# Patient Record
Sex: MALE | Race: WHITE | NOT HISPANIC OR LATINO | Employment: UNEMPLOYED | URBAN - METROPOLITAN AREA
[De-identification: names, ages, dates, MRNs, and addresses within clinical notes are randomized per-mention and may not be internally consistent; named-entity substitution may affect disease eponyms.]

---

## 2017-01-05 LAB
CHOLEST SERPL-MSCNC: 208 MG/DL (ref 0–200)
HDLC SERPL-MCNC: 50 MG/DL
LDLC SERPL CALC-MCNC: 142 MG/DL
NON HDL CHOL. (LDL+VLDL): 158
TRIGL SERPL-MCNC: 81 MG/DL

## 2017-06-25 ENCOUNTER — HOSPITAL ENCOUNTER (EMERGENCY)
Facility: HOSPITAL | Age: 72
Discharge: HOME | End: 2017-06-25
Payer: MEDICARE

## 2017-06-25 VITALS
TEMPERATURE: 98 F | SYSTOLIC BLOOD PRESSURE: 164 MMHG | RESPIRATION RATE: 20 BRPM | WEIGHT: 150 LBS | OXYGEN SATURATION: 97 % | BODY MASS INDEX: 21 KG/M2 | DIASTOLIC BLOOD PRESSURE: 80 MMHG | HEIGHT: 71 IN | HEART RATE: 67 BPM

## 2017-06-25 DIAGNOSIS — T63.461A WASP STING, ACCIDENTAL OR UNINTENTIONAL, INITIAL ENCOUNTER: Primary | ICD-10-CM

## 2017-06-25 PROCEDURE — 96372 THER/PROPH/DIAG INJ SC/IM: CPT

## 2017-06-25 PROCEDURE — 63600175 PHARM REV CODE 636 W HCPCS: Performed by: PHYSICIAN ASSISTANT

## 2017-06-25 PROCEDURE — 99283 EMERGENCY DEPT VISIT LOW MDM: CPT | Mod: 25

## 2017-06-25 RX ORDER — DIPHENHYDRAMINE HCL 25 MG
25 CAPSULE ORAL EVERY 6 HOURS PRN
Qty: 20 CAPSULE | Refills: 0 | Status: SHIPPED | OUTPATIENT
Start: 2017-06-25 | End: 2019-01-22

## 2017-06-25 RX ORDER — FAMOTIDINE 20 MG/1
20 TABLET, FILM COATED ORAL 2 TIMES DAILY
Qty: 20 TABLET | Refills: 0 | Status: SHIPPED | OUTPATIENT
Start: 2017-06-25 | End: 2019-01-22

## 2017-06-25 RX ORDER — PREDNISONE 20 MG/1
40 TABLET ORAL DAILY
Qty: 10 TABLET | Refills: 0 | Status: SHIPPED | OUTPATIENT
Start: 2017-06-25 | End: 2017-06-30

## 2017-06-25 RX ADMIN — METHYLPREDNISOLONE SODIUM SUCCINATE 125 MG: 125 INJECTION, POWDER, FOR SOLUTION INTRAMUSCULAR; INTRAVENOUS at 04:06
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
## 2019-01-22 ENCOUNTER — OFFICE VISIT (OUTPATIENT)
Dept: INTERNAL MEDICINE | Facility: CLINIC | Age: 74
End: 2019-01-22
Payer: MEDICARE

## 2019-01-22 ENCOUNTER — HOSPITAL ENCOUNTER (OUTPATIENT)
Dept: RADIOLOGY | Facility: HOSPITAL | Age: 74
Discharge: HOME | End: 2019-01-22
Attending: FAMILY MEDICINE
Payer: MEDICARE

## 2019-01-22 VITALS
HEART RATE: 66 BPM | TEMPERATURE: 98 F | HEIGHT: 70 IN | WEIGHT: 153 LBS | RESPIRATION RATE: 18 BRPM | OXYGEN SATURATION: 98 % | SYSTOLIC BLOOD PRESSURE: 122 MMHG | BODY MASS INDEX: 21.9 KG/M2 | DIASTOLIC BLOOD PRESSURE: 70 MMHG

## 2019-01-22 DIAGNOSIS — R63.4 WEIGHT LOSS: ICD-10-CM

## 2019-01-22 DIAGNOSIS — Z12.9 SCREENING FOR CANCER: ICD-10-CM

## 2019-01-22 DIAGNOSIS — D64.9 ANEMIA, UNSPECIFIED TYPE: ICD-10-CM

## 2019-01-22 DIAGNOSIS — R05.9 COUGH: ICD-10-CM

## 2019-01-22 DIAGNOSIS — Z12.5 SCREENING FOR PROSTATE CANCER: ICD-10-CM

## 2019-01-22 DIAGNOSIS — Z00.00 WELLNESS EXAMINATION: ICD-10-CM

## 2019-01-22 DIAGNOSIS — I10 ESSENTIAL HYPERTENSION: ICD-10-CM

## 2019-01-22 DIAGNOSIS — E78.5 HYPERLIPIDEMIA, UNSPECIFIED HYPERLIPIDEMIA TYPE: ICD-10-CM

## 2019-01-22 DIAGNOSIS — C34.90 MALIGNANT NEOPLASM OF LUNG, UNSPECIFIED LATERALITY, UNSPECIFIED PART OF LUNG: Primary | ICD-10-CM

## 2019-01-22 DIAGNOSIS — J44.9 CHRONIC OBSTRUCTIVE PULMONARY DISEASE, UNSPECIFIED COPD TYPE: ICD-10-CM

## 2019-01-22 DIAGNOSIS — C34.90 MALIGNANT NEOPLASM OF LUNG, UNSPECIFIED LATERALITY, UNSPECIFIED PART OF LUNG: ICD-10-CM

## 2019-01-22 PROCEDURE — 71046 X-RAY EXAM CHEST 2 VIEWS: CPT | Mod: TC

## 2019-01-22 PROCEDURE — 99214 OFFICE O/P EST MOD 30 MIN: CPT | Mod: PBBFAC,25 | Performed by: FAMILY MEDICINE

## 2019-01-22 PROCEDURE — 99999 PR PBB SHADOW E&M-EST. PATIENT-LVL IV: ICD-10-PCS | Mod: PBBFAC,,, | Performed by: FAMILY MEDICINE

## 2019-01-22 PROCEDURE — 71046 XR CHEST PA AND LATERAL: ICD-10-PCS | Mod: 26,,, | Performed by: RADIOLOGY

## 2019-01-22 PROCEDURE — 99999 PR PBB SHADOW E&M-EST. PATIENT-LVL IV: CPT | Mod: PBBFAC,,, | Performed by: FAMILY MEDICINE

## 2019-01-22 PROCEDURE — 99397 PER PM REEVAL EST PAT 65+ YR: CPT | Mod: S$PBB,,, | Performed by: FAMILY MEDICINE

## 2019-01-22 PROCEDURE — 99397 PR PREVENTIVE VISIT,EST,65 & OVER: ICD-10-PCS | Mod: S$PBB,,, | Performed by: FAMILY MEDICINE

## 2019-01-22 PROCEDURE — 71046 X-RAY EXAM CHEST 2 VIEWS: CPT | Mod: 26,,, | Performed by: RADIOLOGY

## 2019-01-22 RX ORDER — ATORVASTATIN CALCIUM 10 MG/1
TABLET, FILM COATED ORAL
COMMUNITY
Start: 2018-12-28 | End: 2019-03-19 | Stop reason: SDUPTHER

## 2019-01-22 RX ORDER — ALBUTEROL SULFATE 90 UG/1
AEROSOL, METERED RESPIRATORY (INHALATION)
COMMUNITY
Start: 2018-11-16 | End: 2020-09-24 | Stop reason: SDUPTHER

## 2019-01-22 RX ORDER — BRIMONIDINE TARTRATE AND TIMOLOL MALEATE 2; 5 MG/ML; MG/ML
1 SOLUTION OPHTHALMIC
COMMUNITY
Start: 2012-05-13 | End: 2019-01-22 | Stop reason: SDUPTHER

## 2019-01-22 RX ORDER — LATANOPROST 50 UG/ML
SOLUTION/ DROPS OPHTHALMIC
COMMUNITY
Start: 2017-06-08
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
## 2019-01-31 ENCOUNTER — HOSPITAL ENCOUNTER (OUTPATIENT)
Dept: RADIOLOGY | Facility: HOSPITAL | Age: 74
Discharge: HOME | End: 2019-01-31
Attending: FAMILY MEDICINE
Payer: MEDICARE

## 2019-01-31 DIAGNOSIS — R05.9 COUGH: ICD-10-CM

## 2019-01-31 DIAGNOSIS — Z12.9 SCREENING FOR CANCER: ICD-10-CM

## 2019-01-31 DIAGNOSIS — J44.9 CHRONIC OBSTRUCTIVE PULMONARY DISEASE, UNSPECIFIED COPD TYPE: ICD-10-CM

## 2019-01-31 DIAGNOSIS — C34.90 MALIGNANT NEOPLASM OF LUNG, UNSPECIFIED LATERALITY, UNSPECIFIED PART OF LUNG: ICD-10-CM

## 2019-01-31 DIAGNOSIS — R63.4 WEIGHT LOSS: ICD-10-CM

## 2019-01-31 PROCEDURE — 71250 CT THORAX DX C-: CPT | Mod: TC

## 2019-03-19 RX ORDER — ATORVASTATIN CALCIUM 10 MG/1
10 TABLET, FILM COATED ORAL DAILY
Qty: 30 TABLET | Refills: 3 | Status: SHIPPED | OUTPATIENT
Start: 2019-03-19 | End: 2019-07-22

## 2019-04-08 ENCOUNTER — DOCUMENTATION ONLY (OUTPATIENT)
Dept: ADMINISTRATIVE | Facility: HOSPITAL | Age: 74
End: 2019-04-08

## 2019-04-22 ENCOUNTER — OFFICE VISIT (OUTPATIENT)
Dept: INTERNAL MEDICINE | Facility: CLINIC | Age: 74
End: 2019-04-22
Payer: MEDICARE

## 2019-04-22 VITALS
TEMPERATURE: 97 F | WEIGHT: 155.19 LBS | OXYGEN SATURATION: 96 % | SYSTOLIC BLOOD PRESSURE: 126 MMHG | BODY MASS INDEX: 22.22 KG/M2 | DIASTOLIC BLOOD PRESSURE: 64 MMHG | HEIGHT: 70 IN | HEART RATE: 60 BPM

## 2019-04-22 DIAGNOSIS — I10 ESSENTIAL HYPERTENSION: ICD-10-CM

## 2019-04-22 DIAGNOSIS — F17.200 SMOKER: ICD-10-CM

## 2019-04-22 DIAGNOSIS — E78.5 HYPERLIPIDEMIA, UNSPECIFIED HYPERLIPIDEMIA TYPE: ICD-10-CM

## 2019-04-22 DIAGNOSIS — Z71.85 VACCINE COUNSELING: ICD-10-CM

## 2019-04-22 DIAGNOSIS — R73.9 ELEVATED BLOOD SUGAR: Primary | ICD-10-CM

## 2019-04-22 PROCEDURE — 99999 PR PBB SHADOW E&M-EST. PATIENT-LVL III: CPT | Mod: PBBFAC,,, | Performed by: FAMILY MEDICINE

## 2019-04-22 PROCEDURE — 99999 PR PBB SHADOW E&M-EST. PATIENT-LVL III: ICD-10-PCS | Mod: PBBFAC,,, | Performed by: FAMILY MEDICINE

## 2019-04-22 PROCEDURE — 99213 OFFICE O/P EST LOW 20 MIN: CPT | Mod: S$PBB,,, | Performed by: FAMILY MEDICINE

## 2019-04-22 PROCEDURE — 99213 PR OFFICE/OUTPT VISIT, EST, LEVL III, 20-29 MIN: ICD-10-PCS | Mod: S$PBB,,, | Performed by: FAMILY MEDICINE

## 2019-04-22 PROCEDURE — 99213 OFFICE O/P EST LOW 20 MIN: CPT | Mod: PBBFAC | Performed by: FAMILY MEDICINE

## 2019-04-22 RX ORDER — ZOSTER VACCINE RECOMBINANT, ADJUVANTED 50 MCG/0.5
KIT INTRAMUSCULAR
Qty: 1 ML | Refills: 0 | Status: SHIPPED | OUTPATIENT
Start: 2019-04-22 | End: 2020-09-24

## 2019-04-22 RX ORDER — ZOSTER VACCINE RECOMBINANT, ADJUVANTED 50 MCG/0.5
KIT INTRAMUSCULAR
Refills: 0 | COMMUNITY
Start: 2019-01-21 | End: 2019-04-22 | Stop reason: SDUPTHER

## 2019-04-22 NOTE — PROGRESS NOTES
Humberto Carlson  04/22/2019  [id number]    Markie Belcher MD  Patient Care Team:  Markie Belcher MD as PCP - General (Family Medicine)        Chief Complaint:  Chief Complaint   Patient presents with    3 month follow up       History of Present Illness:  This patient is here for recheck visit in needs to get another order for the shingrix vaccine to take to the pharmacy.  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  We looked up his CT scan of the chest and it only showed old scarring and I have asked him to get this done yearly.  His blood sugar was slightly elevated on last evaluation so he will come in next visit fasting so we can recheck it.      History:  Past Medical History:   Diagnosis Date    Asthma     Cancer     Cataract     Encounter for blood transfusion     GERD (gastroesophageal reflux disease)     Glaucoma     Hyperlipidemia     Seizures      Past Surgical History:   Procedure Laterality Date    BRAIN SURGERY      EYE SURGERY      LUNG SURGERY      Partial lung removed Left      No family history on file.  Social History     Socioeconomic History    Marital status:      Spouse name: Not on file    Number of children: Not on file    Years of education: Not on file    Highest education level: Not on file   Occupational History    Not on file   Social Needs    Financial resource strain: Not on file    Food insecurity:     Worry: Not on file     Inability: Not on file    Transportation needs:     Medical: Not on file     Non-medical: Not on file   Tobacco Use    Smoking status: Current Every Day Smoker     Packs/day: 2.00     Types: Cigarettes    Smokeless tobacco: Never Used   Substance and Sexual Activity    Alcohol use: No    Drug use: No    Sexual activity: Not on file   Lifestyle    Physical activity:     Days per week: Not on file      Minutes per session: Not on file    Stress: Not on file   Relationships    Social connections:     Talks on phone: Not on file     Gets together: Not on file     Attends Holiness service: Not on file     Active member of club or organization: Not on file     Attends meetings of clubs or organizations: Not on file     Relationship status: Not on file   Other Topics Concern    Not on file   Social History Narrative    Not on file     There is no problem list on file for this patient.    Review of patient's allergies indicates:   Allergen Reactions    Albuterol      Other reaction(s): sweaty  Other reaction(s): chills       The following were reviewed at this visit: active problem list, medication list, allergies, family history, social history, and health maintenance.    Medications:  Current Outpatient Medications on File Prior to Visit   Medication Sig Dispense Refill    atorvastatin (LIPITOR) 10 MG tablet Take 1 tablet (10 mg total) by mouth once daily. 30 tablet 3    brimonidine-timolol (COMBIGAN) 0.2-0.5 % Drop Inject 1 drop into the eye Twice daily.      latanoprost 0.005 % ophthalmic solution       phenytoin (DILANTIN) 100 MG ER capsule Take by mouth Three times a day.      PROAIR HFA 90 mcg/actuation inhaler       [DISCONTINUED] SHINGRIX, PF, 50 mcg/0.5 mL injection TO BE ADMINISTERED BY PHARMACIST FOR IMMUNIZATION  0     No current facility-administered medications on file prior to visit.        Medications have been reviewed and reconciled with patient at this visit.      Exam:  Wt Readings from Last 3 Encounters:   04/22/19 70.4 kg (155 lb 3.3 oz)   01/22/19 69.4 kg (153 lb)   06/25/17 68 kg (150 lb)     Temp Readings from Last 3 Encounters:   04/22/19 96.5 °F (35.8 °C) (Tympanic)   01/22/19 97.5 °F (36.4 °C) (Tympanic)   06/25/17 98 °F (36.7 °C) (Oral)     BP Readings from Last 3 Encounters:   04/22/19 126/64   01/22/19 122/70   06/25/17 (!) 164/80     Pulse Readings from Last 3 Encounters:    04/22/19 60   01/22/19 66   06/25/17 67     Body mass index is 22.59 kg/m².      Review of Systems   Constitutional: Negative for chills, fever and weight loss.   Respiratory: Negative for shortness of breath.    Cardiovascular: Negative for chest pain and palpitations.     Physical Exam alert and oriented, well-nourished well-developed ambulatory and in no acute distress  Heart regular rate and rhythm ///lungs-with few chronic rhonchi scattered  Abdomen soft nontender  Neuro grossly intact  Psychiatric good affect and cognition    The patient still not interested in trying to quit smoking.          Humberto was seen today for 3 month follow up.    Diagnoses and all orders for this visit:    Essential hypertension    Hyperlipidemia, unspecified hyperlipidemia type    Smoker    Vaccine counseling    Other orders  -     SHINGRIX, PF, 50 mcg/0.5 mL injection; TO BE ADMINISTERED BY PHARMACIST FOR IMMUNIZATION                  Follow up: Follow up in about 3 months (around 7/22/2019).    After visit summary was printed and given to patient upon discharge today.  Patient goals and care plan are included in After Visit Summary.

## 2019-07-22 ENCOUNTER — LAB VISIT (OUTPATIENT)
Dept: LAB | Facility: HOSPITAL | Age: 74
End: 2019-07-22
Attending: FAMILY MEDICINE
Payer: MEDICARE

## 2019-07-22 ENCOUNTER — OFFICE VISIT (OUTPATIENT)
Dept: INTERNAL MEDICINE | Facility: CLINIC | Age: 74
End: 2019-07-22
Payer: MEDICARE

## 2019-07-22 VITALS
BODY MASS INDEX: 21.69 KG/M2 | SYSTOLIC BLOOD PRESSURE: 118 MMHG | HEART RATE: 64 BPM | RESPIRATION RATE: 18 BRPM | TEMPERATURE: 98 F | DIASTOLIC BLOOD PRESSURE: 68 MMHG | WEIGHT: 149.06 LBS

## 2019-07-22 DIAGNOSIS — I10 ESSENTIAL HYPERTENSION: ICD-10-CM

## 2019-07-22 DIAGNOSIS — E78.5 HYPERLIPIDEMIA, UNSPECIFIED HYPERLIPIDEMIA TYPE: ICD-10-CM

## 2019-07-22 DIAGNOSIS — R73.9 ELEVATED BLOOD SUGAR: ICD-10-CM

## 2019-07-22 DIAGNOSIS — Z87.892 HISTORY OF ANAPHYLAXIS: ICD-10-CM

## 2019-07-22 DIAGNOSIS — E78.5 HYPERLIPIDEMIA, UNSPECIFIED HYPERLIPIDEMIA TYPE: Primary | ICD-10-CM

## 2019-07-22 DIAGNOSIS — G40.909 SEIZURE DISORDER: ICD-10-CM

## 2019-07-22 LAB
ANION GAP SERPL CALC-SCNC: 10 MMOL/L (ref 8–16)
BUN SERPL-MCNC: 10 MG/DL (ref 8–23)
CALCIUM SERPL-MCNC: 9.1 MG/DL (ref 8.7–10.5)
CHLORIDE SERPL-SCNC: 103 MMOL/L (ref 95–110)
CHOLEST SERPL-MCNC: 167 MG/DL (ref 120–199)
CHOLEST/HDLC SERPL: 2.7 {RATIO} (ref 2–5)
CO2 SERPL-SCNC: 26 MMOL/L (ref 23–29)
CREAT SERPL-MCNC: 0.8 MG/DL (ref 0.5–1.4)
EST. GFR  (AFRICAN AMERICAN): >60 ML/MIN/1.73 M^2
EST. GFR  (NON AFRICAN AMERICAN): >60 ML/MIN/1.73 M^2
ESTIMATED AVG GLUCOSE: 108 MG/DL (ref 68–131)
GLUCOSE SERPL-MCNC: 113 MG/DL (ref 70–110)
HBA1C MFR BLD HPLC: 5.4 % (ref 4–5.6)
HDLC SERPL-MCNC: 62 MG/DL (ref 40–75)
HDLC SERPL: 37.1 % (ref 20–50)
LDLC SERPL CALC-MCNC: 87.4 MG/DL (ref 63–159)
NONHDLC SERPL-MCNC: 105 MG/DL
POTASSIUM SERPL-SCNC: 4.6 MMOL/L (ref 3.5–5.1)
SODIUM SERPL-SCNC: 139 MMOL/L (ref 136–145)
TRIGL SERPL-MCNC: 88 MG/DL (ref 30–150)

## 2019-07-22 PROCEDURE — 99999 PR PBB SHADOW E&M-EST. PATIENT-LVL IV: ICD-10-PCS | Mod: PBBFAC,,, | Performed by: FAMILY MEDICINE

## 2019-07-22 PROCEDURE — 99214 OFFICE O/P EST MOD 30 MIN: CPT | Mod: PBBFAC,25 | Performed by: FAMILY MEDICINE

## 2019-07-22 PROCEDURE — 99214 OFFICE O/P EST MOD 30 MIN: CPT | Mod: S$PBB,,, | Performed by: FAMILY MEDICINE

## 2019-07-22 PROCEDURE — 83036 HEMOGLOBIN GLYCOSYLATED A1C: CPT

## 2019-07-22 PROCEDURE — 80061 LIPID PANEL: CPT

## 2019-07-22 PROCEDURE — 36415 COLL VENOUS BLD VENIPUNCTURE: CPT

## 2019-07-22 PROCEDURE — 99999 PR PBB SHADOW E&M-EST. PATIENT-LVL IV: CPT | Mod: PBBFAC,,, | Performed by: FAMILY MEDICINE

## 2019-07-22 PROCEDURE — 90471 IMMUNIZATION ADMIN: CPT | Mod: PBBFAC

## 2019-07-22 PROCEDURE — 99214 PR OFFICE/OUTPT VISIT, EST, LEVL IV, 30-39 MIN: ICD-10-PCS | Mod: S$PBB,,, | Performed by: FAMILY MEDICINE

## 2019-07-22 PROCEDURE — 80048 BASIC METABOLIC PNL TOTAL CA: CPT

## 2019-07-22 RX ORDER — ATORVASTATIN CALCIUM 10 MG/1
20 TABLET, FILM COATED ORAL DAILY
Qty: 30 TABLET | Refills: 11 | Status: SHIPPED | OUTPATIENT
Start: 2019-07-22 | End: 2020-09-24 | Stop reason: SDUPTHER

## 2019-07-22 NOTE — PROGRESS NOTES
Humberto Carlson  2019  [id number]    Markie Belcher MD  Patient Care Team:  Markie Belcher MD as PCP - General (Family Medicine)        Chief Complaint:  Chief Complaint   Patient presents with    Follow-up    Medication Refill     Atorvastatin       History of Present Illness:  Mr. carlson states that he is generally doing well and still active taking care of  activities for people.    He needs atorvastatin refill today and because his lipids were elevated we will increase to 20 mg.    He had the shingles 2nd vaccine a few months ago but is due for a tetanus and will get that today.    We discussed smoking cessation but he is not interested in discontinuing says his lungs are doing fine and he has no shortness of breath except occasionally with strenuous exertion doing carpentry type projects and his symptoms resolved if he uses his inhaler.  Also the patient states that his epi pens or  and so we will refill prescription.    The patient sees a neurologist yearly for medication for his seizure disorder but fortunately he has not had a seizure in years because he takes his medication regularly.          History:  Past Medical History:   Diagnosis Date    Asthma     Cancer     Cataract     Encounter for blood transfusion     Essential hypertension 2019    GERD (gastroesophageal reflux disease)     Glaucoma     Hyperlipidemia     Seizures      Past Surgical History:   Procedure Laterality Date    BRAIN SURGERY      EYE SURGERY      LUNG SURGERY      Partial lung removed Left      History reviewed. No pertinent family history.  Social History     Socioeconomic History    Marital status:      Spouse name: Not on file    Number of children: Not on file    Years of education: Not on file    Highest education level: Not on file   Occupational History    Not on file   Social Needs    Financial resource strain: Not on file    Food insecurity:     Worry: Not on file      Inability: Not on file    Transportation needs:     Medical: Not on file     Non-medical: Not on file   Tobacco Use    Smoking status: Current Every Day Smoker     Packs/day: 2.00     Types: Cigarettes    Smokeless tobacco: Never Used   Substance and Sexual Activity    Alcohol use: No    Drug use: No    Sexual activity: Not on file   Lifestyle    Physical activity:     Days per week: Not on file     Minutes per session: Not on file    Stress: Not on file   Relationships    Social connections:     Talks on phone: Not on file     Gets together: Not on file     Attends Pentecostal service: Not on file     Active member of club or organization: Not on file     Attends meetings of clubs or organizations: Not on file     Relationship status: Not on file   Other Topics Concern    Not on file   Social History Narrative    Not on file     Patient Active Problem List   Diagnosis    Hyperlipidemia    Essential hypertension    History of anaphylaxis    Elevated blood sugar    Seizure disorder     Review of patient's allergies indicates:   Allergen Reactions    Albuterol      Other reaction(s): sweaty  Other reaction(s): chills       The following were reviewed at this visit: active problem list, medication list, allergies, family history, social history, and health maintenance.    Medications:  Current Outpatient Medications on File Prior to Visit   Medication Sig Dispense Refill    brimonidine-timolol (COMBIGAN) 0.2-0.5 % Drop Inject 1 drop into the eye Twice daily.      latanoprost 0.005 % ophthalmic solution       phenytoin (DILANTIN) 100 MG ER capsule Take by mouth Three times a day.      PROAIR HFA 90 mcg/actuation inhaler       [DISCONTINUED] atorvastatin (LIPITOR) 10 MG tablet Take 1 tablet (10 mg total) by mouth once daily. 30 tablet 3    SHINGRIX, PF, 50 mcg/0.5 mL injection TO BE ADMINISTERED BY PHARMACIST FOR IMMUNIZATION 1 mL 0     No current facility-administered medications on file prior to  visit.        Medications have been reviewed and reconciled with patient at this visit.      Exam:  Wt Readings from Last 3 Encounters:   07/22/19 67.6 kg (149 lb 0.5 oz)   04/22/19 70.4 kg (155 lb 3.3 oz)   01/22/19 69.4 kg (153 lb)     Temp Readings from Last 3 Encounters:   07/22/19 97.6 °F (36.4 °C) (Tympanic)   04/22/19 96.5 °F (35.8 °C) (Tympanic)   01/22/19 97.5 °F (36.4 °C) (Tympanic)     BP Readings from Last 3 Encounters:   07/22/19 118/68   04/22/19 126/64   01/22/19 122/70     Pulse Readings from Last 3 Encounters:   07/22/19 64   04/22/19 60   01/22/19 66     Body mass index is 21.69 kg/m².      Review of Systems   Constitutional: Negative for chills, fever and weight loss.   Respiratory: Negative for shortness of breath.    Cardiovascular: Negative for chest pain and palpitations.     Physical Exam alert and oriented, well-nourished well-developed no acute distress, ambulatory adult male.    Heart regular rate rhythm  Lungs clear to auscultation with decreased breath sounds left posterior base  Abdomen soft nontender  Extremities no cc or E  Psychiatric good affect and cognition        Humberto was seen today for follow-up and medication refill.    Diagnoses and all orders for this visit:    Hyperlipidemia, unspecified hyperlipidemia type  -     Lipid panel; Future  -     Cancel: Lipid panel; Future    Essential hypertension    History of anaphylaxis    Seizure disorder    Elevated blood sugar    Other orders  -     atorvastatin (LIPITOR) 10 MG tablet; Take 2 tablets (20 mg total) by mouth once daily.  -     (In Office Administered) Tdap Vaccine      30 min spent face-to-face with patient and over 50% time spent counseling regarding maintenance of blood pressure and improvement on cholesterol measures, proper nutrition and needed immunizations.            Follow up: Follow up in about 2 months (around 9/22/2019).    After visit summary was printed and given to patient upon discharge today.  Patient goals  and care plan are included in After Visit Summary.

## 2019-07-22 NOTE — PATIENT INSTRUCTIONS
Tetanus shot today    Atorvastatin refilled at higher dose 20 mg    Labs to be done today    Recheck in 2 months and plan to get flu shot at that time    Called in 2 Epi Pens with 1 additional refill, authorized by Dr. Belcher to [facility]. Pt informed. Rx unable to send electronically. SUHAIL Angulo LPN

## 2019-07-29 ENCOUNTER — TELEPHONE (OUTPATIENT)
Dept: INTERNAL MEDICINE | Facility: CLINIC | Age: 74
End: 2019-07-29

## 2019-07-29 NOTE — TELEPHONE ENCOUNTER
Pharmacy notified and clarified dosage, dispensed amount and directions. Advised to call the office as needed.

## 2019-07-29 NOTE — TELEPHONE ENCOUNTER
Received a fax from the pharmacy asking for clarification on patients Atorvastatin. Please advise if the patient needs to be taking 10 mg, 2 tablets once daily or taking 20 mg once daily.

## 2019-07-31 ENCOUNTER — PATIENT OUTREACH (OUTPATIENT)
Dept: ADMINISTRATIVE | Facility: HOSPITAL | Age: 74
End: 2019-07-31

## 2019-07-31 ENCOUNTER — TELEPHONE (OUTPATIENT)
Dept: ORTHOPEDICS | Facility: CLINIC | Age: 74
End: 2019-07-31

## 2019-07-31 NOTE — TELEPHONE ENCOUNTER
----- Message from Cha Figueroa sent at 7/31/2019  8:45 AM CDT -----  Contact: self  needs call back regarding cholesterol strength, duplicate message...[phone] (home)

## 2019-07-31 NOTE — PROGRESS NOTES
Contacted patient to follow up on overdue Colonoscopy. Patient states he had colonoscopies the past, but doesn't remember if it was at [facility] or [facility].

## 2019-11-25 ENCOUNTER — OFFICE VISIT (OUTPATIENT)
Dept: INTERNAL MEDICINE | Facility: CLINIC | Age: 74
End: 2019-11-25
Payer: MEDICARE

## 2019-11-25 VITALS
HEART RATE: 69 BPM | TEMPERATURE: 99 F | SYSTOLIC BLOOD PRESSURE: 114 MMHG | WEIGHT: 146.81 LBS | OXYGEN SATURATION: 94 % | BODY MASS INDEX: 21.74 KG/M2 | DIASTOLIC BLOOD PRESSURE: 60 MMHG | HEIGHT: 69 IN

## 2019-11-25 DIAGNOSIS — I10 ESSENTIAL HYPERTENSION: ICD-10-CM

## 2019-11-25 DIAGNOSIS — J20.9 ACUTE BRONCHITIS, UNSPECIFIED ORGANISM: ICD-10-CM

## 2019-11-25 DIAGNOSIS — C34.90 MALIGNANT NEOPLASM OF LUNG, UNSPECIFIED LATERALITY, UNSPECIFIED PART OF LUNG: ICD-10-CM

## 2019-11-25 DIAGNOSIS — R06.00 DYSPNEA, UNSPECIFIED TYPE: Primary | ICD-10-CM

## 2019-11-25 PROCEDURE — 99214 PR OFFICE/OUTPT VISIT, EST, LEVL IV, 30-39 MIN: ICD-10-PCS | Mod: S$PBB,,, | Performed by: FAMILY MEDICINE

## 2019-11-25 PROCEDURE — 99214 OFFICE O/P EST MOD 30 MIN: CPT | Mod: PBBFAC,25 | Performed by: FAMILY MEDICINE

## 2019-11-25 PROCEDURE — 1125F PR PAIN SEVERITY QUANTIFIED, PAIN PRESENT: ICD-10-PCS | Mod: ,,, | Performed by: FAMILY MEDICINE

## 2019-11-25 PROCEDURE — 1159F MED LIST DOCD IN RCRD: CPT | Mod: ,,, | Performed by: FAMILY MEDICINE

## 2019-11-25 PROCEDURE — 99999 PR PBB SHADOW E&M-EST. PATIENT-LVL IV: ICD-10-PCS | Mod: PBBFAC,,, | Performed by: FAMILY MEDICINE

## 2019-11-25 PROCEDURE — 96372 THER/PROPH/DIAG INJ SC/IM: CPT | Mod: PBBFAC

## 2019-11-25 PROCEDURE — 1125F AMNT PAIN NOTED PAIN PRSNT: CPT | Mod: ,,, | Performed by: FAMILY MEDICINE

## 2019-11-25 PROCEDURE — 99214 OFFICE O/P EST MOD 30 MIN: CPT | Mod: S$PBB,,, | Performed by: FAMILY MEDICINE

## 2019-11-25 PROCEDURE — 1159F PR MEDICATION LIST DOCUMENTED IN MEDICAL RECORD: ICD-10-PCS | Mod: ,,, | Performed by: FAMILY MEDICINE

## 2019-11-25 PROCEDURE — 99999 PR PBB SHADOW E&M-EST. PATIENT-LVL IV: CPT | Mod: PBBFAC,,, | Performed by: FAMILY MEDICINE

## 2019-11-25 RX ORDER — ALBUTEROL SULFATE 90 UG/1
AEROSOL, METERED RESPIRATORY (INHALATION)
COMMUNITY
Start: 2018-02-28 | End: 2021-04-19

## 2019-11-25 RX ORDER — BETAMETHASONE SODIUM PHOSPHATE AND BETAMETHASONE ACETATE 3; 3 MG/ML; MG/ML
6 INJECTION, SUSPENSION INTRA-ARTICULAR; INTRALESIONAL; INTRAMUSCULAR; SOFT TISSUE
Status: COMPLETED | OUTPATIENT
Start: 2019-11-25 | End: 2019-11-25

## 2019-11-25 RX ORDER — LEVOFLOXACIN 750 MG/1
750 TABLET ORAL DAILY
Qty: 10 TABLET | Refills: 0 | Status: SHIPPED | OUTPATIENT
Start: 2019-11-25 | End: 2020-09-24

## 2019-11-25 RX ORDER — BENZONATATE 100 MG/1
100 CAPSULE ORAL 3 TIMES DAILY PRN
Qty: 30 CAPSULE | Refills: 0 | Status: SHIPPED | OUTPATIENT
Start: 2019-11-25 | End: 2019-12-05

## 2019-11-25 RX ADMIN — BETAMETHASONE ACETATE AND BETAMETHASONE SODIUM PHOSPHATE 6 MG: 3; 3 INJECTION, SUSPENSION INTRA-ARTICULAR; INTRALESIONAL; INTRAMUSCULAR; SOFT TISSUE at 04:11

## 2019-11-25 NOTE — PROGRESS NOTES
Humberto Carlson  11/25/2019  [id number]    Markie Belcher MD  Patient Care Team:  Markie Belcher MD as PCP - General (Family Medicine)  DEIDRE. Anuel Ballesteros MD as Consulting Physician (Gastroenterology)        Chief Complaint:  Chief Complaint   Patient presents with    Cough    chest congestion    runny nose       History of Present Illness:   Humberto Carlson 74 y.o. male presents today with 3-4 day history sinus congestion and heart cough.  The cough is sometimes productive of yellow sputum and he is having some irritation in his chest when he has heart coughing spells.  No known fever and no sweats or chills or weight loss.  No nausea vomiting or diarrhea    He does feel that his breathing has worsened over the last week and that he occasionally feels slightly shortness of breath.  His had some difficulty sleeping because he says at times he has some drainage in his throat which seems to build up and cause him congestion.    He had a flu shot about 5 days ago and then within a few days of the injection started feeling like he was getting a cold or possibly bronchitis; no exertion related chest pain.      History:  Past Medical History:   Diagnosis Date    Asthma     Cancer     Cataract     Encounter for blood transfusion     Essential hypertension 7/22/2019    GERD (gastroesophageal reflux disease)     Glaucoma     Hyperlipidemia     Seizures      Past Surgical History:   Procedure Laterality Date    BRAIN SURGERY      EYE SURGERY      LUNG SURGERY      Partial lung removed Left      No family history on file.  Social History     Socioeconomic History    Marital status:      Spouse name: Not on file    Number of children: Not on file    Years of education: Not on file    Highest education level: Not on file   Occupational History    Not on file   Social Needs    Financial resource strain: Not on file    Food insecurity:     Worry: Not on file     Inability: Not on file     Transportation needs:     Medical: Not on file     Non-medical: Not on file   Tobacco Use    Smoking status: Current Every Day Smoker     Packs/day: 2.00     Types: Cigarettes    Smokeless tobacco: Never Used   Substance and Sexual Activity    Alcohol use: No    Drug use: No    Sexual activity: Not on file   Lifestyle    Physical activity:     Days per week: Not on file     Minutes per session: Not on file    Stress: Not on file   Relationships    Social connections:     Talks on phone: Not on file     Gets together: Not on file     Attends Gnosticist service: Not on file     Active member of club or organization: Not on file     Attends meetings of clubs or organizations: Not on file     Relationship status: Not on file   Other Topics Concern    Not on file   Social History Narrative    Not on file     Patient Active Problem List   Diagnosis    Hyperlipidemia    Essential hypertension    History of anaphylaxis    Elevated blood sugar    Seizure disorder     Review of patient's allergies indicates:   Allergen Reactions    Albuterol      Other reaction(s): sweaty  Other reaction(s): chills    Wasp sting  [allergen ext-venom-honey bee]      Other reaction(s): swelling       The following were reviewed at this visit: active problem list, medication list, allergies, family history, social history, and health maintenance.    Medications:  Current Outpatient Medications on File Prior to Visit   Medication Sig Dispense Refill    albuterol (PROVENTIL/VENTOLIN HFA) 90 mcg/actuation inhaler 2 puffs as needed      atorvastatin (LIPITOR) 10 MG tablet Take 2 tablets (20 mg total) by mouth once daily. 30 tablet 11    brimonidine-timolol (COMBIGAN) 0.2-0.5 % Drop Inject 1 drop into the eye Twice daily.      latanoprost 0.005 % ophthalmic solution       phenytoin (DILANTIN) 100 MG ER capsule Take by mouth Three times a day.      epinephrine (EPIPEN INJ)       PROAIR HFA 90 mcg/actuation inhaler        SHINGRIX, PF, 50 mcg/0.5 mL injection TO BE ADMINISTERED BY PHARMACIST FOR IMMUNIZATION 1 mL 0     No current facility-administered medications on file prior to visit.        Medications have been reviewed and reconciled with patient at this visit.      Exam:  Wt Readings from Last 3 Encounters:   11/25/19 66.6 kg (146 lb 13.2 oz)   07/22/19 67.6 kg (149 lb 0.5 oz)   04/22/19 70.4 kg (155 lb 3.3 oz)     Temp Readings from Last 3 Encounters:   11/25/19 98.6 °F (37 °C) (Tympanic)   07/22/19 97.6 °F (36.4 °C) (Tympanic)   04/22/19 96.5 °F (35.8 °C) (Tympanic)     BP Readings from Last 3 Encounters:   11/25/19 114/60   07/22/19 118/68   04/22/19 126/64     Pulse Readings from Last 3 Encounters:   11/25/19 69   07/22/19 64   04/22/19 60     Body mass index is 21.68 kg/m².      Review of Systems   Constitutional: Negative for chills, fever and weight loss.   Respiratory: Positive for cough. Negative for shortness of breath.    Cardiovascular: Negative for chest pain and palpitations.     Physical Exam  WNWD, A&O ambulatory pleasant adult male appears a little fatigued but in no acute distress at this time    HEENT-ear canal clear  Pharynx benign  No nasal discharge  Neck supple    Heart regular rate and rhythm  Lungs few scattered rhonchi    Psychiatric good affect cognition    25 min spent face patient over 50% of that time in counseling regarding his shortness of breath past history of lung cancer in regards to his present acute bronchitis.  We discussed things  for him to watch for in case his condition worsens.  The patient verbalizes good understanding of medical issue involved and appreciation of the care provided today.      Humberto was seen today for cough, chest congestion and runny nose.    Diagnoses and all orders for this visit:    Dyspnea, unspecified type  -     X-Ray Chest PA And Lateral    Essential hypertension    Other orders  -     betamethasone acetate-betamethasone sodium phosphate injection 6 mg  -      levoFLOXacin (LEVAQUIN) 750 MG tablet; Take 1 tablet (750 mg total) by mouth once daily.  -     benzonatate (TESSALON) 100 MG capsule; Take 1 capsule (100 mg total) by mouth 3 (three) times daily as needed for Cough.          I discussed the nature of the problem(s) with the patient today.  The patient was encouraged to participate in appropriate physical activity.    After visit summary was printed and given to patient upon discharge today.  Patient goals and care plan are included in After Visit Summary.    This note was produced with voice recognition software and may have sound a like errors

## 2019-11-25 NOTE — PATIENT INSTRUCTIONS
Rest fluids    To emergency room if breathing gets any worse or feels like he is struggling to breathe or if temperature over 101    Tylenol as needed for any aches or pains

## 2019-11-26 ENCOUNTER — HOSPITAL ENCOUNTER (OUTPATIENT)
Dept: RADIOLOGY | Facility: HOSPITAL | Age: 74
Discharge: HOME | End: 2019-11-26
Attending: FAMILY MEDICINE
Payer: MEDICARE

## 2019-11-26 DIAGNOSIS — R06.00 DYSPNEA, UNSPECIFIED TYPE: ICD-10-CM

## 2019-11-26 PROCEDURE — 71046 X-RAY EXAM CHEST 2 VIEWS: CPT | Mod: 26,,, | Performed by: RADIOLOGY

## 2019-11-26 PROCEDURE — 71046 X-RAY EXAM CHEST 2 VIEWS: CPT | Mod: TC

## 2019-11-26 PROCEDURE — 71046 XR CHEST PA AND LATERAL: ICD-10-PCS | Mod: 26,,, | Performed by: RADIOLOGY

## 2019-12-02 ENCOUNTER — OFFICE VISIT (OUTPATIENT)
Dept: INTERNAL MEDICINE | Facility: CLINIC | Age: 74
End: 2019-12-02
Payer: MEDICARE

## 2019-12-02 VITALS
OXYGEN SATURATION: 97 % | HEIGHT: 69 IN | SYSTOLIC BLOOD PRESSURE: 120 MMHG | BODY MASS INDEX: 21.25 KG/M2 | TEMPERATURE: 97 F | HEART RATE: 64 BPM | DIASTOLIC BLOOD PRESSURE: 64 MMHG | WEIGHT: 143.5 LBS

## 2019-12-02 DIAGNOSIS — J20.9 ACUTE BRONCHITIS, UNSPECIFIED ORGANISM: ICD-10-CM

## 2019-12-02 DIAGNOSIS — Z12.5 SCREENING FOR PROSTATE CANCER: ICD-10-CM

## 2019-12-02 DIAGNOSIS — C34.90 MALIGNANT NEOPLASM OF LUNG, UNSPECIFIED LATERALITY, UNSPECIFIED PART OF LUNG: ICD-10-CM

## 2019-12-02 DIAGNOSIS — R73.03 PRE-DIABETES: ICD-10-CM

## 2019-12-02 DIAGNOSIS — E78.2 MIXED HYPERLIPIDEMIA: Primary | ICD-10-CM

## 2019-12-02 DIAGNOSIS — R63.4 WEIGHT LOSS: ICD-10-CM

## 2019-12-02 PROCEDURE — 1126F PR PAIN SEVERITY QUANTIFIED, NO PAIN PRESENT: ICD-10-PCS | Mod: ,,, | Performed by: FAMILY MEDICINE

## 2019-12-02 PROCEDURE — 99214 PR OFFICE/OUTPT VISIT, EST, LEVL IV, 30-39 MIN: ICD-10-PCS | Mod: S$PBB,,, | Performed by: FAMILY MEDICINE

## 2019-12-02 PROCEDURE — 1159F MED LIST DOCD IN RCRD: CPT | Mod: ,,, | Performed by: FAMILY MEDICINE

## 2019-12-02 PROCEDURE — 1126F AMNT PAIN NOTED NONE PRSNT: CPT | Mod: ,,, | Performed by: FAMILY MEDICINE

## 2019-12-02 PROCEDURE — 99999 PR PBB SHADOW E&M-EST. PATIENT-LVL IV: CPT | Mod: PBBFAC,,, | Performed by: FAMILY MEDICINE

## 2019-12-02 PROCEDURE — 1159F PR MEDICATION LIST DOCUMENTED IN MEDICAL RECORD: ICD-10-PCS | Mod: ,,, | Performed by: FAMILY MEDICINE

## 2019-12-02 PROCEDURE — 99214 OFFICE O/P EST MOD 30 MIN: CPT | Mod: S$PBB,,, | Performed by: FAMILY MEDICINE

## 2019-12-02 PROCEDURE — 99999 PR PBB SHADOW E&M-EST. PATIENT-LVL IV: ICD-10-PCS | Mod: PBBFAC,,, | Performed by: FAMILY MEDICINE

## 2019-12-02 PROCEDURE — 99214 OFFICE O/P EST MOD 30 MIN: CPT | Mod: PBBFAC | Performed by: FAMILY MEDICINE

## 2019-12-02 NOTE — PROGRESS NOTES
Humberto Carlson  12/02/2019  [id number]    Markie Belcher MD  Patient Care Team:  Markie Belcher MD as PCP - General (Family Medicine)  DEIDRE. Anuel Ballesteros MD as Consulting Physician (Gastroenterology)        Chief Complaint:  Chief Complaint   Patient presents with    1 week followup     Has returned.  History of Present Illness:   Humberto Carlson 74 y.o. male presents today with several week history of cough with yellowish gray sputum and with 3-4 days of fever and chills last week.  His appetite had decreased and he had lost a few lb in the process as well.  He says that he has not had any fever chills in the last 3 days and that his normal appetite has returned.  He is not having shortness of breath any more and, is sleeping better and feels like he is getting back to normal.    He stated his home by himself during Thanksgiving last week so that he would not spread illness to others .    The patient refuses to have colonoscopy or to even have a quick fit:  Occult screen blood test done.  He states that if he sees any blood in his stool he will let us know.    He did have a flu shot a few weeks ago and he has had the shingles  Vaccine.          History:  Past Medical History:   Diagnosis Date    Asthma     Cancer     Cataract     Encounter for blood transfusion     Essential hypertension 7/22/2019    GERD (gastroesophageal reflux disease)     Glaucoma     Hyperlipidemia     Seizures      Past Surgical History:   Procedure Laterality Date    BRAIN SURGERY      EYE SURGERY      LUNG SURGERY      Partial lung removed Left      No family history on file.  Social History     Socioeconomic History    Marital status:      Spouse name: Not on file    Number of children: Not on file    Years of education: Not on file    Highest education level: Not on file   Occupational History    Not on file   Social Needs    Financial resource strain: Not on file    Food insecurity:     Worry: Not on file      Inability: Not on file    Transportation needs:     Medical: Not on file     Non-medical: Not on file   Tobacco Use    Smoking status: Current Every Day Smoker     Packs/day: 2.00     Types: Cigarettes    Smokeless tobacco: Never Used   Substance and Sexual Activity    Alcohol use: No    Drug use: No    Sexual activity: Not on file   Lifestyle    Physical activity:     Days per week: Not on file     Minutes per session: Not on file    Stress: Not on file   Relationships    Social connections:     Talks on phone: Not on file     Gets together: Not on file     Attends Evangelical service: Not on file     Active member of club or organization: Not on file     Attends meetings of clubs or organizations: Not on file     Relationship status: Not on file   Other Topics Concern    Not on file   Social History Narrative    Not on file     Patient Active Problem List   Diagnosis    Hyperlipidemia    Essential hypertension    History of anaphylaxis    Elevated blood sugar    Seizure disorder    Acute bronchitis    Malignant neoplasm of lung    Screening for prostate cancer    Pre-diabetes    Weight loss     Review of patient's allergies indicates:   Allergen Reactions    Albuterol      Other reaction(s): sweaty  Other reaction(s): chills    Wasp sting  [allergen ext-venom-honey bee]      Other reaction(s): swelling       The following were reviewed at this visit: active problem list, medication list, allergies, family history, social history, and health maintenance.    Medications:  Current Outpatient Medications on File Prior to Visit   Medication Sig Dispense Refill    albuterol (PROVENTIL/VENTOLIN HFA) 90 mcg/actuation inhaler 2 puffs as needed      atorvastatin (LIPITOR) 10 MG tablet Take 2 tablets (20 mg total) by mouth once daily. 30 tablet 11    benzonatate (TESSALON) 100 MG capsule Take 1 capsule (100 mg total) by mouth 3 (three) times daily as needed for Cough. 30 capsule 0     brimonidine-timolol (COMBIGAN) 0.2-0.5 % Drop Inject 1 drop into the eye Twice daily.      epinephrine (EPIPEN INJ)       latanoprost 0.005 % ophthalmic solution       levoFLOXacin (LEVAQUIN) 750 MG tablet Take 1 tablet (750 mg total) by mouth once daily. 10 tablet 0    phenytoin (DILANTIN) 100 MG ER capsule Take by mouth Three times a day.      PROAIR HFA 90 mcg/actuation inhaler       FLUZONE HIGH-DOSE 2019-20, PF, 180 mcg/0.5 mL Syrg TO BE ADMINISTERED BY PHARMACIST FOR IMMUNIZATION  0    SHINGRIX, PF, 50 mcg/0.5 mL injection TO BE ADMINISTERED BY PHARMACIST FOR IMMUNIZATION 1 mL 0     No current facility-administered medications on file prior to visit.        Medications have been reviewed and reconciled with patient at this visit.      Exam:  Wt Readings from Last 3 Encounters:   12/02/19 65.1 kg (143 lb 8.3 oz)   11/25/19 66.6 kg (146 lb 13.2 oz)   07/22/19 67.6 kg (149 lb 0.5 oz)     Temp Readings from Last 3 Encounters:   12/02/19 97.4 °F (36.3 °C) (Tympanic)   11/25/19 98.6 °F (37 °C) (Tympanic)   07/22/19 97.6 °F (36.4 °C) (Tympanic)     BP Readings from Last 3 Encounters:   12/02/19 120/64   11/25/19 114/60   07/22/19 118/68     Pulse Readings from Last 3 Encounters:   12/02/19 64   11/25/19 69   07/22/19 64     Body mass index is 21.19 kg/m².      Review of Systems   Constitutional: Negative for chills, fever and weight loss.   Respiratory: Negative for shortness of breath.    Cardiovascular: Negative for chest pain and palpitations.     Physical Exam   Constitutional: He is oriented to person, place, and time. He appears well-developed and well-nourished. No distress.   HENT:   Head: Normocephalic and atraumatic.   Eyes: Pupils are equal, round, and reactive to light. EOM are normal. Right eye exhibits no discharge. Left eye exhibits no discharge.   Neck: Normal range of motion. Neck supple. No thyromegaly present.   Cardiovascular: Normal rate, regular rhythm and normal heart sounds. Exam  reveals no gallop and no friction rub.   No murmur heard.  Pulmonary/Chest: Effort normal and breath sounds normal. No respiratory distress. He has no wheezes. He has no rales.   Abdominal: Soft. He exhibits no distension. There is no tenderness.   Musculoskeletal: Normal range of motion.   Neurological: He is alert and oriented to person, place, and time.   Psychiatric: He has a normal mood and affect.   Vitals reviewed.    WNWD, A&O  25 min spent face-to-face with patient over 50% of that time in counseling regarding recommendation for his preventive care and also discussing his labs this past July and reviewing his labs from January as well.  We also discussed the need for yearly annual follow-ups and labs and he will come in and approximately 3 months and have his labs done and his annual recheck.  He also understands the need to finish his medication for the acute bronchitis.  Chest x-ray was negative for pneumonia    Patient expresses appreciation for the care provided today.    Humberto was seen today for 1 week followup.    Diagnoses and all orders for this visit:    Mixed hyperlipidemia  -     Lipid panel; Future    Acute bronchitis, unspecified organism    Malignant neoplasm of lung, unspecified laterality, unspecified part of lung  -     CBC auto differential; Future  -     Comprehensive metabolic panel; Future  -     Lipid panel; Future  -     TSH; Future  -     Hemoglobin A1c; Future  -     Microalbumin/creatinine urine ratio; Future  -     PSA, Screening; Future    Screening for prostate cancer  -     CBC auto differential; Future  -     Comprehensive metabolic panel; Future  -     Lipid panel; Future  -     TSH; Future  -     Hemoglobin A1c; Future  -     Microalbumin/creatinine urine ratio; Future  -     PSA, Screening; Future    Pre-diabetes  -     CBC auto differential; Future  -     Comprehensive metabolic panel; Future  -     Lipid panel; Future  -     TSH; Future  -     Hemoglobin A1c; Future  -      Microalbumin/creatinine urine ratio; Future  -     PSA, Screening; Future    Weight loss  -     CBC auto differential; Future  -     Comprehensive metabolic panel; Future  -     Lipid panel; Future  -     TSH; Future  -     Hemoglobin A1c; Future  -     Microalbumin/creatinine urine ratio; Future  -     PSA, Screening; Future          I discussed the nature of the problem(s) with the patient today.  The patient was encouraged to participate in appropriate physical activity.    After visit summary was printed and given to patient upon discharge today.  Patient goals and care plan are included in After Visit Summary.    This note was produced with voice recognition software and may have sound a like errors

## 2019-12-03 PROBLEM — R06.00 DYSPNEA: Status: ACTIVE | Noted: 2019-12-03

## 2020-02-25 ENCOUNTER — LAB VISIT (OUTPATIENT)
Dept: LAB | Facility: HOSPITAL | Age: 75
End: 2020-02-25
Attending: FAMILY MEDICINE
Payer: MEDICARE

## 2020-02-25 DIAGNOSIS — R73.03 PRE-DIABETES: ICD-10-CM

## 2020-02-25 DIAGNOSIS — C34.90 MALIGNANT NEOPLASM OF LUNG, UNSPECIFIED LATERALITY, UNSPECIFIED PART OF LUNG: ICD-10-CM

## 2020-02-25 DIAGNOSIS — Z12.5 SCREENING FOR PROSTATE CANCER: ICD-10-CM

## 2020-02-25 DIAGNOSIS — R63.4 WEIGHT LOSS: ICD-10-CM

## 2020-02-25 DIAGNOSIS — E78.2 MIXED HYPERLIPIDEMIA: ICD-10-CM

## 2020-02-25 LAB
ALBUMIN SERPL BCP-MCNC: 4 G/DL (ref 3.5–5.2)
ALP SERPL-CCNC: 57 U/L (ref 55–135)
ALT SERPL W/O P-5'-P-CCNC: 14 U/L (ref 10–44)
ANION GAP SERPL CALC-SCNC: 7 MMOL/L (ref 8–16)
AST SERPL-CCNC: 17 U/L (ref 10–40)
BASOPHILS # BLD AUTO: 0.08 K/UL (ref 0–0.2)
BASOPHILS NFR BLD: 1.2 % (ref 0–1.9)
BILIRUB SERPL-MCNC: 0.3 MG/DL (ref 0.1–1)
BUN SERPL-MCNC: 11 MG/DL (ref 8–23)
CALCIUM SERPL-MCNC: 9.2 MG/DL (ref 8.7–10.5)
CHLORIDE SERPL-SCNC: 102 MMOL/L (ref 95–110)
CHOLEST SERPL-MCNC: 187 MG/DL (ref 120–199)
CHOLEST/HDLC SERPL: 3.8 {RATIO} (ref 2–5)
CO2 SERPL-SCNC: 29 MMOL/L (ref 23–29)
COMPLEXED PSA SERPL-MCNC: 0.88 NG/ML (ref 0–4)
CREAT SERPL-MCNC: 0.8 MG/DL (ref 0.5–1.4)
DIFFERENTIAL METHOD: ABNORMAL
EOSINOPHIL # BLD AUTO: 0.9 K/UL (ref 0–0.5)
EOSINOPHIL NFR BLD: 12.9 % (ref 0–8)
ERYTHROCYTE [DISTWIDTH] IN BLOOD BY AUTOMATED COUNT: 13.2 % (ref 11.5–14.5)
EST. GFR  (AFRICAN AMERICAN): >60 ML/MIN/1.73 M^2
EST. GFR  (NON AFRICAN AMERICAN): >60 ML/MIN/1.73 M^2
ESTIMATED AVG GLUCOSE: 111 MG/DL (ref 68–131)
GLUCOSE SERPL-MCNC: 106 MG/DL (ref 70–110)
HBA1C MFR BLD HPLC: 5.5 % (ref 4–5.6)
HCT VFR BLD AUTO: 44.2 % (ref 40–54)
HDLC SERPL-MCNC: 49 MG/DL (ref 40–75)
HDLC SERPL: 26.2 % (ref 20–50)
HGB BLD-MCNC: 13.5 G/DL (ref 14–18)
IMM GRANULOCYTES # BLD AUTO: 0.02 K/UL (ref 0–0.04)
IMM GRANULOCYTES NFR BLD AUTO: 0.3 % (ref 0–0.5)
LDLC SERPL CALC-MCNC: 110.4 MG/DL (ref 63–159)
LYMPHOCYTES # BLD AUTO: 2.7 K/UL (ref 1–4.8)
LYMPHOCYTES NFR BLD: 39.7 % (ref 18–48)
MCH RBC QN AUTO: 29.5 PG (ref 27–31)
MCHC RBC AUTO-ENTMCNC: 30.5 G/DL (ref 32–36)
MCV RBC AUTO: 97 FL (ref 82–98)
MONOCYTES # BLD AUTO: 0.5 K/UL (ref 0.3–1)
MONOCYTES NFR BLD: 7.4 % (ref 4–15)
NEUTROPHILS # BLD AUTO: 2.6 K/UL (ref 1.8–7.7)
NEUTROPHILS NFR BLD: 38.5 % (ref 38–73)
NONHDLC SERPL-MCNC: 138 MG/DL
NRBC BLD-RTO: 0 /100 WBC
PLATELET # BLD AUTO: 282 K/UL (ref 150–350)
PMV BLD AUTO: 10.9 FL (ref 9.2–12.9)
POTASSIUM SERPL-SCNC: 4.8 MMOL/L (ref 3.5–5.1)
PROT SERPL-MCNC: 7.2 G/DL (ref 6–8.4)
RBC # BLD AUTO: 4.57 M/UL (ref 4.6–6.2)
SODIUM SERPL-SCNC: 138 MMOL/L (ref 136–145)
TRIGL SERPL-MCNC: 138 MG/DL (ref 30–150)
TSH SERPL DL<=0.005 MIU/L-ACNC: 1.58 UIU/ML (ref 0.4–4)
WBC # BLD AUTO: 6.77 K/UL (ref 3.9–12.7)

## 2020-02-25 PROCEDURE — 36415 COLL VENOUS BLD VENIPUNCTURE: CPT

## 2020-02-25 PROCEDURE — 80053 COMPREHEN METABOLIC PANEL: CPT

## 2020-02-25 PROCEDURE — 85025 COMPLETE CBC W/AUTO DIFF WBC: CPT

## 2020-02-25 PROCEDURE — 83036 HEMOGLOBIN GLYCOSYLATED A1C: CPT

## 2020-02-25 PROCEDURE — 80061 LIPID PANEL: CPT

## 2020-02-25 PROCEDURE — 84153 ASSAY OF PSA TOTAL: CPT

## 2020-02-25 PROCEDURE — 84443 ASSAY THYROID STIM HORMONE: CPT

## 2020-02-26 ENCOUNTER — TELEPHONE (OUTPATIENT)
Dept: ORTHOPEDICS | Facility: CLINIC | Age: 75
End: 2020-02-26

## 2020-02-26 NOTE — TELEPHONE ENCOUNTER
Spoke with patient regarding his labs. He verbalized understanding and was grateful for the call. MS      ----- Message from Markie Belcher MD sent at 2/25/2020  5:01 PM CST -----  Labs look very good-continue same.

## 2020-03-02 ENCOUNTER — OFFICE VISIT (OUTPATIENT)
Dept: INTERNAL MEDICINE | Facility: CLINIC | Age: 75
End: 2020-03-02
Payer: MEDICARE

## 2020-03-02 VITALS
WEIGHT: 146.81 LBS | SYSTOLIC BLOOD PRESSURE: 128 MMHG | BODY MASS INDEX: 21.74 KG/M2 | HEART RATE: 72 BPM | HEIGHT: 69 IN | OXYGEN SATURATION: 97 % | TEMPERATURE: 96 F | DIASTOLIC BLOOD PRESSURE: 70 MMHG

## 2020-03-02 DIAGNOSIS — M54.50 RIGHT-SIDED LOW BACK PAIN WITHOUT SCIATICA, UNSPECIFIED CHRONICITY: ICD-10-CM

## 2020-03-02 DIAGNOSIS — I10 ESSENTIAL HYPERTENSION: Primary | ICD-10-CM

## 2020-03-02 DIAGNOSIS — C34.00 MALIGNANT NEOPLASM OF HILUS OF LUNG, UNSPECIFIED LATERALITY: ICD-10-CM

## 2020-03-02 PROCEDURE — 99999 PR PBB SHADOW E&M-EST. PATIENT-LVL III: ICD-10-PCS | Mod: PBBFAC,,, | Performed by: FAMILY MEDICINE

## 2020-03-02 PROCEDURE — 99214 PR OFFICE/OUTPT VISIT, EST, LEVL IV, 30-39 MIN: ICD-10-PCS | Mod: S$PBB,,, | Performed by: FAMILY MEDICINE

## 2020-03-02 PROCEDURE — 99214 OFFICE O/P EST MOD 30 MIN: CPT | Mod: S$PBB,,, | Performed by: FAMILY MEDICINE

## 2020-03-02 PROCEDURE — 99999 PR PBB SHADOW E&M-EST. PATIENT-LVL III: CPT | Mod: PBBFAC,,, | Performed by: FAMILY MEDICINE

## 2020-03-02 PROCEDURE — 99213 OFFICE O/P EST LOW 20 MIN: CPT | Mod: PBBFAC | Performed by: FAMILY MEDICINE

## 2020-03-02 NOTE — PATIENT INSTRUCTIONS
Patient should gradually increased back to full normal activity as tolerated but understands if this takes more than 1-2 weeks or his back flares up again that he should come back and and we will plan to x-ray and further evaluate especially and light of his previous cancer history.     For now activity as tolerated and apply ice if needed

## 2020-03-02 NOTE — PROGRESS NOTES
Humberto Carlson  03/02/2020  [id number]    Markie Belcher MD  Patient Care Team:  Markie Belcher MD as PCP - General (Family Medicine)  Markie Belcher MD as PCP [place] [facility]  MARGIE Ballesteros MD as Consulting Physician (Gastroenterology)        Chief Complaint:  Chief Complaint   Patient presents with    Follow-up       History of Present Illness:   Humberto Carlson 74 y.o. male  had been doing well but 2 weeks ago lift a lot more our the my it and felt a strain in his right low back that sometimes radiates to the hip but not down the leg.  The patient states he reduced activity over the last 2 weeks and the pain is improving.  He says he has stiffness when he 1st gets up in the morning and starts moving around but then the low back loosens up and the pain resolves.  He is not having to take medication or specific treatment for this.    No change in bowel or bladder habits and no fever weight loss or chills.  He is sleeping well.      History:  Past Medical History:   Diagnosis Date    Asthma     Cancer     Cataract     Encounter for blood transfusion     Essential hypertension 7/22/2019    GERD (gastroesophageal reflux disease)     Glaucoma     Hyperlipidemia     Seizures      Past Surgical History:   Procedure Laterality Date    BRAIN SURGERY      EYE SURGERY      LUNG SURGERY      Partial lung removed Left      History reviewed. No pertinent family history.  Social History     Socioeconomic History    Marital status:      Spouse name: Not on file    Number of children: Not on file    Years of education: Not on file    Highest education level: Not on file   Occupational History    Not on file   Social Needs    Financial resource strain: Not on file    Food insecurity:     Worry: Not on file     Inability: Not on file    Transportation needs:     Medical: Not on file     Non-medical: Not on file   Tobacco Use    Smoking status: Current Every Day Smoker     Packs/day: 2.00      Types: Cigarettes    Smokeless tobacco: Never Used   Substance and Sexual Activity    Alcohol use: No    Drug use: No    Sexual activity: Not on file   Lifestyle    Physical activity:     Days per week: Not on file     Minutes per session: Not on file    Stress: Not on file   Relationships    Social connections:     Talks on phone: Not on file     Gets together: Not on file     Attends Denominational service: Not on file     Active member of club or organization: Not on file     Attends meetings of clubs or organizations: Not on file     Relationship status: Not on file   Other Topics Concern    Not on file   Social History Narrative    Not on file     Patient Active Problem List   Diagnosis    Hyperlipidemia    Essential hypertension    History of anaphylaxis    Elevated blood sugar    Seizure disorder    Acute bronchitis    Malignant neoplasm of lung    Screening for prostate cancer    Pre-diabetes    Weight loss    Dyspnea    Right-sided low back pain without sciatica     Review of patient's allergies indicates:   Allergen Reactions    Albuterol      Other reaction(s): sweaty  Other reaction(s): chills    Wasp sting  [allergen ext-venom-honey bee]      Other reaction(s): swelling       The following were reviewed at this visit: active problem list, medication list, allergies, family history, social history, and health maintenance.    Medications:  Current Outpatient Medications on File Prior to Visit   Medication Sig Dispense Refill    atorvastatin (LIPITOR) 10 MG tablet Take 2 tablets (20 mg total) by mouth once daily. 30 tablet 11    brimonidine-timolol (COMBIGAN) 0.2-0.5 % Drop Inject 1 drop into the eye Twice daily.      epinephrine (EPIPEN INJ)       latanoprost 0.005 % ophthalmic solution       phenytoin (DILANTIN) 100 MG ER capsule Take by mouth Three times a day.      albuterol (PROVENTIL/VENTOLIN HFA) 90 mcg/actuation inhaler 2 puffs as needed      FLUZONE HIGH-DOSE 2019-20, PF,  180 mcg/0.5 mL Syrg TO BE ADMINISTERED BY PHARMACIST FOR IMMUNIZATION  0    levoFLOXacin (LEVAQUIN) 750 MG tablet Take 1 tablet (750 mg total) by mouth once daily. (Patient not taking: Reported on 3/2/2020) 10 tablet 0    PROAIR HFA 90 mcg/actuation inhaler       SHINGRIX, PF, 50 mcg/0.5 mL injection TO BE ADMINISTERED BY PHARMACIST FOR IMMUNIZATION 1 mL 0     No current facility-administered medications on file prior to visit.        Medications have been reviewed and reconciled with patient at this visit.      Exam:  Wt Readings from Last 3 Encounters:   03/02/20 66.6 kg (146 lb 13.2 oz)   12/02/19 65.1 kg (143 lb 8.3 oz)   11/25/19 66.6 kg (146 lb 13.2 oz)     Temp Readings from Last 3 Encounters:   03/02/20 (!) 95.8 °F (35.4 °C)   12/02/19 97.4 °F (36.3 °C) (Tympanic)   11/25/19 98.6 °F (37 °C) (Tympanic)     BP Readings from Last 3 Encounters:   03/02/20 128/70   12/02/19 120/64   11/25/19 114/60     Pulse Readings from Last 3 Encounters:   03/02/20 72   12/02/19 64   11/25/19 69     Body mass index is 21.68 kg/m².      Review of Systems   Constitutional: Negative for chills, fever and weight loss.   Respiratory: Negative for shortness of breath.    Cardiovascular: Negative for chest pain and palpitations.     Physical Exam  WNWD, A&O ambulatory and in no acute distress    Respiratory effort is    Lumbar exam-mild tenderness to deep palpation in the right lumbosacral region.  Negative straight leg raise to 90° bilateral  Knee jerk and ankle jerk 1+ bilaterally    Strength is good    Range of motion is good    Neurovascular intact    Psychiatric good affect and cognition      Humberto was seen today for follow-up.    Diagnoses and all orders for this visit:    Essential hypertension    Right-sided low back pain without sciatica, unspecified chronicity    Malignant neoplasm of hilus of lung, unspecified laterality        The patient verbalized good understanding of the medical issues discussed today and  expressed appreciation for the care provided.  Patient was given the opportunity to ask questions and be an active participant in their medical care. Patient had no further questions or concerns at this time.   The patient was encouraged to participate in appropriate physical activity.    After visit summary was printed and given to patient upon discharge today.  Patient goals and care plan are included in After Visit Summary.    This note was produced with voice recognition software and may have sound a like errors

## 2020-06-02 ENCOUNTER — IMMUNIZATION (OUTPATIENT)
Dept: PHARMACY | Facility: CLINIC | Age: 75
End: 2020-06-02
Payer: MEDICARE

## 2020-06-02 ENCOUNTER — OFFICE VISIT (OUTPATIENT)
Dept: INTERNAL MEDICINE | Facility: CLINIC | Age: 75
End: 2020-06-02
Payer: MEDICARE

## 2020-06-02 VITALS
WEIGHT: 145.31 LBS | OXYGEN SATURATION: 96 % | BODY MASS INDEX: 21.52 KG/M2 | SYSTOLIC BLOOD PRESSURE: 118 MMHG | DIASTOLIC BLOOD PRESSURE: 74 MMHG | HEART RATE: 70 BPM | HEIGHT: 69 IN | TEMPERATURE: 98 F | RESPIRATION RATE: 18 BRPM

## 2020-06-02 DIAGNOSIS — I10 ESSENTIAL HYPERTENSION: ICD-10-CM

## 2020-06-02 DIAGNOSIS — M54.50 RIGHT-SIDED LOW BACK PAIN WITHOUT SCIATICA, UNSPECIFIED CHRONICITY: ICD-10-CM

## 2020-06-02 DIAGNOSIS — G40.909 SEIZURE DISORDER: ICD-10-CM

## 2020-06-02 DIAGNOSIS — J00 ACUTE RHINITIS: Primary | ICD-10-CM

## 2020-06-02 DIAGNOSIS — C34.00 MALIGNANT NEOPLASM OF HILUS OF LUNG, UNSPECIFIED LATERALITY: ICD-10-CM

## 2020-06-02 PROCEDURE — 99214 PR OFFICE/OUTPT VISIT, EST, LEVL IV, 30-39 MIN: ICD-10-PCS | Mod: S$PBB,,, | Performed by: FAMILY MEDICINE

## 2020-06-02 PROCEDURE — 99214 OFFICE O/P EST MOD 30 MIN: CPT | Mod: PBBFAC | Performed by: FAMILY MEDICINE

## 2020-06-02 PROCEDURE — 99214 OFFICE O/P EST MOD 30 MIN: CPT | Mod: S$PBB,,, | Performed by: FAMILY MEDICINE

## 2020-06-02 PROCEDURE — 99999 PR PBB SHADOW E&M-EST. PATIENT-LVL IV: ICD-10-PCS | Mod: PBBFAC,,, | Performed by: FAMILY MEDICINE

## 2020-06-02 PROCEDURE — 99999 PR PBB SHADOW E&M-EST. PATIENT-LVL IV: CPT | Mod: PBBFAC,,, | Performed by: FAMILY MEDICINE

## 2020-06-02 RX ORDER — PREDNISONE 10 MG/1
TABLET ORAL
Qty: 9 TABLET | Refills: 0 | Status: SHIPPED | OUTPATIENT
Start: 2020-06-02 | End: 2020-09-24

## 2020-06-02 NOTE — PATIENT INSTRUCTIONS
Zyrtec over-the-counter antihistamine 1/2 for 1 pill at night as needed for allergy and congestion    Shingrix vaccine    Recheck with new PCP 2-3 months    Let us know if nasal drainage continues and we will refer to ENT

## 2020-06-02 NOTE — PROGRESS NOTES
Humberto Carlson  06/02/2020  [id number]    Markie Belcher MD  Patient Care Team:  Markie Belcher MD as PCP - General (Family Medicine)  Markie Belcher MD as PCP - [facility]  MARGEI Ballesteros MD as Consulting Physician (Gastroenterology)        Chief Complaint:  Chief Complaint   Patient presents with    Follow-up       History of Present Illness:   Humberto Carlson 75 y.o. male  doing well and says low back strain has resolved and he is able to do his regular projects such as building a deck for his family members.    He had shingles a year so ago but has not had the Shingrix vaccine so we will order it for him    He has not had fever will weight loss chills sweats chest pain or shortness of breath or been exposed virus as far as he knows.    He complains a clear nasal discharge from either or both nares that times during the day and states the leg raise not drying this up well enough now.  He says he usually has  problem in the spring and other high allergy months and he would like something stronger to help dry up the drainage.  He says the clear nasal drainage seems to be associated also with sneezing during the day.  He states that he has a mild cough 1st thing in the morning and coughs up slight amount of clear sputum but that resolves it for the rest of the day.    We went over in detail the patient's lab work from February 2020 which was normal.      History:  Past Medical History:   Diagnosis Date    Asthma     Cancer     Cataract     Encounter for blood transfusion     Essential hypertension 7/22/2019    GERD (gastroesophageal reflux disease)     Glaucoma     Hyperlipidemia     Seizures      Past Surgical History:   Procedure Laterality Date    BRAIN SURGERY      EYE SURGERY      LUNG SURGERY      Partial lung removed Left      History reviewed. No pertinent family history.  Social History     Socioeconomic History    Marital status:      Spouse name: Not on file    Number  of children: Not on file    Years of education: Not on file    Highest education level: Not on file   Occupational History    Not on file   Social Needs    Financial resource strain: Not on file    Food insecurity:     Worry: Not on file     Inability: Not on file    Transportation needs:     Medical: Not on file     Non-medical: Not on file   Tobacco Use    Smoking status: Current Every Day Smoker     Packs/day: 2.00     Types: Cigarettes    Smokeless tobacco: Never Used   Substance and Sexual Activity    Alcohol use: No    Drug use: No    Sexual activity: Not on file   Lifestyle    Physical activity:     Days per week: Not on file     Minutes per session: Not on file    Stress: Not on file   Relationships    Social connections:     Talks on phone: Not on file     Gets together: Not on file     Attends [employer] service: Not on file     Active member of club or organization: Not on file     Attends meetings of clubs or organizations: Not on file     Relationship status: Not on file   Other Topics Concern    Not on file   Social History Narrative    Not on file     Patient Active Problem List   Diagnosis    Hyperlipidemia    Essential hypertension    History of anaphylaxis    Elevated blood sugar    Seizure disorder    Acute bronchitis    Malignant neoplasm of lung    Screening for prostate cancer    Pre-diabetes    Weight loss    Dyspnea    Right-sided low back pain without sciatica    Acute rhinitis     Review of patient's allergies indicates:   Allergen Reactions    Albuterol      Other reaction(s): sweaty  Other reaction(s): chills    Wasp sting  [allergen ext-venom-honey bee]      Other reaction(s): swelling       The following were reviewed at this visit: active problem list, medication list, allergies, family history, social history, and health maintenance.    Medications:  Current Outpatient Medications on File Prior to Visit   Medication Sig Dispense Refill    albuterol  (PROVENTIL/VENTOLIN HFA) 90 mcg/actuation inhaler 2 puffs as needed      atorvastatin (LIPITOR) 10 MG tablet Take 2 tablets (20 mg total) by mouth once daily. 30 tablet 11    brimonidine-timolol (COMBIGAN) 0.2-0.5 % Drop Inject 1 drop into the eye Twice daily.      epinephrine (EPIPEN INJ)       FLUZONE HIGH-DOSE 2019-20, PF, 180 mcg/0.5 mL Syrg TO BE ADMINISTERED BY PHARMACIST FOR IMMUNIZATION  0    latanoprost 0.005 % ophthalmic solution       levoFLOXacin (LEVAQUIN) 750 MG tablet Take 1 tablet (750 mg total) by mouth once daily. 10 tablet 0    phenytoin (DILANTIN) 100 MG ER capsule Take by mouth Three times a day.      PROAIR HFA 90 mcg/actuation inhaler       SHINGRIX, PF, 50 mcg/0.5 mL injection TO BE ADMINISTERED BY PHARMACIST FOR IMMUNIZATION 1 mL 0     No current facility-administered medications on file prior to visit.        Medications have been reviewed and reconciled with patient at this visit.      Exam:  Wt Readings from Last 3 Encounters:   06/02/20 65.9 kg (145 lb 4.5 oz)   03/02/20 66.6 kg (146 lb 13.2 oz)   12/02/19 65.1 kg (143 lb 8.3 oz)     Temp Readings from Last 3 Encounters:   06/02/20 98.2 °F (36.8 °C) (Tympanic)   03/02/20 (!) 95.8 °F (35.4 °C)   12/02/19 97.4 °F (36.3 °C) (Tympanic)     BP Readings from Last 3 Encounters:   06/02/20 118/74   03/02/20 128/70   12/02/19 120/64     Pulse Readings from Last 3 Encounters:   06/02/20 70   03/02/20 72   12/02/19 64     Body mass index is 21.45 kg/m².      Review of Systems   Constitutional: Negative for chills, fever and weight loss.   Respiratory: Negative for shortness of breath.    Cardiovascular: Negative for chest pain and palpitations.     Physical Exam  WNWD, A&O ambulatory pleasant adult male in no acute distress    HEENT-Nasal mucosa moist but without discharge or excessive congestion  No drainage from eyes or ear canals    Respiratory effort is normal    Back nontender to palpation  Negative straight leg raise  bilateral    Psychiatric good affect and cognition      Humberto was seen today for follow-up.    Diagnoses and all orders for this visit:    Acute rhinitis    Right-sided low back pain without sciatica, unspecified chronicity    Essential hypertension    Seizure disorder        The patient verbalized good understanding of the medical issues discussed today and expressed appreciation for the care provided.  Patient was given the opportunity to ask questions and be an active participant in their medical care. Patient had no further questions or concerns at this time.   The patient was encouraged to participate in appropriate physical activity.    After visit summary was printed and given to patient upon discharge today.  Patient goals and care plan are included in After Visit Summary.    This note was produced with voice recognition software and may have sound a like errors

## 2020-07-17 DIAGNOSIS — Z71.89 COMPLEX CARE COORDINATION: ICD-10-CM

## 2020-09-24 ENCOUNTER — OFFICE VISIT (OUTPATIENT)
Dept: NEUROLOGY | Facility: CLINIC | Age: 75
End: 2020-09-24
Payer: MEDICARE

## 2020-09-24 ENCOUNTER — OFFICE VISIT (OUTPATIENT)
Dept: INTERNAL MEDICINE | Facility: CLINIC | Age: 75
End: 2020-09-24
Payer: MEDICARE

## 2020-09-24 ENCOUNTER — HOSPITAL ENCOUNTER (OUTPATIENT)
Dept: RADIOLOGY | Facility: HOSPITAL | Age: 75
Discharge: HOME | End: 2020-09-24
Attending: FAMILY MEDICINE
Payer: MEDICARE

## 2020-09-24 VITALS
RESPIRATION RATE: 18 BRPM | HEART RATE: 56 BPM | OXYGEN SATURATION: 96 % | WEIGHT: 149.69 LBS | DIASTOLIC BLOOD PRESSURE: 78 MMHG | SYSTOLIC BLOOD PRESSURE: 170 MMHG | BODY MASS INDEX: 22.11 KG/M2 | TEMPERATURE: 98 F

## 2020-09-24 VITALS
BODY MASS INDEX: 22.17 KG/M2 | DIASTOLIC BLOOD PRESSURE: 72 MMHG | HEIGHT: 69 IN | SYSTOLIC BLOOD PRESSURE: 160 MMHG | WEIGHT: 149.69 LBS | HEART RATE: 56 BPM | RESPIRATION RATE: 16 BRPM

## 2020-09-24 DIAGNOSIS — M54.50 CHRONIC BILATERAL LOW BACK PAIN, UNSPECIFIED WHETHER SCIATICA PRESENT: ICD-10-CM

## 2020-09-24 DIAGNOSIS — R63.4 WEIGHT LOSS: ICD-10-CM

## 2020-09-24 DIAGNOSIS — G40.909 SEIZURE DISORDER: Primary | ICD-10-CM

## 2020-09-24 DIAGNOSIS — E78.5 HYPERLIPIDEMIA, UNSPECIFIED HYPERLIPIDEMIA TYPE: ICD-10-CM

## 2020-09-24 DIAGNOSIS — G89.29 CHRONIC BILATERAL LOW BACK PAIN, UNSPECIFIED WHETHER SCIATICA PRESENT: ICD-10-CM

## 2020-09-24 DIAGNOSIS — G40.409 GRAND MAL EPILEPSY, CONTROLLED: Primary | ICD-10-CM

## 2020-09-24 DIAGNOSIS — J20.9 ACUTE BRONCHITIS, UNSPECIFIED ORGANISM: ICD-10-CM

## 2020-09-24 DIAGNOSIS — R73.9 ELEVATED BLOOD SUGAR: ICD-10-CM

## 2020-09-24 DIAGNOSIS — J00 ACUTE RHINITIS: ICD-10-CM

## 2020-09-24 DIAGNOSIS — I10 ESSENTIAL HYPERTENSION: ICD-10-CM

## 2020-09-24 DIAGNOSIS — R06.00 DYSPNEA, UNSPECIFIED TYPE: ICD-10-CM

## 2020-09-24 DIAGNOSIS — C34.00 MALIGNANT NEOPLASM OF HILUS OF LUNG, UNSPECIFIED LATERALITY: ICD-10-CM

## 2020-09-24 DIAGNOSIS — Z76.89 ENCOUNTER TO ESTABLISH CARE: ICD-10-CM

## 2020-09-24 DIAGNOSIS — I10 ELEVATED BLOOD PRESSURE READING IN OFFICE WITH DIAGNOSIS OF HYPERTENSION: ICD-10-CM

## 2020-09-24 DIAGNOSIS — Z86.79 HISTORY OF SUBDURAL HEMATOMA: ICD-10-CM

## 2020-09-24 DIAGNOSIS — Z85.118 HISTORY OF LUNG CANCER: ICD-10-CM

## 2020-09-24 DIAGNOSIS — M54.9 DORSALGIA, UNSPECIFIED: ICD-10-CM

## 2020-09-24 DIAGNOSIS — M54.50 RIGHT-SIDED LOW BACK PAIN WITHOUT SCIATICA, UNSPECIFIED CHRONICITY: ICD-10-CM

## 2020-09-24 DIAGNOSIS — G40.909 SEIZURE DISORDER: ICD-10-CM

## 2020-09-24 DIAGNOSIS — Z87.892 HISTORY OF ANAPHYLAXIS: ICD-10-CM

## 2020-09-24 DIAGNOSIS — G40.409 GRAND MAL EPILEPSY, CONTROLLED: ICD-10-CM

## 2020-09-24 DIAGNOSIS — R73.03 PRE-DIABETES: ICD-10-CM

## 2020-09-24 DIAGNOSIS — F17.200 SMOKING: ICD-10-CM

## 2020-09-24 DIAGNOSIS — Z12.5 SCREENING FOR PROSTATE CANCER: ICD-10-CM

## 2020-09-24 DIAGNOSIS — R01.1 HEART MURMUR: ICD-10-CM

## 2020-09-24 PROCEDURE — 99204 PR OFFICE/OUTPT VISIT, NEW, LEVL IV, 45-59 MIN: ICD-10-PCS | Mod: S$PBB,,, | Performed by: PSYCHIATRY & NEUROLOGY

## 2020-09-24 PROCEDURE — 72110 X-RAY EXAM L-2 SPINE 4/>VWS: CPT | Mod: 26,,, | Performed by: RADIOLOGY

## 2020-09-24 PROCEDURE — 99214 OFFICE O/P EST MOD 30 MIN: CPT | Mod: PBBFAC,25,27 | Performed by: FAMILY MEDICINE

## 2020-09-24 PROCEDURE — 99204 OFFICE O/P NEW MOD 45 MIN: CPT | Mod: S$PBB,,, | Performed by: PSYCHIATRY & NEUROLOGY

## 2020-09-24 PROCEDURE — 99214 OFFICE O/P EST MOD 30 MIN: CPT | Mod: PBBFAC,25 | Performed by: PSYCHIATRY & NEUROLOGY

## 2020-09-24 PROCEDURE — 72110 XR LUMBAR SPINE COMPLETE 5 VIEW: ICD-10-PCS | Mod: 26,,, | Performed by: RADIOLOGY

## 2020-09-24 PROCEDURE — 99999 PR PBB SHADOW E&M-EST. PATIENT-LVL IV: ICD-10-PCS | Mod: PBBFAC,,, | Performed by: FAMILY MEDICINE

## 2020-09-24 PROCEDURE — 99999 PR PBB SHADOW E&M-EST. PATIENT-LVL IV: CPT | Mod: PBBFAC,,, | Performed by: FAMILY MEDICINE

## 2020-09-24 PROCEDURE — 90694 VACC AIIV4 NO PRSRV 0.5ML IM: CPT | Mod: PBBFAC

## 2020-09-24 PROCEDURE — G0009 ADMIN PNEUMOCOCCAL VACCINE: HCPCS | Mod: PBBFAC

## 2020-09-24 PROCEDURE — 99999 PR PBB SHADOW E&M-EST. PATIENT-LVL IV: ICD-10-PCS | Mod: PBBFAC,,, | Performed by: PSYCHIATRY & NEUROLOGY

## 2020-09-24 PROCEDURE — 72110 X-RAY EXAM L-2 SPINE 4/>VWS: CPT | Mod: TC

## 2020-09-24 PROCEDURE — 99999 PR PBB SHADOW E&M-EST. PATIENT-LVL IV: CPT | Mod: PBBFAC,,, | Performed by: PSYCHIATRY & NEUROLOGY

## 2020-09-24 PROCEDURE — 99214 PR OFFICE/OUTPT VISIT, EST, LEVL IV, 30-39 MIN: ICD-10-PCS | Mod: S$PBB,,, | Performed by: FAMILY MEDICINE

## 2020-09-24 PROCEDURE — 99214 OFFICE O/P EST MOD 30 MIN: CPT | Mod: S$PBB,,, | Performed by: FAMILY MEDICINE

## 2020-09-24 PROCEDURE — G0008 ADMIN INFLUENZA VIRUS VAC: HCPCS | Mod: PBBFAC

## 2020-09-24 RX ORDER — ATORVASTATIN CALCIUM 10 MG/1
20 TABLET, FILM COATED ORAL DAILY
Qty: 90 TABLET | Refills: 1 | Status: SHIPPED | OUTPATIENT
Start: 2020-09-24 | End: 2020-10-26 | Stop reason: SDUPTHER

## 2020-09-24 RX ORDER — PHENYTOIN SODIUM 100 MG/1
300 CAPSULE, EXTENDED RELEASE ORAL DAILY
Qty: 270 CAPSULE | Refills: 11 | Status: SHIPPED | OUTPATIENT
Start: 2020-09-24 | End: 2021-09-29

## 2020-09-24 RX ORDER — PHENYTOIN SODIUM 300 MG/1
300 CAPSULE, EXTENDED RELEASE ORAL DAILY
Qty: 90 CAPSULE | Refills: 11 | Status: SHIPPED | OUTPATIENT
Start: 2020-09-24 | End: 2020-09-24 | Stop reason: SDUPTHER

## 2020-09-24 NOTE — TELEPHONE ENCOUNTER
Insurance will not pay for the 300 mr ER ( Dilatin) they will only pay for 100 mg Er .  please resend .

## 2020-09-24 NOTE — PROGRESS NOTES
"Subjective:       Patient ID: Humberto Carlson is a 75 y.o. male.    Chief Complaint: Establish Care    HPI     75    Past Medical History:   Diagnosis Date    Asthma     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    Cancer     Cataract     Encounter for blood transfusion     Essential hypertension 7/22/2019    GERD (gastroesophageal reflux disease)     Glaucoma     Hyperlipidemia     Seizures    - brain bleed - 2015     Past Surgical History:   Procedure Laterality Date    BRAIN SURGERY      EYE SURGERY      LUNG SURGERY      Partial lung removed Left        Social History     Tobacco Use   Smoking Status Current Every Day Smoker    Packs/day: 2.00    Types: Cigarettes   Smokeless Tobacco Never Used     Family History   Problem Relation Age of Onset    Heart attack Father     Coronary artery disease Brother     Valvular heart disease Brother          Seizure history  Last seizure 2010  Has only had 2  Controlled.      Breathing fair  Mask isn't great with him  Short winded easily  Uses prn albuterol - but rare  denies asthma history    Doubts he will ever quit smoking    Reports when found his lung cancer - it was found he had a " bone spur " at base of spine.  Was told needs surgery  Pain goes into his hips and does hurt.  Sometimes just to get up and down.  Low back pain  Chronic    Unaware has heart murmur  Denies dizziness, unless moves head back    Review of Systems   Constitutional: Negative for chills and fever.   HENT: Negative for trouble swallowing.    Respiratory: Negative for shortness of breath.    Cardiovascular: Negative for chest pain.   Gastrointestinal: Negative for constipation.   Genitourinary: Negative for difficulty urinating.   Musculoskeletal: Negative for gait problem.   Skin: Negative for color change.   Neurological: Negative for dizziness and seizures.   Psychiatric/Behavioral: Negative for confusion.       Objective:       Vitals:    " "09/24/20 0754   BP: (!) 170/78   Pulse: (!) 56   Resp: 18   Temp: 97.9 °F (36.6 °C)       Physical Exam  HENT:      Head: Normocephalic.   Eyes:      Conjunctiva/sclera: Conjunctivae normal.   Neck:      Musculoskeletal: Normal range of motion.   Cardiovascular:      Rate and Rhythm: Normal rate and regular rhythm.      Heart sounds: Murmur present.   Skin:     Coloration: Skin is not pale.   Neurological:      General: No focal deficit present.      Mental Status: He is alert.   Psychiatric:         Mood and Affect: Mood normal.         Assessment:       1. Seizure disorder    2. Dorsalgia, unspecified    3. Chronic bilateral low back pain, unspecified whether sciatica present    4. Heart murmur    5. Elevated blood pressure reading in office with diagnosis of hypertension    6. Smoking    7. History of lung cancer    8. Hyperlipidemia, unspecified hyperlipidemia type    9. Encounter to establish care        Plan:     Establish care    H/o lung Cancer  Smoking  - still smoking  - "probably " not going to quit.  Has tried meds and patches  Uses Pro-Air prn - not that often  He questions dx of Asthma    History of seizures  Sees Dr. Cardoso  He gets his dilantin thru Dr. Cardoso.  Will need to get a new neurologist due to insurance issue.    HLD  On statin  Will refill    Elevated blood pressure w/out dx of HTN  Reviewed last 3 visits  Pressures have all been controlled.  Today is only day it has been elevated.  Plan on f/u in 2 week for recheck.    Chronic low back pain  Will set up with pain mngt for further evaluation  Lumbar spine xray today     Flu Shot  PNA 13      Heart murmur  echo          "

## 2020-09-24 NOTE — PATIENT INSTRUCTIONS
Living Well with Epilepsy  People with epilepsy can lead healthy, productive lives. Life with epilepsy can be challenging, but there are things you can do to make it easier. For example, you can pay attention to your emotions. If you feel down, upset, or scared, talk to your healthcare provider. And be open with the people in your life. Talking about epilepsy can help them understand. It can also help you feel better.  Coping with emotions  You may be scared to go out in public for fear of having a seizure. Or you may just get frustrated with having epilepsy. Such feelings are normal. But they can lead to anxiety and depression. Treatment is available for these conditions, so talk to your healthcare provider. Discuss what can help you, such as the following:  · Support groups give you the chance to talk with other people who have epilepsy.  · Counseling helps you learn to cope with your emotions and health problems.  · Medicine can help if you have a mood disorder.     Recognizing signs of depression  Depression is an illness that affects your thoughts and feelings. It can be caused by trouble coping with epilepsy, and sometimes it may be caused by the medicines used to treat it. Depression can be serious. If you have any of the following, call your healthcare provider:  · Feeling down most of the time  · Feeling hopeless or helpless  · Losing pleasure in things you used to enjoy  · Sleeping less or more than usual  · Having a big change in appetite or weight  · Having trouble focusing, remembering, or making decisions  · Isolating yourself from friends or family    Coping at home  Epilepsy affects those around you, too. Talk with your loved ones and learn their concerns. For instance, your children may be afraid for your safety. Reassure them that you can live a long, healthy life with epilepsy. Your partner may wonder if a normal sex life is possible. Let him or her know that epilepsy doesnt have to affect  intimacy. If loved ones have questions, you can always arrange a talk with your healthcare provider.  Epilepsy and your job  Epilepsy doesnt have to keep you from working. In fact, people with epilepsy hold many kinds of jobs. But there are some issues you should consider, such as:  · What kind of work can I do? This depends on several things, like how well controlled your seizures are. Also consider whether the job involves tasks that may not be safe for you. These include driving or operating heavy machinery.  · Should I tell my boss or coworkers about my epilepsy? This is your personal choice. But you may be safer if people at your workplace are prepared to respond to a seizure. If you are concerned about losing your job, know your rights. The Americans with Disabilities Act provides work-related protections for people with epilepsy.  Date Last Reviewed: 9/10/2015  © 7718-6663 [employer]. [address] [place] [address], [place] [address]. All rights reserved. This information is not intended as a substitute for professional medical care. Always follow your healthcare professional's instructions.

## 2020-09-24 NOTE — TELEPHONE ENCOUNTER
----- Message from Megan Jones sent at 9/24/2020 10:09 AM CDT -----  Contact: [facility]  Type:  Pharmacy Calling to Clarify an RX    Name of Caller:julia  [facility]  Prescription Name:dilantin   What do they need to clarify?:directions on prescription  Best Call Back Number:[phone]  Additional Information: none

## 2020-09-24 NOTE — PROGRESS NOTES
Subjective:       Patient ID: Humberto Carlson is a 75 y.o. male.    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 Cardoso 15 years ago (2005) after sustaining a GTC with no aura. He was placed on  mg QD. Has done well on it and was seizure-free till 10 years ago (2010) when he ran of PHT for 1 week and had a Seizure that was not GTC but cause staring and ROSY (Complex Partial). Since then he has not had any seizure. Denies SEs. In 2015, he sustained a traumatic LT SDH S/P evacuation with excellent recovery. We do not have any records and the information is based on the history.         Review of Systems   Constitutional: Negative for appetite change and fatigue.   HENT: Negative for hearing loss and tinnitus.    Eyes: Positive for visual disturbance. Negative for photophobia.   Respiratory: Positive for shortness of breath. Negative for apnea.    Cardiovascular: Negative for chest pain and palpitations.   Gastrointestinal: Negative for nausea and vomiting.   Endocrine: Negative for cold intolerance and heat intolerance.   Genitourinary: Negative for difficulty urinating and urgency.   Musculoskeletal: Positive for arthralgias and back pain. Negative for gait problem, joint swelling, myalgias, neck pain and neck stiffness.   Skin: Negative for color change and rash.   Allergic/Immunologic: Negative for environmental allergies and immunocompromised state.   Neurological: Negative for dizziness, tremors, seizures, syncope, facial asymmetry, speech difficulty, weakness, light-headedness, numbness and headaches.   Hematological: Negative for adenopathy. Does not bruise/bleed easily.   Psychiatric/Behavioral: Negative for agitation, behavioral problems, confusion, decreased concentration, dysphoric mood, hallucinations, self-injury, sleep disturbance and suicidal ideas. The patient is not hyperactive.              Current Outpatient Medications:      albuterol (PROVENTIL/VENTOLIN HFA) 90 mcg/actuation inhaler, 2 puffs as needed, Disp: , Rfl:     atorvastatin (LIPITOR) 10 MG tablet, Take 2 tablets (20 mg total) by mouth once daily., Disp: 90 tablet, Rfl: 1    brimonidine-timolol (COMBIGAN) 0.2-0.5 % Drop, Inject 1 drop into the eye Twice daily., Disp: , Rfl:     epinephrine (EPIPEN INJ), , Disp: , Rfl:     latanoprost 0.005 % ophthalmic solution, , Disp: , Rfl:     phenytoin (DILANTIN) 300 MG ER capsule, Take 1 capsule (300 mg total) by mouth once daily., Disp: 90 capsule, Rfl: 11  Past Medical History:   Diagnosis Date    Asthma     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    Cancer     Cataract     Encounter for blood transfusion     Essential hypertension 7/22/2019    GERD (gastroesophageal reflux disease)     Glaucoma     Hyperlipidemia     Seizures      Past Surgical History:   Procedure Laterality Date    BRAIN SURGERY      EYE SURGERY      LUNG SURGERY      Partial lung removed Left      Social History     Socioeconomic History    Marital status:      Spouse name: Not on file    Number of children: Not on file    Years of education: Not on file    Highest education level: Not on file   Occupational History    Not on file   Social Needs    Financial resource strain: Not on file    Food insecurity     Worry: Not on file     Inability: Not on file    Transportation needs     Medical: Not on file     Non-medical: Not on file   Tobacco Use    Smoking status: Current Every Day Smoker     Packs/day: 2.00     Types: Cigarettes    Smokeless tobacco: Never Used   Substance and Sexual Activity    Alcohol use: No    Drug use: No    Sexual activity: Not on file   Lifestyle    Physical activity     Days per week: Not on file     Minutes per session: Not on file    Stress: Not on file   Relationships    Social connections     Talks on phone: Not on file     Gets together: Not on file      Attends Anabaptism service: Not on file     Active member of club or organization: Not on file     Attends meetings of clubs or organizations: Not on file     Relationship status: Not on file   Other Topics Concern    Not on file   Social History Narrative    Not on file             Past/Current Medical/Surgical History, Past/Current Social History, Past/Current Family History and Past/Current Medications were reviewed in detail.        Objective:           VITAL SIGNS WERE REVIEWED      GENERAL APPEARANCE:     The patient looks comfortable.    Body habitus is normal.     No signs of respiratory distress.    Normal breathing pattern.    No dysmorphic features    Normal eye contact.     GENERAL MEDICAL EXAM:    HEENT:  Head is atraumatic normocephalic.     No tender temporal arteries. Fundoscopic (Ophthalmoscopic) exam showed no disc edema.      Neck and Axillae: No JVD. No visible lesions.    No carotid bruits. No thyromegaly. No lymphadenopathy.    Cardiopulmonary: No cyanosis. No tachypnea. Normal respiratory effort.    Clear breath sounds. S1, S2 with regular rhythm . No murmurs.     Gastrointestinal/Urogenital:  No jaundice. No stomas or lesions. No visible hernias. No catheters.     Abdomen is soft non-tender. No masses or organomegaly.    Skin, Hair and Nails: No pathognonomic skin rash. No neurofibromatosis. No visible lesions.No stigmata of autoimmune disease. No clubbing.    Skin is warm and moist. No palpable masses.    Limbs: No varicose veins. No visible swelling.    No palpable edema. Pulses are symmetric. Pedal pulses are palpable.      Muskoskeletal: No visible deformities.No visible lesions.    No spine tenderness. No signs of longstanding neuropathy. No dislocations or fractures.            Neurologic Exam     Mental Status   Oriented to person, place, and time.   Registration: recalls 3 of 3 objects. Recall at 5 minutes: recalls 3 of 3 objects. Follows 3 step commands.   Attention: normal.  Concentration: normal.   Speech: speech is normal   Level of consciousness: alert  Knowledge: good and consistent with education. Able to perform simple calculations.   Able to name object. Able to read. Able to repeat. Able to write. Normal comprehension.     Cranial Nerves     CN II   Visual acuity: decreased  Right visual field deficit: none  Left visual field deficit: upper temporal and lower temporal quadrant(s)    CN III, IV, VI   Pupils are equal, round, and reactive to light.  Extraocular motions are normal.   Right pupil: Size: 4 mm. Shape: regular. Reactivity: brisk. Consensual response: intact. Accommodation: intact.   Left pupil: Size: 3 mm. Shape: irregular. Reactivity: sluggish. Consensual response: intact. Accommodation: intact.   CN III: no CN III palsy  CN VI: no CN VI palsy  Nystagmus: none   Diplopia: none  Ophthalmoparesis: none  Upgaze: normal  Downgaze: normal  Conjugate gaze: present  Vestibulo-ocular reflex: present    CN V   Facial sensation intact.   Right facial sensation deficit: none  Left facial sensation deficit: none    CN VII   Facial expression full, symmetric.   Right facial weakness: none  Left facial weakness: none    CN VIII   CN VIII normal.   Hearing: intact  Right Rinne: AC > BC  Left Rinne: AC > BC  Glass: does not lateralize     CN IX, X   CN IX normal.   CN X normal.   Palate: symmetric    CN XI   CN XI normal.   Right sternocleidomastoid strength: normal  Left sternocleidomastoid strength: normal  Right trapezius strength: normal  Left trapezius strength: normal    CN XII   CN XII normal.   Tongue: not atrophic  Fasciculations: absent  Tongue deviation: none    Motor Exam   Muscle bulk: normal  Overall muscle tone: normal  Right arm tone: normal  Left arm tone: normal  Right arm pronator drift: absent  Left arm pronator drift: absent  Right leg tone: normal  Left leg tone: normal    Strength   Strength 5/5 throughout.   Right neck flexion: 5/5  Left neck flexion:    Right neck extension:   Left neck extension:   Right deltoid:   Left deltoid:   Right biceps:   Left biceps:   Right triceps:   Left triceps:   Right wrist flexion:   Left wrist flexion:   Right wrist extension:   Left wrist extension:   Right interossei:   Left interossei:   Right iliopsoas:   Left iliopsoas:   Right quadriceps:   Left quadriceps:   Right hamstrin/5  Left hamstrin/5  Right glutei:   Left glutei:   Right anterior tibial:   Left anterior tibial:   Right posterior tibial:   Left posterior tibial:   Right peroneal:   Left peroneal:   Right gastroc:   Left gastroc:     Sensory Exam   Light touch normal.   Right arm light touch: normal  Left arm light touch: normal  Right leg light touch: normal  Left leg light touch: normal  Right arm vibration: normal  Left arm vibration: normal  Right leg vibration: decreased from toes  Left leg vibration: decreased from toes  Right arm proprioception: normal  Left arm proprioception: normal  Right leg proprioception: decreased from toes  Left leg proprioception: decreased from toes  Pinprick normal.   Right arm pinprick: normal  Left arm pinprick: normal  Right leg pinprick: normal  Left leg pinprick: normal  Graphesthesia: normal  Stereognosis: normal    Gait, Coordination, and Reflexes     Gait  Gait: normal    Coordination   Romberg: negative  Finger to nose coordination: normal  Heel to shin coordination: normal    Tremor   Resting tremor: absent  Intention tremor: absent  Action tremor: absent    Reflexes   Right brachioradialis: 2+  Left brachioradialis: 2+  Right biceps: 2+  Left biceps: 2+  Right triceps: 2+  Left triceps: 2+  Right patellar: 1+  Left patellar: 1+  Right achilles: 0  Left achilles: 0  Right plantar: normal  Left plantar: normal  Right Willingham: absent  Left Willingham: absent  Right ankle clonus: absent  Left ankle clonus: absent  Right pendular knee  jerk: absent  Left pendular knee jerk: absent      Lab Results   Component Value Date    WBC 6.77 02/25/2020    HGB 13.5 (L) 02/25/2020    HCT 44.2 02/25/2020    MCV 97 02/25/2020     02/25/2020     Sodium   Date Value Ref Range Status   02/25/2020 138 136 - 145 mmol/L Final     Potassium   Date Value Ref Range Status   02/25/2020 4.8 3.5 - 5.1 mmol/L Final     Chloride   Date Value Ref Range Status   02/25/2020 102 95 - 110 mmol/L Final     CO2   Date Value Ref Range Status   02/25/2020 29 23 - 29 mmol/L Final     Glucose   Date Value Ref Range Status   02/25/2020 106 70 - 110 mg/dL Final     BUN, Bld   Date Value Ref Range Status   02/25/2020 11 8 - 23 mg/dL Final     Creatinine   Date Value Ref Range Status   02/25/2020 0.8 0.5 - 1.4 mg/dL Final     Calcium   Date Value Ref Range Status   02/25/2020 9.2 8.7 - 10.5 mg/dL Final     Total Protein   Date Value Ref Range Status   02/25/2020 7.2 6.0 - 8.4 g/dL Final     Albumin   Date Value Ref Range Status   02/25/2020 4.0 3.5 - 5.2 g/dL Final     Total Bilirubin   Date Value Ref Range Status   02/25/2020 0.3 0.1 - 1.0 mg/dL Final     Comment:     For infants and newborns, interpretation of results should be based  on gestational age, weight and in agreement with clinical  observations.  Premature Infant recommended reference ranges:  Up to 24 hours.............<8.0 mg/dL  Up to 48 hours............<12.0 mg/dL  3-5 days..................<15.0 mg/dL  6-29 days.................<15.0 mg/dL       Alkaline Phosphatase   Date Value Ref Range Status   02/25/2020 57 55 - 135 U/L Final     AST   Date Value Ref Range Status   02/25/2020 17 10 - 40 U/L Final     ALT   Date Value Ref Range Status   02/25/2020 14 10 - 44 U/L Final     Anion Gap   Date Value Ref Range Status   02/25/2020 7 (L) 8 - 16 mmol/L Final     eGFR if    Date Value Ref Range Status   02/25/2020 >60.0 >60 mL/min/1.73 m^2 Final     eGFR if non    Date Value Ref Range  Status   02/25/2020 >60.0 >60 mL/min/1.73 m^2 Final     Comment:     Calculation used to obtain the estimated glomerular filtration  rate (eGFR) is the CKD-EPI equation.        No results found for: [id number]  Lab Results   Component Value Date    TSH 1.583 02/25/2020       10-     Brain MRI     Unremarkable. No new changes. Remote left-sided craniotomy.    12-    EEG NL      10-26 to 10-    71 Hours AEEG NL    Interpreted on 11-      AED MONITORING      AED: PHT NORMAL LEVEL RANGE: 10-20   DATE LEVEL   10- 9.7                                 Reviewed the neuroimaging independently           Assessment:       1. Grand mal epilepsy, controlled    2. Seizure disorder    3. Hyperlipidemia, unspecified hyperlipidemia type    4. Essential hypertension    5. History of anaphylaxis    6. Elevated blood sugar    7. Acute bronchitis, unspecified organism    8. Malignant neoplasm of hilus of lung, unspecified laterality    9. Pre-diabetes    10. Screening for prostate cancer    11. Weight loss    12. Acute rhinitis    13. Right-sided low back pain without sciatica, unspecified chronicity    14. Dyspnea, unspecified type    15. History of subdural hematoma            EPILEPSY CLASSIFICATION    SEMIOLOGY:  DIALEPTIC (FOCAL-ROSY-->GTC     EPILEPTOGENIC ZONE (S): UNKNOWN     ETIOLOGY: UNKNOWN     PRIOR AEDS: NONE     CURRENT AEDS: PHT    LAST SEIZURE DATE: 2010 (NON-COMPLIANCE), 2005 (BEFORE PHT)      COMPREHENSIVE LIST OF AEDs:     Acetazolamide (AZM-Diamox)   Benzos: clonazepam (CZP Klonopin), lorazepam (LZP-Ativan), diazepam (DZP-Valium), clorazepate (CLZ- Tranxene)  Brivaracetam (BRV-Briviact)  Cannabidiol (CBD- Epidiolex)  Carbamazepine (CBZ-Tegretol)  Cenobamate (CNB-Xcopri)  Clobazam (CLB-Onfi)  Eslicarbazepine (ESL-Aptiom)  Ethosuximide (ESX-Zarontin)  Felbamate (FBM-Felbatol)  Gabapentin (GBP-Neurontin)  Lacosamide (LCM-Vimpat)  Lamotrigine (LTG-Lamitcal)  Levetiracetam (LEV-  Keppra)  Oxcarbazepine (OXC-Trileptal)  Perampanel (PML-Fycompa)  Phenobarbital (PB)  Phenytoin (PHT-Dilantin)  Pregabalin (PGB-Lyrica)  Primidone (PRM)   Retigabine (RTG- Potiga) Discontinued in 2017  Rufinamide (RFN-Benzil)  Stiripentol (STP-Diacomit)  Tiagabine (TGB-Gabitril)  Topiramate (TPM-Topamax)  Valproate (VPA-Depakote)  Vigabatrin (VGB-Sabril)  Zonisamide (ZNS-Zonegran)    Plan:       GRAND MAL-FOCAL EPILEPSY, IDIOPATHIC, WELL-CONTROLED ON PHT      Get all the records for review.    EEG to evaluate for epileptiform discharges.     Brain MRI WWO to evaluate for epileptogenic lesions.    The patient was encouraged to maintain full traditional seizure precautions which include but not limited to avoid driving, avoid high altitudes, avoid being close to fire or fire source, avoid being close to a body of water or swimming alone, avoid operating heavy machinery and avoid using sharp objects if possible. The patient was encouraged to shower (without accumulation of water) instead of taking a bath if unsupervised. The patient was made aware that these precautions are especially important during concurrent illness. Adequate sleep and avoidance of alcohol as important measures to assure good seizure control were discussed with the patient. The patient was also advised not to care for children without company. The patient was advised to pad the side rails with pillows and blankets if applicable and sleep as close to the floor as possible. I strongly recommended lowering the bed to the floor level to decrease the risk of falls. I also instructed the patient to avoid safety sensitive duties. In general, any activity that requires full awareness and would result in serious injury to self and others if a seizure occurs should be avoided. The patient verbalized full understanding.    The patient has been seizure-free for >10 years, compliant with regimen with therapeutic AED level. The patient meets the legal criteria  to resume driving.I explained to the patient that the risk of breakthrough seizures will ALWAYS be there. I instructed the patient to avoid alcohol, get enough sleep and be complaint with AED regimen. I instructed the patient to avoid driving if AED was skipped, if drank alcohol, sleep-deprived or not feeling well. The patient verbalized full understanding.       Continue  mg QD (#3 of 100 mg capsules). The patient does not want to change his regimen at all. Insurance would not cover  mg capsules!!!    CBC-CMP, Trough PHT.      Continue AEDs INDEFINITELY, FOR GOOD AND NEVER SKIP A DOSE. The patient verbalized full understanding. Stressed the extreme medical, personal safety, public safety and legal importance of compliance and seizure control. Will give as many refills as possible with or without face-to-face evaluation to make sure the patient and any patient with epilepsy will never run out of medications. Running out of seizure medications should never happen under our care. I explained to the patient that he should not, under any circumstances, adjust or stop taking his seizure medication without discussing with me. The patient verbalized full understanding.     AVOID any substance that could lower seizure threshold including but not limited to:      ALCOHOL AND WITHDRAWAL      TRAMADOL.     DEMEROL      ALL STIMULANTS-ALL ADHD MEDICATIONS.      CLOZAPINE.      BUPROPION.     CIPROFLOXACIN                   MEDICAL/SURGICAL COMORBIDITIES     All relevant medical comorbidities noted and managed by primary care physician and medical care team.          MISCELLANEOUS MEDICAL PROBLEMS       HEALTHY LIFESTYLE AND PREVENTATIVE CARE    Encouraged the patient to adhere to the age-appropriate health maintenance guidelines including screening tests and vaccinations.     Discussed the overall importance of healthy lifestyle, optimal weight, exercise, healthy diet, good sleep hygiene and avoiding drugs  including smoking, alcohol and recreational drugs. The patient verbalized full understanding.       Advised the patient to follow COVID-19 prevention measures.       I spent 50 minutes face to face with the patient    More than 30 minutes of the time spent in counseling and coordination of care including discussions etiology of diagnosis (EPILEPSY), pathogenesis of diagnosis, prognosis of diagnosis,, diagnostic results, impression and recommendations, diagnostic studies, management, risks and benefits of treatment, instructions of disease self-management, treatment instructions, follow up requirements, patient and family counseling/involvement in care compliance with treatment regimen. All of the patient's questions were answered during this discussion.        RTC annually               Zain Morataya MD, FAAN    Attending Neurologist/Epileptologist         Diplomate, American Board of Psychiatry and Neurology    Diplomate, American Board of Clinical Neurophysiology     Fellow, [employer] Academy of Neurology

## 2020-09-25 ENCOUNTER — TELEPHONE (OUTPATIENT)
Dept: INTERNAL MEDICINE | Facility: CLINIC | Age: 75
End: 2020-09-25

## 2020-09-25 ENCOUNTER — LAB VISIT (OUTPATIENT)
Dept: LAB | Facility: HOSPITAL | Age: 75
End: 2020-09-25
Attending: PSYCHIATRY & NEUROLOGY
Payer: MEDICARE

## 2020-09-25 DIAGNOSIS — G40.409 GRAND MAL EPILEPSY, CONTROLLED: ICD-10-CM

## 2020-09-25 PROCEDURE — 80053 COMPREHEN METABOLIC PANEL: CPT

## 2020-09-25 PROCEDURE — 80185 ASSAY OF PHENYTOIN TOTAL: CPT

## 2020-09-25 PROCEDURE — 36415 COLL VENOUS BLD VENIPUNCTURE: CPT | Mod: PO

## 2020-09-25 PROCEDURE — 85025 COMPLETE CBC W/AUTO DIFF WBC: CPT

## 2020-09-25 NOTE — TELEPHONE ENCOUNTER
----- Message from Zeke Lamb MD sent at 9/24/2020  1:17 PM CDT -----  Please call the patient regarding his xray  degenerative findings noted. Keep appt w/ pain mngt

## 2020-09-26 LAB
ALBUMIN SERPL BCP-MCNC: 4.1 G/DL (ref 3.5–5.2)
ALP SERPL-CCNC: 62 U/L (ref 55–135)
ALT SERPL W/O P-5'-P-CCNC: 15 U/L (ref 10–44)
ANION GAP SERPL CALC-SCNC: 9 MMOL/L (ref 8–16)
AST SERPL-CCNC: 19 U/L (ref 10–40)
BASOPHILS # BLD AUTO: 0.05 K/UL (ref 0–0.2)
BASOPHILS NFR BLD: 0.6 % (ref 0–1.9)
BILIRUB SERPL-MCNC: 0.6 MG/DL (ref 0.1–1)
BUN SERPL-MCNC: 10 MG/DL (ref 8–23)
CALCIUM SERPL-MCNC: 8.9 MG/DL (ref 8.7–10.5)
CHLORIDE SERPL-SCNC: 101 MMOL/L (ref 95–110)
CO2 SERPL-SCNC: 29 MMOL/L (ref 23–29)
CREAT SERPL-MCNC: 0.7 MG/DL (ref 0.5–1.4)
DIFFERENTIAL METHOD: ABNORMAL
EOSINOPHIL # BLD AUTO: 0.5 K/UL (ref 0–0.5)
EOSINOPHIL NFR BLD: 5.8 % (ref 0–8)
ERYTHROCYTE [DISTWIDTH] IN BLOOD BY AUTOMATED COUNT: 13.2 % (ref 11.5–14.5)
EST. GFR  (AFRICAN AMERICAN): >60 ML/MIN/1.73 M^2
EST. GFR  (NON AFRICAN AMERICAN): >60 ML/MIN/1.73 M^2
GLUCOSE SERPL-MCNC: 104 MG/DL (ref 70–110)
HCT VFR BLD AUTO: 44.4 % (ref 40–54)
HGB BLD-MCNC: 13.9 G/DL (ref 14–18)
IMM GRANULOCYTES # BLD AUTO: 0.02 K/UL (ref 0–0.04)
IMM GRANULOCYTES NFR BLD AUTO: 0.2 % (ref 0–0.5)
LYMPHOCYTES # BLD AUTO: 2.9 K/UL (ref 1–4.8)
LYMPHOCYTES NFR BLD: 33 % (ref 18–48)
MCH RBC QN AUTO: 30.5 PG (ref 27–31)
MCHC RBC AUTO-ENTMCNC: 31.3 G/DL (ref 32–36)
MCV RBC AUTO: 97 FL (ref 82–98)
MONOCYTES # BLD AUTO: 0.6 K/UL (ref 0.3–1)
MONOCYTES NFR BLD: 7.1 % (ref 4–15)
NEUTROPHILS # BLD AUTO: 4.6 K/UL (ref 1.8–7.7)
NEUTROPHILS NFR BLD: 53.3 % (ref 38–73)
NRBC BLD-RTO: 0 /100 WBC
PHENYTOIN SERPL-MCNC: 8.2 UG/ML (ref 10–20)
PLATELET # BLD AUTO: 305 K/UL (ref 150–350)
PMV BLD AUTO: 10.7 FL (ref 9.2–12.9)
POTASSIUM SERPL-SCNC: 4.3 MMOL/L (ref 3.5–5.1)
PROT SERPL-MCNC: 6.9 G/DL (ref 6–8.4)
RBC # BLD AUTO: 4.56 M/UL (ref 4.6–6.2)
SODIUM SERPL-SCNC: 139 MMOL/L (ref 136–145)
WBC # BLD AUTO: 8.69 K/UL (ref 3.9–12.7)

## 2020-09-30 ENCOUNTER — OFFICE VISIT (OUTPATIENT)
Dept: PAIN MEDICINE | Facility: CLINIC | Age: 75
End: 2020-09-30
Payer: MEDICARE

## 2020-09-30 VITALS
WEIGHT: 148.94 LBS | SYSTOLIC BLOOD PRESSURE: 142 MMHG | BODY MASS INDEX: 22.06 KG/M2 | DIASTOLIC BLOOD PRESSURE: 76 MMHG | HEART RATE: 53 BPM | HEIGHT: 69 IN

## 2020-09-30 DIAGNOSIS — M47.816 SPONDYLOSIS WITHOUT MYELOPATHY OR RADICULOPATHY, LUMBAR REGION: Primary | ICD-10-CM

## 2020-09-30 DIAGNOSIS — M54.50 CHRONIC BILATERAL LOW BACK PAIN, UNSPECIFIED WHETHER SCIATICA PRESENT: ICD-10-CM

## 2020-09-30 DIAGNOSIS — G89.29 CHRONIC BILATERAL LOW BACK PAIN, UNSPECIFIED WHETHER SCIATICA PRESENT: ICD-10-CM

## 2020-09-30 PROCEDURE — 99204 PR OFFICE/OUTPT VISIT, NEW, LEVL IV, 45-59 MIN: ICD-10-PCS | Mod: S$PBB,,, | Performed by: PHYSICAL MEDICINE & REHABILITATION

## 2020-09-30 PROCEDURE — 99999 PR PBB SHADOW E&M-EST. PATIENT-LVL IV: ICD-10-PCS | Mod: PBBFAC,,, | Performed by: PHYSICAL MEDICINE & REHABILITATION

## 2020-09-30 PROCEDURE — 99999 PR PBB SHADOW E&M-EST. PATIENT-LVL IV: CPT | Mod: PBBFAC,,, | Performed by: PHYSICAL MEDICINE & REHABILITATION

## 2020-09-30 PROCEDURE — 99214 OFFICE O/P EST MOD 30 MIN: CPT | Mod: PBBFAC | Performed by: PHYSICAL MEDICINE & REHABILITATION

## 2020-09-30 PROCEDURE — 99204 OFFICE O/P NEW MOD 45 MIN: CPT | Mod: S$PBB,,, | Performed by: PHYSICAL MEDICINE & REHABILITATION

## 2020-09-30 RX ORDER — NAPROXEN 500 MG/1
500 TABLET ORAL 2 TIMES DAILY WITH MEALS
Qty: 60 TABLET | Refills: 0 | Status: SHIPPED | OUTPATIENT
Start: 2020-09-30 | End: 2023-06-21

## 2020-09-30 NOTE — PROGRESS NOTES
New Patient Chronic Pain Note (Initial Visit)    Referring Physician: Zeke Lamb MD    PCP: Markie Belcher MD    Chief Complaint:   Chief Complaint   Patient presents with    Back Pain     Lower back pain radiating into right hip and down right leg    Tingling     tingling in bilateral legs. Right leg is worse than left leg.        SUBJECTIVE:  Humberto Carlson is a 75 y.o. male who presents to the clinic for the evaluation of chronic lower back pain.  He was referred by the Family Medicine department for further evaluation management of this pain.  Of note, patient has past medical history of seizure disorder, history of subdural hematoma, glaucoma, asthma, hypertension, hyperlipidemia, history of lung cancer, prediabetes, GERD, and multiple other medical comorbidities as listed below.  The pain started about 4-5 months ago following an episode where he lifted up his riding  and symptoms have been slowly improving.The pain is located in the lumbosacral area and radiates to the bilateral lower extremities, right worse than left.  He reports that the back pain is worse than the leg pain.  The pain is described as Aching and is rated as 2/10. The pain is rated with a score of  1/10 on the BEST day and a score of 9/10 on the WORST day.  Symptoms interfere with daily activity and work. The pain is exacerbated by bending, lifting, kneeling, stooping.  The pain is mitigated by rest. The patient reports spending 2-4 hours per day reclining. The patient reports 5-7 hours of uninterrupted sleep per night.  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    Patient denies night fever/night sweats, urinary incontinence, bowel incontinence, significant weight loss, significant motor weakness and loss of sensations.    Pain Disability Index Review:   No flowsheet data found.    Non-Pharmacologic Treatments:  Physical Therapy/Home Exercise: no  Ice/Heat:no  TENS: no  Acupuncture:  no  Massage: no  Chiropractic: no    Other: no      Pain Medications:  - Opioids: None  - Adjuvant Medications: Tylenol  - Anti-Coagulants: None     report:  Reviewed and consistent with medication use as prescribed.  No controlled substances recently prescribed.    Pain Procedures:   None      Imaging:   X-ray lumbar spine 09/24/2020:  There is mild dextroscoliosis of the upper thoracic spine.  Vertebral body heights appear within normal limits.  There is mild grade 1 anterolisthesis of L4 on L5. There is moderate disc height loss at L2-3 and L4-5.  Mild asymmetric disc height loss at L3-4. Facet arthropathy demonstrated at L4-5 and L5-S1. No pars defects visualized.  No fractures visualized.  The remaining visualized osseous and soft tissue structures demonstrate no appreciable abnormality.    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    Cancer     Cataract     Encounter for blood transfusion     Essential hypertension 7/22/2019    GERD (gastroesophageal reflux disease)     Glaucoma     Hyperlipidemia     Seizures      Past Surgical History:   Procedure Laterality Date    BRAIN SURGERY      EYE SURGERY      LUNG SURGERY      Partial lung removed Left      Social History     Socioeconomic History    Marital status:      Spouse name: Not on file    Number of children: Not on file    Years of education: Not on file    Highest education level: Not on file   Occupational History    Not on file   Social Needs    Financial resource strain: Not on file    Food insecurity     Worry: Not on file     Inability: Not on file    Transportation needs     Medical: Not on file     Non-medical: Not on file   Tobacco Use    Smoking status: Current Every Day Smoker     Packs/day: 2.00     Types: Cigarettes    Smokeless tobacco: Never Used   Substance and Sexual Activity    Alcohol use: No    Drug use: No     Sexual activity: Not on file   Lifestyle    Physical activity     Days per week: Not on file     Minutes per session: Not on file    Stress: Not on file   Relationships    Social connections     Talks on phone: Not on file     Gets together: Not on file     Attends Congregation service: Not on file     Active member of club or organization: Not on file     Attends meetings of clubs or organizations: Not on file     Relationship status: Not on file   Other Topics Concern    Not on file   Social History Narrative    Not on file     Family History   Problem Relation Age of Onset    Heart attack Father     Coronary artery disease Brother     Valvular heart disease Brother        Review of patient's allergies indicates:   Allergen Reactions    Albuterol      Other reaction(s): sweaty  Other reaction(s): chills    Wasp sting  [allergen ext-venom-honey bee]      Other reaction(s): swelling       Current Outpatient Medications   Medication Sig    albuterol (PROVENTIL/VENTOLIN HFA) 90 mcg/actuation inhaler 2 puffs as needed    atorvastatin (LIPITOR) 10 MG tablet Take 2 tablets (20 mg total) by mouth once daily.    brimonidine-timolol (COMBIGAN) 0.2-0.5 % Drop Inject 1 drop into the eye Twice daily.    epinephrine (EPIPEN INJ)     latanoprost 0.005 % ophthalmic solution     phenytoin (DILANTIN) 100 MG ER capsule Take 3 capsules (300 mg total) by mouth once daily.    naproxen (NAPROSYN) 500 MG tablet Take 1 tablet (500 mg total) by mouth 2 (two) times daily with meals.     No current facility-administered medications for this visit.        Review of Systems     GENERAL:  No weight loss, malaise or fevers.  HEENT:   No recent changes in vision or hearing  NECK:  Negative for lumps, no difficulty with swallowing.  RESPIRATORY:  Negative for cough, wheezing or shortness of breath, patient denies any recent URI.  CARDIOVASCULAR:  Negative for chest pain, leg swelling or palpitations.  GI:  Negative for abdominal  "discomfort, blood in stools or black stools or change in bowel habits.  MUSCULOSKELETAL:  See HPI.  SKIN:  Negative for lesions, rash, and itching.  PSYCH:  No mood disorder or recent psychosocial stressors.  Patients sleep is not disturbed secondary to pain.  HEMATOLOGY/LYMPHOLOGY:  Negative for prolonged bleeding, bruising easily or swollen nodes.  Patient is not currently taking any anti-coagulants  NEURO:   No history of headaches, syncope, paralysis, seizures or tremors.  All other reviewed and negative other than HPI.    OBJECTIVE:    BP (!) 142/76 (BP Location: Right arm, Patient Position: Sitting, BP Method: Medium (Automatic))   Pulse (!) 53   Ht 5' 9" (1.753 m)   Wt 67.5 kg (148 lb 14.7 oz)   BMI 21.99 kg/m²         Physical Exam    GENERAL: Well appearing, in no acute distress, alert and oriented x3.  PSYCH:  Mood and affect appropriate.  SKIN: Skin color, texture, turgor normal, no rashes or lesions.  HEAD/FACE:  Normocephalic, atraumatic. Cranial nerves grossly intact.  CV: RRR with palpation of the radial artery.  PULM: No evidence of respiratory difficulty, symmetric chest rise.  GI:  Soft and non-tender.  BACK: Straight leg raising in the sitting and supine positions is negative to radicular pain. No pain to palpation over the facet joints of the lumbar spine or spinous processes. Normal range of motion without pain reproduction.  EXTREMITIES: Peripheral joint ROM is full and pain free without obvious instability or laxity in all four extremities. No deformities, edema, or skin discoloration. Good capillary refill.  MUSCULOSKELETAL: Shoulder, hip, and knee provocative maneuvers are negative.  There is no pain with palpation over the sacroiliac joints bilaterally.  FABERs test is negative.  FADIRs test is negative.   Bilateral upper and lower extremity strength is normal and symmetric.  No atrophy or tone abnormalities are noted.  NEURO: Bilateral upper and lower extremity coordination and muscle " stretch reflexes are physiologic and symmetric.  Plantar response are downgoing. No clonus.  No loss of sensation is noted.  GAIT: normal.      LABS:  Lab Results   Component Value Date    WBC 8.69 09/25/2020    HGB 13.9 (L) 09/25/2020    HCT 44.4 09/25/2020    MCV 97 09/25/2020     09/25/2020       CMP  Sodium   Date Value Ref Range Status   09/25/2020 139 136 - 145 mmol/L Final     Potassium   Date Value Ref Range Status   09/25/2020 4.3 3.5 - 5.1 mmol/L Final     Chloride   Date Value Ref Range Status   09/25/2020 101 95 - 110 mmol/L Final     CO2   Date Value Ref Range Status   09/25/2020 29 23 - 29 mmol/L Final     Glucose   Date Value Ref Range Status   09/25/2020 104 70 - 110 mg/dL Final     BUN, Bld   Date Value Ref Range Status   09/25/2020 10 8 - 23 mg/dL Final     Creatinine   Date Value Ref Range Status   09/25/2020 0.7 0.5 - 1.4 mg/dL Final     Calcium   Date Value Ref Range Status   09/25/2020 8.9 8.7 - 10.5 mg/dL Final     Total Protein   Date Value Ref Range Status   09/25/2020 6.9 6.0 - 8.4 g/dL Final     Albumin   Date Value Ref Range Status   09/25/2020 4.1 3.5 - 5.2 g/dL Final     Total Bilirubin   Date Value Ref Range Status   09/25/2020 0.6 0.1 - 1.0 mg/dL Final     Comment:     For infants and newborns, interpretation of results should be based  on gestational age, weight and in agreement with clinical  observations.  Premature Infant recommended reference ranges:  Up to 24 hours.............<8.0 mg/dL  Up to 48 hours............<12.0 mg/dL  3-5 days..................<15.0 mg/dL  6-29 days.................<15.0 mg/dL       Alkaline Phosphatase   Date Value Ref Range Status   09/25/2020 62 55 - 135 U/L Final     AST   Date Value Ref Range Status   09/25/2020 19 10 - 40 U/L Final     ALT   Date Value Ref Range Status   09/25/2020 15 10 - 44 U/L Final     Anion Gap   Date Value Ref Range Status   09/25/2020 9 8 - 16 mmol/L Final     eGFR if    Date Value Ref Range Status    09/25/2020 >60.0 >60 mL/min/1.73 m^2 Final     eGFR if non    Date Value Ref Range Status   09/25/2020 >60.0 >60 mL/min/1.73 m^2 Final     Comment:     Calculation used to obtain the estimated glomerular filtration  rate (eGFR) is the CKD-EPI equation.          Lab Results   Component Value Date    HGBA1C 5.5 02/25/2020             ASSESSMENT: 75 y.o. year old male with lower back pain, consistent with     1. Spondylosis without myelopathy or radiculopathy, lumbar region  Ambulatory referral/consult to Physical/Occupational Therapy   2. Chronic bilateral low back pain, unspecified whether sciatica present  Ambulatory referral/consult to Pain Clinic         PLAN:   - Interventions: None at this time.  Consider lumbar facet injections bilaterally at L4-5 and L5-S1 future if warranted and if conservative measures fail.    - Anticoagulation use: no     - Medications:I have counseled the patient on importance of smoking cessation and specifically poor outcomes related to spine and spine surgery.,  I have stressed the importance of physical activity and a home exercise plan to help with pain and improve health. and Patient can continue with medications for now since they are providing benefits, using them appropriately, and without side effects.  Start naproxen 500 mg b.i.d. p.r.n., number 60 tablets, 0 refills.  We will trial this for 1 month the help assist with inflammatory source of pain into limit medication use.    - Therapy: Referral to Physical therapy for Lumbar stabilization, core strengthening, and a home exercise program.    - Labs:  Reviewed    - Imaging: Reviewed available imaging with patient and answered any questions they had regarding study.    - Consults/Referrals:  Physical therapy    - Records:  Reviewed/Obtain old records from outside physicians and imaging    - Follow up visit: return to clinic in 6 weeks or as needed    - Counseled patient regarding the importance of activity  modification, smoking cessation and physical therapy    - This condition does not require this patient to take time off of work, and the primary goal of our Pain Management services is to improve the patient's functional capacity.    - Patient Questions: Answered all of the patient's questions regarding diagnosis, therapy, and treatment        The above plan and management options were discussed at length with patient. Patient is in agreement with the above and verbalized understanding.    I discussed the goals of interventional chronic pain management with the patient on today's visit.  I explained the utility of injections for diagnostic and therapeutic purposes.  We discussed a multimodal approach to pain including treating the patient's given worst pain at any given time.  We will use a systematic approach to addressing pain.  We will also adopt a multimodal approach that includes injections, adjuvant medications, physical therapy, at times psychiatry.  There may be a limited role for opioid use intermittently in the treatment of pain, more particularly for acute pain although no one approach can be used as a sole treatment modality.    I emphasized the importance of regular exercise, core strengthening and stretching, diet and weight loss as a cornerstone of long-term pain management.      Apollo Prescott MD  Interventional Pain Management  [facility]    Disclaimer:  This note was prepared using voice recognition system and is likely to have sound alike errors that may have been overlooked even after proof reading.  Please call me with any questions

## 2020-09-30 NOTE — LETTER
September 30, 2020      Zeke Lamb MD  [address] [address]           [facility]  [address] [place] [id number]  Phone: [phone]  Fax: [phone]          Patient: Humberto Carlson   MR Number: [id number]   YOB: 1945   Date of Visit: 9/30/2020       Dear Dr. Zeke Lamb:    Thank you for referring Humberto Carlson to me for evaluation. Attached you will find relevant portions of my assessment and plan of care.    If you have questions, please do not hesitate to call me. I look forward to following Humberto Carlson along with you.    Sincerely,    Apollo Prescott MD    Enclosure  CC:  No Recipients    If you would like to receive this communication electronically, please contact [email or web address] or [phone] to request more information on [employer] Link access.    For providers and/or their staff who would like to refer a patient to [facility], please contact us through our one-stop-shop provider referral line, [facility] , at [phone].    If you feel you have received this communication in error or would no longer like to receive these types of communications, please e-mail [email or web address]

## 2020-10-01 ENCOUNTER — HOSPITAL ENCOUNTER (OUTPATIENT)
Dept: RADIOLOGY | Facility: HOSPITAL | Age: 75
Discharge: HOME | End: 2020-10-01
Attending: PSYCHIATRY & NEUROLOGY
Payer: MEDICARE

## 2020-10-01 DIAGNOSIS — R63.4 WEIGHT LOSS: ICD-10-CM

## 2020-10-01 DIAGNOSIS — C34.00 MALIGNANT NEOPLASM OF HILUS OF LUNG, UNSPECIFIED LATERALITY: ICD-10-CM

## 2020-10-01 DIAGNOSIS — Z86.79 HISTORY OF SUBDURAL HEMATOMA: ICD-10-CM

## 2020-10-01 DIAGNOSIS — G40.409 GRAND MAL EPILEPSY, CONTROLLED: ICD-10-CM

## 2020-10-01 DIAGNOSIS — J20.9 ACUTE BRONCHITIS, UNSPECIFIED ORGANISM: ICD-10-CM

## 2020-10-01 DIAGNOSIS — M54.50 RIGHT-SIDED LOW BACK PAIN WITHOUT SCIATICA, UNSPECIFIED CHRONICITY: ICD-10-CM

## 2020-10-01 DIAGNOSIS — R73.03 PRE-DIABETES: ICD-10-CM

## 2020-10-01 DIAGNOSIS — Z12.5 SCREENING FOR PROSTATE CANCER: ICD-10-CM

## 2020-10-01 DIAGNOSIS — G40.909 SEIZURE DISORDER: ICD-10-CM

## 2020-10-01 DIAGNOSIS — J00 ACUTE RHINITIS: ICD-10-CM

## 2020-10-01 DIAGNOSIS — R06.00 DYSPNEA, UNSPECIFIED TYPE: ICD-10-CM

## 2020-10-01 DIAGNOSIS — I10 ESSENTIAL HYPERTENSION: ICD-10-CM

## 2020-10-01 DIAGNOSIS — E78.5 HYPERLIPIDEMIA, UNSPECIFIED HYPERLIPIDEMIA TYPE: ICD-10-CM

## 2020-10-01 DIAGNOSIS — Z87.892 HISTORY OF ANAPHYLAXIS: ICD-10-CM

## 2020-10-01 DIAGNOSIS — R73.9 ELEVATED BLOOD SUGAR: ICD-10-CM

## 2020-10-01 PROCEDURE — 70553 MRI BRAIN STEM W/O & W/DYE: CPT | Mod: TC

## 2020-10-01 PROCEDURE — A9585 GADOBUTROL INJECTION: HCPCS | Performed by: PSYCHIATRY & NEUROLOGY

## 2020-10-01 PROCEDURE — 25500020 PHARM REV CODE 255: Performed by: PSYCHIATRY & NEUROLOGY

## 2020-10-01 RX ORDER — GADOBUTROL 604.72 MG/ML
10 INJECTION INTRAVENOUS
Status: COMPLETED | OUTPATIENT
Start: 2020-10-01 | End: 2020-10-01

## 2020-10-01 RX ADMIN — GADOBUTROL 6 ML: 604.72 INJECTION INTRAVENOUS at 08:10

## 2020-10-02 ENCOUNTER — HOSPITAL ENCOUNTER (OUTPATIENT)
Dept: CARDIOLOGY | Facility: HOSPITAL | Age: 75
Discharge: HOME | End: 2020-10-02
Attending: FAMILY MEDICINE
Payer: MEDICARE

## 2020-10-02 VITALS
HEART RATE: 67 BPM | WEIGHT: 149 LBS | BODY MASS INDEX: 22.07 KG/M2 | HEIGHT: 69 IN | SYSTOLIC BLOOD PRESSURE: 142 MMHG | DIASTOLIC BLOOD PRESSURE: 76 MMHG

## 2020-10-02 DIAGNOSIS — R01.1 HEART MURMUR: ICD-10-CM

## 2020-10-02 LAB
AORTIC ROOT ANNULUS: 3.18 CM
ASCENDING AORTA: 3.03 CM
AV INDEX (PROSTH): 0.36
AV MEAN GRADIENT: 19 MMHG
AV PEAK GRADIENT: 34 MMHG
AV VALVE AREA: 1.51 CM2
AV VELOCITY RATIO: 0.36
BSA FOR ECHO PROCEDURE: 1.81 M2
CV ECHO LV RWT: 0.44 CM
DOP CALC AO PEAK VEL: 2.9 M/S
DOP CALC AO VTI: 62.61 CM
DOP CALC LVOT AREA: 4.2 CM2
DOP CALC LVOT DIAMETER: 2.3 CM
DOP CALC LVOT PEAK VEL: 1.03 M/S
DOP CALC LVOT STROKE VOLUME: 94.56 CM3
DOP CALC RVOT PEAK VEL: 0.92 M/S
DOP CALC RVOT VTI: 21.44 CM
DOP CALCLVOT PEAK VEL VTI: 22.77 CM
E WAVE DECELERATION TIME: 239.76 MSEC
E/A RATIO: 0.65
E/E' RATIO: 16.31 M/S
ECHO LV POSTERIOR WALL: 1.12 CM (ref 0.6–1.1)
FRACTIONAL SHORTENING: 34 % (ref 28–44)
INTERVENTRICULAR SEPTUM: 1.19 CM (ref 0.6–1.1)
IVRT: 119.89 MSEC
LA MAJOR: 5.68 CM
LA MINOR: 6.16 CM
LA WIDTH: 3.33 CM
LEFT ATRIUM SIZE: 3.9 CM
LEFT ATRIUM VOLUME INDEX: 35.8 ML/M2
LEFT ATRIUM VOLUME: 65.24 CM3
LEFT INTERNAL DIMENSION IN SYSTOLE: 3.36 CM (ref 2.1–4)
LEFT VENTRICLE DIASTOLIC VOLUME INDEX: 67.34 ML/M2
LEFT VENTRICLE DIASTOLIC VOLUME: 122.75 ML
LEFT VENTRICLE MASS INDEX: 125 G/M2
LEFT VENTRICLE SYSTOLIC VOLUME INDEX: 25.3 ML/M2
LEFT VENTRICLE SYSTOLIC VOLUME: 46.18 ML
LEFT VENTRICULAR INTERNAL DIMENSION IN DIASTOLE: 5.08 CM (ref 3.5–6)
LEFT VENTRICULAR MASS: 227.31 G
LV LATERAL E/E' RATIO: 15.14 M/S
LV SEPTAL E/E' RATIO: 17.67 M/S
MV PEAK A VEL: 1.62 M/S
MV PEAK E VEL: 1.06 M/S
MV STENOSIS PRESSURE HALF TIME: 69.53 MS
MV VALVE AREA P 1/2 METHOD: 3.16 CM2
PISA TR MAX VEL: 2.8 M/S
PULM VEIN S/D RATIO: 1.48
PV MEAN GRADIENT: 2.12 MMHG
PV PEAK D VEL: 0.65 M/S
PV PEAK S VEL: 0.96 M/S
PV PEAK VELOCITY: 1.24 CM/S
RA MAJOR: 3.67 CM
RA WIDTH: 3.49 CM
RIGHT VENTRICULAR END-DIASTOLIC DIMENSION: 2.28 CM
SINUS: 3.32 CM
STJ: 2.97 CM
TDI LATERAL: 0.07 M/S
TDI SEPTAL: 0.06 M/S
TDI: 0.07 M/S
TR MAX PG: 31 MMHG
TRICUSPID ANNULAR PLANE SYSTOLIC EXCURSION: 2.23 CM

## 2020-10-02 PROCEDURE — 93306 ECHO (CUPID ONLY): ICD-10-PCS | Mod: 26,,, | Performed by: INTERNAL MEDICINE

## 2020-10-02 PROCEDURE — 93306 TTE W/DOPPLER COMPLETE: CPT | Mod: 26,,, | Performed by: INTERNAL MEDICINE

## 2020-10-02 PROCEDURE — 93306 TTE W/DOPPLER COMPLETE: CPT

## 2020-10-05 ENCOUNTER — CLINICAL SUPPORT (OUTPATIENT)
Dept: REHABILITATION | Facility: HOSPITAL | Age: 75
End: 2020-10-05
Attending: PHYSICAL MEDICINE & REHABILITATION
Payer: MEDICARE

## 2020-10-05 DIAGNOSIS — M47.816 SPONDYLOSIS WITHOUT MYELOPATHY OR RADICULOPATHY, LUMBAR REGION: ICD-10-CM

## 2020-10-05 DIAGNOSIS — M54.50 CHRONIC BILATERAL LOW BACK PAIN, UNSPECIFIED WHETHER SCIATICA PRESENT: Primary | ICD-10-CM

## 2020-10-05 DIAGNOSIS — G89.29 CHRONIC BILATERAL LOW BACK PAIN, UNSPECIFIED WHETHER SCIATICA PRESENT: Primary | ICD-10-CM

## 2020-10-05 PROCEDURE — 97161 PT EVAL LOW COMPLEX 20 MIN: CPT

## 2020-10-05 NOTE — PLAN OF CARE
[facility]  Physical Therapy Initial Evaluation    Name: Humberto Carlson  Clinic Number: [id number]    Therapy Diagnosis:   Encounter Diagnoses   Name Primary?    Spondylosis without myelopathy or radiculopathy, lumbar region     Chronic bilateral low back pain, unspecified whether sciatica present Yes     Physician: Apollo Prescott MD    Physician Orders: PT Eval and Treat   Medical Diagnosis from Referral: Spondylosis without myelopathy or radiculopathy, lumbar region  Evaluation Date: 10/5/2020  Authorization Period Expiration: 9/30/2021  Plan of Care Expiration: 10/4/2020  Visit # / Visits authorized: 1/1    Time In: 11:00 am  Time Out: 11:50 am  Total Billable Time: 50 minutes    Precautions: seizures    Subjective   Date of onset:  9/30/2020  History of current condition - Humberto reports: trouble breathing after lung surgery 2015 and in coma for 2 months.  Brain surgery in Dec after fall.  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    Cancer     Cataract     Encounter for blood transfusion     Essential hypertension 7/22/2019    GERD (gastroesophageal reflux disease)     Glaucoma     Hyperlipidemia     Seizures        Surgical History:   Humberto Carlson  has a past surgical history that includes Partial lung removed (Left); Brain surgery; Eye surgery; and Lung surgery.    Medications:   Humberto has a current medication list which includes the following prescription(s): albuterol, atorvastatin, brimonidine-timolol, epinephrine, latanoprost, naproxen, and phenytoin.    Allergies:   Review of patient's allergies indicates:   Allergen Reactions    Albuterol      Other reaction(s): sweaty  Other reaction(s): chills    Wasp sting  [allergen ext-venom-honey bee]      Other reaction(s): swelling         Imaging:  Narrative & Impression     EXAMINATION:  XR LUMBAR SPINE COMPLETE 5 VIEW     CLINICAL HISTORY:  Back pain or radiculopathy, > 6 wks;Back pain or radiculopathy, < 6 wks, uncomplicated;Low back pain     TECHNIQUE:  AP, lateral, spot and bilateral oblique views of the lumbar spine were preformed.     COMPARISON:  None     FINDINGS:  There is mild dextroscoliosis of the upper thoracic spine.  Vertebral body heights appear within normal limits.  There is mild grade 1 anterolisthesis of L4 on L5. There is moderate disc height loss at L2-3 and L4-5.  Mild asymmetric disc height loss at L3-4. Facet arthropathy demonstrated at L4-5 and L5-S1. No pars defects visualized.  No fractures visualized.  The remaining visualized osseous and soft tissue structures demonstrate no appreciable abnormality.     Impression:     Degenerative findings as above        Electronically signed by: Dougie Hsu MD     Prior Therapy: none  Social History:  lives alone  Occupation: Maintenance for rent houses  Prior Level of Function: independent  Current Level of Function: independent    Pain:  Current 1/10, worst 5/10, best 0/10   Location: bilateral back and sometimes down the leg.  Recent calf pain when sleeping  Description: Aching  Aggravating Factors: Lifting  Easing Factors: rest    Pts goals: to decrease pain with lifting    Objective     Sensation:  Sensation is intact to light touch    Structural Inspection:     ROM   % ----   Lumbar Forward Bending 100 ----   Lumbar Backward Bending  50 ----    Right % Left %   Lumbar Sidebending 60 60   Hip Flexion 120 120   Hip Extension  15 15     Strength   Right  Left   Gluteus Don 3/5 3/5   Gluteus Medius 3/5 3/5   Hip Adductors 4-/5 4-/5   Psoas 5/5 5/5   Quadriceps 5/5 5/5   Hamstrings 4/5 4/5   Anterior Tibialis 5/5 5/5   Gastroc/Soleus 5/5 5/5     Special Tests:   Test Right Left   SLR Test negative negative   SLUMP  negative negative   ASIS Compression negative negative    ASIS Distraction negative negative   Posterior Shear Gap negative negative     Palpation: (B) low back     Gait Analysis: The patient ambulated with the use of none and presents with the following gait abnormalities: none    Other: Pt presents with postural abnormalities which include: forward head and rounded shoulders     Function:     CMS Impairment/Limitation/Restriction for FOTO Lumbar Spine Survey    Therapist reviewed FOTO scores for Humberto Carlson on 10/5/2020.   FOTO documents entered into [employer] - see Media section.    Limitation Score: 33%         TREATMENT   Treatment Time In: 11:40 am  Treatment Time Out: 11:50 am  Total Treatment time separate from Evaluation: 10 minutes    Humberto received therapeutic exercises to develop strength, ROM and core stabilization for 10 minutes including:  Single knee to chest   Pelvic tilts  Dead bugs (lower extremity to start)  Alt superman (starting with hip extension)    Home Exercises and Patient Education Provided    Education provided:   - Home program    Written Home Exercises Provided: yes.  Exercises were reviewed and Humberto was able to demonstrate them prior to the end of the session.  Humberto demonstrated good  understanding of the education provided.     See EMR under Patient Instructions for exercises provided 10/5/2020.    Assessment   Humberto is a 75 y.o. male referred to outpatient Physical Therapy with a medical diagnosis of Spondylosis without myelopathy or radiculopathy, lumbar region. The patient presents with impairments which include decreased strength, decreased joint mobility and postural abnormalities.  These impairments are limiting patient's ability to lift and work without pain. Pt prognosis is Excellent due to personal factors and co-morbidities listed below. Pt will benefit from skilled outpatient Physical Therapy to address the deficits stated above and in the chart below, provide pt/family education, and to maximize pt's level of independence.      Plan of care discussed with patient: Yes  Pt's spiritual, cultural and educational needs considered and patient is agreeable to the plan of care and goals as stated below:     Anticipated Barriers for therapy: none    Medical Necessity is demonstrated by the following  History  Co-morbidities and personal factors that may impact the plan of care Co-morbidities:   history of TBI and Lung surgery    Personal Factors:   no deficits     low   Examination  Body Structures and Functions, activity limitations and participation restrictions that may impact the plan of care Body Regions:   back    Body Systems:    strength    Participation Restrictions:   none    Activity limitations:   Learning and applying knowledge  no deficits    General Tasks and Commands  no deficits    Communication  no deficits    Mobility  lifting and carrying objects    Self care  no deficits    Domestic Life  shopping  doing house work (cleaning house, washing dishes, laundry)    Interactions/Relationships  no deficits    Life Areas  no deficits    Community and Social Life  community life  recreation and leisure         low   Clinical Presentation stable and uncomplicated low   Decision Making/ Complexity Score: low     Goals:  Short Term Goals: In 3 weeks   1.I with HEP  2.Pt to increase lumbar ROM from 80 to 90%    3. Pt to increase B hip strength from 3/5 to 4/5    4.Pt to have pain less than 3/10 at all times.    Long Term Goals: In 6 weeks  1.Pt to score less than % impaired on the Oswestry low back pain questionnaire  2. Patient to demo increase in LE strength to 5/5  3. Patient to have decreased pain to 0/10 at all times.  4. Patient to demo increase lumbar ROM to 100%  5. Patient to perform daily activities including lifting and working without limitation.    Plan   Plan of care Certification: 10/5/2020 to 10/4/2020.    Outpatient Physical Therapy 2 times weekly for 6 weeks to include the following interventions: Electrical  Stimulation to low back, Manual Therapy, Moist Heat/ Ice, Neuromuscular Re-ed, Patient Education, Therapeutic Activites, Therapeutic Exercise, Ultrasound and dry needling.     Kedar Moore, PT

## 2020-10-09 ENCOUNTER — TELEPHONE (OUTPATIENT)
Dept: INTERNAL MEDICINE | Facility: CLINIC | Age: 75
End: 2020-10-09

## 2020-10-09 NOTE — TELEPHONE ENCOUNTER
----- Message from Zeke Lamb MD sent at 10/9/2020 10:39 AM CDT -----  Please call the patient regarding his echo.  Indicates some weakness / declined diastolic function. Some mild valve issues. We will discuss at our follow up.

## 2020-10-12 ENCOUNTER — OFFICE VISIT (OUTPATIENT)
Dept: INTERNAL MEDICINE | Facility: CLINIC | Age: 75
End: 2020-10-12
Payer: MEDICARE

## 2020-10-12 VITALS
TEMPERATURE: 99 F | OXYGEN SATURATION: 95 % | HEART RATE: 63 BPM | WEIGHT: 146.38 LBS | BODY MASS INDEX: 21.68 KG/M2 | RESPIRATION RATE: 18 BRPM | SYSTOLIC BLOOD PRESSURE: 122 MMHG | DIASTOLIC BLOOD PRESSURE: 62 MMHG | HEIGHT: 69 IN

## 2020-10-12 DIAGNOSIS — R94.30 EJECTION FRACTION < 50%: ICD-10-CM

## 2020-10-12 DIAGNOSIS — R53.1 WEAKNESS: ICD-10-CM

## 2020-10-12 DIAGNOSIS — I51.89 DIASTOLIC DYSFUNCTION: ICD-10-CM

## 2020-10-12 DIAGNOSIS — R25.2 LEG CRAMP: ICD-10-CM

## 2020-10-12 DIAGNOSIS — I35.0 AORTIC VALVE STENOSIS, ETIOLOGY OF CARDIAC VALVE DISEASE UNSPECIFIED: Primary | ICD-10-CM

## 2020-10-12 PROCEDURE — 99999 PR PBB SHADOW E&M-EST. PATIENT-LVL IV: ICD-10-PCS | Mod: PBBFAC,,, | Performed by: FAMILY MEDICINE

## 2020-10-12 PROCEDURE — 99999 PR PBB SHADOW E&M-EST. PATIENT-LVL IV: CPT | Mod: PBBFAC,,, | Performed by: FAMILY MEDICINE

## 2020-10-12 PROCEDURE — 99214 OFFICE O/P EST MOD 30 MIN: CPT | Mod: PBBFAC | Performed by: FAMILY MEDICINE

## 2020-10-12 PROCEDURE — 99213 PR OFFICE/OUTPT VISIT, EST, LEVL III, 20-29 MIN: ICD-10-PCS | Mod: S$PBB,,, | Performed by: FAMILY MEDICINE

## 2020-10-12 PROCEDURE — 99213 OFFICE O/P EST LOW 20 MIN: CPT | Mod: S$PBB,,, | Performed by: FAMILY MEDICINE

## 2020-10-12 NOTE — PROGRESS NOTES
Subjective:       Patient ID: Humberto Carlson is a 75 y.o. male.    Chief Complaint: Follow-up    HPI     75    Past Medical History:   Diagnosis Date    Asthma     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    Cancer     Cataract     Encounter for blood transfusion     Essential hypertension 7/22/2019    GERD (gastroesophageal reflux disease)     Glaucoma     Hyperlipidemia     Seizures      Past Surgical History:   Procedure Laterality Date    BRAIN SURGERY      EYE SURGERY      LUNG SURGERY      Partial lung removed Left      Social History     Tobacco Use   Smoking Status Current Every Day Smoker    Packs/day: 2.00    Types: Cigarettes   Smokeless Tobacco Never Used     Family History   Problem Relation Age of Onset    Heart attack Father     Coronary artery disease Brother     Valvular heart disease Brother          Energy level is low  Has chronic shortness of breath  having to sit a lot more  Taking more breaks  Typically very active.  Some swelling at end of the day.  Sleeps with 1 small/ near flat pillow  Able to lie flat on his back without issue.    Multiple people have noted decrease per his daughter's report.    He notes his calves cramp at night.    Review of Systems   Constitutional: Negative for fever.   HENT: Negative for trouble swallowing.    Eyes: Negative for visual disturbance.   Respiratory: Positive for shortness of breath.    Cardiovascular: Negative for chest pain.   Gastrointestinal: Negative for abdominal pain.   Genitourinary: Negative for difficulty urinating.   Neurological: Positive for weakness.   Psychiatric/Behavioral: Negative for confusion.       Objective:       Vitals:    10/12/20 1027   BP: 122/62   Pulse: 63   Resp: 18   Temp: 98.7 °F (37.1 °C)       Physical Exam  Constitutional:       General: He is not in acute distress.     Appearance: Normal appearance. He is well-developed.   HENT:      Head: Normocephalic and  atraumatic.   Eyes:      General:         Right eye: No discharge.         Left eye: No discharge.      Conjunctiva/sclera: Conjunctivae normal.   Neck:      Musculoskeletal: Full passive range of motion without pain.      Trachea: Trachea normal.   Cardiovascular:      Rate and Rhythm: Normal rate and regular rhythm.      Heart sounds: Normal heart sounds.   Pulmonary:      Effort: Pulmonary effort is normal. No respiratory distress.      Breath sounds: Normal breath sounds. No decreased breath sounds or wheezing.   Abdominal:      Palpations: Abdomen is soft.      Tenderness: There is no abdominal tenderness.   Musculoskeletal:         General: No deformity.   Lymphadenopathy:      Cervical: No cervical adenopathy.   Skin:     Coloration: Skin is not pale.   Neurological:      Mental Status: He is alert and oriented to person, place, and time.   Psychiatric:         Speech: Speech normal.         Behavior: Behavior normal.         Thought Content: Thought content normal.         Assessment:       1. Aortic valve stenosis, etiology of cardiac valve disease unspecified    2. Diastolic dysfunction    3. Ejection fraction < 50%    4. Weakness    5. Leg cramp        Plan:       AS  Diastolic dysfunction  Decreased EF  Given weakness and decline in function/ability  Will consult Cardiology for further evaluation    Weight loss  Wt Readings from Last 10 Encounters:   10/12/20 66.4 kg (146 lb 6.2 oz)   10/02/20 67.6 kg (149 lb)   09/30/20 67.5 kg (148 lb 14.7 oz)   09/24/20 67.9 kg (149 lb 11.1 oz)   09/24/20 67.9 kg (149 lb 11.1 oz)   06/02/20 65.9 kg (145 lb 4.5 oz)   03/02/20 66.6 kg (146 lb 13.2 oz)   12/02/19 65.1 kg (143 lb 8.3 oz)   11/25/19 66.6 kg (146 lb 13.2 oz)   07/22/19 67.6 kg (149 lb 0.5 oz)   daughter concerned -but relatively stable over past year  Will continue to montior    Leg cramping  Onset roughly 1 month ago  Maybe for 6 months  Has been on statin for a long time.  Improves with juliann  juice  Limit caffeine  Advise trial of multivitamin  Stretching before bed.  If not improving plan further lab work

## 2020-10-26 ENCOUNTER — HOSPITAL ENCOUNTER (OUTPATIENT)
Dept: CARDIOLOGY | Facility: HOSPITAL | Age: 75
Discharge: HOME | End: 2020-10-26
Attending: INTERNAL MEDICINE
Payer: MEDICARE

## 2020-10-26 ENCOUNTER — OFFICE VISIT (OUTPATIENT)
Dept: CARDIOLOGY | Facility: CLINIC | Age: 75
End: 2020-10-26
Payer: MEDICARE

## 2020-10-26 VITALS — DIASTOLIC BLOOD PRESSURE: 80 MMHG | OXYGEN SATURATION: 98 % | SYSTOLIC BLOOD PRESSURE: 146 MMHG | HEART RATE: 52 BPM

## 2020-10-26 DIAGNOSIS — I10 ESSENTIAL HYPERTENSION: ICD-10-CM

## 2020-10-26 DIAGNOSIS — I35.0 AORTIC VALVE STENOSIS, ETIOLOGY OF CARDIAC VALVE DISEASE UNSPECIFIED: ICD-10-CM

## 2020-10-26 DIAGNOSIS — I35.0 NONRHEUMATIC AORTIC VALVE STENOSIS: ICD-10-CM

## 2020-10-26 DIAGNOSIS — I70.0 AORTIC CALCIFICATION: ICD-10-CM

## 2020-10-26 DIAGNOSIS — I25.118 CORONARY ARTERY DISEASE OF NATIVE ARTERY OF NATIVE HEART WITH STABLE ANGINA PECTORIS: Primary | ICD-10-CM

## 2020-10-26 DIAGNOSIS — F17.200 SMOKER: ICD-10-CM

## 2020-10-26 DIAGNOSIS — E78.2 MIXED HYPERLIPIDEMIA: ICD-10-CM

## 2020-10-26 DIAGNOSIS — E78.5 HYPERLIPIDEMIA, UNSPECIFIED HYPERLIPIDEMIA TYPE: ICD-10-CM

## 2020-10-26 DIAGNOSIS — I50.22 CHRONIC SYSTOLIC CONGESTIVE HEART FAILURE: ICD-10-CM

## 2020-10-26 DIAGNOSIS — E78.5 HYPERLIPIDEMIA, UNSPECIFIED HYPERLIPIDEMIA TYPE: Primary | ICD-10-CM

## 2020-10-26 DIAGNOSIS — I51.89 DIASTOLIC DYSFUNCTION: ICD-10-CM

## 2020-10-26 PROCEDURE — 93005 ELECTROCARDIOGRAM TRACING: CPT

## 2020-10-26 PROCEDURE — 99999 PR PBB SHADOW E&M-EST. PATIENT-LVL III: ICD-10-PCS | Mod: PBBFAC,,, | Performed by: INTERNAL MEDICINE

## 2020-10-26 PROCEDURE — 93010 ELECTROCARDIOGRAM REPORT: CPT | Mod: ,,, | Performed by: INTERNAL MEDICINE

## 2020-10-26 PROCEDURE — 99213 OFFICE O/P EST LOW 20 MIN: CPT | Mod: PBBFAC,25 | Performed by: INTERNAL MEDICINE

## 2020-10-26 PROCEDURE — 99205 OFFICE O/P NEW HI 60 MIN: CPT | Mod: S$PBB,,, | Performed by: INTERNAL MEDICINE

## 2020-10-26 PROCEDURE — 93010 EKG 12-LEAD: ICD-10-PCS | Mod: ,,, | Performed by: INTERNAL MEDICINE

## 2020-10-26 PROCEDURE — 99999 PR PBB SHADOW E&M-EST. PATIENT-LVL III: CPT | Mod: PBBFAC,,, | Performed by: INTERNAL MEDICINE

## 2020-10-26 PROCEDURE — 99205 PR OFFICE/OUTPT VISIT, NEW, LEVL V, 60-74 MIN: ICD-10-PCS | Mod: S$PBB,,, | Performed by: INTERNAL MEDICINE

## 2020-10-26 RX ORDER — ASPIRIN 81 MG/1
81 TABLET ORAL DAILY
Start: 2020-10-26 | End: 2023-04-19

## 2020-10-26 RX ORDER — LOSARTAN POTASSIUM 25 MG/1
25 TABLET ORAL DAILY
Qty: 30 TABLET | Refills: 5 | Status: SHIPPED | OUTPATIENT
Start: 2020-10-26 | End: 2021-03-22 | Stop reason: DRUGHIGH

## 2020-10-26 RX ORDER — ATORVASTATIN CALCIUM 40 MG/1
40 TABLET, FILM COATED ORAL DAILY
Qty: 30 TABLET | Refills: 5 | Status: SHIPPED | OUTPATIENT
Start: 2020-10-26 | End: 2021-04-29

## 2020-10-26 RX ORDER — AMLODIPINE BESYLATE 2.5 MG/1
2.5 TABLET ORAL DAILY
Qty: 30 TABLET | Refills: 11 | Status: SHIPPED | OUTPATIENT
Start: 2020-10-26 | End: 2021-02-01

## 2020-10-26 NOTE — PROGRESS NOTES
Subjective:   Patient ID:  Humberto Carlson is a 75 y.o. male who presents for evaluation of Shortness of Breath      74 yo male referred for low EF.   PMH CAD lung cancer s/p removal of left lung in 2015, h/o fall two months after the procedure and brain bleeding s/p removal, HTN HLD smoker 1 ppd for 60 yrs, occasional drinking  Occasional BECKER  No chest pain, dizziness palpitation, orthopnea and leg swelling  Part time work.  ECHO EF 40% mild AS and apical HK  EKG NSR ols septal infarct        Past Medical History:   Diagnosis Date    Asthma     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    Cancer     Cataract     Coronary artery disease of native artery of native heart with stable angina pectoris 10/26/2020    Encounter for blood transfusion     Essential hypertension 7/22/2019    GERD (gastroesophageal reflux disease)     Glaucoma     Hyperlipidemia     Seizures        Past Surgical History:   Procedure Laterality Date    BRAIN SURGERY      EYE SURGERY      LUNG SURGERY      Partial lung removed Left        Social History     Tobacco Use    Smoking status: Current Every Day Smoker     Packs/day: 2.00     Types: Cigarettes    Smokeless tobacco: Never Used   Substance Use Topics    Alcohol use: No    Drug use: No       Family History   Problem Relation Age of Onset    Heart attack Father     Coronary artery disease Brother     Valvular heart disease Brother        Review of Systems   Constitution: Negative for decreased appetite, diaphoresis, fever, malaise/fatigue and night sweats.   HENT: Negative for nosebleeds.    Eyes: Negative for blurred vision and double vision.   Cardiovascular: Negative for chest pain, claudication, dyspnea on exertion, irregular heartbeat, leg swelling, near-syncope, orthopnea, palpitations, paroxysmal nocturnal dyspnea and syncope.   Respiratory: Negative for cough, shortness of breath, sleep disturbances due to breathing, snoring,  sputum production and wheezing.    Endocrine: Negative for cold intolerance and polyuria.   Hematologic/Lymphatic: Does not bruise/bleed easily.   Skin: Negative for rash.   Musculoskeletal: Negative for back pain, falls, joint pain, joint swelling and neck pain.   Gastrointestinal: Negative for abdominal pain, heartburn, nausea and vomiting.   Genitourinary: Negative for dysuria, frequency and hematuria.   Neurological: Negative for difficulty with concentration, dizziness, focal weakness, headaches, light-headedness, numbness, seizures and weakness.   Psychiatric/Behavioral: Negative for depression, memory loss and substance abuse. The patient does not have insomnia.    Allergic/Immunologic: Negative for HIV exposure and hives.       Objective:   Physical Exam   Constitutional: He is oriented to person, place, and time. He appears well-nourished.   HENT:   Head: Normocephalic.   Eyes: Pupils are equal, round, and reactive to light.   Neck: Normal carotid pulses and no JVD present. Carotid bruit is not present. No thyromegaly present.   Cardiovascular: Normal rate, regular rhythm and normal pulses.  No extrasystoles are present. PMI is not displaced. Exam reveals no gallop and no S3.   Murmur (ESM on bases and radiating up to the neck ) heard.  Pulses:       Carotid pulses are on the right side with bruit and on the left side with bruit.  Pulmonary/Chest: Breath sounds normal. No stridor. No respiratory distress.   Abdominal: Soft. Bowel sounds are normal. There is no abdominal tenderness. There is no rebound.   Musculoskeletal: Normal range of motion.   Neurological: He is alert and oriented to person, place, and time.   Skin: Skin is intact. No rash noted.   Psychiatric: His behavior is normal.       Lab Results   Component Value Date    CHOL 187 02/25/2020    CHOL 167 07/22/2019    CHOL 184 01/22/2019     Lab Results   Component Value Date    HDL 49 02/25/2020    HDL 62 07/22/2019    HDL 56 01/22/2019     Lab  Results   Component Value Date    LDLCALC 110.4 02/25/2020    LDLCALC 87.4 07/22/2019    LDLCALC 106.8 01/22/2019     Lab Results   Component Value Date    TRIG 138 02/25/2020    TRIG 88 07/22/2019    TRIG 106 01/22/2019     Lab Results   Component Value Date    CHOLHDL 26.2 02/25/2020    CHOLHDL 37.1 07/22/2019    CHOLHDL 30.4 01/22/2019       Chemistry        Component Value Date/Time     09/25/2020 0828    K 4.3 09/25/2020 0828     09/25/2020 0828    CO2 29 09/25/2020 0828    BUN 10 09/25/2020 0828    CREATININE 0.7 09/25/2020 0828     09/25/2020 0828        Component Value Date/Time    CALCIUM 8.9 09/25/2020 0828    ALKPHOS 62 09/25/2020 0828    AST 19 09/25/2020 0828    ALT 15 09/25/2020 0828    BILITOT 0.6 09/25/2020 0828    ESTGFRAFRICA >60.0 09/25/2020 0828    EGFRNONAA >60.0 09/25/2020 0828          Lab Results   Component Value Date    HGBA1C 5.5 02/25/2020     Lab Results   Component Value Date    TSH 1.583 02/25/2020     No results found for: INR, PROTIME  Lab Results   Component Value Date    WBC 8.69 09/25/2020    HGB 13.9 (L) 09/25/2020    HCT 44.4 09/25/2020    MCV 97 09/25/2020     09/25/2020     BNP  @LABRCNTIP(BNP,BNPTRIAGEBLO)@  CrCl cannot be calculated (Patient's most recent lab result is older than the maximum 7 days allowed.).  No results found in the last 24 hours.  No results found in the last 24 hours.  No results found in the last 24 hours.    Assessment:      1. Coronary artery disease of native artery of native heart with stable angina pectoris    2. Aortic valve stenosis, etiology of cardiac valve disease unspecified    3. Diastolic dysfunction    4. Essential hypertension    5. Mixed hyperlipidemia    6. Chronic systolic congestive heart failure    7. Smoker    8. Nonrheumatic aortic valve stenosis    9. 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 And carotid US  Add ASA 81mg  amlodpine 2.5 mg daily Losartan 25 mg   Increase Lipitor from 20mg to 40mg   Check CMP and Lipids in 3 months  Smoking cessation  Counseled DASH  Check Lipid profile in 6 months  Recommend heart-healthy diet, weight control and regular exercise.  Arvind. Risk modification.   I have reviewed all pertinent labs and cardiac studies independently. Plans and recommendations have been formulated under my direct supervision. All questions answered and patient voiced understanding.   If symptoms persist go to the ED  RTC in 4 weeks

## 2020-10-26 NOTE — LETTER
October 26, 2020      Zeke Lamb MD  [address] [place] [id number]           [facility]  [address] [place] [id number]  Phone: [phone]  Fax: [phone]          Patient: Humberto Carlson   MR Number: [id number]   YOB: 1945   Date of Visit: 10/26/2020       Dear Dr. Zeke Lamb:    Thank you for referring Humberto Carlson to me for evaluation. Attached you will find relevant portions of my assessment and plan of care.    If you have questions, please do not hesitate to call me. I look forward to following Humberto Carlson along with you.    Sincerely,    Gallo Carbajal MD    Enclosure  CC:  No Recipients    If you would like to receive this communication electronically, please contact [email or web address] or [phone] to request more information on [employer] Link access.    For providers and/or their staff who would like to refer a patient to [facility], please contact us through our one-stop-shop provider referral line, [facility] , at [phone].    If you feel you have received this communication in error or would no longer like to receive these types of communications, please e-mail [email or web address]

## 2020-11-03 ENCOUNTER — HOSPITAL ENCOUNTER (OUTPATIENT)
Dept: CARDIOLOGY | Facility: HOSPITAL | Age: 75
Discharge: HOME | End: 2020-11-03
Attending: INTERNAL MEDICINE
Payer: MEDICARE

## 2020-11-03 ENCOUNTER — HOSPITAL ENCOUNTER (OUTPATIENT)
Dept: RADIOLOGY | Facility: HOSPITAL | Age: 75
Discharge: HOME | End: 2020-11-03
Attending: INTERNAL MEDICINE
Payer: MEDICARE

## 2020-11-03 VITALS
SYSTOLIC BLOOD PRESSURE: 130 MMHG | DIASTOLIC BLOOD PRESSURE: 65 MMHG | BODY MASS INDEX: 21.62 KG/M2 | WEIGHT: 146 LBS | HEIGHT: 69 IN

## 2020-11-03 DIAGNOSIS — I25.118 CORONARY ARTERY DISEASE OF NATIVE ARTERY OF NATIVE HEART WITH STABLE ANGINA PECTORIS: ICD-10-CM

## 2020-11-03 LAB
CV STRESS BASE HR: 60 BPM
DIASTOLIC BLOOD PRESSURE: 71 MMHG
LEFT ARM DIASTOLIC BLOOD PRESSURE: 65 MMHG
LEFT ARM SYSTOLIC BLOOD PRESSURE: 130 MMHG
LEFT CBA DIAS: 16 CM/S
LEFT CBA SYS: 59 CM/S
LEFT CCA DIST DIAS: 17 CM/S
LEFT CCA DIST SYS: 69 CM/S
LEFT CCA MID DIAS: 17 CM/S
LEFT CCA MID SYS: 60 CM/S
LEFT CCA PROX DIAS: 13 CM/S
LEFT CCA PROX SYS: 91 CM/S
LEFT ECA DIAS: 8 CM/S
LEFT ECA SYS: 87 CM/S
LEFT ICA DIST DIAS: 16 CM/S
LEFT ICA DIST SYS: 59 CM/S
LEFT ICA MID DIAS: 66 CM/S
LEFT ICA MID SYS: 208 CM/S
LEFT ICA PROX DIAS: 45 CM/S
LEFT ICA PROX SYS: 218 CM/S
LEFT VERTEBRAL DIAS: 5 CM/S
LEFT VERTEBRAL SYS: 43 CM/S
NUC REST EJECTION FRACTION: 35
NUC STRESS EJECTION FRACTION: 34 %
OHS CV CAROTID RIGHT ICA EDV HIGHEST: 33
OHS CV CAROTID ULTRASOUND LEFT ICA/CCA RATIO: 3.16
OHS CV CAROTID ULTRASOUND RIGHT ICA/CCA RATIO: 1.87
OHS CV CPX 85 PERCENT MAX PREDICTED HEART RATE MALE: 122
OHS CV CPX MAX PREDICTED HEART RATE: 144
OHS CV CPX PATIENT IS FEMALE: 0
OHS CV CPX PATIENT IS MALE: 1
OHS CV CPX PEAK DIASTOLIC BLOOD PRESSURE: 64 MMHG
OHS CV CPX PEAK HEAR RATE: 73 BPM
OHS CV CPX PEAK RATE PRESSURE PRODUCT: NORMAL
OHS CV CPX PEAK SYSTOLIC BLOOD PRESSURE: 146 MMHG
OHS CV CPX PERCENT MAX PREDICTED HEART RATE ACHIEVED: 51
OHS CV CPX RATE PRESSURE PRODUCT PRESENTING: 7920
OHS CV PV CAROTID LEFT HIGHEST CCA: 91
OHS CV PV CAROTID LEFT HIGHEST ICA: 218
OHS CV PV CAROTID RIGHT HIGHEST CCA: 62
OHS CV PV CAROTID RIGHT HIGHEST ICA: 116
OHS CV US CAROTID LEFT HIGHEST EDV: 66
RIGHT ARM DIASTOLIC BLOOD PRESSURE: 63 MMHG
RIGHT ARM SYSTOLIC BLOOD PRESSURE: 121 MMHG
RIGHT CBA DIAS: 14 CM/S
RIGHT CBA SYS: 57 CM/S
RIGHT CCA DIST DIAS: 14 CM/S
RIGHT CCA DIST SYS: 62 CM/S
RIGHT CCA MID DIAS: 15 CM/S
RIGHT CCA MID SYS: 53 CM/S
RIGHT CCA PROX DIAS: 7 CM/S
RIGHT CCA PROX SYS: 50 CM/S
RIGHT ECA DIAS: 2 CM/S
RIGHT ECA SYS: 55 CM/S
RIGHT ICA DIST DIAS: 33 CM/S
RIGHT ICA DIST SYS: 116 CM/S
RIGHT ICA MID DIAS: 20 CM/S
RIGHT ICA MID SYS: 70 CM/S
RIGHT ICA PROX DIAS: 21 CM/S
RIGHT ICA PROX SYS: 73 CM/S
RIGHT VERTEBRAL DIAS: 16 CM/S
RIGHT VERTEBRAL SYS: 69 CM/S
STRESS ECHO POST EXERCISE DUR MIN: 1 MINUTES
STRESS ECHO POST EXERCISE DUR SEC: 4 SECONDS
SYSTOLIC BLOOD PRESSURE: 132 MMHG

## 2020-11-03 PROCEDURE — 93016 STRESS TEST WITH MYOCARDIAL PERFUSION (CUPID ONLY): ICD-10-PCS | Mod: ,,, | Performed by: INTERNAL MEDICINE

## 2020-11-03 PROCEDURE — 93017 CV STRESS TEST TRACING ONLY: CPT

## 2020-11-03 PROCEDURE — 78452 STRESS TEST WITH MYOCARDIAL PERFUSION (CUPID ONLY): ICD-10-PCS | Mod: 26,,, | Performed by: INTERNAL MEDICINE

## 2020-11-03 PROCEDURE — 93880 EXTRACRANIAL BILAT STUDY: CPT

## 2020-11-03 PROCEDURE — 93880 EXTRACRANIAL BILAT STUDY: CPT | Mod: 26,,, | Performed by: INTERNAL MEDICINE

## 2020-11-03 PROCEDURE — 93018 STRESS TEST WITH MYOCARDIAL PERFUSION (CUPID ONLY): ICD-10-PCS | Mod: ,,, | Performed by: INTERNAL MEDICINE

## 2020-11-03 PROCEDURE — 78452 HT MUSCLE IMAGE SPECT MULT: CPT | Mod: 26,,, | Performed by: INTERNAL MEDICINE

## 2020-11-03 PROCEDURE — A9502 TC99M TETROFOSMIN: HCPCS

## 2020-11-03 PROCEDURE — 93016 CV STRESS TEST SUPVJ ONLY: CPT | Mod: ,,, | Performed by: INTERNAL MEDICINE

## 2020-11-03 PROCEDURE — 93880 CV US DOPPLER CAROTID (CUPID ONLY): ICD-10-PCS | Mod: 26,,, | Performed by: INTERNAL MEDICINE

## 2020-11-03 PROCEDURE — 93018 CV STRESS TEST I&R ONLY: CPT | Mod: ,,, | Performed by: INTERNAL MEDICINE

## 2020-11-03 RX ORDER — REGADENOSON 0.08 MG/ML
0.4 INJECTION, SOLUTION INTRAVENOUS ONCE
Status: COMPLETED | OUTPATIENT
Start: 2020-11-03 | End: 2020-11-03

## 2020-11-03 RX ADMIN — REGADENOSON 0.4 MG: 0.08 INJECTION, SOLUTION INTRAVENOUS at 09:11

## 2020-11-04 ENCOUNTER — TELEPHONE (OUTPATIENT)
Dept: CARDIOLOGY | Facility: CLINIC | Age: 75
End: 2020-11-04

## 2020-11-04 NOTE — TELEPHONE ENCOUNTER
Pt contacted about results, pt verbalized understanding.          ----- Message from Gallo Carbajal MD sent at 11/4/2020  1:20 PM CST -----  [facility] stress test showed old MI  Carotid US showed mild Dz  F/u as scheduled

## 2020-11-11 ENCOUNTER — OFFICE VISIT (OUTPATIENT)
Dept: PAIN MEDICINE | Facility: CLINIC | Age: 75
End: 2020-11-11
Payer: MEDICARE

## 2020-11-11 VITALS
SYSTOLIC BLOOD PRESSURE: 123 MMHG | WEIGHT: 145.94 LBS | HEART RATE: 61 BPM | HEIGHT: 69 IN | BODY MASS INDEX: 21.62 KG/M2 | DIASTOLIC BLOOD PRESSURE: 72 MMHG

## 2020-11-11 DIAGNOSIS — M54.50 CHRONIC BILATERAL LOW BACK PAIN, UNSPECIFIED WHETHER SCIATICA PRESENT: Primary | ICD-10-CM

## 2020-11-11 DIAGNOSIS — G89.29 CHRONIC BILATERAL LOW BACK PAIN, UNSPECIFIED WHETHER SCIATICA PRESENT: Primary | ICD-10-CM

## 2020-11-11 DIAGNOSIS — M47.816 SPONDYLOSIS WITHOUT MYELOPATHY OR RADICULOPATHY, LUMBAR REGION: ICD-10-CM

## 2020-11-11 PROCEDURE — 99999 PR PBB SHADOW E&M-EST. PATIENT-LVL IV: CPT | Mod: PBBFAC,,, | Performed by: PHYSICAL MEDICINE & REHABILITATION

## 2020-11-11 PROCEDURE — 99214 OFFICE O/P EST MOD 30 MIN: CPT | Mod: PBBFAC | Performed by: PHYSICAL MEDICINE & REHABILITATION

## 2020-11-11 PROCEDURE — 99999 PR PBB SHADOW E&M-EST. PATIENT-LVL IV: ICD-10-PCS | Mod: PBBFAC,,, | Performed by: PHYSICAL MEDICINE & REHABILITATION

## 2020-11-11 PROCEDURE — 99213 OFFICE O/P EST LOW 20 MIN: CPT | Mod: S$PBB,,, | Performed by: PHYSICAL MEDICINE & REHABILITATION

## 2020-11-11 PROCEDURE — 99213 PR OFFICE/OUTPT VISIT, EST, LEVL III, 20-29 MIN: ICD-10-PCS | Mod: S$PBB,,, | Performed by: PHYSICAL MEDICINE & REHABILITATION

## 2020-11-11 NOTE — PROGRESS NOTES
Established Patient Chronic Pain Note (Follow up visit)    Chief Complaint:   Chief Complaint   Patient presents with    Follow-up    Low-back Pain       SUBJECTIVE:    Humberto Carlson is a 75 y.o. male who presents to the clinic for a follow-up appointment for chronic lower back pain.  At the last visit, patient was referred to physical therapy and also started on naproxen.  We discussed facet joint injections if conservative measures were to fail.  He did not attend formal physical therapy, but has done his own home exercises which were previously provided.  He states that he does his home exercises at least 4-5 times weekly.  He has been taking the naproxen fairly regularly.  Since the last visit, Humberto Carlson states the pain has been stable. Current pain intensity is 0/10.    Patient denies night fever/night sweats, urinary incontinence, bowel incontinence, significant weight loss, significant motor weakness and loss of sensations.    Pain Disability Index Review:   No flowsheet data found.    Initial HPI 09/30/2020:  Humberto Carlson is a 75 y.o. male who presents to the clinic for the evaluation of chronic lower back pain.  He was referred by the Family Medicine department for further evaluation management of this pain.  Of note, patient has past medical history of seizure disorder, history of subdural hematoma, glaucoma, asthma, hypertension, hyperlipidemia, history of lung cancer, prediabetes, GERD, and multiple other medical comorbidities as listed below.  The pain started about 4-5 months ago following an episode where he lifted up his riding  and symptoms have been slowly improving.The pain is located in the lumbosacral area and radiates to the bilateral lower extremities, right worse than left.  He reports that the back pain is worse than the leg pain.  The pain is described as Aching and is rated as 2/10. The pain is rated with a score of  1/10 on the BEST day and a score of 9/10 on the WORST  day.  Symptoms interfere with daily activity and work. The pain is exacerbated by bending, lifting, kneeling, stooping.  The pain is mitigated by rest. The patient reports spending 2-4 hours per day reclining. The patient reports 5-7 hours of uninterrupted sleep per night.  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       Non-Pharmacologic Treatments:  Physical Therapy/Home Exercise: no  Ice/Heat:no  TENS: no  Acupuncture: no  Massage: no  Chiropractic: no    Other: no        Pain Medications:  - Opioids: None  - Adjuvant Medications: Tylenol  - Anti-Coagulants: None      report:  Reviewed and consistent with medication use as prescribed.  No controlled substances recently prescribed.     Pain Procedures:   None        Imaging:   X-ray lumbar spine 09/24/2020:  There is mild dextroscoliosis of the upper thoracic spine.  Vertebral body heights appear within normal limits.  There is mild grade 1 anterolisthesis of L4 on L5. There is moderate disc height loss at L2-3 and L4-5.  Mild asymmetric disc height loss at L3-4. Facet arthropathy demonstrated at L4-5 and L5-S1. No pars defects visualized.  No fractures visualized.  The remaining visualized osseous and soft tissue structures demonstrate no appreciable abnormality.       PMHx,PSHx, Social history, and Family history:  I have reviewed the patient's medical, surgical, social, and family history in detail and updated the computerized patient record.    Review of patient's allergies indicates:   Allergen Reactions    Albuterol      Other reaction(s): sweaty  Other reaction(s): chills    Wasp sting  [allergen ext-venom-honey bee]      Other reaction(s): swelling       Current Outpatient Medications   Medication Sig    albuterol (PROVENTIL/VENTOLIN HFA) 90 mcg/actuation inhaler 2 puffs as needed    amLODIPine (NORVASC) 2.5 MG tablet Take 1 tablet (2.5 mg total) by mouth once daily.    aspirin (ECOTRIN) 81 MG EC tablet Take 1  "tablet (81 mg total) by mouth once daily.    atorvastatin (LIPITOR) 40 MG tablet Take 1 tablet (40 mg total) by mouth once daily.    brimonidine-timolol (COMBIGAN) 0.2-0.5 % Drop Inject 1 drop into the eye Twice daily.    epinephrine (EPIPEN INJ)     latanoprost 0.005 % ophthalmic solution     losartan (COZAAR) 25 MG tablet Take 1 tablet (25 mg total) by mouth once daily.    naproxen (NAPROSYN) 500 MG tablet Take 1 tablet (500 mg total) by mouth 2 (two) times daily with meals.    phenytoin (DILANTIN) 100 MG ER capsule Take 3 capsules (300 mg total) by mouth once daily.     No current facility-administered medications for this visit.          REVIEW OF SYSTEMS:    GENERAL:  No weight loss, malaise or fevers.  HEENT:   No recent changes in vision or hearing  NECK:  Negative for lumps, no difficulty with swallowing.  RESPIRATORY:  Negative for cough, wheezing or shortness of breath, patient denies any recent URI.  CARDIOVASCULAR:  Negative for chest pain, leg swelling or palpitations.  GI:  Negative for abdominal discomfort, blood in stools or black stools or change in bowel habits.  MUSCULOSKELETAL:  See HPI.  SKIN:  Negative for lesions, rash, and itching.  PSYCH:  No mood disorder or recent psychosocial stressors.  Patients sleep is not disturbed secondary to pain.  HEMATOLOGY/LYMPHOLOGY:  Negative for prolonged bleeding, bruising easily or swollen nodes.  Patient is not currently taking any anti-coagulants  NEURO:   No history of headaches, syncope, paralysis, seizures or tremors.  All other reviewed and negative other than HPI.    OBJECTIVE:    /72   Pulse 61   Ht 5' 9" (1.753 m)   Wt 66.2 kg (145 lb 15.1 oz)   BMI 21.55 kg/m²     PHYSICAL EXAMINATION:    GENERAL: Well appearing, in no acute distress, alert and oriented x3.  PSYCH:  Mood and affect appropriate.  SKIN: Skin color, texture, turgor normal, no rashes or lesions.  HEAD/FACE:  Normocephalic, atraumatic. Cranial nerves grossly " intact.  CV: RRR with palpation of the radial artery.  PULM: No evidence of respiratory difficulty, symmetric chest rise.  GI:  Soft and non-tender.  BACK: Straight leg raising in the sitting and supine positions is negative to radicular pain. No pain to palpation over the facet joints of the lumbar spine or spinous processes. Normal range of motion without pain reproduction.  EXTREMITIES: Peripheral joint ROM is full and pain free without obvious instability or laxity in all four extremities. No deformities, edema, or skin discoloration. Good capillary refill.  MUSCULOSKELETAL: Hip and knee provocative maneuvers are negative.  There is no pain with palpation over the sacroiliac joints bilaterally.  FABERs test is negative.  FADIRs test is negative.   Bilateral upper and lower extremity strength is normal and symmetric.  No atrophy or tone abnormalities are noted.  NEURO: Bilateral upper and lower extremity coordination and muscle stretch reflexes are physiologic and symmetric.  Plantar response are downgoing. No clonus.  No loss of sensation is noted.  GAIT: normal.      LABS:  Lab Results   Component Value Date    WBC 8.69 09/25/2020    HGB 13.9 (L) 09/25/2020    HCT 44.4 09/25/2020    MCV 97 09/25/2020     09/25/2020       CMP  Sodium   Date Value Ref Range Status   09/25/2020 139 136 - 145 mmol/L Final     Potassium   Date Value Ref Range Status   09/25/2020 4.3 3.5 - 5.1 mmol/L Final     Chloride   Date Value Ref Range Status   09/25/2020 101 95 - 110 mmol/L Final     CO2   Date Value Ref Range Status   09/25/2020 29 23 - 29 mmol/L Final     Glucose   Date Value Ref Range Status   09/25/2020 104 70 - 110 mg/dL Final     BUN   Date Value Ref Range Status   09/25/2020 10 8 - 23 mg/dL Final     Creatinine   Date Value Ref Range Status   09/25/2020 0.7 0.5 - 1.4 mg/dL Final     Calcium   Date Value Ref Range Status   09/25/2020 8.9 8.7 - 10.5 mg/dL Final     Total Protein   Date Value Ref Range Status    09/25/2020 6.9 6.0 - 8.4 g/dL Final     Albumin   Date Value Ref Range Status   09/25/2020 4.1 3.5 - 5.2 g/dL Final     Total Bilirubin   Date Value Ref Range Status   09/25/2020 0.6 0.1 - 1.0 mg/dL Final     Comment:     For infants and newborns, interpretation of results should be based  on gestational age, weight and in agreement with clinical  observations.  Premature Infant recommended reference ranges:  Up to 24 hours.............<8.0 mg/dL  Up to 48 hours............<12.0 mg/dL  3-5 days..................<15.0 mg/dL  6-29 days.................<15.0 mg/dL       Alkaline Phosphatase   Date Value Ref Range Status   09/25/2020 62 55 - 135 U/L Final     AST   Date Value Ref Range Status   09/25/2020 19 10 - 40 U/L Final     ALT   Date Value Ref Range Status   09/25/2020 15 10 - 44 U/L Final     Anion Gap   Date Value Ref Range Status   09/25/2020 9 8 - 16 mmol/L Final     eGFR if    Date Value Ref Range Status   09/25/2020 >60.0 >60 mL/min/1.73 m^2 Final     eGFR if non    Date Value Ref Range Status   09/25/2020 >60.0 >60 mL/min/1.73 m^2 Final     Comment:     Calculation used to obtain the estimated glomerular filtration  rate (eGFR) is the CKD-EPI equation.          Lab Results   Component Value Date    HGBA1C 5.5 02/25/2020             ASSESSMENT: 75 y.o. year old male with lower back pain, consistent with     1. Chronic bilateral low back pain, unspecified whether sciatica present     2. Spondylosis without myelopathy or radiculopathy, lumbar region           PLAN:   - Interventions: None at this time.  Consider lumbar facet injections bilaterally at L4-5 and L5-S1 future if warranted and if conservative measures fail.     - Anticoagulation use: no      - Medications:I have counseled the patient on importance of smoking cessation and specifically poor outcomes related to spine and spine surgery.,  I have stressed the importance of physical activity and a home exercise plan to  help with pain and improve health. and Patient can continue with medications for now since they are providing benefits, using them appropriately, and without side effects.   discontinue naproxen due to long-term side effects risk, and transition to turmeric for its anti-inflammatory properties.      - Therapy:  continue activities and home exercises as tolerated.     - Labs:  Reviewed     - Imaging: Reviewed available imaging with patient and answered any questions they had regarding study.     - Consults/Referrals:   none at this time     - Records:  Reviewed/Obtain old records from outside physicians and imaging     - Follow up visit: return to clinic as needed     - Counseled patient regarding the importance of activity modification, smoking cessation and physical therapy     - This condition does not require this patient to take time off of work, and the primary goal of our Pain Management services is to improve the patient's functional capacity.     - Patient Questions: Answered all of the patient's questions regarding diagnosis, therapy, and treatment    The above plan and management options were discussed at length with patient. Patient is in agreement with the above and verbalized understanding.      Apollo Prescott MD  Interventional Pain Management  [facility]    Disclaimer:  This note was prepared using voice recognition system and is likely to have sound alike errors that may have been overlooked even after proof reading.  Please call me with any questions

## 2020-11-11 NOTE — PATIENT INSTRUCTIONS
Take 500mg -1000mg up to 2-3x a day, dependent on your pain level       Continue with home exercise program at least 2-3x weekly

## 2020-11-17 ENCOUNTER — PATIENT OUTREACH (OUTPATIENT)
Dept: ADMINISTRATIVE | Facility: HOSPITAL | Age: 75
End: 2020-11-17

## 2020-11-27 ENCOUNTER — OFFICE VISIT (OUTPATIENT)
Dept: CARDIOLOGY | Facility: CLINIC | Age: 75
End: 2020-11-27
Payer: MEDICARE

## 2020-11-27 VITALS
HEART RATE: 62 BPM | DIASTOLIC BLOOD PRESSURE: 78 MMHG | BODY MASS INDEX: 21.58 KG/M2 | OXYGEN SATURATION: 98 % | WEIGHT: 146.19 LBS | SYSTOLIC BLOOD PRESSURE: 118 MMHG

## 2020-11-27 DIAGNOSIS — E78.2 MIXED HYPERLIPIDEMIA: ICD-10-CM

## 2020-11-27 DIAGNOSIS — I35.0 NONRHEUMATIC AORTIC VALVE STENOSIS: ICD-10-CM

## 2020-11-27 DIAGNOSIS — I50.22 CHRONIC SYSTOLIC CONGESTIVE HEART FAILURE: ICD-10-CM

## 2020-11-27 DIAGNOSIS — F17.200 SMOKER: ICD-10-CM

## 2020-11-27 DIAGNOSIS — I10 ESSENTIAL HYPERTENSION: ICD-10-CM

## 2020-11-27 DIAGNOSIS — I25.118 CORONARY ARTERY DISEASE OF NATIVE ARTERY OF NATIVE HEART WITH STABLE ANGINA PECTORIS: Primary | ICD-10-CM

## 2020-11-27 PROCEDURE — 99213 OFFICE O/P EST LOW 20 MIN: CPT | Mod: PBBFAC | Performed by: INTERNAL MEDICINE

## 2020-11-27 PROCEDURE — 99214 OFFICE O/P EST MOD 30 MIN: CPT | Mod: S$PBB,,, | Performed by: INTERNAL MEDICINE

## 2020-11-27 PROCEDURE — 99999 PR PBB SHADOW E&M-EST. PATIENT-LVL III: ICD-10-PCS | Mod: PBBFAC,,, | Performed by: INTERNAL MEDICINE

## 2020-11-27 PROCEDURE — 99999 PR PBB SHADOW E&M-EST. PATIENT-LVL III: CPT | Mod: PBBFAC,,, | Performed by: INTERNAL MEDICINE

## 2020-11-27 PROCEDURE — 99214 PR OFFICE/OUTPT VISIT, EST, LEVL IV, 30-39 MIN: ICD-10-PCS | Mod: S$PBB,,, | Performed by: INTERNAL MEDICINE

## 2020-11-27 NOTE — PROGRESS NOTES
Subjective:   Patient ID:  Humberto Carlson is a 75 y.o. male who presents for follow up of No chief complaint on file.      76 yo male referred for low EF.   PMH CAD old MI  lung cancer s/p removal of left lung in 2015, h/o fall two months after the procedure and brain bleeding s/p removal, HTN HLD smoker 1 ppd for 60 yrs, occasional drinking  Occasional BECKER  No chest pain, palpitation, orthopnea and leg swelling  Dizziness if looks up  Part time work.  ECHO EF 40% mild AS and apical HK  EKG NSR ols septal infarct   MPI showed moderate sized severally fixed defect perfusion in apex  Carotid US 20% right and 50% left         Past Medical History:   Diagnosis Date    Asthma     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    Cancer     Cataract     Coronary artery disease of native artery of native heart with stable angina pectoris 10/26/2020    Encounter for blood transfusion     Essential hypertension 7/22/2019    GERD (gastroesophageal reflux disease)     Glaucoma     Hyperlipidemia     Seizures        Past Surgical History:   Procedure Laterality Date    BRAIN SURGERY      EYE SURGERY      LUNG SURGERY      Partial lung removed Left        Social History     Tobacco Use    Smoking status: Current Every Day Smoker     Packs/day: 2.00     Types: Cigarettes    Smokeless tobacco: Never Used   Substance Use Topics    Alcohol use: No    Drug use: No       Family History   Problem Relation Age of Onset    Heart attack Father     Coronary artery disease Brother     Valvular heart disease Brother          Review of Systems   Constitution: Negative for decreased appetite, diaphoresis, fever, malaise/fatigue and night sweats.   HENT: Negative for nosebleeds.    Eyes: Negative for blurred vision and double vision.   Cardiovascular: Positive for dyspnea on exertion. Negative for chest pain, claudication, irregular heartbeat, leg swelling, near-syncope, orthopnea,  palpitations, paroxysmal nocturnal dyspnea and syncope.   Respiratory: Positive for cough and shortness of breath. Negative for sleep disturbances due to breathing, snoring, sputum production and wheezing.    Endocrine: Negative for cold intolerance and polyuria.   Hematologic/Lymphatic: Does not bruise/bleed easily.   Skin: Negative for rash.   Musculoskeletal: Negative for back pain, falls, joint pain, joint swelling and neck pain.   Gastrointestinal: Negative for abdominal pain, heartburn, nausea and vomiting.   Genitourinary: Negative for dysuria, frequency and hematuria.   Neurological: Negative for difficulty with concentration, dizziness, focal weakness, headaches, light-headedness, numbness, seizures and weakness.   Psychiatric/Behavioral: Negative for depression, memory loss and substance abuse. The patient does not have insomnia.    Allergic/Immunologic: Negative for HIV exposure and hives.       Objective:   Physical Exam   Constitutional: He is oriented to person, place, and time. He appears well-nourished.   HENT:   Head: Normocephalic.   Eyes: Pupils are equal, round, and reactive to light.   Neck: Normal carotid pulses and no JVD present. Carotid bruit is not present. No thyromegaly present.   Cardiovascular: Normal rate, regular rhythm and normal pulses.  No extrasystoles are present. PMI is not displaced. Exam reveals no gallop and no S3.   Murmur (ESM on bases and radiating up to the neck ) heard.  Pulses:       Carotid pulses are on the right side with bruit and on the left side with bruit.  Pulmonary/Chest: Breath sounds normal. No stridor. No respiratory distress.   Abdominal: Soft. Bowel sounds are normal. There is no abdominal tenderness. There is no rebound.   Musculoskeletal: Normal range of motion.   Neurological: He is alert and oriented to person, place, and time.   Skin: Skin is intact. No rash noted.   Psychiatric: His behavior is normal.       Lab Results   Component Value Date    CHOL  187 02/25/2020    CHOL 167 07/22/2019    CHOL 184 01/22/2019     Lab Results   Component Value Date    HDL 49 02/25/2020    HDL 62 07/22/2019    HDL 56 01/22/2019     Lab Results   Component Value Date    LDLCALC 110.4 02/25/2020    LDLCALC 87.4 07/22/2019    LDLCALC 106.8 01/22/2019     Lab Results   Component Value Date    TRIG 138 02/25/2020    TRIG 88 07/22/2019    TRIG 106 01/22/2019     Lab Results   Component Value Date    CHOLHDL 26.2 02/25/2020    CHOLHDL 37.1 07/22/2019    CHOLHDL 30.4 01/22/2019       Chemistry        Component Value Date/Time     09/25/2020 0828    K 4.3 09/25/2020 0828     09/25/2020 0828    CO2 29 09/25/2020 0828    BUN 10 09/25/2020 0828    CREATININE 0.7 09/25/2020 0828     09/25/2020 0828        Component Value Date/Time    CALCIUM 8.9 09/25/2020 0828    ALKPHOS 62 09/25/2020 0828    AST 19 09/25/2020 0828    ALT 15 09/25/2020 0828    BILITOT 0.6 09/25/2020 0828    ESTGFRAFRICA >60.0 09/25/2020 0828    EGFRNONAA >60.0 09/25/2020 0828          Lab Results   Component Value Date    HGBA1C 5.5 02/25/2020     Lab Results   Component Value Date    TSH 1.583 02/25/2020     No results found for: INR, PROTIME  Lab Results   Component Value Date    WBC 8.69 09/25/2020    HGB 13.9 (L) 09/25/2020    HCT 44.4 09/25/2020    MCV 97 09/25/2020     09/25/2020     BMP  Sodium   Date Value Ref Range Status   09/25/2020 139 136 - 145 mmol/L Final     Potassium   Date Value Ref Range Status   09/25/2020 4.3 3.5 - 5.1 mmol/L Final     Chloride   Date Value Ref Range Status   09/25/2020 101 95 - 110 mmol/L Final     CO2   Date Value Ref Range Status   09/25/2020 29 23 - 29 mmol/L Final     BUN   Date Value Ref Range Status   09/25/2020 10 8 - 23 mg/dL Final     Creatinine   Date Value Ref Range Status   09/25/2020 0.7 0.5 - 1.4 mg/dL Final     Calcium   Date Value Ref Range Status   09/25/2020 8.9 8.7 - 10.5 mg/dL Final     Anion Gap   Date Value Ref Range Status   09/25/2020 9  8 - 16 mmol/L Final     eGFR if    Date Value Ref Range Status   09/25/2020 >60.0 >60 mL/min/1.73 m^2 Final     eGFR if non    Date Value Ref Range Status   09/25/2020 >60.0 >60 mL/min/1.73 m^2 Final     Comment:     Calculation used to obtain the estimated glomerular filtration  rate (eGFR) is the CKD-EPI equation.        BNP  @LABRCNTIP(BNP,BNPTRIAGEBLO)@  @LABRCNTIP(troponini)@  CrCl cannot be calculated (Patient's most recent lab result is older than the maximum 7 days allowed.).  No results found in the last 24 hours.  No results found in the last 24 hours.  No results found in the last 24 hours.    Assessment:      1. Coronary artery disease of native artery of native heart with stable angina pectoris    2. Chronic systolic congestive heart failure    3. Essential hypertension    4. Mixed hyperlipidemia    5. Nonrheumatic aortic valve stenosis    6. Smoker        Plan:   Lipitor 40 mg now  CMP and Lipid profile in 2 months  Continue ASA 81mg, amlodipine and Losartan     Counseled DASH  Check Lipid profile in 6 months  Recommend heart-healthy diet, weight control and regular exercise.  Arvind. Risk modification.   I have reviewed all pertinent labs and cardiac studies independently. Plans and recommendations have been formulated under my direct supervision. All questions answered and patient voiced understanding.   If symptoms persist go to the ED  RTC in 4 months

## 2020-12-09 ENCOUNTER — HOSPITAL ENCOUNTER (OUTPATIENT)
Dept: PULMONOLOGY | Facility: HOSPITAL | Age: 75
Discharge: HOME | End: 2020-12-09
Attending: PSYCHIATRY & NEUROLOGY
Payer: MEDICARE

## 2020-12-09 DIAGNOSIS — I10 ESSENTIAL HYPERTENSION: ICD-10-CM

## 2020-12-09 DIAGNOSIS — G40.909 SEIZURE DISORDER: ICD-10-CM

## 2020-12-09 DIAGNOSIS — Z12.5 SCREENING FOR PROSTATE CANCER: ICD-10-CM

## 2020-12-09 DIAGNOSIS — Z86.79 HISTORY OF SUBDURAL HEMATOMA: ICD-10-CM

## 2020-12-09 DIAGNOSIS — J00 ACUTE RHINITIS: ICD-10-CM

## 2020-12-09 DIAGNOSIS — E78.5 HYPERLIPIDEMIA, UNSPECIFIED HYPERLIPIDEMIA TYPE: ICD-10-CM

## 2020-12-09 DIAGNOSIS — R63.4 WEIGHT LOSS: ICD-10-CM

## 2020-12-09 DIAGNOSIS — R73.9 ELEVATED BLOOD SUGAR: ICD-10-CM

## 2020-12-09 DIAGNOSIS — M54.50 RIGHT-SIDED LOW BACK PAIN WITHOUT SCIATICA, UNSPECIFIED CHRONICITY: ICD-10-CM

## 2020-12-09 DIAGNOSIS — Z87.892 HISTORY OF ANAPHYLAXIS: ICD-10-CM

## 2020-12-09 DIAGNOSIS — R06.00 DYSPNEA, UNSPECIFIED TYPE: ICD-10-CM

## 2020-12-09 DIAGNOSIS — J20.9 ACUTE BRONCHITIS, UNSPECIFIED ORGANISM: ICD-10-CM

## 2020-12-09 DIAGNOSIS — C34.00 MALIGNANT NEOPLASM OF HILUS OF LUNG, UNSPECIFIED LATERALITY: ICD-10-CM

## 2020-12-09 DIAGNOSIS — G40.409 GRAND MAL EPILEPSY, CONTROLLED: ICD-10-CM

## 2020-12-09 DIAGNOSIS — R73.03 PRE-DIABETES: ICD-10-CM

## 2020-12-09 PROCEDURE — 95819 EEG AWAKE AND ASLEEP: CPT | Mod: 26,,, | Performed by: PSYCHIATRY & NEUROLOGY

## 2020-12-09 PROCEDURE — 95819 EEG AWAKE AND ASLEEP: CPT

## 2020-12-09 PROCEDURE — 95819 PR EEG,W/AWAKE & ASLEEP RECORD: ICD-10-PCS | Mod: 26,,, | Performed by: PSYCHIATRY & NEUROLOGY

## 2020-12-10 ENCOUNTER — TELEPHONE (OUTPATIENT)
Dept: NEUROLOGY | Facility: CLINIC | Age: 75
End: 2020-12-10

## 2020-12-10 NOTE — PROCEDURES
DATE EEG PERFORMED:  12-.      DATE EEG INTERPRETED:  12-.                   DURATION OF EEG: ROUTINE.         LEVEL OF CONSCIOUSENESS    Awake and Sleep.         EEG BACKGROUND    The posterior dominant basic rhythm reaches 9-10 Hz, symmetric, reactive, well-modulated and well-sustained.         EEG CLASSIFICATION    Normal        IMPRESSION      The EEG is normal in the awake and sleep states.       There are no epileptiform discharges or lateralizing signs. No typical events were recorded. There is no electrographic evidence of seizure.There is no electrographic evidence of status epilepticus.         PLEASE NOTE THAT A NON-EPILEPTIFORM EEG DOES NOT RULE OUT EPILEPSY.        HEDY MENESES MD, FAAN    Diplomate, American Board of Psychiatry and Neurology    Diplomate, American Board of Clinical Neurophysiology

## 2021-01-25 ENCOUNTER — LAB VISIT (OUTPATIENT)
Dept: LAB | Facility: HOSPITAL | Age: 76
End: 2021-01-25
Attending: INTERNAL MEDICINE
Payer: MEDICARE

## 2021-01-25 DIAGNOSIS — I25.118 CORONARY ARTERY DISEASE OF NATIVE ARTERY OF NATIVE HEART WITH STABLE ANGINA PECTORIS: ICD-10-CM

## 2021-01-25 PROCEDURE — 80061 LIPID PANEL: CPT

## 2021-01-25 PROCEDURE — 36415 COLL VENOUS BLD VENIPUNCTURE: CPT | Mod: PO

## 2021-01-25 PROCEDURE — 80053 COMPREHEN METABOLIC PANEL: CPT

## 2021-01-26 LAB
ALBUMIN SERPL BCP-MCNC: 4.4 G/DL (ref 3.5–5.2)
ALP SERPL-CCNC: 51 U/L (ref 55–135)
ALT SERPL W/O P-5'-P-CCNC: 24 U/L (ref 10–44)
ANION GAP SERPL CALC-SCNC: 9 MMOL/L (ref 8–16)
AST SERPL-CCNC: 24 U/L (ref 10–40)
BILIRUB SERPL-MCNC: 0.4 MG/DL (ref 0.1–1)
BUN SERPL-MCNC: 14 MG/DL (ref 8–23)
CALCIUM SERPL-MCNC: 9.3 MG/DL (ref 8.7–10.5)
CHLORIDE SERPL-SCNC: 101 MMOL/L (ref 95–110)
CHOLEST SERPL-MCNC: 174 MG/DL (ref 120–199)
CHOLEST/HDLC SERPL: 2.8 {RATIO} (ref 2–5)
CO2 SERPL-SCNC: 29 MMOL/L (ref 23–29)
CREAT SERPL-MCNC: 0.8 MG/DL (ref 0.5–1.4)
EST. GFR  (AFRICAN AMERICAN): >60 ML/MIN/1.73 M^2
EST. GFR  (NON AFRICAN AMERICAN): >60 ML/MIN/1.73 M^2
GLUCOSE SERPL-MCNC: 84 MG/DL (ref 70–110)
HDLC SERPL-MCNC: 62 MG/DL (ref 40–75)
HDLC SERPL: 35.6 % (ref 20–50)
LDLC SERPL CALC-MCNC: 88.8 MG/DL (ref 63–159)
NONHDLC SERPL-MCNC: 112 MG/DL
POTASSIUM SERPL-SCNC: 4.7 MMOL/L (ref 3.5–5.1)
PROT SERPL-MCNC: 7.1 G/DL (ref 6–8.4)
SODIUM SERPL-SCNC: 139 MMOL/L (ref 136–145)
TRIGL SERPL-MCNC: 116 MG/DL (ref 30–150)

## 2021-01-27 ENCOUNTER — TELEPHONE (OUTPATIENT)
Dept: CARDIOLOGY | Facility: CLINIC | Age: 76
End: 2021-01-27

## 2021-01-31 ENCOUNTER — EXTERNAL CHRONIC CARE MANAGEMENT (OUTPATIENT)
Dept: PRIMARY CARE CLINIC | Facility: CLINIC | Age: 76
End: 2021-01-31
Payer: MEDICARE

## 2021-01-31 PROCEDURE — 99490 CHRNC CARE MGMT STAFF 1ST 20: CPT | Mod: S$PBB,,, | Performed by: FAMILY MEDICINE

## 2021-01-31 PROCEDURE — 99490 PR CHRONIC CARE MGMT, 1ST 20 MIN: ICD-10-PCS | Mod: S$PBB,,, | Performed by: FAMILY MEDICINE

## 2021-01-31 PROCEDURE — 99490 CHRNC CARE MGMT STAFF 1ST 20: CPT | Mod: PBBFAC | Performed by: FAMILY MEDICINE

## 2021-02-01 ENCOUNTER — OFFICE VISIT (OUTPATIENT)
Dept: CARDIOLOGY | Facility: CLINIC | Age: 76
End: 2021-02-01
Payer: MEDICARE

## 2021-02-01 VITALS
SYSTOLIC BLOOD PRESSURE: 128 MMHG | BODY MASS INDEX: 21.8 KG/M2 | HEART RATE: 63 BPM | WEIGHT: 147.63 LBS | OXYGEN SATURATION: 96 % | DIASTOLIC BLOOD PRESSURE: 64 MMHG

## 2021-02-01 DIAGNOSIS — I50.22 CHRONIC SYSTOLIC CONGESTIVE HEART FAILURE: ICD-10-CM

## 2021-02-01 DIAGNOSIS — I35.0 NONRHEUMATIC AORTIC VALVE STENOSIS: ICD-10-CM

## 2021-02-01 DIAGNOSIS — E78.2 MIXED HYPERLIPIDEMIA: ICD-10-CM

## 2021-02-01 DIAGNOSIS — I70.0 AORTIC CALCIFICATION: ICD-10-CM

## 2021-02-01 DIAGNOSIS — F17.200 SMOKER: ICD-10-CM

## 2021-02-01 DIAGNOSIS — I65.23 BILATERAL CAROTID ARTERY STENOSIS: ICD-10-CM

## 2021-02-01 DIAGNOSIS — I25.118 CORONARY ARTERY DISEASE OF NATIVE ARTERY OF NATIVE HEART WITH STABLE ANGINA PECTORIS: Primary | ICD-10-CM

## 2021-02-01 DIAGNOSIS — I10 ESSENTIAL HYPERTENSION: ICD-10-CM

## 2021-02-01 PROCEDURE — 99999 PR PBB SHADOW E&M-EST. PATIENT-LVL III: ICD-10-PCS | Mod: PBBFAC,,, | Performed by: INTERNAL MEDICINE

## 2021-02-01 PROCEDURE — 99999 PR PBB SHADOW E&M-EST. PATIENT-LVL III: CPT | Mod: PBBFAC,,, | Performed by: INTERNAL MEDICINE

## 2021-02-01 PROCEDURE — 99213 OFFICE O/P EST LOW 20 MIN: CPT | Mod: PBBFAC | Performed by: INTERNAL MEDICINE

## 2021-02-01 PROCEDURE — 99214 OFFICE O/P EST MOD 30 MIN: CPT | Mod: S$PBB,,, | Performed by: INTERNAL MEDICINE

## 2021-02-01 PROCEDURE — 99214 PR OFFICE/OUTPT VISIT, EST, LEVL IV, 30-39 MIN: ICD-10-PCS | Mod: S$PBB,,, | Performed by: INTERNAL MEDICINE

## 2021-02-01 RX ORDER — METOPROLOL SUCCINATE 25 MG/1
12.5 TABLET, EXTENDED RELEASE ORAL DAILY
Qty: 45 TABLET | Refills: 5 | Status: SHIPPED | OUTPATIENT
Start: 2021-02-01 | End: 2022-04-11

## 2021-02-12 ENCOUNTER — OFFICE VISIT (OUTPATIENT)
Dept: INTERNAL MEDICINE | Facility: CLINIC | Age: 76
End: 2021-02-12
Payer: MEDICARE

## 2021-02-12 VITALS
HEART RATE: 63 BPM | BODY MASS INDEX: 21.91 KG/M2 | TEMPERATURE: 98 F | RESPIRATION RATE: 18 BRPM | OXYGEN SATURATION: 99 % | SYSTOLIC BLOOD PRESSURE: 128 MMHG | WEIGHT: 148.38 LBS | DIASTOLIC BLOOD PRESSURE: 64 MMHG

## 2021-02-12 DIAGNOSIS — J30.9 ALLERGIC RHINITIS, UNSPECIFIED SEASONALITY, UNSPECIFIED TRIGGER: Primary | ICD-10-CM

## 2021-02-12 DIAGNOSIS — F17.200 SMOKING: ICD-10-CM

## 2021-02-12 DIAGNOSIS — I10 HYPERTENSION, UNSPECIFIED TYPE: ICD-10-CM

## 2021-02-12 DIAGNOSIS — K21.9 GASTROESOPHAGEAL REFLUX DISEASE, UNSPECIFIED WHETHER ESOPHAGITIS PRESENT: ICD-10-CM

## 2021-02-12 DIAGNOSIS — I25.118 CORONARY ARTERY DISEASE OF NATIVE ARTERY OF NATIVE HEART WITH STABLE ANGINA PECTORIS: ICD-10-CM

## 2021-02-12 PROCEDURE — 99999 PR PBB SHADOW E&M-EST. PATIENT-LVL IV: ICD-10-PCS | Mod: PBBFAC,,, | Performed by: FAMILY MEDICINE

## 2021-02-12 PROCEDURE — 99214 OFFICE O/P EST MOD 30 MIN: CPT | Mod: S$PBB,,, | Performed by: FAMILY MEDICINE

## 2021-02-12 PROCEDURE — 99214 OFFICE O/P EST MOD 30 MIN: CPT | Mod: PBBFAC | Performed by: FAMILY MEDICINE

## 2021-02-12 PROCEDURE — 99214 PR OFFICE/OUTPT VISIT, EST, LEVL IV, 30-39 MIN: ICD-10-PCS | Mod: S$PBB,,, | Performed by: FAMILY MEDICINE

## 2021-02-12 PROCEDURE — 99999 PR PBB SHADOW E&M-EST. PATIENT-LVL IV: CPT | Mod: PBBFAC,,, | Performed by: FAMILY MEDICINE

## 2021-02-12 RX ORDER — FLUTICASONE PROPIONATE 50 MCG
1 SPRAY, SUSPENSION (ML) NASAL DAILY
Qty: 16 G | Refills: 3 | Status: SHIPPED | OUTPATIENT
Start: 2021-02-12 | End: 2022-01-18

## 2021-02-12 RX ORDER — PANTOPRAZOLE SODIUM 20 MG/1
20 TABLET, DELAYED RELEASE ORAL DAILY
Qty: 90 TABLET | Refills: 0 | Status: SHIPPED | OUTPATIENT
Start: 2021-02-12 | End: 2021-03-22 | Stop reason: SDUPTHER

## 2021-02-12 RX ORDER — SERTRALINE HYDROCHLORIDE 100 MG/1
TABLET, FILM COATED ORAL
COMMUNITY
End: 2021-05-24 | Stop reason: DRUGHIGH

## 2021-02-18 ENCOUNTER — IMMUNIZATION (OUTPATIENT)
Dept: INTERNAL MEDICINE | Facility: CLINIC | Age: 76
End: 2021-02-18
Payer: MEDICARE

## 2021-02-18 DIAGNOSIS — Z23 NEED FOR VACCINATION: Primary | ICD-10-CM

## 2021-02-18 PROCEDURE — 91300 COVID-19, MRNA, LNP-S, PF, 30 MCG/0.3 ML DOSE VACCINE: CPT | Mod: PBBFAC | Performed by: FAMILY MEDICINE

## 2021-02-28 ENCOUNTER — EXTERNAL CHRONIC CARE MANAGEMENT (OUTPATIENT)
Dept: PRIMARY CARE CLINIC | Facility: CLINIC | Age: 76
End: 2021-02-28
Payer: MEDICARE

## 2021-02-28 PROCEDURE — 99490 PR CHRONIC CARE MGMT, 1ST 20 MIN: ICD-10-PCS | Mod: S$PBB,,, | Performed by: FAMILY MEDICINE

## 2021-02-28 PROCEDURE — 99490 CHRNC CARE MGMT STAFF 1ST 20: CPT | Mod: S$PBB,,, | Performed by: FAMILY MEDICINE

## 2021-02-28 PROCEDURE — 99490 CHRNC CARE MGMT STAFF 1ST 20: CPT | Mod: PBBFAC | Performed by: FAMILY MEDICINE

## 2021-02-28 PROCEDURE — 99439 PR CHRONIC CARE MGMT, EA ADDTL 20 MIN: ICD-10-PCS | Mod: S$PBB,,, | Performed by: FAMILY MEDICINE

## 2021-02-28 PROCEDURE — 99439 CHRNC CARE MGMT STAF EA ADDL: CPT | Mod: PBBFAC | Performed by: FAMILY MEDICINE

## 2021-02-28 PROCEDURE — 99439 CHRNC CARE MGMT STAF EA ADDL: CPT | Mod: S$PBB,,, | Performed by: FAMILY MEDICINE

## 2021-03-11 ENCOUNTER — IMMUNIZATION (OUTPATIENT)
Dept: INTERNAL MEDICINE | Facility: CLINIC | Age: 76
End: 2021-03-11
Payer: MEDICARE

## 2021-03-11 DIAGNOSIS — Z23 NEED FOR VACCINATION: Primary | ICD-10-CM

## 2021-03-11 PROCEDURE — 91300 COVID-19, MRNA, LNP-S, PF, 30 MCG/0.3 ML DOSE VACCINE: CPT | Mod: PBBFAC | Performed by: FAMILY MEDICINE

## 2021-03-11 PROCEDURE — 0002A COVID-19, MRNA, LNP-S, PF, 30 MCG/0.3 ML DOSE VACCINE: CPT | Mod: PBBFAC | Performed by: FAMILY MEDICINE

## 2021-03-19 ENCOUNTER — PES CALL (OUTPATIENT)
Dept: ADMINISTRATIVE | Facility: CLINIC | Age: 76
End: 2021-03-19

## 2021-03-22 ENCOUNTER — OFFICE VISIT (OUTPATIENT)
Dept: INTERNAL MEDICINE | Facility: CLINIC | Age: 76
End: 2021-03-22
Payer: MEDICARE

## 2021-03-22 VITALS
BODY MASS INDEX: 22.14 KG/M2 | HEART RATE: 61 BPM | WEIGHT: 149.5 LBS | OXYGEN SATURATION: 97 % | SYSTOLIC BLOOD PRESSURE: 138 MMHG | DIASTOLIC BLOOD PRESSURE: 75 MMHG | HEIGHT: 69 IN | TEMPERATURE: 97 F

## 2021-03-22 DIAGNOSIS — I25.118 CORONARY ARTERY DISEASE OF NATIVE ARTERY OF NATIVE HEART WITH STABLE ANGINA PECTORIS: Primary | ICD-10-CM

## 2021-03-22 DIAGNOSIS — K21.9 GASTROESOPHAGEAL REFLUX DISEASE, UNSPECIFIED WHETHER ESOPHAGITIS PRESENT: ICD-10-CM

## 2021-03-22 DIAGNOSIS — J30.9 ALLERGIC RHINITIS, UNSPECIFIED SEASONALITY, UNSPECIFIED TRIGGER: ICD-10-CM

## 2021-03-22 DIAGNOSIS — I10 ESSENTIAL HYPERTENSION: ICD-10-CM

## 2021-03-22 DIAGNOSIS — I50.22 CHRONIC SYSTOLIC CONGESTIVE HEART FAILURE: ICD-10-CM

## 2021-03-22 PROCEDURE — 99999 PR PBB SHADOW E&M-EST. PATIENT-LVL IV: CPT | Mod: PBBFAC,,, | Performed by: FAMILY MEDICINE

## 2021-03-22 PROCEDURE — 99214 OFFICE O/P EST MOD 30 MIN: CPT | Mod: PBBFAC | Performed by: FAMILY MEDICINE

## 2021-03-22 PROCEDURE — 99214 PR OFFICE/OUTPT VISIT, EST, LEVL IV, 30-39 MIN: ICD-10-PCS | Mod: S$PBB,,, | Performed by: FAMILY MEDICINE

## 2021-03-22 PROCEDURE — 99999 PR PBB SHADOW E&M-EST. PATIENT-LVL IV: ICD-10-PCS | Mod: PBBFAC,,, | Performed by: FAMILY MEDICINE

## 2021-03-22 PROCEDURE — 99214 OFFICE O/P EST MOD 30 MIN: CPT | Mod: S$PBB,,, | Performed by: FAMILY MEDICINE

## 2021-03-22 RX ORDER — PANTOPRAZOLE SODIUM 20 MG/1
20 TABLET, DELAYED RELEASE ORAL DAILY
Qty: 90 TABLET | Refills: 1 | Status: SHIPPED | OUTPATIENT
Start: 2021-03-22 | End: 2021-09-17

## 2021-03-22 RX ORDER — LOSARTAN POTASSIUM 50 MG/1
50 TABLET ORAL DAILY
Qty: 90 TABLET | Refills: 1 | Status: SHIPPED | OUTPATIENT
Start: 2021-03-22 | End: 2021-06-04

## 2021-03-31 ENCOUNTER — EXTERNAL CHRONIC CARE MANAGEMENT (OUTPATIENT)
Dept: PRIMARY CARE CLINIC | Facility: CLINIC | Age: 76
End: 2021-03-31
Payer: MEDICARE

## 2021-03-31 PROCEDURE — 99490 CHRNC CARE MGMT STAFF 1ST 20: CPT | Mod: PBBFAC | Performed by: FAMILY MEDICINE

## 2021-03-31 PROCEDURE — 99490 CHRNC CARE MGMT STAFF 1ST 20: CPT | Mod: S$PBB,,, | Performed by: FAMILY MEDICINE

## 2021-03-31 PROCEDURE — 99490 PR CHRONIC CARE MGMT, 1ST 20 MIN: ICD-10-PCS | Mod: S$PBB,,, | Performed by: FAMILY MEDICINE

## 2021-04-15 ENCOUNTER — LAB VISIT (OUTPATIENT)
Dept: LAB | Facility: HOSPITAL | Age: 76
End: 2021-04-15
Attending: NURSE PRACTITIONER
Payer: MEDICARE

## 2021-04-15 ENCOUNTER — OFFICE VISIT (OUTPATIENT)
Dept: PRIMARY CARE CLINIC | Facility: CLINIC | Age: 76
End: 2021-04-15
Payer: MEDICARE

## 2021-04-15 VITALS
SYSTOLIC BLOOD PRESSURE: 136 MMHG | OXYGEN SATURATION: 97 % | TEMPERATURE: 97 F | WEIGHT: 151.44 LBS | DIASTOLIC BLOOD PRESSURE: 78 MMHG | BODY MASS INDEX: 22.43 KG/M2 | HEIGHT: 69 IN | HEART RATE: 76 BPM

## 2021-04-15 DIAGNOSIS — Z00.00 ENCOUNTER FOR PREVENTIVE HEALTH EXAMINATION: ICD-10-CM

## 2021-04-15 DIAGNOSIS — I70.0 AORTIC CALCIFICATION: ICD-10-CM

## 2021-04-15 DIAGNOSIS — F17.200 SMOKER: ICD-10-CM

## 2021-04-15 DIAGNOSIS — I25.118 CORONARY ARTERY DISEASE OF NATIVE ARTERY OF NATIVE HEART WITH STABLE ANGINA PECTORIS: ICD-10-CM

## 2021-04-15 DIAGNOSIS — Z11.59 ENCOUNTER FOR HEPATITIS C SCREENING TEST FOR LOW RISK PATIENT: ICD-10-CM

## 2021-04-15 DIAGNOSIS — Z11.59 ENCOUNTER FOR HEPATITIS C SCREENING TEST FOR LOW RISK PATIENT: Primary | ICD-10-CM

## 2021-04-15 DIAGNOSIS — I65.23 BILATERAL CAROTID ARTERY STENOSIS: ICD-10-CM

## 2021-04-15 DIAGNOSIS — I50.22 CHRONIC SYSTOLIC CONGESTIVE HEART FAILURE: ICD-10-CM

## 2021-04-15 DIAGNOSIS — G40.909 SEIZURE DISORDER: ICD-10-CM

## 2021-04-15 PROCEDURE — 99999 PR PBB SHADOW E&M-EST. PATIENT-LVL V: CPT | Mod: PBBFAC,,, | Performed by: NURSE PRACTITIONER

## 2021-04-15 PROCEDURE — G0439 PR MEDICARE ANNUAL WELLNESS SUBSEQUENT VISIT: ICD-10-PCS | Mod: ,,, | Performed by: NURSE PRACTITIONER

## 2021-04-15 PROCEDURE — G0439 PPPS, SUBSEQ VISIT: HCPCS | Mod: ,,, | Performed by: NURSE PRACTITIONER

## 2021-04-15 PROCEDURE — 99999 PR PBB SHADOW E&M-EST. PATIENT-LVL V: ICD-10-PCS | Mod: PBBFAC,,, | Performed by: NURSE PRACTITIONER

## 2021-04-15 PROCEDURE — 99215 OFFICE O/P EST HI 40 MIN: CPT | Mod: PBBFAC | Performed by: NURSE PRACTITIONER

## 2021-04-15 PROCEDURE — 86803 HEPATITIS C AB TEST: CPT | Performed by: NURSE PRACTITIONER

## 2021-04-15 PROCEDURE — 36415 COLL VENOUS BLD VENIPUNCTURE: CPT | Performed by: NURSE PRACTITIONER

## 2021-04-16 LAB — HCV AB SERPL QL IA: NEGATIVE

## 2021-04-30 ENCOUNTER — EXTERNAL CHRONIC CARE MANAGEMENT (OUTPATIENT)
Dept: PRIMARY CARE CLINIC | Facility: CLINIC | Age: 76
End: 2021-04-30
Payer: MEDICARE

## 2021-04-30 PROCEDURE — 99490 CHRNC CARE MGMT STAFF 1ST 20: CPT | Mod: S$PBB,,, | Performed by: FAMILY MEDICINE

## 2021-04-30 PROCEDURE — 99490 PR CHRONIC CARE MGMT, 1ST 20 MIN: ICD-10-PCS | Mod: S$PBB,,, | Performed by: FAMILY MEDICINE

## 2021-04-30 PROCEDURE — 99490 CHRNC CARE MGMT STAFF 1ST 20: CPT | Mod: PBBFAC | Performed by: FAMILY MEDICINE

## 2021-05-24 ENCOUNTER — OFFICE VISIT (OUTPATIENT)
Dept: INTERNAL MEDICINE | Facility: CLINIC | Age: 76
End: 2021-05-24
Payer: MEDICARE

## 2021-05-24 VITALS
HEART RATE: 58 BPM | DIASTOLIC BLOOD PRESSURE: 72 MMHG | WEIGHT: 148.56 LBS | TEMPERATURE: 96 F | BODY MASS INDEX: 21.94 KG/M2 | RESPIRATION RATE: 18 BRPM | OXYGEN SATURATION: 96 % | SYSTOLIC BLOOD PRESSURE: 138 MMHG

## 2021-05-24 DIAGNOSIS — K21.9 GASTROESOPHAGEAL REFLUX DISEASE, UNSPECIFIED WHETHER ESOPHAGITIS PRESENT: ICD-10-CM

## 2021-05-24 DIAGNOSIS — F32.A DEPRESSION, UNSPECIFIED DEPRESSION TYPE: Primary | ICD-10-CM

## 2021-05-24 DIAGNOSIS — F17.200 SMOKER: ICD-10-CM

## 2021-05-24 DIAGNOSIS — J44.9 CHRONIC OBSTRUCTIVE PULMONARY DISEASE, UNSPECIFIED COPD TYPE: ICD-10-CM

## 2021-05-24 DIAGNOSIS — G40.909 SEIZURE DISORDER: ICD-10-CM

## 2021-05-24 DIAGNOSIS — I25.118 CORONARY ARTERY DISEASE OF NATIVE ARTERY OF NATIVE HEART WITH STABLE ANGINA PECTORIS: ICD-10-CM

## 2021-05-24 PROCEDURE — 99999 PR PBB SHADOW E&M-EST. PATIENT-LVL III: CPT | Mod: PBBFAC,,, | Performed by: FAMILY MEDICINE

## 2021-05-24 PROCEDURE — 99999 PR PBB SHADOW E&M-EST. PATIENT-LVL III: ICD-10-PCS | Mod: PBBFAC,,, | Performed by: FAMILY MEDICINE

## 2021-05-24 PROCEDURE — 99214 PR OFFICE/OUTPT VISIT, EST, LEVL IV, 30-39 MIN: ICD-10-PCS | Mod: S$PBB,,, | Performed by: FAMILY MEDICINE

## 2021-05-24 PROCEDURE — 99214 OFFICE O/P EST MOD 30 MIN: CPT | Mod: S$PBB,,, | Performed by: FAMILY MEDICINE

## 2021-05-24 PROCEDURE — 99213 OFFICE O/P EST LOW 20 MIN: CPT | Mod: PBBFAC | Performed by: FAMILY MEDICINE

## 2021-05-24 RX ORDER — ALBUTEROL SULFATE 90 UG/1
AEROSOL, METERED RESPIRATORY (INHALATION)
Qty: 8.5 G | Refills: 6 | Status: SHIPPED | OUTPATIENT
Start: 2021-05-24 | End: 2022-06-27

## 2021-05-24 RX ORDER — SERTRALINE HYDROCHLORIDE 50 MG/1
50 TABLET, FILM COATED ORAL DAILY
Qty: 90 TABLET | Refills: 1 | Status: SHIPPED | OUTPATIENT
Start: 2021-05-24 | End: 2021-11-24 | Stop reason: SDUPTHER

## 2021-05-31 ENCOUNTER — EXTERNAL CHRONIC CARE MANAGEMENT (OUTPATIENT)
Dept: PRIMARY CARE CLINIC | Facility: CLINIC | Age: 76
End: 2021-05-31
Payer: MEDICARE

## 2021-05-31 DIAGNOSIS — I10 ESSENTIAL HYPERTENSION: Primary | ICD-10-CM

## 2021-05-31 PROCEDURE — 99490 CHRNC CARE MGMT STAFF 1ST 20: CPT | Mod: S$PBB,,, | Performed by: FAMILY MEDICINE

## 2021-05-31 PROCEDURE — 99490 PR CHRONIC CARE MGMT, 1ST 20 MIN: ICD-10-PCS | Mod: S$PBB,,, | Performed by: FAMILY MEDICINE

## 2021-05-31 PROCEDURE — 99490 CHRNC CARE MGMT STAFF 1ST 20: CPT | Mod: PBBFAC | Performed by: FAMILY MEDICINE

## 2021-06-04 ENCOUNTER — HOSPITAL ENCOUNTER (OUTPATIENT)
Dept: CARDIOLOGY | Facility: HOSPITAL | Age: 76
Discharge: HOME | End: 2021-06-04
Attending: INTERNAL MEDICINE
Payer: MEDICARE

## 2021-06-04 ENCOUNTER — OFFICE VISIT (OUTPATIENT)
Dept: CARDIOLOGY | Facility: CLINIC | Age: 76
End: 2021-06-04
Payer: MEDICARE

## 2021-06-04 VITALS
DIASTOLIC BLOOD PRESSURE: 66 MMHG | WEIGHT: 148.38 LBS | HEART RATE: 63 BPM | SYSTOLIC BLOOD PRESSURE: 138 MMHG | BODY MASS INDEX: 21.91 KG/M2 | OXYGEN SATURATION: 99 %

## 2021-06-04 DIAGNOSIS — I50.22 CHRONIC SYSTOLIC CONGESTIVE HEART FAILURE: Primary | ICD-10-CM

## 2021-06-04 DIAGNOSIS — I10 ESSENTIAL HYPERTENSION: ICD-10-CM

## 2021-06-04 DIAGNOSIS — E78.2 MIXED HYPERLIPIDEMIA: ICD-10-CM

## 2021-06-04 DIAGNOSIS — I70.0 AORTIC CALCIFICATION: ICD-10-CM

## 2021-06-04 DIAGNOSIS — I25.118 CORONARY ARTERY DISEASE OF NATIVE ARTERY OF NATIVE HEART WITH STABLE ANGINA PECTORIS: ICD-10-CM

## 2021-06-04 DIAGNOSIS — I35.0 NONRHEUMATIC AORTIC VALVE STENOSIS: ICD-10-CM

## 2021-06-04 DIAGNOSIS — I65.23 BILATERAL CAROTID ARTERY STENOSIS: ICD-10-CM

## 2021-06-04 PROCEDURE — 99999 PR PBB SHADOW E&M-EST. PATIENT-LVL III: CPT | Mod: PBBFAC,,, | Performed by: INTERNAL MEDICINE

## 2021-06-04 PROCEDURE — 93010 ELECTROCARDIOGRAM REPORT: CPT | Mod: ,,, | Performed by: INTERNAL MEDICINE

## 2021-06-04 PROCEDURE — 99214 PR OFFICE/OUTPT VISIT, EST, LEVL IV, 30-39 MIN: ICD-10-PCS | Mod: S$PBB,,, | Performed by: INTERNAL MEDICINE

## 2021-06-04 PROCEDURE — 99999 PR PBB SHADOW E&M-EST. PATIENT-LVL III: ICD-10-PCS | Mod: PBBFAC,,, | Performed by: INTERNAL MEDICINE

## 2021-06-04 PROCEDURE — 93005 ELECTROCARDIOGRAM TRACING: CPT

## 2021-06-04 PROCEDURE — 99214 OFFICE O/P EST MOD 30 MIN: CPT | Mod: S$PBB,,, | Performed by: INTERNAL MEDICINE

## 2021-06-04 PROCEDURE — 93010 EKG 12-LEAD: ICD-10-PCS | Mod: ,,, | Performed by: INTERNAL MEDICINE

## 2021-06-04 PROCEDURE — 99213 OFFICE O/P EST LOW 20 MIN: CPT | Mod: PBBFAC,25 | Performed by: INTERNAL MEDICINE

## 2021-06-04 RX ORDER — SACUBITRIL AND VALSARTAN 24; 26 MG/1; MG/1
1 TABLET, FILM COATED ORAL 2 TIMES DAILY
Qty: 60 TABLET | Refills: 5 | Status: SHIPPED | OUTPATIENT
Start: 2021-06-04 | End: 2021-07-07 | Stop reason: SDUPTHER

## 2021-06-04 RX ORDER — SPIRONOLACTONE 25 MG/1
25 TABLET ORAL DAILY
Qty: 30 TABLET | Refills: 11 | Status: SHIPPED | OUTPATIENT
Start: 2021-06-04 | End: 2021-11-01 | Stop reason: SDUPTHER

## 2021-06-08 ENCOUNTER — TELEPHONE (OUTPATIENT)
Dept: CARDIOLOGY | Facility: CLINIC | Age: 76
End: 2021-06-08

## 2021-06-18 ENCOUNTER — LAB VISIT (OUTPATIENT)
Dept: LAB | Facility: HOSPITAL | Age: 76
End: 2021-06-18
Attending: INTERNAL MEDICINE
Payer: MEDICARE

## 2021-06-18 DIAGNOSIS — I50.22 CHRONIC SYSTOLIC CONGESTIVE HEART FAILURE: ICD-10-CM

## 2021-06-18 LAB
ANION GAP SERPL CALC-SCNC: 9 MMOL/L (ref 8–16)
BUN SERPL-MCNC: 12 MG/DL (ref 8–23)
CALCIUM SERPL-MCNC: 8.9 MG/DL (ref 8.7–10.5)
CHLORIDE SERPL-SCNC: 103 MMOL/L (ref 95–110)
CO2 SERPL-SCNC: 26 MMOL/L (ref 23–29)
CREAT SERPL-MCNC: 0.8 MG/DL (ref 0.5–1.4)
EST. GFR  (AFRICAN AMERICAN): >60 ML/MIN/1.73 M^2
EST. GFR  (NON AFRICAN AMERICAN): >60 ML/MIN/1.73 M^2
GLUCOSE SERPL-MCNC: 134 MG/DL (ref 70–110)
POTASSIUM SERPL-SCNC: 4.7 MMOL/L (ref 3.5–5.1)
SODIUM SERPL-SCNC: 138 MMOL/L (ref 136–145)

## 2021-06-18 PROCEDURE — 80048 BASIC METABOLIC PNL TOTAL CA: CPT | Performed by: INTERNAL MEDICINE

## 2021-06-18 PROCEDURE — 36415 COLL VENOUS BLD VENIPUNCTURE: CPT | Mod: PO | Performed by: INTERNAL MEDICINE

## 2021-06-22 ENCOUNTER — TELEPHONE (OUTPATIENT)
Dept: CARDIOLOGY | Facility: CLINIC | Age: 76
End: 2021-06-22

## 2021-06-30 ENCOUNTER — EXTERNAL CHRONIC CARE MANAGEMENT (OUTPATIENT)
Dept: PRIMARY CARE CLINIC | Facility: CLINIC | Age: 76
End: 2021-06-30
Payer: MEDICARE

## 2021-06-30 PROCEDURE — 99490 PR CHRONIC CARE MGMT, 1ST 20 MIN: ICD-10-PCS | Mod: S$PBB,,, | Performed by: FAMILY MEDICINE

## 2021-06-30 PROCEDURE — 99490 CHRNC CARE MGMT STAFF 1ST 20: CPT | Mod: S$PBB,,, | Performed by: FAMILY MEDICINE

## 2021-06-30 PROCEDURE — 99490 CHRNC CARE MGMT STAFF 1ST 20: CPT | Mod: PBBFAC | Performed by: FAMILY MEDICINE

## 2021-07-07 ENCOUNTER — TELEPHONE (OUTPATIENT)
Dept: CARDIOLOGY | Facility: CLINIC | Age: 76
End: 2021-07-07

## 2021-07-07 RX ORDER — SACUBITRIL AND VALSARTAN 24; 26 MG/1; MG/1
1 TABLET, FILM COATED ORAL 2 TIMES DAILY
Qty: 60 TABLET | Refills: 5 | Status: SHIPPED | OUTPATIENT
Start: 2021-07-07 | End: 2022-01-05

## 2021-07-31 ENCOUNTER — EXTERNAL CHRONIC CARE MANAGEMENT (OUTPATIENT)
Dept: PRIMARY CARE CLINIC | Facility: CLINIC | Age: 76
End: 2021-07-31
Payer: MEDICARE

## 2021-07-31 PROCEDURE — 99490 PR CHRONIC CARE MGMT, 1ST 20 MIN: ICD-10-PCS | Mod: S$PBB,,, | Performed by: FAMILY MEDICINE

## 2021-07-31 PROCEDURE — 99490 CHRNC CARE MGMT STAFF 1ST 20: CPT | Mod: PBBFAC | Performed by: FAMILY MEDICINE

## 2021-07-31 PROCEDURE — 99490 CHRNC CARE MGMT STAFF 1ST 20: CPT | Mod: S$PBB,,, | Performed by: FAMILY MEDICINE

## 2021-08-31 ENCOUNTER — EXTERNAL CHRONIC CARE MANAGEMENT (OUTPATIENT)
Dept: PRIMARY CARE CLINIC | Facility: CLINIC | Age: 76
End: 2021-08-31
Payer: MEDICARE

## 2021-08-31 PROCEDURE — 99490 CHRNC CARE MGMT STAFF 1ST 20: CPT | Mod: PBBFAC | Performed by: FAMILY MEDICINE

## 2021-08-31 PROCEDURE — 99490 PR CHRONIC CARE MGMT, 1ST 20 MIN: ICD-10-PCS | Mod: S$PBB,,, | Performed by: FAMILY MEDICINE

## 2021-08-31 PROCEDURE — 99490 CHRNC CARE MGMT STAFF 1ST 20: CPT | Mod: S$PBB,,, | Performed by: FAMILY MEDICINE

## 2021-09-17 RX ORDER — PANTOPRAZOLE SODIUM 20 MG/1
TABLET, DELAYED RELEASE ORAL
Qty: 90 TABLET | Refills: 0 | Status: SHIPPED | OUTPATIENT
Start: 2021-09-17 | End: 2021-12-21

## 2021-09-29 ENCOUNTER — TELEPHONE (OUTPATIENT)
Dept: NEUROLOGY | Facility: CLINIC | Age: 76
End: 2021-09-29

## 2021-09-29 ENCOUNTER — OFFICE VISIT (OUTPATIENT)
Dept: NEUROLOGY | Facility: CLINIC | Age: 76
End: 2021-09-29
Payer: MEDICARE

## 2021-09-29 VITALS
WEIGHT: 143.31 LBS | DIASTOLIC BLOOD PRESSURE: 60 MMHG | SYSTOLIC BLOOD PRESSURE: 134 MMHG | HEIGHT: 69 IN | RESPIRATION RATE: 16 BRPM | BODY MASS INDEX: 21.22 KG/M2

## 2021-09-29 DIAGNOSIS — J44.9 CHRONIC OBSTRUCTIVE PULMONARY DISEASE, UNSPECIFIED COPD TYPE: ICD-10-CM

## 2021-09-29 DIAGNOSIS — I25.118 CORONARY ARTERY DISEASE OF NATIVE ARTERY OF NATIVE HEART WITH STABLE ANGINA PECTORIS: ICD-10-CM

## 2021-09-29 DIAGNOSIS — I65.23 BILATERAL CAROTID ARTERY STENOSIS: ICD-10-CM

## 2021-09-29 DIAGNOSIS — C34.00 MALIGNANT NEOPLASM OF HILUS OF LUNG, UNSPECIFIED LATERALITY: ICD-10-CM

## 2021-09-29 DIAGNOSIS — J20.9 ACUTE BRONCHITIS, UNSPECIFIED ORGANISM: ICD-10-CM

## 2021-09-29 DIAGNOSIS — Z87.892 HISTORY OF ANAPHYLAXIS: ICD-10-CM

## 2021-09-29 DIAGNOSIS — R63.4 WEIGHT LOSS: ICD-10-CM

## 2021-09-29 DIAGNOSIS — G40.409 GRAND MAL EPILEPSY, CONTROLLED: ICD-10-CM

## 2021-09-29 DIAGNOSIS — R06.00 DYSPNEA, UNSPECIFIED TYPE: ICD-10-CM

## 2021-09-29 DIAGNOSIS — R73.03 PRE-DIABETES: ICD-10-CM

## 2021-09-29 DIAGNOSIS — R73.9 ELEVATED BLOOD SUGAR: ICD-10-CM

## 2021-09-29 DIAGNOSIS — Z12.5 SCREENING FOR PROSTATE CANCER: ICD-10-CM

## 2021-09-29 DIAGNOSIS — J00 ACUTE RHINITIS: ICD-10-CM

## 2021-09-29 DIAGNOSIS — F17.200 SMOKER: ICD-10-CM

## 2021-09-29 DIAGNOSIS — E78.5 HYPERLIPIDEMIA, UNSPECIFIED HYPERLIPIDEMIA TYPE: ICD-10-CM

## 2021-09-29 DIAGNOSIS — I10 ESSENTIAL HYPERTENSION: ICD-10-CM

## 2021-09-29 DIAGNOSIS — I70.0 AORTIC CALCIFICATION: ICD-10-CM

## 2021-09-29 DIAGNOSIS — I50.22 CHRONIC SYSTOLIC CONGESTIVE HEART FAILURE: ICD-10-CM

## 2021-09-29 DIAGNOSIS — I35.0 NONRHEUMATIC AORTIC VALVE STENOSIS: ICD-10-CM

## 2021-09-29 DIAGNOSIS — G40.409 GRAND MAL EPILEPSY, CONTROLLED: Primary | ICD-10-CM

## 2021-09-29 DIAGNOSIS — G40.909 SEIZURE DISORDER: ICD-10-CM

## 2021-09-29 DIAGNOSIS — Z86.79 HISTORY OF SUBDURAL HEMATOMA: ICD-10-CM

## 2021-09-29 DIAGNOSIS — M54.50 RIGHT-SIDED LOW BACK PAIN WITHOUT SCIATICA, UNSPECIFIED CHRONICITY: ICD-10-CM

## 2021-09-29 PROCEDURE — 99215 OFFICE O/P EST HI 40 MIN: CPT | Mod: S$PBB,,, | Performed by: NURSE PRACTITIONER

## 2021-09-29 PROCEDURE — 99214 OFFICE O/P EST MOD 30 MIN: CPT | Mod: PBBFAC | Performed by: NURSE PRACTITIONER

## 2021-09-29 PROCEDURE — 99999 PR PBB SHADOW E&M-EST. PATIENT-LVL IV: CPT | Mod: PBBFAC,,, | Performed by: NURSE PRACTITIONER

## 2021-09-29 PROCEDURE — 99999 PR PBB SHADOW E&M-EST. PATIENT-LVL IV: ICD-10-PCS | Mod: PBBFAC,,, | Performed by: NURSE PRACTITIONER

## 2021-09-29 PROCEDURE — 99215 PR OFFICE/OUTPT VISIT, EST, LEVL V, 40-54 MIN: ICD-10-PCS | Mod: S$PBB,,, | Performed by: NURSE PRACTITIONER

## 2021-09-29 RX ORDER — PHENYTOIN SODIUM 100 MG/1
300 CAPSULE, EXTENDED RELEASE ORAL DAILY
Qty: 270 CAPSULE | Refills: 3 | Status: SHIPPED | OUTPATIENT
Start: 2021-09-29 | End: 2022-12-29 | Stop reason: SDUPTHER

## 2021-09-29 RX ORDER — PHENYTOIN SODIUM 100 MG/1
CAPSULE, EXTENDED RELEASE ORAL
Qty: 270 CAPSULE | Refills: 0 | Status: SHIPPED | OUTPATIENT
Start: 2021-09-29 | End: 2021-09-29 | Stop reason: SDUPTHER

## 2021-09-30 ENCOUNTER — LAB VISIT (OUTPATIENT)
Dept: LAB | Facility: HOSPITAL | Age: 76
End: 2021-09-30
Attending: NURSE PRACTITIONER
Payer: MEDICARE

## 2021-09-30 ENCOUNTER — EXTERNAL CHRONIC CARE MANAGEMENT (OUTPATIENT)
Dept: PRIMARY CARE CLINIC | Facility: CLINIC | Age: 76
End: 2021-09-30
Payer: MEDICARE

## 2021-09-30 DIAGNOSIS — G40.409 GRAND MAL EPILEPSY, CONTROLLED: ICD-10-CM

## 2021-09-30 LAB
ALBUMIN SERPL BCP-MCNC: 3.9 G/DL (ref 3.5–5.2)
ALP SERPL-CCNC: 59 U/L (ref 55–135)
ALT SERPL W/O P-5'-P-CCNC: 16 U/L (ref 10–44)
ANION GAP SERPL CALC-SCNC: 9 MMOL/L (ref 8–16)
AST SERPL-CCNC: 21 U/L (ref 10–40)
BASOPHILS # BLD AUTO: 0.06 K/UL (ref 0–0.2)
BASOPHILS NFR BLD: 0.8 % (ref 0–1.9)
BILIRUB SERPL-MCNC: 0.3 MG/DL (ref 0.1–1)
BUN SERPL-MCNC: 12 MG/DL (ref 8–23)
CALCIUM SERPL-MCNC: 9 MG/DL (ref 8.7–10.5)
CHLORIDE SERPL-SCNC: 102 MMOL/L (ref 95–110)
CO2 SERPL-SCNC: 25 MMOL/L (ref 23–29)
CREAT SERPL-MCNC: 0.7 MG/DL (ref 0.5–1.4)
DIFFERENTIAL METHOD: ABNORMAL
EOSINOPHIL # BLD AUTO: 0.3 K/UL (ref 0–0.5)
EOSINOPHIL NFR BLD: 4.4 % (ref 0–8)
ERYTHROCYTE [DISTWIDTH] IN BLOOD BY AUTOMATED COUNT: 13.2 % (ref 11.5–14.5)
EST. GFR  (AFRICAN AMERICAN): >60 ML/MIN/1.73 M^2
EST. GFR  (NON AFRICAN AMERICAN): >60 ML/MIN/1.73 M^2
GLUCOSE SERPL-MCNC: 107 MG/DL (ref 70–110)
HCT VFR BLD AUTO: 36.7 % (ref 40–54)
HGB BLD-MCNC: 12.3 G/DL (ref 14–18)
IMM GRANULOCYTES # BLD AUTO: 0.01 K/UL (ref 0–0.04)
IMM GRANULOCYTES NFR BLD AUTO: 0.1 % (ref 0–0.5)
LYMPHOCYTES # BLD AUTO: 2.7 K/UL (ref 1–4.8)
LYMPHOCYTES NFR BLD: 36.1 % (ref 18–48)
MCH RBC QN AUTO: 30.4 PG (ref 27–31)
MCHC RBC AUTO-ENTMCNC: 33.5 G/DL (ref 32–36)
MCV RBC AUTO: 91 FL (ref 82–98)
MONOCYTES # BLD AUTO: 0.4 K/UL (ref 0.3–1)
MONOCYTES NFR BLD: 5.9 % (ref 4–15)
NEUTROPHILS # BLD AUTO: 4 K/UL (ref 1.8–7.7)
NEUTROPHILS NFR BLD: 52.7 % (ref 38–73)
NRBC BLD-RTO: 0 /100 WBC
PHENOBARB SERPL-MCNC: <2 UG/ML (ref 15–40)
PLATELET # BLD AUTO: 237 K/UL (ref 150–450)
PMV BLD AUTO: 10.5 FL (ref 9.2–12.9)
POTASSIUM SERPL-SCNC: 4.4 MMOL/L (ref 3.5–5.1)
PROT SERPL-MCNC: 6.6 G/DL (ref 6–8.4)
RBC # BLD AUTO: 4.05 M/UL (ref 4.6–6.2)
SODIUM SERPL-SCNC: 136 MMOL/L (ref 136–145)
WBC # BLD AUTO: 7.51 K/UL (ref 3.9–12.7)

## 2021-09-30 PROCEDURE — 99490 CHRNC CARE MGMT STAFF 1ST 20: CPT | Mod: PBBFAC | Performed by: FAMILY MEDICINE

## 2021-09-30 PROCEDURE — 99490 CHRNC CARE MGMT STAFF 1ST 20: CPT | Mod: S$PBB,,, | Performed by: FAMILY MEDICINE

## 2021-09-30 PROCEDURE — 36415 COLL VENOUS BLD VENIPUNCTURE: CPT | Mod: PO | Performed by: NURSE PRACTITIONER

## 2021-09-30 PROCEDURE — 80053 COMPREHEN METABOLIC PANEL: CPT | Performed by: NURSE PRACTITIONER

## 2021-09-30 PROCEDURE — 99490 PR CHRONIC CARE MGMT, 1ST 20 MIN: ICD-10-PCS | Mod: S$PBB,,, | Performed by: FAMILY MEDICINE

## 2021-09-30 PROCEDURE — 80184 ASSAY OF PHENOBARBITAL: CPT | Performed by: NURSE PRACTITIONER

## 2021-09-30 PROCEDURE — 85025 COMPLETE CBC W/AUTO DIFF WBC: CPT | Performed by: NURSE PRACTITIONER

## 2021-10-01 ENCOUNTER — TELEPHONE (OUTPATIENT)
Dept: NEUROLOGY | Facility: CLINIC | Age: 76
End: 2021-10-01

## 2021-10-01 DIAGNOSIS — G40.909 SEIZURE DISORDER: Primary | ICD-10-CM

## 2021-10-04 ENCOUNTER — OFFICE VISIT (OUTPATIENT)
Dept: CARDIOLOGY | Facility: CLINIC | Age: 76
End: 2021-10-04
Payer: MEDICARE

## 2021-10-04 ENCOUNTER — LAB VISIT (OUTPATIENT)
Dept: LAB | Facility: HOSPITAL | Age: 76
End: 2021-10-04
Attending: NURSE PRACTITIONER
Payer: MEDICARE

## 2021-10-04 VITALS
WEIGHT: 143.75 LBS | DIASTOLIC BLOOD PRESSURE: 70 MMHG | OXYGEN SATURATION: 95 % | HEART RATE: 70 BPM | SYSTOLIC BLOOD PRESSURE: 128 MMHG | BODY MASS INDEX: 21.23 KG/M2

## 2021-10-04 DIAGNOSIS — I25.118 CORONARY ARTERY DISEASE OF NATIVE ARTERY OF NATIVE HEART WITH STABLE ANGINA PECTORIS: ICD-10-CM

## 2021-10-04 DIAGNOSIS — F17.200 SMOKER: ICD-10-CM

## 2021-10-04 DIAGNOSIS — I70.0 AORTIC CALCIFICATION: ICD-10-CM

## 2021-10-04 DIAGNOSIS — E78.2 MIXED HYPERLIPIDEMIA: ICD-10-CM

## 2021-10-04 DIAGNOSIS — G40.909 SEIZURE DISORDER: ICD-10-CM

## 2021-10-04 DIAGNOSIS — I35.0 NONRHEUMATIC AORTIC VALVE STENOSIS: ICD-10-CM

## 2021-10-04 DIAGNOSIS — I65.23 BILATERAL CAROTID ARTERY STENOSIS: ICD-10-CM

## 2021-10-04 DIAGNOSIS — C34.00 MALIGNANT NEOPLASM OF HILUS OF LUNG, UNSPECIFIED LATERALITY: ICD-10-CM

## 2021-10-04 DIAGNOSIS — I50.22 CHRONIC SYSTOLIC CONGESTIVE HEART FAILURE: Primary | ICD-10-CM

## 2021-10-04 DIAGNOSIS — I10 ESSENTIAL HYPERTENSION: ICD-10-CM

## 2021-10-04 LAB — PHENYTOIN SERPL-MCNC: 9.7 UG/ML (ref 10–20)

## 2021-10-04 PROCEDURE — 99214 PR OFFICE/OUTPT VISIT, EST, LEVL IV, 30-39 MIN: ICD-10-PCS | Mod: S$PBB,,, | Performed by: INTERNAL MEDICINE

## 2021-10-04 PROCEDURE — 99213 OFFICE O/P EST LOW 20 MIN: CPT | Mod: PBBFAC | Performed by: INTERNAL MEDICINE

## 2021-10-04 PROCEDURE — 99999 PR PBB SHADOW E&M-EST. PATIENT-LVL III: CPT | Mod: PBBFAC,,, | Performed by: INTERNAL MEDICINE

## 2021-10-04 PROCEDURE — 36415 COLL VENOUS BLD VENIPUNCTURE: CPT | Performed by: NURSE PRACTITIONER

## 2021-10-04 PROCEDURE — 99999 PR PBB SHADOW E&M-EST. PATIENT-LVL III: ICD-10-PCS | Mod: PBBFAC,,, | Performed by: INTERNAL MEDICINE

## 2021-10-04 PROCEDURE — 80185 ASSAY OF PHENYTOIN TOTAL: CPT | Performed by: NURSE PRACTITIONER

## 2021-10-04 PROCEDURE — 99214 OFFICE O/P EST MOD 30 MIN: CPT | Mod: S$PBB,,, | Performed by: INTERNAL MEDICINE

## 2021-10-26 PROCEDURE — 95724 EEG PHY/QHP>60<84 HR W/VEEG: CPT | Mod: ,,, | Performed by: PSYCHIATRY & NEUROLOGY

## 2021-10-26 PROCEDURE — 95724 PR EEG, W/VIDEO, CONT RECORD, CMPLT STDY, I&R, >60<84 HRS: ICD-10-PCS | Mod: ,,, | Performed by: PSYCHIATRY & NEUROLOGY

## 2021-10-31 ENCOUNTER — EXTERNAL CHRONIC CARE MANAGEMENT (OUTPATIENT)
Dept: PRIMARY CARE CLINIC | Facility: CLINIC | Age: 76
End: 2021-10-31
Payer: MEDICARE

## 2021-10-31 PROCEDURE — 99490 PR CHRONIC CARE MGMT, 1ST 20 MIN: ICD-10-PCS | Mod: S$PBB,,, | Performed by: FAMILY MEDICINE

## 2021-10-31 PROCEDURE — 99490 CHRNC CARE MGMT STAFF 1ST 20: CPT | Mod: S$PBB,,, | Performed by: FAMILY MEDICINE

## 2021-10-31 PROCEDURE — 99490 CHRNC CARE MGMT STAFF 1ST 20: CPT | Mod: PBBFAC | Performed by: FAMILY MEDICINE

## 2021-11-01 RX ORDER — SPIRONOLACTONE 25 MG/1
25 TABLET ORAL DAILY
Qty: 30 TABLET | Refills: 11 | Status: CANCELLED | OUTPATIENT
Start: 2021-11-01 | End: 2021-12-01

## 2021-11-01 RX ORDER — SPIRONOLACTONE 25 MG/1
25 TABLET ORAL DAILY
Qty: 30 TABLET | Refills: 11 | Status: SHIPPED | OUTPATIENT
Start: 2021-11-01 | End: 2022-10-25

## 2021-11-01 NOTE — TELEPHONE ENCOUNTER
JL          Preferred Name:   Humberto Carlson    Male, 76 y.o., 1945  Phone:   [phone] (M)    Zeke Lamb MD  PCP - General  Language:   English  Need Interp:   No      Allergies Last Reviewed:   10/04/21  Allergies:   Wasp Sting [Allergen Ext-venom-honey Bee]  Digital Medicine:   None    Health Maintenance:   None          Primary Ins.:   MEDICARE  MRN:   [id number]  Pt Comm Pref:   Patient Portal, Mail, Push Notification    Next Appt:   With Internal Medicine (Zeke Lamb MD)  11/24/2021 at 8:20 AM    My Sticky Note:     Specialty Comments:     Last Encounter Center:   None  Last Enc in Dept:   5/24/2021  Last Enc this Prov:   None  Last Contact Department:   [facility] CHRONIC CARE MANAGEMENT [facility]                  Medication refill  Received: 3 days ago  Diana Alanis Staff  Phone Number: [phone]     MRN: [id number]     Pt:  Humberto Carlson     Phone: [phone]        Pharmacy: [place] [address]    Phone:[phone]       Good Morning,     CC spoke to pt during monthly health check. Pt needs an refill on spironolactone (ALDACTONE) 25 MG tablet to be [facility] to [facility].   Please contact pt at above contact information or CC at contact information below for further questions or concerns. Thanks     DARRELL Ortiz   Care Coordinator   [facility]   [phone] ext [phone]

## 2021-11-05 ENCOUNTER — TELEPHONE (OUTPATIENT)
Dept: NEUROLOGY | Facility: CLINIC | Age: 76
End: 2021-11-05
Payer: MEDICARE

## 2021-11-24 ENCOUNTER — OFFICE VISIT (OUTPATIENT)
Dept: INTERNAL MEDICINE | Facility: CLINIC | Age: 76
End: 2021-11-24
Payer: MEDICARE

## 2021-11-24 VITALS
HEART RATE: 61 BPM | OXYGEN SATURATION: 99 % | TEMPERATURE: 96 F | WEIGHT: 146.19 LBS | BODY MASS INDEX: 21.65 KG/M2 | DIASTOLIC BLOOD PRESSURE: 66 MMHG | SYSTOLIC BLOOD PRESSURE: 122 MMHG | HEIGHT: 69 IN

## 2021-11-24 DIAGNOSIS — F32.A DEPRESSION, UNSPECIFIED DEPRESSION TYPE: ICD-10-CM

## 2021-11-24 DIAGNOSIS — R94.30 EJECTION FRACTION < 50%: ICD-10-CM

## 2021-11-24 DIAGNOSIS — I10 ESSENTIAL HYPERTENSION: ICD-10-CM

## 2021-11-24 DIAGNOSIS — G40.909 SEIZURE DISORDER: ICD-10-CM

## 2021-11-24 DIAGNOSIS — F17.200 SMOKER: Primary | ICD-10-CM

## 2021-11-24 DIAGNOSIS — E78.5 HYPERLIPIDEMIA, UNSPECIFIED HYPERLIPIDEMIA TYPE: ICD-10-CM

## 2021-11-24 DIAGNOSIS — I25.118 CORONARY ARTERY DISEASE OF NATIVE ARTERY OF NATIVE HEART WITH STABLE ANGINA PECTORIS: ICD-10-CM

## 2021-11-24 PROCEDURE — 99214 OFFICE O/P EST MOD 30 MIN: CPT | Mod: S$PBB,,, | Performed by: FAMILY MEDICINE

## 2021-11-24 PROCEDURE — 99214 PR OFFICE/OUTPT VISIT, EST, LEVL IV, 30-39 MIN: ICD-10-PCS | Mod: S$PBB,,, | Performed by: FAMILY MEDICINE

## 2021-11-24 PROCEDURE — 99214 OFFICE O/P EST MOD 30 MIN: CPT | Mod: PBBFAC | Performed by: FAMILY MEDICINE

## 2021-11-24 PROCEDURE — 99999 PR PBB SHADOW E&M-EST. PATIENT-LVL IV: CPT | Mod: PBBFAC,,, | Performed by: FAMILY MEDICINE

## 2021-11-24 PROCEDURE — 99999 PR PBB SHADOW E&M-EST. PATIENT-LVL IV: ICD-10-PCS | Mod: PBBFAC,,, | Performed by: FAMILY MEDICINE

## 2021-11-24 RX ORDER — SERTRALINE HYDROCHLORIDE 50 MG/1
50 TABLET, FILM COATED ORAL DAILY
Qty: 90 TABLET | Refills: 3 | Status: SHIPPED | OUTPATIENT
Start: 2021-11-24 | End: 2022-11-21

## 2021-11-30 ENCOUNTER — EXTERNAL CHRONIC CARE MANAGEMENT (OUTPATIENT)
Dept: PRIMARY CARE CLINIC | Facility: CLINIC | Age: 76
End: 2021-11-30
Payer: MEDICARE

## 2021-11-30 PROCEDURE — 99490 PR CHRONIC CARE MGMT, 1ST 20 MIN: ICD-10-PCS | Mod: S$PBB,,, | Performed by: FAMILY MEDICINE

## 2021-11-30 PROCEDURE — 99490 CHRNC CARE MGMT STAFF 1ST 20: CPT | Mod: PBBFAC | Performed by: FAMILY MEDICINE

## 2021-11-30 PROCEDURE — 99490 CHRNC CARE MGMT STAFF 1ST 20: CPT | Mod: S$PBB,,, | Performed by: FAMILY MEDICINE

## 2021-12-14 ENCOUNTER — IMMUNIZATION (OUTPATIENT)
Dept: PRIMARY CARE CLINIC | Facility: CLINIC | Age: 76
End: 2021-12-14
Payer: MEDICARE

## 2021-12-14 DIAGNOSIS — Z23 NEED FOR VACCINATION: Primary | ICD-10-CM

## 2021-12-14 PROCEDURE — 91300 COVID-19, MRNA, LNP-S, PF, 30 MCG/0.3 ML DOSE VACCINE: CPT | Mod: PBBFAC | Performed by: FAMILY MEDICINE

## 2021-12-14 PROCEDURE — 0003A COVID-19, MRNA, LNP-S, PF, 30 MCG/0.3 ML DOSE VACCINE: CPT | Mod: CV19,PBBFAC | Performed by: FAMILY MEDICINE

## 2021-12-21 RX ORDER — PANTOPRAZOLE SODIUM 20 MG/1
TABLET, DELAYED RELEASE ORAL
Qty: 90 TABLET | Refills: 0 | Status: SHIPPED | OUTPATIENT
Start: 2021-12-21 | End: 2022-03-22

## 2022-01-17 DIAGNOSIS — J30.9 ALLERGIC RHINITIS, UNSPECIFIED SEASONALITY, UNSPECIFIED TRIGGER: ICD-10-CM

## 2022-01-17 NOTE — TELEPHONE ENCOUNTER
No new care gaps identified.  Powered by [employer]. Reference number: [id number].   1/17/2022 9:38:22 AM CST

## 2022-01-18 RX ORDER — FLUTICASONE PROPIONATE 50 MCG
SPRAY, SUSPENSION (ML) NASAL
Qty: 48 G | Refills: 3 | Status: SHIPPED | OUTPATIENT
Start: 2022-01-18 | End: 2023-02-03

## 2022-01-18 NOTE — TELEPHONE ENCOUNTER
Refill Authorization Note   Humberto Carlson  is requesting a refill authorization.  Brief Assessment and Rationale for Refill:  Approve     Medication Therapy Plan:       Medication Reconciliation Completed: No   Comments:   --->Care Gap information included below if applicable.   Orders Placed This Encounter    fluticasone propionate (FLONASE) 50 mcg/actuation nasal spray      Requested Prescriptions   Signed Prescriptions Disp Refills    fluticasone propionate (FLONASE) 50 mcg/actuation nasal spray 48 g 3     Si SPRAY IN EACH NOSTRIL ONCE DAILY.       Ear, Nose, and Throat: Nasal Preparations - Corticosteroids Passed - 2022  9:37 AM        Passed - Patient is at least 18 years old        Passed - Valid encounter within last 15 months     Recent Visits  Date Type Provider Dept   21 Office Visit Zeke Lamb MD [facility] Internal Medicine   21 Office Visit Zeke Lamb MD [facility] Internal Medicine   21 Office Visit Zeke Lamb MD [facility] Internal Medicine   21 Office Visit Zeke Lamb MD [facility] Internal Medicine   10/12/20 Office Visit Zeke Lamb MD [facility] Internal Medicine   20 Office Visit Markei Belcher MD [facility] Internal Medicine   20 Office Visit Markie Belcher MD [facility] Internal Medicine   Showing recent visits within past 720 days and meeting all other requirements  Future Appointments  No visits were found meeting these conditions.  Showing future appointments within next 150 days and meeting all other requirements      Future Appointments              In 2 months [facility]    In 4 months Zeke Lamb MD [facility] - Internal Medicine, [facility]                    Appointments  past 12m or future 3m with PCP    Date Provider   Last Visit   2021 Zeke Lamb MD   Next Visit   2022 Zeke Lamb MD   ED visits in past 90 days: 0     Note composed:10:46 AM 2022

## 2022-01-31 ENCOUNTER — EXTERNAL CHRONIC CARE MANAGEMENT (OUTPATIENT)
Dept: PRIMARY CARE CLINIC | Facility: CLINIC | Age: 77
End: 2022-01-31
Payer: MEDICARE

## 2022-01-31 PROCEDURE — 99490 CHRNC CARE MGMT STAFF 1ST 20: CPT | Mod: S$PBB,,, | Performed by: FAMILY MEDICINE

## 2022-01-31 PROCEDURE — 99490 CHRNC CARE MGMT STAFF 1ST 20: CPT | Mod: PBBFAC | Performed by: FAMILY MEDICINE

## 2022-01-31 PROCEDURE — 99490 PR CHRONIC CARE MGMT, 1ST 20 MIN: ICD-10-PCS | Mod: S$PBB,,, | Performed by: FAMILY MEDICINE

## 2022-02-28 ENCOUNTER — EXTERNAL CHRONIC CARE MANAGEMENT (OUTPATIENT)
Dept: PRIMARY CARE CLINIC | Facility: CLINIC | Age: 77
End: 2022-02-28
Payer: MEDICARE

## 2022-02-28 PROCEDURE — 99490 CHRNC CARE MGMT STAFF 1ST 20: CPT | Mod: PBBFAC | Performed by: FAMILY MEDICINE

## 2022-02-28 PROCEDURE — 99490 PR CHRONIC CARE MGMT, 1ST 20 MIN: ICD-10-PCS | Mod: S$PBB,,, | Performed by: FAMILY MEDICINE

## 2022-02-28 PROCEDURE — 99490 CHRNC CARE MGMT STAFF 1ST 20: CPT | Mod: S$PBB,,, | Performed by: FAMILY MEDICINE

## 2022-03-01 RX ORDER — ATORVASTATIN CALCIUM 40 MG/1
TABLET, FILM COATED ORAL
Qty: 90 TABLET | Refills: 2 | Status: SHIPPED | OUTPATIENT
Start: 2022-03-01 | End: 2022-03-02 | Stop reason: SDUPTHER

## 2022-03-02 RX ORDER — ATORVASTATIN CALCIUM 40 MG/1
40 TABLET, FILM COATED ORAL DAILY
Qty: 90 TABLET | Refills: 2 | Status: SHIPPED | OUTPATIENT
Start: 2022-03-02 | End: 2023-05-21

## 2022-03-29 DIAGNOSIS — I50.22 CHRONIC SYSTOLIC CONGESTIVE HEART FAILURE: Primary | ICD-10-CM

## 2022-03-31 ENCOUNTER — EXTERNAL CHRONIC CARE MANAGEMENT (OUTPATIENT)
Dept: PRIMARY CARE CLINIC | Facility: CLINIC | Age: 77
End: 2022-03-31
Payer: MEDICARE

## 2022-03-31 PROCEDURE — 99490 CHRNC CARE MGMT STAFF 1ST 20: CPT | Mod: S$PBB,,, | Performed by: FAMILY MEDICINE

## 2022-03-31 PROCEDURE — 99490 CHRNC CARE MGMT STAFF 1ST 20: CPT | Mod: PBBFAC | Performed by: FAMILY MEDICINE

## 2022-03-31 PROCEDURE — 99490 PR CHRONIC CARE MGMT, 1ST 20 MIN: ICD-10-PCS | Mod: S$PBB,,, | Performed by: FAMILY MEDICINE

## 2022-04-06 ENCOUNTER — CLINICAL SUPPORT (OUTPATIENT)
Dept: CARDIOLOGY | Facility: CLINIC | Age: 77
End: 2022-04-06
Payer: MEDICARE

## 2022-04-06 ENCOUNTER — OFFICE VISIT (OUTPATIENT)
Dept: CARDIOLOGY | Facility: CLINIC | Age: 77
End: 2022-04-06
Payer: MEDICARE

## 2022-04-06 VITALS
DIASTOLIC BLOOD PRESSURE: 78 MMHG | WEIGHT: 138.56 LBS | HEIGHT: 70 IN | SYSTOLIC BLOOD PRESSURE: 132 MMHG | OXYGEN SATURATION: 97 % | HEART RATE: 76 BPM | BODY MASS INDEX: 19.84 KG/M2

## 2022-04-06 DIAGNOSIS — I65.23 BILATERAL CAROTID ARTERY STENOSIS: ICD-10-CM

## 2022-04-06 DIAGNOSIS — I10 ESSENTIAL HYPERTENSION: ICD-10-CM

## 2022-04-06 DIAGNOSIS — I50.22 CHRONIC SYSTOLIC CONGESTIVE HEART FAILURE: ICD-10-CM

## 2022-04-06 DIAGNOSIS — I25.118 CORONARY ARTERY DISEASE OF NATIVE ARTERY OF NATIVE HEART WITH STABLE ANGINA PECTORIS: Primary | ICD-10-CM

## 2022-04-06 DIAGNOSIS — I35.0 NONRHEUMATIC AORTIC VALVE STENOSIS: ICD-10-CM

## 2022-04-06 DIAGNOSIS — E78.2 MIXED HYPERLIPIDEMIA: ICD-10-CM

## 2022-04-06 PROCEDURE — 93010 ELECTROCARDIOGRAM REPORT: CPT | Mod: S$PBB,,, | Performed by: INTERNAL MEDICINE

## 2022-04-06 PROCEDURE — 93005 ELECTROCARDIOGRAM TRACING: CPT | Mod: PBBFAC,PO | Performed by: INTERNAL MEDICINE

## 2022-04-06 PROCEDURE — 99214 OFFICE O/P EST MOD 30 MIN: CPT | Mod: S$PBB,,, | Performed by: INTERNAL MEDICINE

## 2022-04-06 PROCEDURE — 99999 PR PBB SHADOW E&M-EST. PATIENT-LVL IV: ICD-10-PCS | Mod: PBBFAC,,, | Performed by: INTERNAL MEDICINE

## 2022-04-06 PROCEDURE — 99214 OFFICE O/P EST MOD 30 MIN: CPT | Mod: PBBFAC,PO | Performed by: INTERNAL MEDICINE

## 2022-04-06 PROCEDURE — 99214 PR OFFICE/OUTPT VISIT, EST, LEVL IV, 30-39 MIN: ICD-10-PCS | Mod: S$PBB,,, | Performed by: INTERNAL MEDICINE

## 2022-04-06 PROCEDURE — 93010 EKG 12-LEAD: ICD-10-PCS | Mod: S$PBB,,, | Performed by: INTERNAL MEDICINE

## 2022-04-06 PROCEDURE — 99999 PR PBB SHADOW E&M-EST. PATIENT-LVL IV: CPT | Mod: PBBFAC,,, | Performed by: INTERNAL MEDICINE

## 2022-04-06 NOTE — PROGRESS NOTES
Subjective:   Patient ID:  Humberto Carlson is a 77 y.o. male who presents for follow up of No chief complaint on file.      74 yo male f/u for low EF.   PMH CAD old MI  CHFrEF 35%, mild AS, lung cancer s/p removal of left lung in 2015, h/o fall two months after the procedure and brain bleeding s/p removal, HTN HLD smoker 1 ppd for 60 yrs, occasional drinking  Part time work.  ECHO EF 40% mild AS and apical HK  EKG NSR ols septal infarct   MPI showed moderate sized severally fixed defect perfusion in apex, EF 35%  Carotid US 20% right and 50% left     Occasional BECKER  No chest pain, palpitation, orthopnea and leg swelling  Dizziness if looks up    06/2021 visit  No chest pain dizziness palpitation  Chronic BECKER after the lung procedure  Med compliance  EKG NASR HR 52 bpm, nonspecific STT change     visit  BP log reviewed. Occasional dizziness if looking up.  No chest pain weight control, leg swelling, palpitation. BECKER stable and the inhaler helps. 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 Chronic SOB due to mask and smoking.  Sleeps on 1 pillow and no leg swelling  EKG today NSR and old septal infarct  BP checked regularly and controlled. Physically active at home      Past Medical History:   Diagnosis Date    Asthma     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    Cancer     Cataract     Chronic obstructive pulmonary disease 5/24/2021    Coronary artery disease of native artery of native heart with stable angina pectoris 10/26/2020    Encounter for blood transfusion     Essential hypertension 7/22/2019    GERD (gastroesophageal reflux disease)     Glaucoma     Hyperlipidemia     Seizures        Past Surgical History:   Procedure Laterality Date    BRAIN SURGERY      EYE SURGERY      LUNG SURGERY      Partial lung removed Left        Social History     Tobacco Use     Smoking status: Current Every Day Smoker     Packs/day: 2.00     Types: Cigarettes    Smokeless tobacco: Never Used   Substance Use Topics    Alcohol use: No    Drug use: No       Family History   Problem Relation Age of Onset    Heart attack Father     Coronary artery disease Brother     Valvular heart disease Brother          Review of Systems   Constitutional: Positive for weight loss. Negative for decreased appetite, diaphoresis, fever, malaise/fatigue and night sweats.   HENT: Negative for nosebleeds.    Eyes: Negative for blurred vision and double vision.   Cardiovascular: Positive for dyspnea on exertion. Negative for chest pain, claudication, irregular heartbeat, leg swelling, near-syncope, orthopnea, palpitations, paroxysmal nocturnal dyspnea and syncope.   Respiratory: Positive for cough and shortness of breath. Negative for sleep disturbances due to breathing, snoring, sputum production and wheezing.    Endocrine: Negative for cold intolerance and polyuria.   Hematologic/Lymphatic: Does not bruise/bleed easily.   Skin: Negative for rash.   Musculoskeletal: Positive for arthritis and back pain. Negative for falls, joint pain, joint swelling and neck pain.   Gastrointestinal: Negative for abdominal pain, heartburn, nausea and vomiting.   Genitourinary: Negative for dysuria, frequency and hematuria.   Neurological: Negative for difficulty with concentration, dizziness, focal weakness, headaches, light-headedness, numbness, seizures and weakness.   Psychiatric/Behavioral: Negative for depression, memory loss and substance abuse. The patient does not have insomnia.    Allergic/Immunologic: Negative for HIV exposure and hives.       Objective:   Physical Exam  HENT:      Head: Normocephalic.   Eyes:      Pupils: Pupils are equal, round, and reactive to light.   Neck:      Thyroid: No thyromegaly.      Vascular: Normal carotid pulses. No carotid bruit or JVD.   Cardiovascular:      Rate and Rhythm: Normal  rate and regular rhythm.  No extrasystoles are present.     Chest Wall: PMI is not displaced.      Pulses: Normal pulses.           Carotid pulses are on the right side with bruit and on the left side with bruit.     Heart sounds: Murmur (ESM on bases and radiating up to the neck ) heard.     No gallop. No S3 sounds.   Pulmonary:      Effort: No respiratory distress.      Breath sounds: No stridor. Examination of the left-middle field reveals decreased breath sounds. Examination of the left-lower field reveals decreased breath sounds. Decreased breath sounds present.   Abdominal:      General: Bowel sounds are normal.      Palpations: Abdomen is soft.      Tenderness: There is no abdominal tenderness. There is no rebound.   Musculoskeletal:         General: Normal range of motion.   Skin:     Findings: No rash.   Neurological:      Mental Status: He is alert and oriented to person, place, and time.   Psychiatric:         Behavior: Behavior normal.         Lab Results   Component Value Date    CHOL 174 01/25/2021    CHOL 187 02/25/2020    CHOL 167 07/22/2019     Lab Results   Component Value Date    HDL 62 01/25/2021    HDL 49 02/25/2020    HDL 62 07/22/2019     Lab Results   Component Value Date    LDLCALC 88.8 01/25/2021    LDLCALC 110.4 02/25/2020    LDLCALC 87.4 07/22/2019     Lab Results   Component Value Date    TRIG 116 01/25/2021    TRIG 138 02/25/2020    TRIG 88 07/22/2019     Lab Results   Component Value Date    CHOLHDL 35.6 01/25/2021    CHOLHDL 26.2 02/25/2020    CHOLHDL 37.1 07/22/2019       Chemistry        Component Value Date/Time     09/30/2021 1130    K 4.4 09/30/2021 1130     09/30/2021 1130    CO2 25 09/30/2021 1130    BUN 12 09/30/2021 1130    CREATININE 0.7 09/30/2021 1130     09/30/2021 1130        Component Value Date/Time    CALCIUM 9.0 09/30/2021 1130    ALKPHOS 59 09/30/2021 1130    AST 21 09/30/2021 1130    ALT 16 09/30/2021 1130    BILITOT 0.3 09/30/2021 1130     ESTGFRAFRICA >60.0 09/30/2021 1130    EGFRNONAA >60.0 09/30/2021 1130          Lab Results   Component Value Date    HGBA1C 5.5 02/25/2020     Lab Results   Component Value Date    TSH 1.583 02/25/2020     No results found for: INR, PROTIME  Lab Results   Component Value Date    WBC 7.51 09/30/2021    HGB 12.3 (L) 09/30/2021    HCT 36.7 (L) 09/30/2021    MCV 91 09/30/2021     09/30/2021     BMP  Sodium   Date Value Ref Range Status   09/30/2021 136 136 - 145 mmol/L Final     Potassium   Date Value Ref Range Status   09/30/2021 4.4 3.5 - 5.1 mmol/L Final     Chloride   Date Value Ref Range Status   09/30/2021 102 95 - 110 mmol/L Final     CO2   Date Value Ref Range Status   09/30/2021 25 23 - 29 mmol/L Final     BUN   Date Value Ref Range Status   09/30/2021 12 8 - 23 mg/dL Final     Creatinine   Date Value Ref Range Status   09/30/2021 0.7 0.5 - 1.4 mg/dL Final     Calcium   Date Value Ref Range Status   09/30/2021 9.0 8.7 - 10.5 mg/dL Final     Anion Gap   Date Value Ref Range Status   09/30/2021 9 8 - 16 mmol/L Final     eGFR if    Date Value Ref Range Status   09/30/2021 >60.0 >60 mL/min/1.73 m^2 Final     eGFR if non    Date Value Ref Range Status   09/30/2021 >60.0 >60 mL/min/1.73 m^2 Final     Comment:     Calculation used to obtain the estimated glomerular filtration  rate (eGFR) is the CKD-EPI equation.        BNP  @LABRCNTIP(BNP,BNPTRIAGEBLO)@  @LABRCNTIP(troponini)@  CrCl cannot be calculated (Patient's most recent lab result is older than the maximum 7 days allowed.).  No results found in the last 24 hours.  No results found in the last 24 hours.  No results found in the last 24 hours.    Assessment:      1. Coronary artery disease of native artery of native heart with stable angina pectoris    2. Essential hypertension    3. Mixed hyperlipidemia    4. Nonrheumatic aortic valve stenosis    5. Chronic systolic congestive heart failure    6. Bilateral carotid artery  stenosis        Plan:     Echo for mild AS  Carotid US for carotid artery Dz  Continue ASA Lipitor entrestor ToprolXl Aldactone  Smoking cessation    Counseled DASH  Check Lipid profile in 6 months  Recommend heart-healthy diet, weight control and regular exercise.  Arvind. Risk modification.   I have reviewed all pertinent labs and cardiac studies independently. Plans and recommendations have been formulated under my direct supervision. All questions answered and patient voiced understanding.   If symptoms persist go to the ED  RTC in 6 months

## 2022-04-11 ENCOUNTER — HOSPITAL ENCOUNTER (OUTPATIENT)
Dept: CARDIOLOGY | Facility: HOSPITAL | Age: 77
Discharge: HOME | End: 2022-04-11
Attending: INTERNAL MEDICINE
Payer: MEDICARE

## 2022-04-11 VITALS
HEIGHT: 70 IN | HEIGHT: 70 IN | SYSTOLIC BLOOD PRESSURE: 132 MMHG | BODY MASS INDEX: 19.76 KG/M2 | SYSTOLIC BLOOD PRESSURE: 132 MMHG | DIASTOLIC BLOOD PRESSURE: 78 MMHG | WEIGHT: 138 LBS | BODY MASS INDEX: 19.76 KG/M2 | WEIGHT: 138 LBS | DIASTOLIC BLOOD PRESSURE: 78 MMHG

## 2022-04-11 DIAGNOSIS — I65.23 BILATERAL CAROTID ARTERY STENOSIS: ICD-10-CM

## 2022-04-11 DIAGNOSIS — I35.0 NONRHEUMATIC AORTIC VALVE STENOSIS: ICD-10-CM

## 2022-04-11 LAB
AORTIC ROOT ANNULUS: 3.38 CM
ASCENDING AORTA: 3.4 CM
AV INDEX (PROSTH): 0.53
AV MEAN GRADIENT: 8 MMHG
AV PEAK GRADIENT: 15 MMHG
AV VALVE AREA: 2.21 CM2
AV VELOCITY RATIO: 0.44
BSA FOR ECHO PROCEDURE: 1.76 M2
CV ECHO LV RWT: 0.45 CM
DOP CALC AO PEAK VEL: 1.92 M/S
DOP CALC AO VTI: 39.4 CM
DOP CALC LVOT AREA: 4.2 CM2
DOP CALC LVOT DIAMETER: 2.31 CM
DOP CALC LVOT PEAK VEL: 0.85 M/S
DOP CALC LVOT STROKE VOLUME: 87.13 CM3
DOP CALC RVOT PEAK VEL: 0.66 M/S
DOP CALC RVOT VTI: 16 CM
DOP CALCLVOT PEAK VEL VTI: 20.8 CM
E WAVE DECELERATION TIME: 305.7 MSEC
E/A RATIO: 0.63
ECHO EF ESTIMATED: 63 %
ECHO LV POSTERIOR WALL: 1.12 CM (ref 0.6–1.1)
EJECTION FRACTION: 40 %
FRACTIONAL SHORTENING: 34 % (ref 28–44)
INTERVENTRICULAR SEPTUM: 1.47 CM (ref 0.6–1.1)
IVC DIAMETER: 1.71 CM
LA WIDTH: 5.5 CM
LEFT ARM DIASTOLIC BLOOD PRESSURE: 75 MMHG
LEFT ARM SYSTOLIC BLOOD PRESSURE: 130 MMHG
LEFT ATRIUM SIZE: 4.43 CM
LEFT ATRIUM VOLUME INDEX MOD: 15 ML/M2
LEFT ATRIUM VOLUME MOD: 26.71 CM3
LEFT CBA DIAS: 23 CM/S
LEFT CBA SYS: 143 CM/S
LEFT CCA DIST DIAS: 15 CM/S
LEFT CCA DIST SYS: 60 CM/S
LEFT CCA MID DIAS: 12 CM/S
LEFT CCA MID SYS: 75 CM/S
LEFT CCA PROX DIAS: 15 CM/S
LEFT CCA PROX SYS: 100 CM/S
LEFT ECA DIAS: 15 CM/S
LEFT ECA SYS: 179 CM/S
LEFT ICA DIST DIAS: 15 CM/S
LEFT ICA DIST SYS: 79 CM/S
LEFT ICA MID DIAS: 31 CM/S
LEFT ICA MID SYS: 200 CM/S
LEFT ICA PROX DIAS: 42 CM/S
LEFT ICA PROX SYS: 181 CM/S
LEFT INTERNAL DIMENSION IN SYSTOLE: 3.27 CM (ref 2.1–4)
LEFT VENTRICLE DIASTOLIC VOLUME INDEX: 65.7 ML/M2
LEFT VENTRICLE DIASTOLIC VOLUME: 116.94 ML
LEFT VENTRICLE MASS INDEX: 145 G/M2
LEFT VENTRICLE SYSTOLIC VOLUME INDEX: 24.3 ML/M2
LEFT VENTRICLE SYSTOLIC VOLUME: 43.21 ML
LEFT VENTRICULAR INTERNAL DIMENSION IN DIASTOLE: 4.98 CM (ref 3.5–6)
LEFT VENTRICULAR MASS: 258.76 G
LEFT VERTEBRAL DIAS: 23 CM/S
LEFT VERTEBRAL SYS: 88 CM/S
LVOT MG: 1.45 MMHG
LVOT MV: 0.56 CM/S
MV PEAK A VEL: 1.06 M/S
MV PEAK E VEL: 0.67 M/S
OHS CV CAROTID RIGHT ICA EDV HIGHEST: 30
OHS CV CAROTID ULTRASOUND LEFT ICA/CCA RATIO: 3.33
OHS CV CAROTID ULTRASOUND RIGHT ICA/CCA RATIO: 1.65
OHS CV PV CAROTID LEFT HIGHEST CCA: 100
OHS CV PV CAROTID LEFT HIGHEST ICA: 200
OHS CV PV CAROTID RIGHT HIGHEST CCA: 65
OHS CV PV CAROTID RIGHT HIGHEST ICA: 104
OHS CV US CAROTID LEFT HIGHEST EDV: 42
PISA TR MAX VEL: 2.26 M/S
PULM VEIN S/D RATIO: 2.65
PV MEAN GRADIENT: 0.86 MMHG
PV PEAK D VEL: 0.26 M/S
PV PEAK S VEL: 0.69 M/S
PV PEAK VELOCITY: 0.77 CM/S
RA MAJOR: 4.64 CM
RA PRESSURE: 3 MMHG
RIGHT ARM DIASTOLIC BLOOD PRESSURE: 78 MMHG
RIGHT ARM SYSTOLIC BLOOD PRESSURE: 132 MMHG
RIGHT CBA DIAS: 10 CM/S
RIGHT CBA SYS: 55 CM/S
RIGHT CCA DIST DIAS: 15 CM/S
RIGHT CCA DIST SYS: 63 CM/S
RIGHT CCA MID DIAS: 13 CM/S
RIGHT CCA MID SYS: 65 CM/S
RIGHT CCA PROX DIAS: 9 CM/S
RIGHT CCA PROX SYS: 55 CM/S
RIGHT ECA DIAS: 6 CM/S
RIGHT ECA SYS: 61 CM/S
RIGHT ICA DIST DIAS: 24 CM/S
RIGHT ICA DIST SYS: 104 CM/S
RIGHT ICA MID DIAS: 23 CM/S
RIGHT ICA MID SYS: 90 CM/S
RIGHT ICA PROX DIAS: 30 CM/S
RIGHT ICA PROX SYS: 83 CM/S
RIGHT VENTRICULAR END-DIASTOLIC DIMENSION: 4.17 CM
RIGHT VERTEBRAL DIAS: 21 CM/S
RIGHT VERTEBRAL SYS: 59 CM/S
STJ: 2.87 CM
TR MAX PG: 20 MMHG
TV REST PULMONARY ARTERY PRESSURE: 23 MMHG

## 2022-04-11 PROCEDURE — 93880 CV US DOPPLER CAROTID (CUPID ONLY): ICD-10-PCS | Mod: 26,,, | Performed by: INTERNAL MEDICINE

## 2022-04-11 PROCEDURE — 93306 TTE W/DOPPLER COMPLETE: CPT

## 2022-04-11 PROCEDURE — 93880 EXTRACRANIAL BILAT STUDY: CPT | Mod: 26,,, | Performed by: INTERNAL MEDICINE

## 2022-04-11 PROCEDURE — 93306 TTE W/DOPPLER COMPLETE: CPT | Mod: 26,,, | Performed by: STUDENT IN AN ORGANIZED HEALTH CARE EDUCATION/TRAINING PROGRAM

## 2022-04-11 PROCEDURE — 93880 EXTRACRANIAL BILAT STUDY: CPT

## 2022-04-11 PROCEDURE — 93306 ECHO (CUPID ONLY): ICD-10-PCS | Mod: 26,,, | Performed by: STUDENT IN AN ORGANIZED HEALTH CARE EDUCATION/TRAINING PROGRAM

## 2022-04-13 ENCOUNTER — TELEPHONE (OUTPATIENT)
Dept: CARDIOLOGY | Facility: CLINIC | Age: 77
End: 2022-04-13
Payer: MEDICARE

## 2022-04-13 NOTE — TELEPHONE ENCOUNTER
----- Message from Gallo Carbajal MD sent at 4/12/2022  8:35 PM CDT -----  Carotid us showed mild to moderate Dz  Continue current Rx

## 2022-04-26 ENCOUNTER — TELEPHONE (OUTPATIENT)
Dept: ADMINISTRATIVE | Facility: HOSPITAL | Age: 77
End: 2022-04-26
Payer: MEDICARE

## 2022-04-30 ENCOUNTER — EXTERNAL CHRONIC CARE MANAGEMENT (OUTPATIENT)
Dept: PRIMARY CARE CLINIC | Facility: CLINIC | Age: 77
End: 2022-04-30
Payer: MEDICARE

## 2022-04-30 PROCEDURE — 99490 PR CHRONIC CARE MGMT, 1ST 20 MIN: ICD-10-PCS | Mod: S$PBB,,, | Performed by: FAMILY MEDICINE

## 2022-04-30 PROCEDURE — 99490 CHRNC CARE MGMT STAFF 1ST 20: CPT | Mod: PBBFAC | Performed by: FAMILY MEDICINE

## 2022-04-30 PROCEDURE — 99490 CHRNC CARE MGMT STAFF 1ST 20: CPT | Mod: S$PBB,,, | Performed by: FAMILY MEDICINE

## 2022-05-02 ENCOUNTER — OFFICE VISIT (OUTPATIENT)
Dept: FAMILY MEDICINE | Facility: CLINIC | Age: 77
End: 2022-05-02
Payer: MEDICARE

## 2022-05-02 VITALS
WEIGHT: 145.75 LBS | BODY MASS INDEX: 20.4 KG/M2 | SYSTOLIC BLOOD PRESSURE: 124 MMHG | DIASTOLIC BLOOD PRESSURE: 68 MMHG | OXYGEN SATURATION: 96 % | RESPIRATION RATE: 20 BRPM | HEIGHT: 71 IN | HEART RATE: 62 BPM | TEMPERATURE: 97 F

## 2022-05-02 DIAGNOSIS — I70.0 AORTIC CALCIFICATION: ICD-10-CM

## 2022-05-02 DIAGNOSIS — I65.23 BILATERAL CAROTID ARTERY STENOSIS: ICD-10-CM

## 2022-05-02 DIAGNOSIS — Z85.118 HX OF CANCER OF LUNG: ICD-10-CM

## 2022-05-02 DIAGNOSIS — G40.909 SEIZURE DISORDER: ICD-10-CM

## 2022-05-02 DIAGNOSIS — F32.4 MAJOR DEPRESSIVE DISORDER IN PARTIAL REMISSION, UNSPECIFIED WHETHER RECURRENT: ICD-10-CM

## 2022-05-02 DIAGNOSIS — F17.200 SMOKER: ICD-10-CM

## 2022-05-02 DIAGNOSIS — E78.2 MIXED HYPERLIPIDEMIA: ICD-10-CM

## 2022-05-02 DIAGNOSIS — R06.00 DYSPNEA, UNSPECIFIED TYPE: ICD-10-CM

## 2022-05-02 DIAGNOSIS — J44.9 CHRONIC OBSTRUCTIVE PULMONARY DISEASE, UNSPECIFIED COPD TYPE: ICD-10-CM

## 2022-05-02 DIAGNOSIS — Z00.00 ENCOUNTER FOR PREVENTIVE HEALTH EXAMINATION: Primary | ICD-10-CM

## 2022-05-02 DIAGNOSIS — I25.118 CORONARY ARTERY DISEASE OF NATIVE ARTERY OF NATIVE HEART WITH STABLE ANGINA PECTORIS: ICD-10-CM

## 2022-05-02 DIAGNOSIS — I10 ESSENTIAL HYPERTENSION: ICD-10-CM

## 2022-05-02 PROBLEM — J20.9 ACUTE BRONCHITIS: Status: RESOLVED | Noted: 2019-12-02 | Resolved: 2022-05-02

## 2022-05-02 PROBLEM — C34.90 MALIGNANT NEOPLASM OF LUNG: Status: RESOLVED | Noted: 2019-12-02 | Resolved: 2022-05-02

## 2022-05-02 PROCEDURE — G0439 PR MEDICARE ANNUAL WELLNESS SUBSEQUENT VISIT: ICD-10-PCS | Mod: ,,, | Performed by: NURSE PRACTITIONER

## 2022-05-02 PROCEDURE — G0439 PPPS, SUBSEQ VISIT: HCPCS | Mod: ,,, | Performed by: NURSE PRACTITIONER

## 2022-05-02 PROCEDURE — 99215 OFFICE O/P EST HI 40 MIN: CPT | Mod: PBBFAC,PO | Performed by: NURSE PRACTITIONER

## 2022-05-02 PROCEDURE — 99999 PR PBB SHADOW E&M-EST. PATIENT-LVL V: ICD-10-PCS | Mod: PBBFAC,,, | Performed by: NURSE PRACTITIONER

## 2022-05-02 PROCEDURE — 99999 PR PBB SHADOW E&M-EST. PATIENT-LVL V: CPT | Mod: PBBFAC,,, | Performed by: NURSE PRACTITIONER

## 2022-05-02 NOTE — PROGRESS NOTES
"  Humberto Carlson presented for a  Medicare AWV and comprehensive Health Risk Assessment today. The following components were reviewed and updated:    · Medical history  · Family History  · Social history  · Allergies and Current Medications  · Health Risk Assessment  · Health Maintenance  · Care Team         ** See Completed Assessments for Annual Wellness Visit within the encounter summary.**         The following assessments were completed:  · Living Situation  · CAGE  · Depression Screening  · Timed Get Up and Go  · Whisper Test  · Cognitive Function Screening  · Nutrition Screening  · ADL Screening  · PAQ Screening        Vitals:    05/02/22 1431   BP: 124/68   Pulse: 62   Resp: 20   Temp: 97 °F (36.1 °C)   TempSrc: Temporal   SpO2: 96%   Weight: 66.1 kg (145 lb 11.6 oz)   Height: 5' 11" (1.803 m)     Body mass index is 20.32 kg/m².  Physical Exam  Constitutional:       General: He is not in acute distress.     Appearance: Normal appearance. He is well-developed. He is not ill-appearing, toxic-appearing or diaphoretic.   HENT:      Head: Normocephalic and atraumatic.      Right Ear: External ear normal.      Left Ear: External ear normal.      Nose: Nose normal. No rhinorrhea.      Mouth/Throat:      Mouth: Mucous membranes are moist.      Pharynx: No posterior oropharyngeal erythema.   Eyes:      Extraocular Movements: Extraocular movements intact.      Conjunctiva/sclera: Conjunctivae normal.   Neck:      Vascular: Carotid bruit present.      Comments: Right side bruit  Cardiovascular:      Rate and Rhythm: Normal rate and regular rhythm.      Pulses: Normal pulses.      Heart sounds: Murmur heard.     No friction rub. No gallop.   Pulmonary:      Effort: Pulmonary effort is normal. No respiratory distress.      Breath sounds: No stridor. No wheezing, rhonchi or rales.      Comments: Left side diminished breath sounds  Chest:      Chest wall: No tenderness.   Abdominal:      General: Bowel sounds are normal. " There is no distension.      Palpations: Abdomen is soft. There is no mass.      Tenderness: There is no abdominal tenderness.      Hernia: No hernia is present.   Musculoskeletal:         General: No tenderness. Normal range of motion.      Cervical back: Normal range of motion and neck supple. No tenderness.   Lymphadenopathy:      Cervical: No cervical adenopathy.   Skin:     General: Skin is warm and dry.   Neurological:      Mental Status: He is alert and oriented to person, place, and time.      Cranial Nerves: No cranial nerve deficit.   Psychiatric:         Mood and Affect: Mood normal.         Behavior: Behavior normal.               Diagnoses and health risks identified today and associated recommendations/orders:    1. Encounter for preventive health examination  Screenings performed, as noted above.  Personal preventative testing needs reviewed.     2. Seizure disorder  Monitored/treated on meds, continue the same tx, stable, followed by neurology, last seen by BHARGAV Quintero q 6 months    3. Aortic calcification  Monitored/treated on meds, continue the same tx, stable, follow up with pcp    4. Chronic obstructive pulmonary disease, unspecified COPD type  Monitored/treated on meds, continue the same tx, stable, follow up with pcp    5. Essential hypertension  Monitored/treated on meds, continue the same tx, stable, follow up with pcp    6. Mixed hyperlipidemia  Monitored/treated on meds, continue the same tx, stable, follow up with pcp    7. Coronary artery disease of native artery of native heart with stable angina pectoris  Monitored/treated on meds, continue the same tx, stable, follow up with pcp    8. Bilateral carotid artery stenosis  Monitored/treated on meds, continue the same tx, stable, follow up with pcp and cardiology    9. Smoker  Monitored/treated on meds, continue the same tx, stable, follow up with pcp, declined smoking cessation today    10. Dyspnea, unspecified type  Monitored/treated on  meds, continue the same tx, stable, follow up with pcp    11. Major depressive disorder in partial remission, unspecified whether recurrent  Monitored/treated on meds, continue the same tx, stable, follow up with pcp, no SI/HI/or hallucinations reported on Zoloft    12. Hx of cancer of lung  Monitored/treated on meds, continue the same tx, stable, follow up with pcp, had left lobectomy       Provided Humberto with a 5-10 year written screening schedule and personal prevention plan. Recommendations were developed using the USPSTF age appropriate recommendations. Education, counseling, and referrals were provided as needed. After Visit Summary printed and given to patient which includes a list of additional screenings\tests needed.    No follow-ups on file.    Donaldo Smith, BHARGAV  I offered to discuss advanced care planning, including how to pick a person who would make decisions for you if you were unable to make them for yourself, called a health care power of , and what kind of decisions you might make such as use of life sustaining treatments such as ventilators and tube feeding when faced with a life limiting illness recorded on a living will that they will need to know. (How you want to be cared for as you near the end of your natural life)     X Patient is interested in learning more about how to make advanced directives.  I provided them paperwork and offered to discuss this with them.

## 2022-05-02 NOTE — PATIENT INSTRUCTIONS
Counseling and Referral of Other Preventative  (Italic type indicates deductible and co-insurance are waived)    Patient Name: Humberto Carlson  Today's Date: 5/2/2022    Health Maintenance       Date Due Completion Date    Pneumococcal Vaccines (Age 65+) (3 - PPSV23 or PCV20) 01/05/2022 9/24/2020    COVID-19 Vaccine (4 - Booster for Pfizer series) 03/14/2022 12/14/2021    Aspirin/Antiplatelet Therapy 04/06/2023 4/6/2022    Lipid Panel 01/25/2026 1/25/2021    TETANUS VACCINE 07/22/2029 7/22/2019        No orders of the defined types were placed in this encounter.    The following information is provided to all patients.  This information is to help you find resources for any of the problems found today that may be affecting your health:                Living healthy guide: www.[facility].[place].gov      Understanding Diabetes: www.diabetes.org      Eating healthy: www.cdc.gov/healthyweight      CDC home safety checklist: www.cdc.gov/steadi/patient.html      Agency on Aging: www.goea.[place].[facility]      Alcoholics anonymous (AA): www.aa.org      Physical Activity: www.kate.nih.gov/dm7zmhj      Tobacco use: www.quitwithusla.org

## 2022-05-31 ENCOUNTER — EXTERNAL CHRONIC CARE MANAGEMENT (OUTPATIENT)
Dept: PRIMARY CARE CLINIC | Facility: CLINIC | Age: 77
End: 2022-05-31
Payer: MEDICARE

## 2022-05-31 PROCEDURE — 99490 CHRNC CARE MGMT STAFF 1ST 20: CPT | Mod: S$PBB,,, | Performed by: FAMILY MEDICINE

## 2022-05-31 PROCEDURE — 99490 PR CHRONIC CARE MGMT, 1ST 20 MIN: ICD-10-PCS | Mod: S$PBB,,, | Performed by: FAMILY MEDICINE

## 2022-05-31 PROCEDURE — 99490 CHRNC CARE MGMT STAFF 1ST 20: CPT | Mod: PBBFAC | Performed by: FAMILY MEDICINE

## 2022-06-13 ENCOUNTER — IMMUNIZATION (OUTPATIENT)
Dept: PRIMARY CARE CLINIC | Facility: CLINIC | Age: 77
End: 2022-06-13
Payer: MEDICARE

## 2022-06-13 DIAGNOSIS — Z23 NEED FOR VACCINATION: Primary | ICD-10-CM

## 2022-06-13 PROCEDURE — 91305 COVID-19, MRNA, LNP-S, PF, 30 MCG/0.3 ML DOSE VACCINE (PFIZER): CPT | Mod: PBBFAC | Performed by: FAMILY MEDICINE

## 2022-06-30 ENCOUNTER — EXTERNAL CHRONIC CARE MANAGEMENT (OUTPATIENT)
Dept: PRIMARY CARE CLINIC | Facility: CLINIC | Age: 77
End: 2022-06-30
Payer: MEDICARE

## 2022-06-30 PROCEDURE — 99490 PR CHRONIC CARE MGMT, 1ST 20 MIN: ICD-10-PCS | Mod: S$PBB,,, | Performed by: FAMILY MEDICINE

## 2022-06-30 PROCEDURE — 99490 CHRNC CARE MGMT STAFF 1ST 20: CPT | Mod: PBBFAC | Performed by: FAMILY MEDICINE

## 2022-06-30 PROCEDURE — 99490 CHRNC CARE MGMT STAFF 1ST 20: CPT | Mod: S$PBB,,, | Performed by: FAMILY MEDICINE

## 2022-07-31 ENCOUNTER — EXTERNAL CHRONIC CARE MANAGEMENT (OUTPATIENT)
Dept: PRIMARY CARE CLINIC | Facility: CLINIC | Age: 77
End: 2022-07-31
Payer: MEDICARE

## 2022-07-31 PROCEDURE — 99490 CHRNC CARE MGMT STAFF 1ST 20: CPT | Mod: PBBFAC | Performed by: FAMILY MEDICINE

## 2022-07-31 PROCEDURE — 99490 CHRNC CARE MGMT STAFF 1ST 20: CPT | Mod: S$PBB,,, | Performed by: FAMILY MEDICINE

## 2022-07-31 PROCEDURE — 99490 PR CHRONIC CARE MGMT, 1ST 20 MIN: ICD-10-PCS | Mod: S$PBB,,, | Performed by: FAMILY MEDICINE

## 2022-08-31 ENCOUNTER — EXTERNAL CHRONIC CARE MANAGEMENT (OUTPATIENT)
Dept: PRIMARY CARE CLINIC | Facility: CLINIC | Age: 77
End: 2022-08-31
Payer: MEDICARE

## 2022-08-31 PROCEDURE — 99490 PR CHRONIC CARE MGMT, 1ST 20 MIN: ICD-10-PCS | Mod: S$PBB,,, | Performed by: FAMILY MEDICINE

## 2022-08-31 PROCEDURE — 99490 CHRNC CARE MGMT STAFF 1ST 20: CPT | Mod: S$PBB,,, | Performed by: FAMILY MEDICINE

## 2022-08-31 PROCEDURE — 99490 CHRNC CARE MGMT STAFF 1ST 20: CPT | Mod: PBBFAC | Performed by: FAMILY MEDICINE

## 2022-09-15 RX ORDER — PANTOPRAZOLE SODIUM 20 MG/1
TABLET, DELAYED RELEASE ORAL
Qty: 90 TABLET | Refills: 0 | Status: SHIPPED | OUTPATIENT
Start: 2022-09-15 | End: 2022-09-26 | Stop reason: ALTCHOICE

## 2022-09-15 NOTE — TELEPHONE ENCOUNTER
Refill Decision Note   Humberto Carlson  is requesting a refill authorization.  Brief Assessment and Rationale for Refill:  Approve     Medication Therapy Plan:       Medication Reconciliation Completed: No   Comments:     No Care Gaps recommended.     Note composed:12:37 PM 09/15/2022

## 2022-09-15 NOTE — TELEPHONE ENCOUNTER
No new care gaps identified.  [facility] Embedded Care Gaps. Reference number: [id number]. 9/15/2022   10:10:38 AM REYNAT

## 2022-09-26 ENCOUNTER — LAB VISIT (OUTPATIENT)
Dept: LAB | Facility: HOSPITAL | Age: 77
End: 2022-09-26
Attending: FAMILY MEDICINE
Payer: MEDICARE

## 2022-09-26 ENCOUNTER — OFFICE VISIT (OUTPATIENT)
Dept: INTERNAL MEDICINE | Facility: CLINIC | Age: 77
End: 2022-09-26
Payer: MEDICARE

## 2022-09-26 VITALS
HEIGHT: 71 IN | WEIGHT: 145.75 LBS | HEART RATE: 85 BPM | OXYGEN SATURATION: 92 % | BODY MASS INDEX: 20.4 KG/M2 | DIASTOLIC BLOOD PRESSURE: 62 MMHG | SYSTOLIC BLOOD PRESSURE: 138 MMHG | TEMPERATURE: 96 F

## 2022-09-26 DIAGNOSIS — G40.909 SEIZURE DISORDER: ICD-10-CM

## 2022-09-26 DIAGNOSIS — Z79.899 ENCOUNTER FOR LONG-TERM (CURRENT) USE OF MEDICATIONS: ICD-10-CM

## 2022-09-26 DIAGNOSIS — F17.200 SMOKER: ICD-10-CM

## 2022-09-26 DIAGNOSIS — J44.9 CHRONIC OBSTRUCTIVE PULMONARY DISEASE, UNSPECIFIED COPD TYPE: ICD-10-CM

## 2022-09-26 DIAGNOSIS — I25.118 CORONARY ARTERY DISEASE OF NATIVE ARTERY OF NATIVE HEART WITH STABLE ANGINA PECTORIS: ICD-10-CM

## 2022-09-26 DIAGNOSIS — I10 ESSENTIAL HYPERTENSION: ICD-10-CM

## 2022-09-26 DIAGNOSIS — F32.A DEPRESSION, UNSPECIFIED DEPRESSION TYPE: Primary | ICD-10-CM

## 2022-09-26 LAB
ALBUMIN SERPL BCP-MCNC: 4 G/DL (ref 3.5–5.2)
ALP SERPL-CCNC: 41 U/L (ref 55–135)
ALT SERPL W/O P-5'-P-CCNC: 24 U/L (ref 10–44)
ANION GAP SERPL CALC-SCNC: 9 MMOL/L (ref 8–16)
AST SERPL-CCNC: 26 U/L (ref 10–40)
BASOPHILS # BLD AUTO: 0.06 K/UL (ref 0–0.2)
BASOPHILS NFR BLD: 0.9 % (ref 0–1.9)
BILIRUB SERPL-MCNC: 0.4 MG/DL (ref 0.1–1)
BUN SERPL-MCNC: 14 MG/DL (ref 8–23)
CALCIUM SERPL-MCNC: 9.2 MG/DL (ref 8.7–10.5)
CHLORIDE SERPL-SCNC: 105 MMOL/L (ref 95–110)
CHOLEST SERPL-MCNC: 159 MG/DL (ref 120–199)
CHOLEST/HDLC SERPL: 3.3 {RATIO} (ref 2–5)
CO2 SERPL-SCNC: 26 MMOL/L (ref 23–29)
CREAT SERPL-MCNC: 0.7 MG/DL (ref 0.5–1.4)
DIFFERENTIAL METHOD: ABNORMAL
EOSINOPHIL # BLD AUTO: 0.5 K/UL (ref 0–0.5)
EOSINOPHIL NFR BLD: 8.2 % (ref 0–8)
ERYTHROCYTE [DISTWIDTH] IN BLOOD BY AUTOMATED COUNT: 13.2 % (ref 11.5–14.5)
EST. GFR  (NO RACE VARIABLE): >60 ML/MIN/1.73 M^2
ESTIMATED AVG GLUCOSE: 111 MG/DL (ref 68–131)
GLUCOSE SERPL-MCNC: 103 MG/DL (ref 70–110)
HBA1C MFR BLD: 5.5 % (ref 4–5.6)
HCT VFR BLD AUTO: 39 % (ref 40–54)
HDLC SERPL-MCNC: 48 MG/DL (ref 40–75)
HDLC SERPL: 30.2 % (ref 20–50)
HGB BLD-MCNC: 12.7 G/DL (ref 14–18)
IMM GRANULOCYTES # BLD AUTO: 0.02 K/UL (ref 0–0.04)
IMM GRANULOCYTES NFR BLD AUTO: 0.3 % (ref 0–0.5)
LDLC SERPL CALC-MCNC: 91.4 MG/DL (ref 63–159)
LYMPHOCYTES # BLD AUTO: 2.4 K/UL (ref 1–4.8)
LYMPHOCYTES NFR BLD: 37.6 % (ref 18–48)
MCH RBC QN AUTO: 30.4 PG (ref 27–31)
MCHC RBC AUTO-ENTMCNC: 32.6 G/DL (ref 32–36)
MCV RBC AUTO: 93 FL (ref 82–98)
MONOCYTES # BLD AUTO: 0.5 K/UL (ref 0.3–1)
MONOCYTES NFR BLD: 7.5 % (ref 4–15)
NEUTROPHILS # BLD AUTO: 2.9 K/UL (ref 1.8–7.7)
NEUTROPHILS NFR BLD: 45.5 % (ref 38–73)
NONHDLC SERPL-MCNC: 111 MG/DL
NRBC BLD-RTO: 0 /100 WBC
PLATELET # BLD AUTO: 250 K/UL (ref 150–450)
PMV BLD AUTO: 10.8 FL (ref 9.2–12.9)
POTASSIUM SERPL-SCNC: 4.7 MMOL/L (ref 3.5–5.1)
PROT SERPL-MCNC: 6.5 G/DL (ref 6–8.4)
RBC # BLD AUTO: 4.18 M/UL (ref 4.6–6.2)
SODIUM SERPL-SCNC: 140 MMOL/L (ref 136–145)
TRIGL SERPL-MCNC: 98 MG/DL (ref 30–150)
TSH SERPL DL<=0.005 MIU/L-ACNC: 1.46 UIU/ML (ref 0.4–4)
WBC # BLD AUTO: 6.43 K/UL (ref 3.9–12.7)

## 2022-09-26 PROCEDURE — 99999 PR PBB SHADOW E&M-EST. PATIENT-LVL IV: CPT | Mod: PBBFAC,,, | Performed by: FAMILY MEDICINE

## 2022-09-26 PROCEDURE — 99214 OFFICE O/P EST MOD 30 MIN: CPT | Mod: S$PBB,,, | Performed by: FAMILY MEDICINE

## 2022-09-26 PROCEDURE — 83036 HEMOGLOBIN GLYCOSYLATED A1C: CPT | Performed by: FAMILY MEDICINE

## 2022-09-26 PROCEDURE — 99214 PR OFFICE/OUTPT VISIT, EST, LEVL IV, 30-39 MIN: ICD-10-PCS | Mod: S$PBB,,, | Performed by: FAMILY MEDICINE

## 2022-09-26 PROCEDURE — G0008 ADMIN INFLUENZA VIRUS VAC: HCPCS | Mod: PBBFAC

## 2022-09-26 PROCEDURE — 80053 COMPREHEN METABOLIC PANEL: CPT | Performed by: FAMILY MEDICINE

## 2022-09-26 PROCEDURE — 80061 LIPID PANEL: CPT | Performed by: FAMILY MEDICINE

## 2022-09-26 PROCEDURE — 36415 COLL VENOUS BLD VENIPUNCTURE: CPT | Performed by: FAMILY MEDICINE

## 2022-09-26 PROCEDURE — 85025 COMPLETE CBC W/AUTO DIFF WBC: CPT | Performed by: FAMILY MEDICINE

## 2022-09-26 PROCEDURE — 84443 ASSAY THYROID STIM HORMONE: CPT | Performed by: FAMILY MEDICINE

## 2022-09-26 PROCEDURE — 99214 OFFICE O/P EST MOD 30 MIN: CPT | Mod: PBBFAC,25 | Performed by: FAMILY MEDICINE

## 2022-09-26 PROCEDURE — 99999 PR PBB SHADOW E&M-EST. PATIENT-LVL IV: ICD-10-PCS | Mod: PBBFAC,,, | Performed by: FAMILY MEDICINE

## 2022-09-26 NOTE — PROGRESS NOTES
Subjective:       Patient ID: Humberto Carlson is a 77 y.o. male.    Chief Complaint: Follow-up    HPI    Patient Active Problem List   Diagnosis    Hyperlipidemia    Essential hypertension    History of anaphylaxis    Elevated blood sugar    Seizure disorder    Screening for prostate cancer    Pre-diabetes    Weight loss    Dyspnea    Chronic bilateral low back pain    Acute rhinitis    History of subdural hematoma    Coronary artery disease of native artery of native heart with stable angina pectoris    Chronic systolic congestive heart failure    Smoker    Nonrheumatic aortic valve stenosis    Aortic calcification    Bilateral carotid artery stenosis    Chronic obstructive pulmonary disease    Major depression in partial remission    Hx of cancer of lung       Past Medical History:   Diagnosis Date    Acute bronchitis 12/2/2019    Asthma     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    Cancer     Cataract     Chronic obstructive pulmonary disease 5/24/2021    Coronary artery disease of native artery of native heart with stable angina pectoris 10/26/2020    Encounter for blood transfusion     Essential hypertension 7/22/2019    GERD (gastroesophageal reflux disease)     Glaucoma     Hyperlipidemia     Major depression in partial remission 5/2/2022    Seizures        Past Surgical History:   Procedure Laterality Date    BRAIN SURGERY      EYE SURGERY      LUNG SURGERY      Partial lung removed Left        Family History   Problem Relation Age of Onset    Heart attack Father     Coronary artery disease Brother     Valvular heart disease Brother        Social History     Tobacco Use   Smoking Status Every Day    Packs/day: 2.00    Types: Cigarettes   Smokeless Tobacco Never       Wt Readings from Last 5 Encounters:   09/26/22 66.1 kg (145 lb 11.6 oz)   05/02/22 66.1 kg (145 lb 11.6 oz)   04/11/22 62.6 kg (138 lb)   04/11/22 62.6 kg (138 lb)   04/06/22 62.9 kg (138 lb 9 oz)       For  "further HPI details, see assessment and plan.    Review of Systems    Objective:      Vitals:    09/26/22 0810   BP: 138/62   Pulse: 85   Temp: 96.2 °F (35.7 °C)       Physical Exam  Constitutional:       General: He is not in acute distress.     Appearance: He is not ill-appearing.   Pulmonary:      Effort: Pulmonary effort is normal. No respiratory distress.   Neurological:      General: No focal deficit present.      Mental Status: He is alert.   Psychiatric:         Mood and Affect: Mood normal.         Behavior: Behavior normal.       Assessment:       1. Depression, unspecified depression type    2. Smoker    3. Essential hypertension    4. Coronary artery disease of native artery of native heart with stable angina pectoris    5. Seizure disorder    6. Chronic obstructive pulmonary disease, unspecified COPD type    7. Encounter for long-term (current) use of medications        Plan:   Depression, unspecified depression type    Smoker    Essential hypertension    Coronary artery disease of native artery of native heart with stable angina pectoris    Seizure disorder    Chronic obstructive pulmonary disease, unspecified COPD type    Encounter for long-term (current) use of medications  -     CBC Auto Differential; Future; Expected date: 09/26/2022  -     Comprehensive Metabolic Panel; Future; Expected date: 09/26/2022  -     Hemoglobin A1C; Future; Expected date: 09/26/2022  -     Lipid Panel; Future; Expected date: 09/26/2022  -     TSH; Future; Expected date: 09/26/2022      COPD  " About like it was"  Implies stable  Albuterol inhaler - not daily - but prn        HTN  BP Readings from Last 3 Encounters:   09/26/22 138/62   05/02/22 124/68   04/11/22 132/78     Controlled  Continue meds  Spirolactone  Entresto  Metorpolol  All last prescribed by cards    HLD  Lab Results   Component Value Date    LDLCALC 88.8 01/25/2021     Lab Results   Component Value Date    ALT 16 09/30/2021    AST 21 09/30/2021    ALKPHOS " 59 09/30/2021    BILITOT 0.3 09/30/2021     On statin  Lipitor 40  Will update labs    Allergic rhinitis  Uses nasal spray as needed    GERD  Still on PPI  Not having any heartburn issues  Will have him stop the PPI as doesn't sound problematic and if can avoid med use I want to.        Seizure history  Working with neurology  His annual appt w/ them is due  Will ensure gets arranged  Has had no seizures  He is taking his medication.      Weight  Wt Readings from Last 5 Encounters:   09/26/22 66.1 kg (145 lb 11.6 oz)   05/02/22 66.1 kg (145 lb 11.6 oz)   04/11/22 62.6 kg (138 lb)   04/11/22 62.6 kg (138 lb)   04/06/22 62.9 kg (138 lb 9 oz)   Glad to see weight is stable      Glucose  Lab Results   Component Value Date    HGBA1C 5.5 02/25/2020     Needs updating      Anemia  Lab Results   Component Value Date    WBC 7.51 09/30/2021    HGB 12.3 (L) 09/30/2021    HCT 36.7 (L) 09/30/2021    MCV 91 09/30/2021     09/30/2021   Needs updating      CAD  Carotid Artery stenosis  On asa  On statin    CHF  Stable      Smoking  Encouraged cessation      Mental health/ h/o derpession  On zoloft  Reports stable.  Continue med  Issue w/ daughter ongoing  Continue anti-depressant - zoloft      6 month

## 2022-09-30 ENCOUNTER — EXTERNAL CHRONIC CARE MANAGEMENT (OUTPATIENT)
Dept: PRIMARY CARE CLINIC | Facility: CLINIC | Age: 77
End: 2022-09-30
Payer: MEDICARE

## 2022-09-30 PROCEDURE — 99490 CHRNC CARE MGMT STAFF 1ST 20: CPT | Mod: S$PBB,,, | Performed by: FAMILY MEDICINE

## 2022-09-30 PROCEDURE — 99439 CHRNC CARE MGMT STAF EA ADDL: CPT | Mod: PBBFAC | Performed by: FAMILY MEDICINE

## 2022-09-30 PROCEDURE — 99490 CHRNC CARE MGMT STAFF 1ST 20: CPT | Mod: PBBFAC | Performed by: FAMILY MEDICINE

## 2022-09-30 PROCEDURE — 99490 PR CHRONIC CARE MGMT, 1ST 20 MIN: ICD-10-PCS | Mod: S$PBB,,, | Performed by: FAMILY MEDICINE

## 2022-09-30 PROCEDURE — 99439 PR CHRONIC CARE MGMT, EA ADDTL 20 MIN: ICD-10-PCS | Mod: S$PBB,,, | Performed by: FAMILY MEDICINE

## 2022-09-30 PROCEDURE — 99439 CHRNC CARE MGMT STAF EA ADDL: CPT | Mod: S$PBB,,, | Performed by: FAMILY MEDICINE

## 2022-10-12 ENCOUNTER — OFFICE VISIT (OUTPATIENT)
Dept: CARDIOLOGY | Facility: CLINIC | Age: 77
End: 2022-10-12
Payer: MEDICARE

## 2022-10-12 VITALS
BODY MASS INDEX: 19.26 KG/M2 | OXYGEN SATURATION: 95 % | DIASTOLIC BLOOD PRESSURE: 71 MMHG | WEIGHT: 137.56 LBS | HEIGHT: 71 IN | RESPIRATION RATE: 18 BRPM | HEART RATE: 71 BPM | SYSTOLIC BLOOD PRESSURE: 147 MMHG

## 2022-10-12 DIAGNOSIS — I10 ESSENTIAL HYPERTENSION: ICD-10-CM

## 2022-10-12 DIAGNOSIS — F17.200 SMOKER: ICD-10-CM

## 2022-10-12 DIAGNOSIS — I35.0 NONRHEUMATIC AORTIC VALVE STENOSIS: ICD-10-CM

## 2022-10-12 DIAGNOSIS — I65.23 BILATERAL CAROTID ARTERY STENOSIS: ICD-10-CM

## 2022-10-12 DIAGNOSIS — I25.118 CORONARY ARTERY DISEASE OF NATIVE ARTERY OF NATIVE HEART WITH STABLE ANGINA PECTORIS: Primary | ICD-10-CM

## 2022-10-12 DIAGNOSIS — E78.2 MIXED HYPERLIPIDEMIA: ICD-10-CM

## 2022-10-12 PROCEDURE — 99999 PR PBB SHADOW E&M-EST. PATIENT-LVL IV: CPT | Mod: PBBFAC,,, | Performed by: INTERNAL MEDICINE

## 2022-10-12 PROCEDURE — 99999 PR PBB SHADOW E&M-EST. PATIENT-LVL IV: ICD-10-PCS | Mod: PBBFAC,,, | Performed by: INTERNAL MEDICINE

## 2022-10-12 PROCEDURE — 99214 OFFICE O/P EST MOD 30 MIN: CPT | Mod: PBBFAC,PO | Performed by: INTERNAL MEDICINE

## 2022-10-12 PROCEDURE — 99215 OFFICE O/P EST HI 40 MIN: CPT | Mod: S$PBB,,, | Performed by: INTERNAL MEDICINE

## 2022-10-12 PROCEDURE — 99215 PR OFFICE/OUTPT VISIT, EST, LEVL V, 40-54 MIN: ICD-10-PCS | Mod: S$PBB,,, | Performed by: INTERNAL MEDICINE

## 2022-10-12 NOTE — PROGRESS NOTES
Subjective:   Patient ID:  Humberto Carlson is a 77 y.o. male who presents for follow up of 6 month follow up      78 yo male f/u for 6 months f/u  PMH CAD old MI  CHFrEF 35%, mild AS, lung cancer s/p removal of left lung in 2015, h/o fall two months after the procedure and brain bleeding s/p removal, HTN HLD smoker 1 ppd for 60 yrs, occasional drinking  Part time work.  ECHO EF 40% mild AS and apical HK  EKG NSR old septal infarct   MPI showed moderate sized severally fixed defect perfusion in apex, EF 35%  Carotid US 20% right and 50% left     Occasional BECKER  No chest pain, palpitation, orthopnea and leg swelling  Dizziness if looks up    06/2021 visit  No chest pain dizziness palpitation  Chronic BECKER after the lung procedure  Med compliance  EKG NASR HR 52 bpm, nonspecific STT change     visit  BP log reviewed. Occasional dizziness if looking up.  No chest pain weight control, leg swelling, palpitation. BECKER stable and the inhaler helps. 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 Chronic SOB due to mask and smoking.  Sleeps on 1 pillow and no leg swelling  EKG NSR and old septal infarct  BP checked regularly and controlled. Physically active at home    Interval history  Still smoking 1ppd. And chronic SOB and fatigue.  BP controlled at home and not taking the med yet.   No chest pain dizziness faint palpitation and leg swelling   echo EF 40 to 45% and mild AS; carotid US left 50 to 59% and right < 50%      Past Medical History:   Diagnosis Date    Acute bronchitis 12/2/2019    Asthma     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    Cancer     Cataract     Chronic obstructive pulmonary disease 5/24/2021    Coronary artery disease of native artery of native heart with stable angina pectoris 10/26/2020    Encounter for blood transfusion     Essential hypertension 7/22/2019    GERD  (gastroesophageal reflux disease)     Glaucoma     Hyperlipidemia     Major depression in partial remission 5/2/2022    Seizures        Past Surgical History:   Procedure Laterality Date    BRAIN SURGERY      EYE SURGERY      LUNG SURGERY      Partial lung removed Left        Social History     Tobacco Use    Smoking status: Every Day     Packs/day: 2.00     Types: Cigarettes    Smokeless tobacco: Never   Substance Use Topics    Alcohol use: No    Drug use: No       Family History   Problem Relation Age of Onset    Heart attack Father     Coronary artery disease Brother     Valvular heart disease Brother          Review of Systems   Constitutional: Positive for weight loss. Negative for decreased appetite, diaphoresis, fever, malaise/fatigue and night sweats.   HENT:  Negative for nosebleeds.    Eyes:  Negative for blurred vision and double vision.   Cardiovascular:  Positive for dyspnea on exertion. Negative for chest pain, claudication, irregular heartbeat, leg swelling, near-syncope, orthopnea, palpitations, paroxysmal nocturnal dyspnea and syncope.   Respiratory:  Positive for cough and shortness of breath. Negative for sleep disturbances due to breathing, snoring, sputum production and wheezing.    Endocrine: Negative for cold intolerance and polyuria.   Hematologic/Lymphatic: Does not bruise/bleed easily.   Skin:  Negative for rash.   Musculoskeletal:  Positive for arthritis and back pain. Negative for falls, joint pain, joint swelling and neck pain.   Gastrointestinal:  Negative for abdominal pain, heartburn, nausea and vomiting.   Genitourinary:  Negative for dysuria, frequency and hematuria.   Neurological:  Negative for difficulty with concentration, dizziness, focal weakness, headaches, light-headedness, numbness, seizures and weakness.   Psychiatric/Behavioral:  Negative for depression, memory loss and substance abuse. The patient does not have insomnia.    Allergic/Immunologic: Negative for HIV exposure  and hives.     Objective:   Physical Exam  HENT:      Head: Normocephalic.   Eyes:      Pupils: Pupils are equal, round, and reactive to light.   Neck:      Thyroid: No thyromegaly.      Vascular: Normal carotid pulses. No carotid bruit or JVD.   Cardiovascular:      Rate and Rhythm: Normal rate and regular rhythm. No extrasystoles are present.     Chest Wall: PMI is not displaced.      Pulses: Normal pulses.           Carotid pulses are  on the right side with bruit and  on the left side with bruit.     Heart sounds: Murmur (ESM on bases and radiating up to the neck ) heard.     No gallop. No S3 sounds.   Pulmonary:      Effort: No respiratory distress.      Breath sounds: No stridor. Examination of the left-middle field reveals decreased breath sounds. Examination of the left-lower field reveals decreased breath sounds. Decreased breath sounds present.   Abdominal:      General: Bowel sounds are normal.      Palpations: Abdomen is soft.      Tenderness: There is no abdominal tenderness. There is no rebound.   Musculoskeletal:         General: Normal range of motion.   Skin:     Findings: No rash.   Neurological:      Mental Status: He is alert and oriented to person, place, and time.   Psychiatric:         Behavior: Behavior normal.     Lab Results   Component Value Date    CHOL 159 09/26/2022    CHOL 174 01/25/2021    CHOL 187 02/25/2020     Lab Results   Component Value Date    HDL 48 09/26/2022    HDL 62 01/25/2021    HDL 49 02/25/2020     Lab Results   Component Value Date    LDLCALC 91.4 09/26/2022    LDLCALC 88.8 01/25/2021    LDLCALC 110.4 02/25/2020     Lab Results   Component Value Date    TRIG 98 09/26/2022    TRIG 116 01/25/2021    TRIG 138 02/25/2020     Lab Results   Component Value Date    CHOLHDL 30.2 09/26/2022    CHOLHDL 35.6 01/25/2021    CHOLHDL 26.2 02/25/2020       Chemistry        Component Value Date/Time     09/26/2022 0924    K 4.7 09/26/2022 0924     09/26/2022 0924    CO2 26  09/26/2022 0924    BUN 14 09/26/2022 0924    CREATININE 0.7 09/26/2022 0924     09/26/2022 0924        Component Value Date/Time    CALCIUM 9.2 09/26/2022 0924    ALKPHOS 41 (L) 09/26/2022 0924    AST 26 09/26/2022 0924    ALT 24 09/26/2022 0924    BILITOT 0.4 09/26/2022 0924    ESTGFRAFRICA >60.0 09/30/2021 1130    EGFRNONAA >60.0 09/30/2021 1130          Lab Results   Component Value Date    HGBA1C 5.5 09/26/2022     Lab Results   Component Value Date    TSH 1.455 09/26/2022     No results found for: INR, PROTIME  Lab Results   Component Value Date    WBC 6.43 09/26/2022    HGB 12.7 (L) 09/26/2022    HCT 39.0 (L) 09/26/2022    MCV 93 09/26/2022     09/26/2022     BMP  Sodium   Date Value Ref Range Status   09/26/2022 140 136 - 145 mmol/L Final     Potassium   Date Value Ref Range Status   09/26/2022 4.7 3.5 - 5.1 mmol/L Final     Chloride   Date Value Ref Range Status   09/26/2022 105 95 - 110 mmol/L Final     CO2   Date Value Ref Range Status   09/26/2022 26 23 - 29 mmol/L Final     BUN   Date Value Ref Range Status   09/26/2022 14 8 - 23 mg/dL Final     Creatinine   Date Value Ref Range Status   09/26/2022 0.7 0.5 - 1.4 mg/dL Final     Calcium   Date Value Ref Range Status   09/26/2022 9.2 8.7 - 10.5 mg/dL Final     Anion Gap   Date Value Ref Range Status   09/26/2022 9 8 - 16 mmol/L Final     eGFR if    Date Value Ref Range Status   09/30/2021 >60.0 >60 mL/min/1.73 m^2 Final     eGFR if non    Date Value Ref Range Status   09/30/2021 >60.0 >60 mL/min/1.73 m^2 Final     Comment:     Calculation used to obtain the estimated glomerular filtration  rate (eGFR) is the CKD-EPI equation.        BNP  @LABRCNTIP(BNP,BNPTRIAGEBLO)@  @LABRCNTIP(troponini)@  CrCl cannot be calculated (Patient's most recent lab result is older than the maximum 7 days allowed.).  No results found in the last 24 hours.  No results found in the last 24 hours.  No results found in the last 24  hours.    Assessment:      1. Coronary artery disease of native artery of native heart with stable angina pectoris    2. Essential hypertension    3. Mixed hyperlipidemia    4. Nonrheumatic aortic valve stenosis    5. Bilateral carotid artery stenosis    6. Smoker        Plan:     Continue ASA Lipitor entrestor ToprolXl Aldactone  Smoking cessation    Counseled DASH  Check Lipid profile in 6 months  Recommend heart-healthy diet, weight control and regular exercise.  Arvind. Risk modification.   I have reviewed all pertinent labs and cardiac studies independently. Plans and recommendations have been formulated under my direct supervision. All questions answered and patient voiced understanding.   If symptoms persist go to the ED  RTC in 6 months

## 2022-10-31 ENCOUNTER — EXTERNAL CHRONIC CARE MANAGEMENT (OUTPATIENT)
Dept: PRIMARY CARE CLINIC | Facility: CLINIC | Age: 77
End: 2022-10-31
Payer: MEDICARE

## 2022-10-31 PROCEDURE — 99439 PR CHRONIC CARE MGMT, EA ADDTL 20 MIN: ICD-10-PCS | Mod: S$PBB,,, | Performed by: FAMILY MEDICINE

## 2022-10-31 PROCEDURE — 99490 CHRNC CARE MGMT STAFF 1ST 20: CPT | Mod: S$PBB,,, | Performed by: FAMILY MEDICINE

## 2022-10-31 PROCEDURE — 99439 CHRNC CARE MGMT STAF EA ADDL: CPT | Mod: PBBFAC | Performed by: FAMILY MEDICINE

## 2022-10-31 PROCEDURE — 99490 CHRNC CARE MGMT STAFF 1ST 20: CPT | Mod: PBBFAC | Performed by: FAMILY MEDICINE

## 2022-10-31 PROCEDURE — 99490 PR CHRONIC CARE MGMT, 1ST 20 MIN: ICD-10-PCS | Mod: S$PBB,,, | Performed by: FAMILY MEDICINE

## 2022-10-31 PROCEDURE — 99439 CHRNC CARE MGMT STAF EA ADDL: CPT | Mod: S$PBB,,, | Performed by: FAMILY MEDICINE

## 2022-11-21 RX ORDER — SERTRALINE HYDROCHLORIDE 50 MG/1
50 TABLET, FILM COATED ORAL DAILY
Qty: 90 TABLET | Refills: 3 | Status: SHIPPED | OUTPATIENT
Start: 2022-11-21 | End: 2023-09-27 | Stop reason: SDUPTHER

## 2022-11-21 NOTE — TELEPHONE ENCOUNTER
No new care gaps identified.  [facility] Embedded Care Gaps. Reference number: [id number]. 11/21/2022   10:03:39 AM CST

## 2022-11-21 NOTE — TELEPHONE ENCOUNTER
Refill Decision Note   Humberto Carlson  is requesting a refill authorization.  Brief Assessment and Rationale for Refill:  Approve     Medication Therapy Plan:       Medication Reconciliation Completed: No   Comments:     No Care Gaps recommended.     Note composed:10:07 AM 11/21/2022

## 2022-11-30 ENCOUNTER — EXTERNAL CHRONIC CARE MANAGEMENT (OUTPATIENT)
Dept: PRIMARY CARE CLINIC | Facility: CLINIC | Age: 77
End: 2022-11-30
Payer: MEDICARE

## 2022-11-30 PROCEDURE — 99490 PR CHRONIC CARE MGMT, 1ST 20 MIN: ICD-10-PCS | Mod: S$PBB,,, | Performed by: FAMILY MEDICINE

## 2022-11-30 PROCEDURE — 99490 CHRNC CARE MGMT STAFF 1ST 20: CPT | Mod: S$PBB,,, | Performed by: FAMILY MEDICINE

## 2022-11-30 PROCEDURE — 99490 CHRNC CARE MGMT STAFF 1ST 20: CPT | Mod: PBBFAC | Performed by: FAMILY MEDICINE

## 2022-12-20 DIAGNOSIS — G40.409 GRAND MAL EPILEPSY, CONTROLLED: ICD-10-CM

## 2022-12-20 RX ORDER — PHENYTOIN SODIUM 100 MG/1
CAPSULE, EXTENDED RELEASE ORAL
Qty: 270 CAPSULE | Refills: 0 | OUTPATIENT
Start: 2022-12-20

## 2022-12-20 NOTE — TELEPHONE ENCOUNTER
Spoke with pt. Advised pt that provider has to see pt in order to refill RX. Pt has been scheduled with BHARGAV Connors on 01/06/2023 @ 9:30 am.     Pt would like to have RX before appt date in case he has SZ. Please advise.     Pharmacy: [facility]

## 2022-12-20 NOTE — TELEPHONE ENCOUNTER
----- Message from Kristal Benjamin sent at 12/20/2022  9:41 AM CST -----  Contact: camryn  Patient is calling to speak with the nurse regarding medication. Reports not able to get a refill on the medication phenytoin (DILANTIN) 100 MG ER capsule and stats he will be short and needs meds for seizures. Please give the patient a call back at .[phone]   Thanks dylan

## 2022-12-22 ENCOUNTER — TELEPHONE (OUTPATIENT)
Dept: ADMINISTRATIVE | Facility: CLINIC | Age: 77
End: 2022-12-22
Payer: MEDICARE

## 2022-12-22 NOTE — TELEPHONE ENCOUNTER
Spoke with Raquel with [facility].  Informed her the patient will run out of Dilantin before his appointment with Neurology on Dec 28th. Would like to get enough to last till his appointment to get his new prescription since MD was not able to prescribe since he has not seen him and the BHARGAV Quintero is not available until new year. He only needs two days worth, Raquel asked patient to bring his pill bottle and they would accommodate since patient knows if he misses any doses he will have a seizure. Thank Raquel and will call patient, melanie.

## 2022-12-22 NOTE — TELEPHONE ENCOUNTER
Informed patient that Raquel at [facility] will accommodate the necessary pills so he does not miss a dose until he sees his physician, he verbalized understanding, melanie.

## 2022-12-22 NOTE — TELEPHONE ENCOUNTER
Spoke with patient, states he will run out of Dilantin before his appointment with Neurology on Dec 28th. Would like to get enough to last till his appointment to get his new prescription, will forward the message to Staff and previous NP,which I am not sure is available. Patient has a history of seizures when he is off his Medication, he verbalized understanding, melanie.

## 2022-12-23 ENCOUNTER — TELEPHONE (OUTPATIENT)
Dept: ADMINISTRATIVE | Facility: CLINIC | Age: 77
End: 2022-12-23
Payer: MEDICARE

## 2022-12-23 NOTE — TELEPHONE ENCOUNTER
Spoke with patient to ensure he was able to go to pharmacy to get his medication to bridge the gap before his appt, he was able to receive medication for 3 days. Will check to ensure pt arrives for his appt on Wednesday De. 28th, mss.

## 2022-12-28 ENCOUNTER — OFFICE VISIT (OUTPATIENT)
Dept: NEUROLOGY | Facility: CLINIC | Age: 77
End: 2022-12-28
Payer: MEDICARE

## 2022-12-28 ENCOUNTER — TELEPHONE (OUTPATIENT)
Dept: NEUROLOGY | Facility: CLINIC | Age: 77
End: 2022-12-28
Payer: MEDICARE

## 2022-12-28 DIAGNOSIS — G40.909 SEIZURE DISORDER: ICD-10-CM

## 2022-12-28 PROCEDURE — 99211 OFF/OP EST MAY X REQ PHY/QHP: CPT | Mod: S$PBB,,, | Performed by: PSYCHIATRY & NEUROLOGY

## 2022-12-28 PROCEDURE — 99211 PR OFFICE/OUTPT VISIT, EST, LEVL I: ICD-10-PCS | Mod: S$PBB,,, | Performed by: PSYCHIATRY & NEUROLOGY

## 2022-12-28 NOTE — TELEPHONE ENCOUNTER
Pt arrived late to appt with Dr. Ramirez and missed it on 12/28/2022. Pt is scheduled with BHARGAV Connors on 01/06/2023. Pt states he is out of Dilantin RX. Pt wants to know if RX can be sent to pharmacy. Please advise.     LOV 09/29/2021

## 2022-12-29 ENCOUNTER — OFFICE VISIT (OUTPATIENT)
Dept: NEUROLOGY | Facility: CLINIC | Age: 77
End: 2022-12-29
Payer: MEDICARE

## 2022-12-29 ENCOUNTER — LAB VISIT (OUTPATIENT)
Dept: LAB | Facility: HOSPITAL | Age: 77
End: 2022-12-29
Attending: NURSE PRACTITIONER
Payer: MEDICARE

## 2022-12-29 VITALS
RESPIRATION RATE: 16 BRPM | SYSTOLIC BLOOD PRESSURE: 117 MMHG | DIASTOLIC BLOOD PRESSURE: 66 MMHG | HEART RATE: 66 BPM | BODY MASS INDEX: 19.66 KG/M2 | HEIGHT: 71 IN | WEIGHT: 140.44 LBS

## 2022-12-29 DIAGNOSIS — G40.409 GRAND MAL EPILEPSY, CONTROLLED: ICD-10-CM

## 2022-12-29 DIAGNOSIS — G40.909 SEIZURE DISORDER: ICD-10-CM

## 2022-12-29 DIAGNOSIS — G40.909 SEIZURE DISORDER: Primary | ICD-10-CM

## 2022-12-29 DIAGNOSIS — S41.119A SKIN TEAR OF UPPER EXTREMITY: ICD-10-CM

## 2022-12-29 DIAGNOSIS — T14.8XXA OPEN WOUND OF SKIN: ICD-10-CM

## 2022-12-29 PROCEDURE — 99999 PR PBB SHADOW E&M-EST. PATIENT-LVL IV: ICD-10-PCS | Mod: PBBFAC,,, | Performed by: NURSE PRACTITIONER

## 2022-12-29 PROCEDURE — 99214 PR OFFICE/OUTPT VISIT, EST, LEVL IV, 30-39 MIN: ICD-10-PCS | Mod: S$PBB,,, | Performed by: NURSE PRACTITIONER

## 2022-12-29 PROCEDURE — 99214 OFFICE O/P EST MOD 30 MIN: CPT | Mod: S$PBB,,, | Performed by: NURSE PRACTITIONER

## 2022-12-29 PROCEDURE — 99999 PR PBB SHADOW E&M-EST. PATIENT-LVL IV: CPT | Mod: PBBFAC,,, | Performed by: NURSE PRACTITIONER

## 2022-12-29 PROCEDURE — 36415 COLL VENOUS BLD VENIPUNCTURE: CPT | Performed by: NURSE PRACTITIONER

## 2022-12-29 PROCEDURE — 99214 OFFICE O/P EST MOD 30 MIN: CPT | Mod: PBBFAC | Performed by: NURSE PRACTITIONER

## 2022-12-29 RX ORDER — PHENYTOIN SODIUM 100 MG/1
300 CAPSULE, EXTENDED RELEASE ORAL DAILY
Qty: 270 CAPSULE | Refills: 3 | Status: SHIPPED | OUTPATIENT
Start: 2022-12-29 | End: 2023-12-21

## 2022-12-29 NOTE — PROGRESS NOTES
Subjective:       Patient ID: Humberto Carlson is a 77 y.o. male.    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 Cardoso 15 years ago (2005) after sustaining a GTC with no aura. He was placed on  mg QD. Has done well on it and was seizure-free till 10 years ago (2010) when he ran of PHT for 1 week and had a Seizure that was not GTC but cause staring and ROSY (Complex Partial). Since then he has not had any seizure. Denies SEs. In 2015, he sustained a traumatic LT SDH S/P evacuation with excellent recovery. We do not have any records and the information is based on the history. 9-: Patient is established with Dr. Morataya, new to me. Has had a total of 2 seizures, last in 2010; well controlled. Tolerating  mg QD without adverse effects. 10- brain MRI unremarkable. 12-9-2020 EEG normal.    INTERVAL NOTE 12-: Patient is new to me but established with Dr. Morataya and last seen by Homa Quintero NP. Patient is new to me.Reports his last seizure was in 2010; and it was due to him missing medication, Patient seizures are very well controlled on current therapy. Tolerating  mg QD no side effects. Refills requested.     Patient reports falling last week and sustain skin tear to LIDA, patient has dry gauze dressing in place outer dressing clean dry intact. Patient reports he has ointment applied to skin. He request referral to have evaluation of open skin wound and treatment. Will notify PCP for appointment.       Review of Systems   Constitutional:  Negative for appetite change and fatigue.   HENT:  Positive for hearing loss. Negative for tinnitus.    Eyes:  Negative for photophobia and visual disturbance.   Respiratory:  Negative for apnea and shortness of breath.    Cardiovascular:  Negative for chest pain and palpitations.   Gastrointestinal:  Negative for nausea  and vomiting.   Endocrine: Negative for cold intolerance and heat intolerance.   Genitourinary:  Negative for difficulty urinating and urgency.   Musculoskeletal:  Positive for arthralgias and back pain. Negative for gait problem, joint swelling, myalgias, neck pain and neck stiffness.   Skin:  Positive for wound. Negative for color change and rash.        Skin tear to RT arm dressing clean dry intact    Allergic/Immunologic: Negative for environmental allergies and immunocompromised state.   Neurological:  Positive for seizures. Negative for dizziness, tremors, syncope, facial asymmetry, speech difficulty, weakness, light-headedness, numbness and headaches.   Hematological:  Negative for adenopathy. Does not bruise/bleed easily.   Psychiatric/Behavioral:  Negative for agitation, behavioral problems, confusion, decreased concentration, dysphoric mood, hallucinations, self-injury, sleep disturbance and suicidal ideas. The patient is not hyperactive.            Current Outpatient Medications:     albuterol (PROVENTIL/VENTOLIN HFA) 90 mcg/actuation inhaler, 2 PUFFS EVERY 6 HOURS, Disp: 25.5 g, Rfl: 3    aspirin (ECOTRIN) 81 MG EC tablet, Take 1 tablet (81 mg total) by mouth once daily., Disp: , Rfl:     atorvastatin (LIPITOR) 40 MG tablet, Take 1 tablet (40 mg total) by mouth once daily., Disp: 90 tablet, Rfl: 2    brimonidine-timoloL (COMBIGAN) 0.2-0.5 % Drop, Inject 1 drop into the eye Twice daily., Disp: , Rfl:     ENTRESTO 24-26 mg per tablet, TAKE 1 TABLET BY MOUTH TWICE A DAY, Disp: 60 tablet, Rfl: 6    EPINEPHrine (EPIPEN) 0.3 mg/0.3 mL AtIn, USE AS DIRECTED AS NEEDED, Disp: 2 each, Rfl: 0    fluticasone propionate (FLONASE) 50 mcg/actuation nasal spray, 1 SPRAY IN EACH NOSTRIL ONCE DAILY., Disp: 48 g, Rfl: 3    latanoprost 0.005 % ophthalmic solution, , Disp: , Rfl:     metoprolol succinate (TOPROL-XL) 25 MG 24 hr tablet, TAKE (1/2) TABLET BY MOUTH DAILY., Disp: 45 tablet, Rfl: 3    naproxen (NAPROSYN) 500 MG  tablet, Take 1 tablet (500 mg total) by mouth 2 (two) times daily with meals., Disp: 60 tablet, Rfl: 0    sertraline (ZOLOFT) 50 MG tablet, Take 1 tablet (50 mg total) by mouth once daily., Disp: 90 tablet, Rfl: 3    spironolactone (ALDACTONE) 25 MG tablet, TAKE 1 TABLET BY MOUTH ONCE A DAY, Disp: 30 tablet, Rfl: 9    phenytoin (DILANTIN) 100 MG ER capsule, Take 3 capsules (300 mg total) by mouth once daily., Disp: 270 capsule, Rfl: 3  Past Medical History:   Diagnosis Date    Acute bronchitis 12/2/2019    Asthma     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    Cancer     Cataract     Chronic obstructive pulmonary disease 5/24/2021    Coronary artery disease of native artery of native heart with stable angina pectoris 10/26/2020    Encounter for blood transfusion     Essential hypertension 7/22/2019    GERD (gastroesophageal reflux disease)     Glaucoma     Hyperlipidemia     Major depression in partial remission 5/2/2022    Seizures      Past Surgical History:   Procedure Laterality Date    BRAIN SURGERY      EYE SURGERY      LUNG SURGERY      Partial lung removed Left      Social History     Socioeconomic History    Marital status:    Tobacco Use    Smoking status: Every Day     Packs/day: 2.00     Types: Cigarettes    Smokeless tobacco: Never   Substance and Sexual Activity    Alcohol use: No    Drug use: No    Sexual activity: Yes     Partners: Female     Social Determinants of Health     Financial Resource Strain: Low Risk     Difficulty of Paying Living Expenses: Not hard at all   Food Insecurity: No Food Insecurity    Worried About Running Out of Food in the Last Year: Never true    Ran Out of Food in the Last Year: Never true   Transportation Needs: No Transportation Needs    Lack of Transportation (Medical): No    Lack of Transportation (Non-Medical): No   Physical Activity: Inactive    Days of Exercise per Week: 0 days    Minutes of Exercise per Session: 0 min    Stress: No Stress Concern Present    Feeling of Stress : Only a little   Social Connections: Socially Isolated    Frequency of Communication with Friends and Family: More than three times a week    Frequency of Social Gatherings with Friends and Family: More than three times a week    Attends Spiritism Services: Never    Active Member of Clubs or Organizations: No    Attends Club or Organization Meetings: Never    Marital Status:    Housing Stability: Unknown    Unable to Pay for Housing in the Last Year: No    Unstable Housing in the Last Year: No             Past/Current Medical/Surgical History, Past/Current Social History, Past/Current Family History and Past/Current Medications were reviewed in detail.        Objective:           VITAL SIGNS WERE REVIEWED      GENERAL APPEARANCE:     The patient looks comfortable.    Body habitus is normal.     No signs of respiratory distress.    Normal breathing pattern.    No dysmorphic features    Normal eye contact.     GENERAL MEDICAL EXAM:    HEENT:  Head is atraumatic normocephalic. No tender temporal arteries.     Neck and Axillae: No JVD. No visible lesions.    No carotid bruits. No thyromegaly. No lymphadenopathy.    Cardiopulmonary: No cyanosis. No tachypnea. Normal respiratory effort.     Gastrointestinal/Urogenital:  No jaundice. No stomas or lesions. No visible hernias. No catheters.     Skin, Hair and Nails: No pathognonomic skin rash. No neurofibromatosis. No visible lesions. No stigmata of autoimmune disease. No clubbing.    Skin is warm and moist. No palpable masses.    Limbs: No varicose veins. No visible swelling. No palpable edema.       Muskoskeletal: No visible deformities.No visible lesions. No spine tenderness. No signs of longstanding neuropathy. No dislocations or fractures.            Neurologic Exam     Mental Status   Oriented to person, place, and time.   Registration: recalls 3 of 3 objects. Recall at 5 minutes: recalls 3 of 3 objects.  Follows 3 step commands.   Attention: normal. Concentration: normal.   Speech: speech is normal   Level of consciousness: alert  Knowledge: good and consistent with education. Able to perform simple calculations.   Able to name object. Able to read. Able to repeat. Normal comprehension.     Cranial Nerves     CN II   Visual acuity: decreased  Right visual field deficit: none  Left visual field deficit: upper temporal and lower temporal quadrant(s)    CN III, IV, VI   Pupils are equal, round, and reactive to light.  Extraocular motions are normal.   Right pupil: Size: 4 mm. Shape: regular. Reactivity: brisk. Consensual response: intact. Accommodation: intact.   Left pupil: Size: 3 mm. Shape: irregular. Reactivity: sluggish. Consensual response: intact. Accommodation: intact.   CN III: no CN III palsy  CN VI: no CN VI palsy  Nystagmus: none   Diplopia: none  Ophthalmoparesis: none  Upgaze: normal  Downgaze: normal  Conjugate gaze: absent    CN V   Facial sensation intact.   Right facial sensation deficit: none  Left facial sensation deficit: none    CN VII   Facial expression full, symmetric.   Right facial weakness: none  Left facial weakness: none    CN VIII   CN VIII normal.   Hearing: impaired    CN IX, X   CN IX normal.   CN X normal.   Palate: symmetric    CN XI   CN XI normal.   Right sternocleidomastoid strength: normal  Left sternocleidomastoid strength: normal  Right trapezius strength: normal  Left trapezius strength: normal    CN XII   CN XII normal.   Tongue: not atrophic  Fasciculations: absent  Tongue deviation: none    Motor Exam   Muscle bulk: normal  Overall muscle tone: normal  Right arm tone: normal  Left arm tone: normal  Right arm pronator drift: absent  Left arm pronator drift: absent  Right leg tone: normal  Left leg tone: normal    Strength   Strength 5/5 throughout.   Right neck flexion: 5/5  Left neck flexion: 5/5  Right neck extension: 5/5  Left neck extension: 5/5  Right deltoid: 5/5  Left  deltoid: 5/5  Right biceps: 5/5  Left biceps: 5/5  Right triceps: 5/5  Left triceps: 5/5  Right wrist flexion: 5/5  Left wrist flexion: 5/5  Right wrist extension: 5/5  Left wrist extension: 5/5  Right interossei: 5/5  Left interossei: 5/5  Right iliopsoas: 5/5  Left iliopsoas: 5/5  Right quadriceps: 5/5  Left quadriceps: 5/5  Right hamstrin/5  Left hamstrin/5  Right glutei: 5/5  Left glutei: 5/5  Right anterior tibial: 5/5  Left anterior tibial: 5/5  Right posterior tibial: 5/5  Left posterior tibial: 5/5  Right peroneal: 5/5  Left peroneal: 5/5  Right gastroc: 5/5  Left gastroc: 5/5    Sensory Exam   Light touch normal.   Right arm light touch: normal  Left arm light touch: normal  Right leg light touch: normal  Left leg light touch: normal  Right arm vibration: normal  Left arm vibration: normal  Right leg vibration: decreased from toes  Left leg vibration: decreased from toes  Right arm proprioception: normal  Left arm proprioception: normal  Right leg proprioception: normal  Left leg proprioception: normal  Pinprick normal.   Right arm pinprick: normal  Left arm pinprick: normal  Right leg pinprick: normal  Left leg pinprick: normal    Gait, Coordination, and Reflexes     Gait  Gait: normal    Coordination   Finger to nose coordination: normal  Heel to shin coordination: normal    Tremor   Resting tremor: absent  Intention tremor: absent  Action tremor: absent    Reflexes   Right brachioradialis: 2+  Left brachioradialis: 2+  Right biceps: 2+  Left biceps: 2+  Right triceps: 2+  Left triceps: 2+  Right patellar: 1+  Left patellar: 1+  Right achilles: 0  Left achilles: 0  Right plantar: normal  Left plantar: normal  Right Willingham: absent  Left Willingham: absent  Right ankle clonus: absent  Left ankle clonus: absent  Right pendular knee jerk: absent  Left pendular knee jerk: absent    Lab Results   Component Value Date    WBC 6.43 2022    HGB 12.7 (L) 2022    HCT 39.0 (L) 2022    MCV 93  09/26/2022     09/26/2022     Sodium   Date Value Ref Range Status   09/26/2022 140 136 - 145 mmol/L Final     Potassium   Date Value Ref Range Status   09/26/2022 4.7 3.5 - 5.1 mmol/L Final     Chloride   Date Value Ref Range Status   09/26/2022 105 95 - 110 mmol/L Final     CO2   Date Value Ref Range Status   09/26/2022 26 23 - 29 mmol/L Final     Glucose   Date Value Ref Range Status   09/26/2022 103 70 - 110 mg/dL Final     BUN   Date Value Ref Range Status   09/26/2022 14 8 - 23 mg/dL Final     Creatinine   Date Value Ref Range Status   09/26/2022 0.7 0.5 - 1.4 mg/dL Final     Calcium   Date Value Ref Range Status   09/26/2022 9.2 8.7 - 10.5 mg/dL Final     Total Protein   Date Value Ref Range Status   09/26/2022 6.5 6.0 - 8.4 g/dL Final     Albumin   Date Value Ref Range Status   09/26/2022 4.0 3.5 - 5.2 g/dL Final     Total Bilirubin   Date Value Ref Range Status   09/26/2022 0.4 0.1 - 1.0 mg/dL Final     Comment:     For infants and newborns, interpretation of results should be based  on gestational age, weight and in agreement with clinical  observations.    Premature Infant recommended reference ranges:  Up to 24 hours.............<8.0 mg/dL  Up to 48 hours............<12.0 mg/dL  3-5 days..................<15.0 mg/dL  6-29 days.................<15.0 mg/dL       Alkaline Phosphatase   Date Value Ref Range Status   09/26/2022 41 (L) 55 - 135 U/L Final     AST   Date Value Ref Range Status   09/26/2022 26 10 - 40 U/L Final     ALT   Date Value Ref Range Status   09/26/2022 24 10 - 44 U/L Final     Anion Gap   Date Value Ref Range Status   09/26/2022 9 8 - 16 mmol/L Final     eGFR if    Date Value Ref Range Status   09/30/2021 >60.0 >60 mL/min/1.73 m^2 Final     eGFR if non    Date Value Ref Range Status   09/30/2021 >60.0 >60 mL/min/1.73 m^2 Final     Comment:     Calculation used to obtain the estimated glomerular filtration  rate (eGFR) is the CKD-EPI equation.         No results found for: [id number]  Lab Results   Component Value Date    TSH 1.455 09/26/2022     AED: Phenytoin  NORMAL LEVEL RANGE: 10-20   DATE  LEVEL    09- 8.2 NL    12- 17.3 NL                            12-31- 2022    PHT Free 1.6 NL    PHT Total 17.3 NL   10-1-2020    Brain MRI Unremarkable. No changes. Remote left-sided craniotomy.    12-9-2020    EEG, w/awake & asleep WNL        Assessment:       1. Seizure disorder    2. Grand mal epilepsy, controlled    3. Open wound of skin    4. Skin tear of upper extremity              EPILEPSY CLASSIFICATION    SEMIOLOGY:  DIALEPTIC (FOCAL-ROSY-->GTC     EPILEPTOGENIC ZONE (S): UNKNOWN     ETIOLOGY: UNKNOWN     PRIOR AEDS: NONE     CURRENT AEDS: PHT    LAST SEIZURE DATE: 2010 (NON-COMPLIANCE), 2005 (BEFORE PHT)      COMPREHENSIVE LIST OF AEDs:     Acetazolamide (AZM-Diamox)   Benzos: clonazepam (CZP Klonopin), lorazepam (LZP-Ativan), diazepam (DZP-Valium), clorazepate (CLZ- Tranxene)  Brivaracetam (BRV-Briviact)  Cannabidiol (CBD- Epidiolex)  Carbamazepine (CBZ-Tegretol)  Cenobamate (CNB-Xcopri)  Clobazam (CLB-Onfi)  Eslicarbazepine (ESL-Aptiom)  Ethosuximide (ESX-Zarontin)  Felbamate (FBM-Felbatol)  Gabapentin (GBP-Neurontin)  Lacosamide (LCM-Vimpat)  Lamotrigine (LTG-Lamitcal)  Levetiracetam (LEV- Keppra)  Oxcarbazepine (OXC-Trileptal)  Perampanel (PML-Fycompa)  Phenobarbital (PB)  Phenytoin (PHT-Dilantin)  Pregabalin (PGB-Lyrica)  Primidone (PRM)   Retigabine (RTG- Potiga) Discontinued in 2017  Rufinamide (RFN-Benzil)  Stiripentol (STP-Diacomit)  Tiagabine (TGB-Gabitril)  Topiramate (TPM-Topamax)  Valproate (VPA-Depakote)  Vigabatrin (VGB-Sabril)  Zonisamide (ZNS-Zonegran)    Plan:       GRAND MAL-FOCAL EPILEPSY, IDIOPATHIC, WELL-CONTROLED ON PHT       The patient was encouraged to maintain full traditional seizure precautions which include but not limited to avoid driving, avoid high altitudes, avoid being close to fire or fire source, avoid being  close to a body of water or swimming alone, avoid operating heavy machinery and avoid using sharp objects if possible. The patient was encouraged to shower (without accumulation of water) instead of taking a bath if unsupervised. The patient was made aware that these precautions are especially important during concurrent illness. Adequate sleep and avoidance of alcohol as important measures to assure good seizure control were discussed with the patient. The patient was also advised not to care for children without company. The patient was advised to pad the side rails with pillows and blankets if applicable and sleep as close to the floor as possible. I strongly recommended lowering the bed to the floor level to decrease the risk of falls. I also instructed the patient to avoid safety sensitive duties. In general, any activity that requires full awareness and would result in serious injury to self and others if a seizure occurs should be avoided. The patient verbalized full understanding.    The patient has been seizure-free for >10 years, compliant with regimen with therapeutic AED level. The patient meets the legal criteria to resume driving.I explained to the patient that the risk of breakthrough seizures will ALWAYS be there. I instructed the patient to avoid alcohol, get enough sleep and be complaint with AED regimen. I instructed the patient to avoid driving if AED was skipped, if drank alcohol, sleep-deprived or not feeling well. The patient verbalized full understanding.       Continue  mg QD (#3 of 100 mg capsules). The patient does not want to change his regimen at all. Insurance would not cover  mg capsules!!!    Check PHT level.      Continue AEDs INDEFINITELY, FOR GOOD AND NEVER SKIP A DOSE. The patient verbalized full understanding. Stressed the extreme medical, personal safety, public safety and legal importance of compliance and seizure control. Will give as many refills as possible  with or without face-to-face evaluation to make sure the patient and any patient with epilepsy will never run out of medications. Running out of seizure medications should never happen under our care. I explained to the patient that he should not, under any circumstances, adjust or stop taking his seizure medication without discussing with me. The patient verbalized full understanding.     AVOID any substance that could lower seizure threshold including but not limited to:      ALCOHOL AND WITHDRAWAL      TRAMADOL.     DEMEROL      ALL STIMULANTS-ALL ADHD MEDICATIONS.      CLOZAPINE.      BUPROPION.     CIPROFLOXACIN      RT ARM SKIN TEAR    Follow up with PCP for management.     Decline Dermatology referral              MEDICAL/SURGICAL COMORBIDITIES     All relevant medical comorbidities noted and managed by primary care physician and medical care team.          MISCELLANEOUS MEDICAL PROBLEMS       HEALTHY LIFESTYLE AND PREVENTATIVE CARE    Encouraged the patient to adhere to the age-appropriate health maintenance guidelines including screening tests and vaccinations.     Discussed the overall importance of healthy lifestyle, optimal weight, exercise, healthy diet, good sleep hygiene and avoiding drugs including smoking, alcohol and recreational drugs. The patient verbalized full understanding.       Advised the patient to follow COVID-19 prevention measures.       I spent a total of 30 minutes on the day of the visit.This includes face to face time and non-face to face time preparing to see the patient (eg, review of tests), obtaining and/or reviewing separately obtained history, documenting clinical information in the electronic or other health record, independently interpreting results and communicating results to the patient/family/caregiver, or care coordinator.        RTC annually        Amelie Whitaker, MSN NP      Collaborating Provider: Zain Morataya MD, FAAN Neurologist/Epileptologist

## 2022-12-30 ENCOUNTER — OFFICE VISIT (OUTPATIENT)
Dept: INTERNAL MEDICINE | Facility: CLINIC | Age: 77
End: 2022-12-30
Payer: MEDICARE

## 2022-12-30 VITALS
BODY MASS INDEX: 20.09 KG/M2 | TEMPERATURE: 98 F | HEIGHT: 71 IN | OXYGEN SATURATION: 97 % | WEIGHT: 143.5 LBS | SYSTOLIC BLOOD PRESSURE: 122 MMHG | RESPIRATION RATE: 19 BRPM | DIASTOLIC BLOOD PRESSURE: 70 MMHG | HEART RATE: 56 BPM

## 2022-12-30 DIAGNOSIS — S41.111A SKIN TEAR OF RIGHT UPPER ARM WITHOUT COMPLICATION, INITIAL ENCOUNTER: ICD-10-CM

## 2022-12-30 DIAGNOSIS — S51.811A SKIN TEAR OF FOREARM WITHOUT COMPLICATION, RIGHT, INITIAL ENCOUNTER: ICD-10-CM
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
## 2022-12-30 PROCEDURE — 99213 PR OFFICE/OUTPT VISIT, EST, LEVL III, 20-29 MIN: ICD-10-PCS | Mod: S$PBB,,, | Performed by: PHYSICIAN ASSISTANT

## 2022-12-30 PROCEDURE — 99213 OFFICE O/P EST LOW 20 MIN: CPT | Mod: S$PBB,,, | Performed by: PHYSICIAN ASSISTANT

## 2022-12-30 PROCEDURE — 99999 PR PBB SHADOW E&M-EST. PATIENT-LVL V: ICD-10-PCS | Mod: PBBFAC,,, | Performed by: PHYSICIAN ASSISTANT

## 2022-12-30 PROCEDURE — 99215 OFFICE O/P EST HI 40 MIN: CPT | Mod: PBBFAC | Performed by: PHYSICIAN ASSISTANT

## 2022-12-30 PROCEDURE — 99999 PR PBB SHADOW E&M-EST. PATIENT-LVL V: CPT | Mod: PBBFAC,,, | Performed by: PHYSICIAN ASSISTANT

## 2022-12-30 NOTE — PROGRESS NOTES
Subjective:       Patient ID: Humberto Carlson is a 77 y.o. male.    Chief Complaint: Fall (laceration)      HPI  Patient is new to me, but known to clinic.     Humberto Carlson is a 77 y.o. male who presents today with complaints of Fall (laceration)  Patient presents with c/o multiple abrasions/skin tears to right forearm and upper arm from a slip and fall in his bedroom on 12/24. UTD on tetanus. Denies excessive bleeding or fever. He is performing wound care appropriately.     Review of Systems   Constitutional:  Negative for chills and fever.   Skin:  Positive for wound.     Objective:      Physical Exam  Vitals and nursing note reviewed.   Constitutional:       General: He is not in acute distress.     Appearance: He is well-developed.   HENT:      Head: Normocephalic and atraumatic.   Eyes:      General: Lids are normal. No scleral icterus.     Extraocular Movements: Extraocular movements intact.      Conjunctiva/sclera: Conjunctivae normal.   Cardiovascular:      Pulses:           Radial pulses are 2+ on the right side.   Pulmonary:      Effort: Pulmonary effort is normal.   Musculoskeletal:        Arms:    Neurological:      Mental Status: He is alert.      Cranial Nerves: No cranial nerve deficit.   Psychiatric:         Mood and Affect: Mood and affect normal.       Assessment:       1. Fall, initial encounter    2. Skin tear of right upper arm without complication, initial encounter    3. Skin tear of forearm without complication, right, initial encounter        Plan:   1. Fall, initial encounter    2. Skin tear of right upper arm without complication, initial encounter    3. Skin tear of forearm without complication, right, initial encounter      Discussed daily wound care.

## 2022-12-30 NOTE — PATIENT INSTRUCTIONS
Please bring your medication or a written list to your next office visit.    If you check your blood pressure at home, please bring written your blood pressure log and your blood pressure machine to your next office visit.       1. Drink plenty of fluids  2. Get plenty of rest.  3. Take Tylenol as directed on package for pain/fever. Do not take more than package suggests to take.   4. Go to Emergency Department if your symptoms worsen.  5. Follow up with your PCP in 1 week if symptoms persist.

## 2022-12-31 ENCOUNTER — EXTERNAL CHRONIC CARE MANAGEMENT (OUTPATIENT)
Dept: PRIMARY CARE CLINIC | Facility: CLINIC | Age: 77
End: 2022-12-31
Payer: MEDICARE

## 2022-12-31 LAB
PHENYTOIN FREE SERPL-MCNC: 1.6 MCG/ML (ref 1–2)
PHENYTOIN, TOTAL: 17.3 MCG/ML (ref 10–20)

## 2022-12-31 PROCEDURE — 99490 CHRNC CARE MGMT STAFF 1ST 20: CPT | Mod: PBBFAC,27 | Performed by: FAMILY MEDICINE

## 2022-12-31 PROCEDURE — 99490 CHRNC CARE MGMT STAFF 1ST 20: CPT | Mod: S$PBB,,, | Performed by: FAMILY MEDICINE

## 2022-12-31 PROCEDURE — 99490 PR CHRONIC CARE MGMT, 1ST 20 MIN: ICD-10-PCS | Mod: S$PBB,,, | Performed by: FAMILY MEDICINE

## 2022-12-31 PROCEDURE — 99439 CHRNC CARE MGMT STAF EA ADDL: CPT | Mod: PBBFAC,27 | Performed by: FAMILY MEDICINE

## 2022-12-31 PROCEDURE — 99439 PR CHRONIC CARE MGMT, EA ADDTL 20 MIN: ICD-10-PCS | Mod: S$PBB,,, | Performed by: FAMILY MEDICINE

## 2022-12-31 PROCEDURE — 99439 CHRNC CARE MGMT STAF EA ADDL: CPT | Mod: S$PBB,,, | Performed by: FAMILY MEDICINE

## 2023-01-05 RX ORDER — SACUBITRIL AND VALSARTAN 24; 26 MG/1; MG/1
1 TABLET, FILM COATED ORAL 2 TIMES DAILY
Qty: 60 TABLET | Refills: 6 | Status: SHIPPED | OUTPATIENT
Start: 2023-01-05 | End: 2023-09-05

## 2023-01-05 NOTE — TELEPHONE ENCOUNTER
----- Message from Mitzi Lindquist sent at 1/5/2023 10:24 AM CST -----  Contact: Carl  Type:  RX Refill Request    Who Called: Carl  Refill or New Rx: refill  RX Name and Strength:ENTRESTO 24-26 mg per tablet  How is the patient currently taking it? (ex. 1XDay):TAKE 1 TABLET BY MOUTH TWICE A DAY  Is this a 30 day or 90 day RX: 90  Preferred Pharmacy with phone number:   Local or Mail Order: local  Ordering Provider: Solomon  Would the patient rather a call back or a response via [facility]? call  Best Call Back Number: [phone] (home)    Additional Information:  Humberto says the medication is on back order via the supplier for [place] [facility] and he want to know if you can find it or what shall he do.

## 2023-01-31 ENCOUNTER — EXTERNAL CHRONIC CARE MANAGEMENT (OUTPATIENT)
Dept: PRIMARY CARE CLINIC | Facility: CLINIC | Age: 78
End: 2023-01-31
Payer: MEDICARE

## 2023-01-31 PROCEDURE — 99439 PR CHRONIC CARE MGMT, EA ADDTL 20 MIN: ICD-10-PCS | Mod: S$PBB,,, | Performed by: FAMILY MEDICINE

## 2023-01-31 PROCEDURE — 99439 CHRNC CARE MGMT STAF EA ADDL: CPT | Mod: PBBFAC | Performed by: FAMILY MEDICINE

## 2023-01-31 PROCEDURE — 99490 CHRNC CARE MGMT STAFF 1ST 20: CPT | Mod: S$PBB,,, | Performed by: FAMILY MEDICINE

## 2023-01-31 PROCEDURE — 99490 CHRNC CARE MGMT STAFF 1ST 20: CPT | Mod: PBBFAC,25 | Performed by: FAMILY MEDICINE

## 2023-01-31 PROCEDURE — 99439 CHRNC CARE MGMT STAF EA ADDL: CPT | Mod: S$PBB,,, | Performed by: FAMILY MEDICINE

## 2023-01-31 PROCEDURE — 99490 PR CHRONIC CARE MGMT, 1ST 20 MIN: ICD-10-PCS | Mod: S$PBB,,, | Performed by: FAMILY MEDICINE

## 2023-02-01 ENCOUNTER — OFFICE VISIT (OUTPATIENT)
Dept: DERMATOLOGY | Facility: CLINIC | Age: 78
End: 2023-02-01
Payer: MEDICARE

## 2023-02-01 DIAGNOSIS — D22.9 MULTIPLE BENIGN NEVI: ICD-10-CM

## 2023-02-01 DIAGNOSIS — Z12.83 SKIN CANCER SCREENING: ICD-10-CM

## 2023-02-01 DIAGNOSIS — L57.0 ACTINIC KERATOSES: ICD-10-CM

## 2023-02-01 DIAGNOSIS — L82.1 SEBORRHEIC KERATOSES: ICD-10-CM

## 2023-02-01 DIAGNOSIS — L81.4 SOLAR LENTIGO: Primary | ICD-10-CM

## 2023-02-01 PROCEDURE — 17003 DESTRUCT PREMALG LES 2-14: CPT | Mod: PBBFAC,PO | Performed by: STUDENT IN AN ORGANIZED HEALTH CARE EDUCATION/TRAINING PROGRAM

## 2023-02-01 PROCEDURE — 17000 PR DESTRUCTION(LASER SURGERY,CRYOSURGERY,CHEMOSURGERY),PREMALIGNANT LESIONS,FIRST LESION: ICD-10-PCS | Mod: S$PBB,,, | Performed by: STUDENT IN AN ORGANIZED HEALTH CARE EDUCATION/TRAINING PROGRAM

## 2023-02-01 PROCEDURE — 17003 DESTRUCT PREMALG LES 2-14: CPT | Mod: S$PBB,,, | Performed by: STUDENT IN AN ORGANIZED HEALTH CARE EDUCATION/TRAINING PROGRAM

## 2023-02-01 PROCEDURE — 99213 OFFICE O/P EST LOW 20 MIN: CPT | Mod: PBBFAC,PO | Performed by: STUDENT IN AN ORGANIZED HEALTH CARE EDUCATION/TRAINING PROGRAM

## 2023-02-01 PROCEDURE — 17000 DESTRUCT PREMALG LESION: CPT | Mod: S$PBB,,, | Performed by: STUDENT IN AN ORGANIZED HEALTH CARE EDUCATION/TRAINING PROGRAM

## 2023-02-01 PROCEDURE — 99203 PR OFFICE/OUTPT VISIT, NEW, LEVL III, 30-44 MIN: ICD-10-PCS | Mod: 25,S$PBB,, | Performed by: STUDENT IN AN ORGANIZED HEALTH CARE EDUCATION/TRAINING PROGRAM

## 2023-02-01 PROCEDURE — 99999 PR PBB SHADOW E&M-EST. PATIENT-LVL III: CPT | Mod: PBBFAC,,, | Performed by: STUDENT IN AN ORGANIZED HEALTH CARE EDUCATION/TRAINING PROGRAM

## 2023-02-01 PROCEDURE — 99203 OFFICE O/P NEW LOW 30 MIN: CPT | Mod: 25,S$PBB,, | Performed by: STUDENT IN AN ORGANIZED HEALTH CARE EDUCATION/TRAINING PROGRAM

## 2023-02-01 PROCEDURE — 99999 PR PBB SHADOW E&M-EST. PATIENT-LVL III: ICD-10-PCS | Mod: PBBFAC,,, | Performed by: STUDENT IN AN ORGANIZED HEALTH CARE EDUCATION/TRAINING PROGRAM

## 2023-02-01 PROCEDURE — 17003 DESTRUCTION, PREMALIGNANT LESIONS; SECOND THROUGH 14 LESIONS: ICD-10-PCS | Mod: S$PBB,,, | Performed by: STUDENT IN AN ORGANIZED HEALTH CARE EDUCATION/TRAINING PROGRAM

## 2023-02-01 PROCEDURE — 17000 DESTRUCT PREMALG LESION: CPT | Mod: PBBFAC,PO | Performed by: STUDENT IN AN ORGANIZED HEALTH CARE EDUCATION/TRAINING PROGRAM

## 2023-02-01 NOTE — PROGRESS NOTES
Patient Information  Name: Humberto Carlson  : 1945  MRN: [id number]     Referring Physician:  Dr. Waters   Primary Care Physician:  Dr. Zeke Lamb MD   Date of Visit: 2023      Subjective:       Humberto Carlson is a 77 y.o. male who presents for   Chief Complaint   Patient presents with    Skin Check     annual     HPI  Patient here for skin check.     Does patient have a personal hx of skin cancers? no  Does patient have family hx of melanoma?  no  Does patient have hx of strong sun exposure or tanning bed use in the past? yes    Patient was last seen:Visit date not found     Prior notes by myself reviewed.   Clinical documentation obtained by nursing staff reviewed.    Review of Systems   Skin:  Negative for itching and rash.      Objective:    Physical Exam   Constitutional: He appears well-developed and well-nourished. No distress.   Neurological: He is alert and oriented to person, place, and time. He is not disoriented.   Psychiatric: He has a normal mood and affect.   Skin:   Areas Examined (abnormalities noted in diagram):   Scalp / Hair Palpated and Inspected  Head / Face Inspection Performed  Neck Inspection Performed  Chest / Axilla Inspection Performed  Abdomen Inspection Performed  Genitals / Buttocks / Groin Inspection Performed  Back Inspection Performed  RUE Inspected  LUE Inspection Performed  RLE Inspected  LLE Inspection Performed  Nails and Digits Inspection Performed                 Diagram Legend     Erythematous scaling macule/papule c/w actinic keratosis       Vascular papule c/w angioma      Pigmented verrucoid papule/plaque c/w seborrheic keratosis      Yellow umbilicated papule c/w sebaceous hyperplasia      Irregularly shaped tan macule c/w lentigo     1-2 mm smooth white papules consistent with Milia      Movable subcutaneous cyst with punctum c/w epidermal inclusion cyst      Subcutaneous movable cyst c/w pilar cyst      Firm pink to brown papule c/w dermatofibroma       Pedunculated fleshy papule(s) c/w skin tag(s)      Evenly pigmented macule c/w junctional nevus     Mildly variegated pigmented, slightly irregular-bordered macule c/w mildly atypical nevus      Flesh colored to evenly pigmented papule c/w intradermal nevus       Pink pearly papule/plaque c/w basal cell carcinoma      Erythematous hyperkeratotic cursted plaque c/w SCC      Surgical scar with no sign of skin cancer recurrence      Open and closed comedones      Inflammatory papules and pustules      Verrucoid papule consistent consistent with wart     Erythematous eczematous patches and plaques     Dystrophic onycholytic nail with subungual debris c/w onychomycosis     Umbilicated papule    Erythematous-base heme-crusted tan verrucoid plaque consistent with inflamed seborrheic keratosis     Erythematous Silvery Scaling Plaque c/w Psoriasis     See annotation      No images are attached to the encounter or orders placed in the encounter.    [] Data reviewed  [] Independent review of test  [] Management discussed with another provider    Assessment / Plan:        Solar lentigo  This is a benign hyperpigmented sun induced lesion. Recommend daily sun protection/avoidance and use of at least SPF 30, broad spectrum sunscreen (OTC drug) will reduce the number of new lesions. Treatment of these benign lesions are considered cosmetic.    Skin cancer screening  Total body skin examination performed today including at least 12 points as noted in physical examination. No lesions suspicious for malignancy noted.    Recommend daily sun protection/avoidance, use of at least SPF 30, broad spectrum sunscreen (OTC drug), skin self examinations, and routine physician surveillance to optimize early detection    Actinic keratoses  Cryosurgery Procedure Note    Verbal consent from the patient is obtained including, but not limited to, risk of hypopigmentation/hyperpigmentation, scar, recurrence of lesion. The patient is aware of the  precancerous quality and need for treatment of these lesions. Liquid nitrogen cryosurgery is applied to the 2 actinic keratoses, as detailed in the physical exam, to produce a freeze injury. The patient is aware that blisters may form and is instructed on wound care with gentle cleansing and use of vaseline ointment to keep moist until healed. The patient is supplied a handout on cryosurgery and is instructed to call if lesions do not completely resolve.      Seborrheic keratoses  These are benign inherited growths without a malignant potential. Reassurance given to patient. No treatment is necessary.     Multiple benign nevi  Discussed ABCDE's of nevi.  Monitor for new mole or moles that are becoming bigger, darker, irritated, or developing irregular borders. Brochure provided. Instructed patient to observe lesion(s) for changes and follow up in clinic if changes are noted. Patient to monitor skin at home for new or changing lesions.              LOS NUMBER AND COMPLEXITY OF PROBLEMS    COMPLEXITY OF DATA RISK TOTAL TIME (m)   33145  87910 [] 1 self-limited or minor problem [x] Minimal to none [] No treatment recommended or patient to monitor 15-29  10-19   63146  19631 Low  [] 2 or > self limited or minor problems  [] 1 stable chronic illness  [] 1 acute, uncomplicated illness or injury Limited (2)  [] Prior external notes from each unique source  [] Review result of each unique test  [] Order each unique test [x]  Low  OTC medications, minor skin biopsy 30-44  20-29   06066  84798 Moderate  []  1 or > chronic illness with progression, exacerbation or SE of treatment  [x]  2 or more stable chronic illnesses  []  1 acute illness with systemic symptoms  []  1 acute complicated injury  []  1 undiagnosed new problem with uncertain prognosis Moderate (1/3 below)  []  3 or more data items        *Now includes assessment requiring independent historian  []  Independent interpretation of a test  []  Discuss  management/test with another provider Moderate  []  Prescription drug mgmt  []  Minor surgery with risk discussed  []  Mgmt limited by social determinates 45-59  30-39   03490  27746 High  []  1 or more chronic illness with severe exacerbation, progression or SE of treatment  []  1 acute or chronic illness/injury that poses a threat to life or bodily function Extensive (2/3 below)  []  3 or more data items        *Now includes assessment requiring independent historian.  []  Independent interpretation of a test  []  Discuss management/test with another provider High  []  Major surgery with risk discussed  []  Drug therapy requiring intensive monitoring for toxicity  []  Hospitalization  []  Decision for DNR 60-74  40-54      No follow-ups on file.    Hilda Montejo MD, FAAD  [facility]

## 2023-02-01 NOTE — PATIENT INSTRUCTIONS

## 2023-02-28 ENCOUNTER — EXTERNAL CHRONIC CARE MANAGEMENT (OUTPATIENT)
Dept: PRIMARY CARE CLINIC | Facility: CLINIC | Age: 78
End: 2023-02-28
Payer: MEDICARE

## 2023-02-28 PROCEDURE — 99490 PR CHRONIC CARE MGMT, 1ST 20 MIN: ICD-10-PCS | Mod: S$PBB,,, | Performed by: FAMILY MEDICINE

## 2023-02-28 PROCEDURE — 99439 CHRNC CARE MGMT STAF EA ADDL: CPT | Mod: PBBFAC,27 | Performed by: FAMILY MEDICINE

## 2023-02-28 PROCEDURE — 99439 PR CHRONIC CARE MGMT, EA ADDTL 20 MIN: ICD-10-PCS | Mod: S$PBB,,, | Performed by: FAMILY MEDICINE

## 2023-02-28 PROCEDURE — 99490 CHRNC CARE MGMT STAFF 1ST 20: CPT | Mod: PBBFAC | Performed by: FAMILY MEDICINE

## 2023-02-28 PROCEDURE — 99439 CHRNC CARE MGMT STAF EA ADDL: CPT | Mod: S$PBB,,, | Performed by: FAMILY MEDICINE

## 2023-02-28 PROCEDURE — 99490 CHRNC CARE MGMT STAFF 1ST 20: CPT | Mod: S$PBB,,, | Performed by: FAMILY MEDICINE

## 2023-03-09 ENCOUNTER — OFFICE VISIT (OUTPATIENT)
Dept: INTERNAL MEDICINE | Facility: CLINIC | Age: 78
End: 2023-03-09
Payer: MEDICARE

## 2023-03-09 VITALS
OXYGEN SATURATION: 99 % | TEMPERATURE: 98 F | SYSTOLIC BLOOD PRESSURE: 120 MMHG | HEART RATE: 55 BPM | DIASTOLIC BLOOD PRESSURE: 74 MMHG | WEIGHT: 140.19 LBS | RESPIRATION RATE: 18 BRPM | HEIGHT: 71 IN | BODY MASS INDEX: 19.63 KG/M2

## 2023-03-09 DIAGNOSIS — R21 FACIAL RASH: Primary | ICD-10-CM

## 2023-03-09 PROCEDURE — 99213 PR OFFICE/OUTPT VISIT, EST, LEVL III, 20-29 MIN: ICD-10-PCS | Mod: S$PBB,,, | Performed by: FAMILY MEDICINE

## 2023-03-09 PROCEDURE — 99213 OFFICE O/P EST LOW 20 MIN: CPT | Mod: S$PBB,,, | Performed by: FAMILY MEDICINE

## 2023-03-09 PROCEDURE — 99999 PR PBB SHADOW E&M-EST. PATIENT-LVL IV: CPT | Mod: PBBFAC,,, | Performed by: FAMILY MEDICINE

## 2023-03-09 PROCEDURE — 99999 PR PBB SHADOW E&M-EST. PATIENT-LVL IV: ICD-10-PCS | Mod: PBBFAC,,, | Performed by: FAMILY MEDICINE

## 2023-03-09 PROCEDURE — 99214 OFFICE O/P EST MOD 30 MIN: CPT | Mod: PBBFAC | Performed by: FAMILY MEDICINE

## 2023-03-09 RX ORDER — TRIAMCINOLONE ACETONIDE 1 MG/G
CREAM TOPICAL 2 TIMES DAILY
Qty: 28.4 G | Refills: 0 | Status: SHIPPED | OUTPATIENT
Start: 2023-03-09 | End: 2024-03-27

## 2023-03-09 NOTE — PROGRESS NOTES
Subjective:       Patient ID: Humberto Carlson is a 77 y.o. male.    Chief Complaint: Rash    HPI    Patient Active Problem List   Diagnosis    Hyperlipidemia    Essential hypertension    History of anaphylaxis    Elevated blood sugar    Seizure disorder    Screening for prostate cancer    Pre-diabetes    Weight loss    Dyspnea    Chronic bilateral low back pain    Acute rhinitis    History of subdural hematoma    Coronary artery disease of native artery of native heart with stable angina pectoris    Chronic systolic congestive heart failure    Smoker    Nonrheumatic aortic valve stenosis    Aortic calcification    Bilateral carotid artery stenosis    Chronic obstructive pulmonary disease    Major depression in partial remission    Hx of cancer of lung       Past Medical History:   Diagnosis Date    Acute bronchitis 12/2/2019    Asthma     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    Cancer     Cataract     Chronic obstructive pulmonary disease 5/24/2021    Coronary artery disease of native artery of native heart with stable angina pectoris 10/26/2020    Encounter for blood transfusion     Essential hypertension 7/22/2019    GERD (gastroesophageal reflux disease)     Glaucoma     Hyperlipidemia     Major depression in partial remission 5/2/2022    Seizures        Past Surgical History:   Procedure Laterality Date    BRAIN SURGERY      EYE SURGERY      LUNG SURGERY      Partial lung removed Left        Family History   Problem Relation Age of Onset    Heart attack Father     Coronary artery disease Brother     Valvular heart disease Brother        Social History     Tobacco Use   Smoking Status Every Day    Packs/day: 2.00    Types: Cigarettes   Smokeless Tobacco Never       Wt Readings from Last 5 Encounters:   03/09/23 63.6 kg (140 lb 3.4 oz)   12/30/22 65.1 kg (143 lb 8.3 oz)   12/29/22 63.7 kg (140 lb 6.9 oz)   10/12/22 62.4 kg (137 lb 9.1 oz)   09/26/22 66.1 kg (145 lb 11.6 oz)        For further HPI details, see assessment and plan.    Review of Systems    Objective:      Vitals:    03/09/23 0827   BP: 120/74   Pulse: (!) 55   Resp: 18   Temp: 98.1 °F (36.7 °C)       Physical Exam  Constitutional:       General: He is not in acute distress.     Appearance: He is not ill-appearing.   Pulmonary:      Effort: Pulmonary effort is normal. No respiratory distress.   Skin:     Findings: Rash present.   Neurological:      General: No focal deficit present.      Mental Status: He is alert.   Psychiatric:         Mood and Affect: Mood normal.         Behavior: Behavior normal.       Assessment:       1. Facial rash        Plan:   Facial rash    Other orders  -     triamcinolone acetonide 0.1% (KENALOG) 0.1 % cream; Apply topically 2 (two) times daily.  Dispense: 28.4 g; Refill: 0            Patient presents for a facial rash that developed 3-4 days ago.  The rash was diffuse across his upper neck jaw and face.  It was read any had what he thought to be fever blisters on his lips.  He has had no known exposures to anything new.  He has not had issues like this in the past.  The rash was not painful.  It did have a very mild itch.      Overall his rash seems to have nearly entirely resolved.  He has a few slot his of erythema under his lip and on his cheeks but it is very faint and does not appear to be aggravated.    I am not quite sure the cause of this rash.  The descriptions of the lesions on his lips makes me curious about shingles but he has had no pain and he has had shingles in the past.    For the remaining few lesions on his face I feel it is reasonable to try to reduce inflammation with a steroid cream.  Prescribing Kenalog cream.  Discussed the risk of use of this cream on his face as steroids can thin his skin out.  Advise he limited to roughly 1 week duration.  If he does not need it advise he does not use it.  If we do experience a recurrence of this rash I recommend a dermatology  evaluation.      We already have a prescheduled follow-up visit.  Plan to keep that visit to evaluate his rash and to review the rest of his chronic conditions.    This note was verbally dictated, please excuse any type errors.

## 2023-03-27 ENCOUNTER — OFFICE VISIT (OUTPATIENT)
Dept: INTERNAL MEDICINE | Facility: CLINIC | Age: 78
End: 2023-03-27
Payer: MEDICARE

## 2023-03-27 ENCOUNTER — LAB VISIT (OUTPATIENT)
Dept: LAB | Facility: HOSPITAL | Age: 78
End: 2023-03-27
Attending: FAMILY MEDICINE
Payer: MEDICARE

## 2023-03-27 VITALS
SYSTOLIC BLOOD PRESSURE: 110 MMHG | DIASTOLIC BLOOD PRESSURE: 80 MMHG | HEART RATE: 75 BPM | TEMPERATURE: 97 F | HEIGHT: 71 IN | WEIGHT: 142.31 LBS | OXYGEN SATURATION: 97 % | BODY MASS INDEX: 19.92 KG/M2

## 2023-03-27 DIAGNOSIS — F32.A DEPRESSION, UNSPECIFIED DEPRESSION TYPE: ICD-10-CM

## 2023-03-27 DIAGNOSIS — D64.9 ANEMIA, UNSPECIFIED TYPE: Primary | ICD-10-CM

## 2023-03-27 DIAGNOSIS — Z79.899 ENCOUNTER FOR LONG-TERM (CURRENT) USE OF MEDICATIONS: ICD-10-CM

## 2023-03-27 DIAGNOSIS — G40.909 SEIZURE DISORDER: ICD-10-CM

## 2023-03-27 DIAGNOSIS — K21.9 GASTROESOPHAGEAL REFLUX DISEASE, UNSPECIFIED WHETHER ESOPHAGITIS PRESENT: ICD-10-CM

## 2023-03-27 DIAGNOSIS — D64.9 ANEMIA, UNSPECIFIED TYPE: ICD-10-CM

## 2023-03-27 DIAGNOSIS — R21 FACIAL RASH: ICD-10-CM

## 2023-03-27 DIAGNOSIS — J44.9 CHRONIC OBSTRUCTIVE PULMONARY DISEASE, UNSPECIFIED COPD TYPE: ICD-10-CM

## 2023-03-27 DIAGNOSIS — I10 ESSENTIAL HYPERTENSION: ICD-10-CM

## 2023-03-27 DIAGNOSIS — I25.118 CORONARY ARTERY DISEASE OF NATIVE ARTERY OF NATIVE HEART WITH STABLE ANGINA PECTORIS: ICD-10-CM

## 2023-03-27 DIAGNOSIS — Z78.9 EXCESSIVE CAFFEINE INTAKE: ICD-10-CM

## 2023-03-27 DIAGNOSIS — F17.200 SMOKER: ICD-10-CM

## 2023-03-27 DIAGNOSIS — J30.9 ALLERGIC RHINITIS, UNSPECIFIED SEASONALITY, UNSPECIFIED TRIGGER: ICD-10-CM

## 2023-03-27 DIAGNOSIS — E78.5 HYPERLIPIDEMIA, UNSPECIFIED HYPERLIPIDEMIA TYPE: ICD-10-CM

## 2023-03-27 LAB
BASOPHILS # BLD AUTO: 0.06 K/UL (ref 0–0.2)
BASOPHILS NFR BLD: 0.8 % (ref 0–1.9)
DIFFERENTIAL METHOD: ABNORMAL
EOSINOPHIL # BLD AUTO: 0.7 K/UL (ref 0–0.5)
EOSINOPHIL NFR BLD: 8.9 % (ref 0–8)
ERYTHROCYTE [DISTWIDTH] IN BLOOD BY AUTOMATED COUNT: 13 % (ref 11.5–14.5)
FERRITIN SERPL-MCNC: 109 NG/ML (ref 20–300)
FOLATE SERPL-MCNC: 17.9 NG/ML (ref 4–24)
HCT VFR BLD AUTO: 39.7 % (ref 40–54)
HGB BLD-MCNC: 12.5 G/DL (ref 14–18)
IMM GRANULOCYTES # BLD AUTO: 0.01 K/UL (ref 0–0.04)
IMM GRANULOCYTES NFR BLD AUTO: 0.1 % (ref 0–0.5)
IRON SERPL-MCNC: 127 UG/DL (ref 45–160)
LYMPHOCYTES # BLD AUTO: 3 K/UL (ref 1–4.8)
LYMPHOCYTES NFR BLD: 39 % (ref 18–48)
MCH RBC QN AUTO: 30 PG (ref 27–31)
MCHC RBC AUTO-ENTMCNC: 31.5 G/DL (ref 32–36)
MCV RBC AUTO: 95 FL (ref 82–98)
MONOCYTES # BLD AUTO: 0.6 K/UL (ref 0.3–1)
MONOCYTES NFR BLD: 7.3 % (ref 4–15)
NEUTROPHILS # BLD AUTO: 3.4 K/UL (ref 1.8–7.7)
NEUTROPHILS NFR BLD: 43.9 % (ref 38–73)
NRBC BLD-RTO: 0 /100 WBC
PLATELET # BLD AUTO: 290 K/UL (ref 150–450)
PMV BLD AUTO: 10.4 FL (ref 9.2–12.9)
RBC # BLD AUTO: 4.16 M/UL (ref 4.6–6.2)
SATURATED IRON: 44 % (ref 20–50)
TOTAL IRON BINDING CAPACITY: 286 UG/DL (ref 250–450)
TRANSFERRIN SERPL-MCNC: 193 MG/DL (ref 200–375)
VIT B12 SERPL-MCNC: 818 PG/ML (ref 210–950)
WBC # BLD AUTO: 7.76 K/UL (ref 3.9–12.7)

## 2023-03-27 PROCEDURE — 99999 PR PBB SHADOW E&M-EST. PATIENT-LVL III: CPT | Mod: PBBFAC,,, | Performed by: FAMILY MEDICINE

## 2023-03-27 PROCEDURE — 82728 ASSAY OF FERRITIN: CPT | Performed by: FAMILY MEDICINE

## 2023-03-27 PROCEDURE — 99213 OFFICE O/P EST LOW 20 MIN: CPT | Mod: PBBFAC | Performed by: FAMILY MEDICINE

## 2023-03-27 PROCEDURE — 82746 ASSAY OF FOLIC ACID SERUM: CPT | Performed by: FAMILY MEDICINE

## 2023-03-27 PROCEDURE — 85025 COMPLETE CBC W/AUTO DIFF WBC: CPT | Performed by: FAMILY MEDICINE

## 2023-03-27 PROCEDURE — 82607 VITAMIN B-12: CPT | Performed by: FAMILY MEDICINE

## 2023-03-27 PROCEDURE — 99214 OFFICE O/P EST MOD 30 MIN: CPT | Mod: S$PBB,,, | Performed by: FAMILY MEDICINE

## 2023-03-27 PROCEDURE — 84466 ASSAY OF TRANSFERRIN: CPT | Performed by: FAMILY MEDICINE

## 2023-03-27 PROCEDURE — 99999 PR PBB SHADOW E&M-EST. PATIENT-LVL III: ICD-10-PCS | Mod: PBBFAC,,, | Performed by: FAMILY MEDICINE

## 2023-03-27 PROCEDURE — 36415 COLL VENOUS BLD VENIPUNCTURE: CPT | Performed by: FAMILY MEDICINE

## 2023-03-27 PROCEDURE — 99214 PR OFFICE/OUTPT VISIT, EST, LEVL IV, 30-39 MIN: ICD-10-PCS | Mod: S$PBB,,, | Performed by: FAMILY MEDICINE

## 2023-03-27 RX ORDER — ALBUTEROL SULFATE 90 UG/1
AEROSOL, METERED RESPIRATORY (INHALATION)
Qty: 25.5 G | Refills: 3 | Status: SHIPPED | OUTPATIENT
Start: 2023-03-27 | End: 2024-03-27 | Stop reason: SDUPTHER

## 2023-03-27 NOTE — PROGRESS NOTES
Subjective:       Patient ID: Humberto Carlson is a 78 y.o. male.    Chief Complaint: Follow-up    HPI    Patient Active Problem List   Diagnosis    Hyperlipidemia    Essential hypertension    History of anaphylaxis    Elevated blood sugar    Seizure disorder    Screening for prostate cancer    Pre-diabetes    Weight loss    Dyspnea    Chronic bilateral low back pain    Acute rhinitis    History of subdural hematoma    Coronary artery disease of native artery of native heart with stable angina pectoris    Chronic systolic congestive heart failure    Smoker    Nonrheumatic aortic valve stenosis    Aortic calcification    Bilateral carotid artery stenosis    Chronic obstructive pulmonary disease    Major depression in partial remission    Hx of cancer of lung       Past Medical History:   Diagnosis Date    Acute bronchitis 12/2/2019    Asthma     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    Cancer     Cataract     Chronic obstructive pulmonary disease 5/24/2021    Coronary artery disease of native artery of native heart with stable angina pectoris 10/26/2020    Encounter for blood transfusion     Essential hypertension 7/22/2019    GERD (gastroesophageal reflux disease)     Glaucoma     Hyperlipidemia     Major depression in partial remission 5/2/2022    Seizures        Past Surgical History:   Procedure Laterality Date    BRAIN SURGERY      EYE SURGERY      LUNG SURGERY      Partial lung removed Left        Family History   Problem Relation Age of Onset    Heart attack Father     Coronary artery disease Brother     Valvular heart disease Brother        Social History     Tobacco Use   Smoking Status Every Day    Packs/day: 2.00    Types: Cigarettes   Smokeless Tobacco Never       Wt Readings from Last 5 Encounters:   03/27/23 64.5 kg (142 lb 4.9 oz)   03/09/23 63.6 kg (140 lb 3.4 oz)   12/30/22 65.1 kg (143 lb 8.3 oz)   12/29/22 63.7 kg (140 lb 6.9 oz)   10/12/22 62.4 kg (137 lb 9.1  oz)       For further HPI details, see assessment and plan.    Review of Systems    Objective:      Vitals:    03/27/23 1024   BP: 110/80   Pulse: 75   Temp: 97 °F (36.1 °C)       Physical Exam    Assessment:       1. Anemia, unspecified type    2. Facial rash    3. Depression, unspecified depression type    4. Smoker    5. Essential hypertension    6. Coronary artery disease of native artery of native heart with stable angina pectoris    7. Seizure disorder    8. Chronic obstructive pulmonary disease, unspecified COPD type    9. Hyperlipidemia, unspecified hyperlipidemia type    10. Gastroesophageal reflux disease, unspecified whether esophagitis present    11. Encounter for long-term (current) use of medications    12. Allergic rhinitis, unspecified seasonality, unspecified trigger        Plan:   Anemia, unspecified type  -     CBC Auto Differential; Future; Expected date: 03/27/2023  -     Ferritin; Future; Expected date: 03/27/2023  -     Folate; Future; Expected date: 03/27/2023  -     Iron and TIBC; Future; Expected date: 03/27/2023  -     Vitamin B12; Future; Expected date: 03/27/2023    Facial rash    Depression, unspecified depression type    Smoker    Essential hypertension    Coronary artery disease of native artery of native heart with stable angina pectoris    Seizure disorder    Chronic obstructive pulmonary disease, unspecified COPD type    Hyperlipidemia, unspecified hyperlipidemia type    Gastroesophageal reflux disease, unspecified whether esophagitis present    Encounter for long-term (current) use of medications  -     CBC Auto Differential; Future; Expected date: 03/27/2023  -     Ferritin; Future; Expected date: 03/27/2023  -     Folate; Future; Expected date: 03/27/2023  -     Iron and TIBC; Future; Expected date: 03/27/2023  -     Vitamin B12; Future; Expected date: 03/27/2023    Allergic rhinitis, unspecified seasonality, unspecified trigger        Facial rash resolved.  No steroid cream  needed    Reviewed last neuro visit  No seizures since  Tolerating his medications w/out issue  He is on dilantin  Continue med    COPD  Using albuterol daily  If too active uses it multiple times  Still smoking  Doesn't plan on quitting.  Encourage cessation    HTN  Controlled.  Spironolactone  Entresto  Metoprolol  Monitor for hypotensive  Encourage bring log to his cardiologist as that will be his next appointment.  Drastic caffine reduction necessary - see below    HLD  Lab Results   Component Value Date    LDLCALC 91.4 09/26/2022     Lab Results   Component Value Date    ALT 24 09/26/2022    AST 26 09/26/2022    ALKPHOS 41 (L) 09/26/2022    BILITOT 0.4 09/26/2022   Continue statin  Lipitor 40 mg        Allergies are controlled  Using his nose spray    GERD  At last visit removed his PPI  Tolerable  Diet controlled now      Wt Readings from Last 5 Encounters:   03/27/23 64.5 kg (142 lb 4.9 oz)   03/09/23 63.6 kg (140 lb 3.4 oz)   12/30/22 65.1 kg (143 lb 8.3 oz)   12/29/22 63.7 kg (140 lb 6.9 oz)   10/12/22 62.4 kg (137 lb 9.1 oz)   Stable weights    Anemia  Lab Results   Component Value Date    WBC 6.43 09/26/2022    HGB 12.7 (L) 09/26/2022    HCT 39.0 (L) 09/26/2022    MCV 93 09/26/2022     09/26/2022   Mild  Will continue to monitor  Anemia studies to be done.    CAD  Carotid stenosis  No chest pain currently  A month ago had some pain but didn't worry him and so didn't see care.  Is under care of cardiologist  Keep f/u with his cardiologist - has appt next month  Montior for any more chest pain  No dizziness  No passing out      Depression  On zoloft  Doing well  No issues with med  Continue med      He estimates he drinks 20-30 keurig cups 12 oz amount of community dark roast coffee.  He indicates there truth to this statement  This is an alarming and extreme amount of caffeine - attempted to look up/ quantify - unable to find but I suspect   mg per cup - which would result in severe excess  amount  Strongly encouraged reduction - suspect would be wise to do so gradually.      This note was verbally dictated, please excuse any type errors.

## 2023-03-31 ENCOUNTER — EXTERNAL CHRONIC CARE MANAGEMENT (OUTPATIENT)
Dept: PRIMARY CARE CLINIC | Facility: CLINIC | Age: 78
End: 2023-03-31
Payer: MEDICARE

## 2023-03-31 PROCEDURE — 99490 PR CHRONIC CARE MGMT, 1ST 20 MIN: ICD-10-PCS | Mod: S$PBB,,, | Performed by: FAMILY MEDICINE

## 2023-03-31 PROCEDURE — 99490 CHRNC CARE MGMT STAFF 1ST 20: CPT | Mod: PBBFAC | Performed by: FAMILY MEDICINE

## 2023-03-31 PROCEDURE — 99490 CHRNC CARE MGMT STAFF 1ST 20: CPT | Mod: S$PBB,,, | Performed by: FAMILY MEDICINE

## 2023-04-12 DIAGNOSIS — I70.0 AORTIC CALCIFICATION: Primary | ICD-10-CM

## 2023-04-12 DIAGNOSIS — Z00.00 ROUTINE HEALTH MAINTENANCE: ICD-10-CM

## 2023-04-14 ENCOUNTER — PES CALL (OUTPATIENT)
Dept: ADMINISTRATIVE | Facility: CLINIC | Age: 78
End: 2023-04-14
Payer: MEDICARE

## 2023-04-19 ENCOUNTER — CLINICAL SUPPORT (OUTPATIENT)
Dept: CARDIOLOGY | Facility: CLINIC | Age: 78
End: 2023-04-19
Payer: MEDICARE

## 2023-04-19 ENCOUNTER — OFFICE VISIT (OUTPATIENT)
Dept: CARDIOLOGY | Facility: CLINIC | Age: 78
End: 2023-04-19
Payer: MEDICARE

## 2023-04-19 VITALS
HEART RATE: 71 BPM | SYSTOLIC BLOOD PRESSURE: 122 MMHG | DIASTOLIC BLOOD PRESSURE: 80 MMHG | HEIGHT: 71 IN | BODY MASS INDEX: 19.74 KG/M2 | WEIGHT: 141 LBS

## 2023-04-19 DIAGNOSIS — I48.3 TYPICAL ATRIAL FLUTTER: Primary | ICD-10-CM

## 2023-04-19 DIAGNOSIS — F17.200 SMOKER: ICD-10-CM

## 2023-04-19 DIAGNOSIS — I10 ESSENTIAL HYPERTENSION: ICD-10-CM

## 2023-04-19 DIAGNOSIS — I70.0 AORTIC CALCIFICATION: ICD-10-CM

## 2023-04-19 DIAGNOSIS — I50.22 CHRONIC SYSTOLIC CONGESTIVE HEART FAILURE: ICD-10-CM

## 2023-04-19 DIAGNOSIS — I65.23 BILATERAL CAROTID ARTERY STENOSIS: ICD-10-CM

## 2023-04-19 DIAGNOSIS — I25.118 CORONARY ARTERY DISEASE OF NATIVE ARTERY OF NATIVE HEART WITH STABLE ANGINA PECTORIS: ICD-10-CM

## 2023-04-19 DIAGNOSIS — R73.03 PRE-DIABETES: ICD-10-CM

## 2023-04-19 DIAGNOSIS — Z00.00 ROUTINE HEALTH MAINTENANCE: ICD-10-CM

## 2023-04-19 DIAGNOSIS — E78.2 MIXED HYPERLIPIDEMIA: ICD-10-CM

## 2023-04-19 DIAGNOSIS — I35.0 NONRHEUMATIC AORTIC VALVE STENOSIS: ICD-10-CM

## 2023-04-19 PROBLEM — I48.92 ATRIAL FLUTTER: Status: ACTIVE | Noted: 2023-04-19

## 2023-04-19 PROCEDURE — 93010 EKG 12-LEAD: ICD-10-PCS | Mod: S$PBB,,, | Performed by: INTERNAL MEDICINE

## 2023-04-19 PROCEDURE — 99214 PR OFFICE/OUTPT VISIT, EST, LEVL IV, 30-39 MIN: ICD-10-PCS | Mod: S$PBB,,, | Performed by: INTERNAL MEDICINE

## 2023-04-19 PROCEDURE — 99999 PR PBB SHADOW E&M-EST. PATIENT-LVL III: ICD-10-PCS | Mod: PBBFAC,,, | Performed by: INTERNAL MEDICINE

## 2023-04-19 PROCEDURE — 99999 PR PBB SHADOW E&M-EST. PATIENT-LVL III: CPT | Mod: PBBFAC,,, | Performed by: INTERNAL MEDICINE

## 2023-04-19 PROCEDURE — 99213 OFFICE O/P EST LOW 20 MIN: CPT | Mod: PBBFAC,PO | Performed by: INTERNAL MEDICINE

## 2023-04-19 PROCEDURE — 99214 OFFICE O/P EST MOD 30 MIN: CPT | Mod: S$PBB,,, | Performed by: INTERNAL MEDICINE

## 2023-04-19 PROCEDURE — 93010 ELECTROCARDIOGRAM REPORT: CPT | Mod: S$PBB,,, | Performed by: INTERNAL MEDICINE

## 2023-04-19 PROCEDURE — 93005 ELECTROCARDIOGRAM TRACING: CPT | Mod: PBBFAC,PO | Performed by: INTERNAL MEDICINE

## 2023-04-19 RX ORDER — METOPROLOL SUCCINATE 25 MG/1
12.5 TABLET, EXTENDED RELEASE ORAL DAILY
Qty: 45 TABLET | Refills: 3 | Status: SHIPPED | OUTPATIENT
Start: 2023-04-19 | End: 2024-03-21

## 2023-04-19 NOTE — PROGRESS NOTES
Subjective:   Patient ID:  Humberto Carlson is a 78 y.o. male who presents for follow up of Dizziness and Follow-up      77 yo male f/u for 6 months f/u  PMH CAD old MI  CHFrEF 35%, mild AS, lung cancer s/p removal of left lung in 2015, h/o fall two months after the procedure and brain bleeding s/p removal, HTN HLD smoker 1 ppd for 60 yrs, occasional drinking  Part time work.  ECHO EF 40% mild AS and apical HK  EKG NSR old septal infarct   MPI showed moderate sized severally fixed defect perfusion in apex, EF 35%  Carotid US 20% right and 50% left       06/2021 visit  No chest pain dizziness palpitation  Chronic BECKER after the lung procedure  Med compliance  EKG NASR HR 52 bpm, nonspecific STT change     visit  BP log reviewed. Occasional dizziness if looking up.  No chest pain weight control, leg swelling, palpitation. BECKER stable and the inhaler helps. 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 Chronic SOB due to mask and smoking.  Sleeps on 1 pillow and no leg swelling  EKG NSR and old septal infarct  BP checked regularly and controlled. Physically active at home     VISIT  Still smoking 1ppd. And chronic SOB and fatigue.  BP controlled at home and not taking the med yet.   No chest pain dizziness faint palpitation and leg swelling   echo EF 40 to 45% and mild AS; carotid US left 50 to 59% and right < 50%    Interval history  TODAY EKG AFL with variable AV block.  Chronic SOB. No dizziness and faint. Occasional lightheadedness. No chest pain  Physically active. Still smoking  AFIB [id number] score of 4            Past Medical History:   Diagnosis Date    Acute bronchitis 12/2/2019    Asthma     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    Cancer     Cataract     Chronic obstructive pulmonary disease 5/24/2021    Coronary artery disease of native artery of native heart with  stable angina pectoris 10/26/2020    Encounter for blood transfusion     Essential hypertension 7/22/2019    GERD (gastroesophageal reflux disease)     Glaucoma     Hyperlipidemia     Major depression in partial remission 5/2/2022    Seizures        Past Surgical History:   Procedure Laterality Date    BRAIN SURGERY      EYE SURGERY      LUNG SURGERY      Partial lung removed Left        Social History     Tobacco Use    Smoking status: Every Day     Packs/day: 2.00     Types: Cigarettes    Smokeless tobacco: Never   Substance Use Topics    Alcohol use: No    Drug use: No       Family History   Problem Relation Age of Onset    Heart attack Father     Coronary artery disease Brother     Valvular heart disease Brother          Review of Systems   Constitutional: Positive for weight loss. Negative for decreased appetite, diaphoresis, fever, malaise/fatigue and night sweats.   HENT:  Negative for nosebleeds.    Eyes:  Negative for blurred vision and double vision.   Cardiovascular:  Positive for dyspnea on exertion. Negative for chest pain, claudication, irregular heartbeat, leg swelling, near-syncope, orthopnea, palpitations, paroxysmal nocturnal dyspnea and syncope.   Respiratory:  Positive for cough and shortness of breath. Negative for sleep disturbances due to breathing, snoring, sputum production and wheezing.    Endocrine: Negative for cold intolerance and polyuria.   Hematologic/Lymphatic: Does not bruise/bleed easily.   Skin:  Negative for rash.   Musculoskeletal:  Positive for arthritis and back pain. Negative for falls, joint pain, joint swelling and neck pain.   Gastrointestinal:  Negative for abdominal pain, heartburn, nausea and vomiting.   Genitourinary:  Negative for dysuria, frequency and hematuria.   Neurological:  Negative for difficulty with concentration, dizziness, focal weakness, headaches, light-headedness, numbness, seizures and weakness.   Psychiatric/Behavioral:  Negative for depression,  memory loss and substance abuse. The patient does not have insomnia.    Allergic/Immunologic: Negative for HIV exposure and hives.     Objective:   Physical Exam  HENT:      Head: Normocephalic.   Eyes:      Pupils: Pupils are equal, round, and reactive to light.   Neck:      Thyroid: No thyromegaly.      Vascular: Normal carotid pulses. No carotid bruit or JVD.   Cardiovascular:      Rate and Rhythm: Normal rate and regular rhythm. No extrasystoles are present.     Chest Wall: PMI is not displaced.      Pulses: Normal pulses.           Carotid pulses are  on the right side with bruit and  on the left side with bruit.     Heart sounds: Murmur (ESM on bases and radiating up to the neck ) heard.     No gallop. No S3 sounds.   Pulmonary:      Effort: No respiratory distress.      Breath sounds: No stridor. Examination of the left-middle field reveals decreased breath sounds. Examination of the left-lower field reveals decreased breath sounds. Decreased breath sounds present.   Abdominal:      General: Bowel sounds are normal.      Palpations: Abdomen is soft.      Tenderness: There is no abdominal tenderness. There is no rebound.   Musculoskeletal:         General: Normal range of motion.   Skin:     Findings: No rash.   Neurological:      Mental Status: He is alert and oriented to person, place, and time.   Psychiatric:         Behavior: Behavior normal.       Lab Results   Component Value Date    CHOL 159 09/26/2022    CHOL 174 01/25/2021    CHOL 187 02/25/2020     Lab Results   Component Value Date    HDL 48 09/26/2022    HDL 62 01/25/2021    HDL 49 02/25/2020     Lab Results   Component Value Date    LDLCALC 91.4 09/26/2022    LDLCALC 88.8 01/25/2021    LDLCALC 110.4 02/25/2020     Lab Results   Component Value Date    TRIG 98 09/26/2022    TRIG 116 01/25/2021    TRIG 138 02/25/2020     Lab Results   Component Value Date    CHOLHDL 30.2 09/26/2022    CHOLHDL 35.6 01/25/2021    CHOLHDL 26.2 02/25/2020        Chemistry        Component Value Date/Time     09/26/2022 0924    K 4.7 09/26/2022 0924     09/26/2022 0924    CO2 26 09/26/2022 0924    BUN 14 09/26/2022 0924    CREATININE 0.7 09/26/2022 0924     09/26/2022 0924        Component Value Date/Time    CALCIUM 9.2 09/26/2022 0924    ALKPHOS 41 (L) 09/26/2022 0924    AST 26 09/26/2022 0924    ALT 24 09/26/2022 0924    BILITOT 0.4 09/26/2022 0924    ESTGFRAFRICA >60.0 09/30/2021 1130    EGFRNONAA >60.0 09/30/2021 1130          Lab Results   Component Value Date    HGBA1C 5.5 09/26/2022     Lab Results   Component Value Date    TSH 1.455 09/26/2022     No results found for: INR, PROTIME  Lab Results   Component Value Date    WBC 7.76 03/27/2023    HGB 12.5 (L) 03/27/2023    HCT 39.7 (L) 03/27/2023    MCV 95 03/27/2023     03/27/2023     BMP  Sodium   Date Value Ref Range Status   09/26/2022 140 136 - 145 mmol/L Final     Potassium   Date Value Ref Range Status   09/26/2022 4.7 3.5 - 5.1 mmol/L Final     Chloride   Date Value Ref Range Status   09/26/2022 105 95 - 110 mmol/L Final     CO2   Date Value Ref Range Status   09/26/2022 26 23 - 29 mmol/L Final     BUN   Date Value Ref Range Status   09/26/2022 14 8 - 23 mg/dL Final     Creatinine   Date Value Ref Range Status   09/26/2022 0.7 0.5 - 1.4 mg/dL Final     Calcium   Date Value Ref Range Status   09/26/2022 9.2 8.7 - 10.5 mg/dL Final     Anion Gap   Date Value Ref Range Status   09/26/2022 9 8 - 16 mmol/L Final     eGFR if    Date Value Ref Range Status   09/30/2021 >60.0 >60 mL/min/1.73 m^2 Final     eGFR if non    Date Value Ref Range Status   09/30/2021 >60.0 >60 mL/min/1.73 m^2 Final     Comment:     Calculation used to obtain the estimated glomerular filtration  rate (eGFR) is the CKD-EPI equation.        BNP  @LABRCNTIP(BNP,BNPTRIAGEBLO)@  @LABRCNTIP(troponini)@  CrCl cannot be calculated (Patient's most recent lab result is older than the maximum 7 days  allowed.).  No results found in the last 24 hours.  No results found in the last 24 hours.  No results found in the last 24 hours.    Assessment:      1. Typical atrial flutter    2. Mixed hyperlipidemia    3. Essential hypertension    4. Coronary artery disease of native artery of native heart with stable angina pectoris    5. Chronic systolic congestive heart failure    6. Nonrheumatic aortic valve stenosis    7. Aortic calcification    8. Bilateral carotid artery stenosis    9. Smoker    10. Pre-diabetes      New Dx of AFL VR controlled.   Chronic SOB and lifelong smoker  CHFrEF compensated    Plan:       Add Eliquis 5 mg bid For systemic thromboembolism prophylaxis.  Discussed the benefits and risks of NOAC.    Will clarity with neurologist if ok to start it. Given h/o brain bleeding in 2015  Continue entresto toprolXL and aldactone  Daily weight. Please call the office if gain 3 pounds in 1 day or 5 pounds in 1 week.  Fluid restriction 1.5 liters a day  Na< 2 gm  RTC in 3 months

## 2023-04-26 ENCOUNTER — HOSPITAL ENCOUNTER (OUTPATIENT)
Dept: CARDIOLOGY | Facility: HOSPITAL | Age: 78
Discharge: HOME | End: 2023-04-26
Attending: INTERNAL MEDICINE
Payer: MEDICARE

## 2023-04-26 ENCOUNTER — TELEPHONE (OUTPATIENT)
Dept: ADMINISTRATIVE | Facility: HOSPITAL | Age: 78
End: 2023-04-26
Payer: MEDICARE

## 2023-04-26 DIAGNOSIS — I48.3 TYPICAL ATRIAL FLUTTER: ICD-10-CM

## 2023-04-26 LAB
AORTIC ROOT ANNULUS: 3.43 CM
ASCENDING AORTA: 3.08 CM
AV INDEX (PROSTH): 0.34
AV MEAN GRADIENT: 8 MMHG
AV PEAK GRADIENT: 15 MMHG
AV VALVE AREA: 1.33 CM2
AV VELOCITY RATIO: 0.33
CV ECHO LV RWT: 0.69 CM
DOP CALC AO PEAK VEL: 1.96 M/S
DOP CALC AO VTI: 36.9 CM
DOP CALC LVOT AREA: 3.9 CM2
DOP CALC LVOT DIAMETER: 2.22 CM
DOP CALC LVOT PEAK VEL: 0.65 M/S
DOP CALC LVOT STROKE VOLUME: 49.13 CM3
DOP CALC RVOT PEAK VEL: 0.48 M/S
DOP CALC RVOT VTI: 8.6 CM
DOP CALCLVOT PEAK VEL VTI: 12.7 CM
E WAVE DECELERATION TIME: 162.92 MSEC
E/A RATIO: 1.07
E/E' RATIO: 14.88 M/S
ECHO LV POSTERIOR WALL: 1.45 CM (ref 0.6–1.1)
EJECTION FRACTION: 45 %
FRACTIONAL SHORTENING: 24 % (ref 28–44)
INTERVENTRICULAR SEPTUM: 1.1 CM (ref 0.6–1.1)
IVC DIAMETER: 1.36 CM
IVRT: 74.22 MSEC
LA MAJOR: 4.65 CM
LA MINOR: 5.1 CM
LA WIDTH: 3.4 CM
LEFT ATRIUM SIZE: 3.88 CM
LEFT ATRIUM VOLUME MOD: 60.89 CM3
LEFT ATRIUM VOLUME: 54.55 CM3
LEFT INTERNAL DIMENSION IN SYSTOLE: 3.21 CM (ref 2.1–4)
LEFT VENTRICLE DIASTOLIC VOLUME: 79.64 ML
LEFT VENTRICLE SYSTOLIC VOLUME: 41.33 ML
LEFT VENTRICULAR INTERNAL DIMENSION IN DIASTOLE: 4.22 CM (ref 3.5–6)
LEFT VENTRICULAR MASS: 196.23 G
LV LATERAL E/E' RATIO: 13.22 M/S
LV SEPTAL E/E' RATIO: 17 M/S
LVOT MG: 0.9 MMHG
LVOT MV: 0.44 CM/S
MV PEAK A VEL: 1.11 M/S
MV PEAK E VEL: 1.19 M/S
MV STENOSIS PRESSURE HALF TIME: 47.25 MS
MV VALVE AREA P 1/2 METHOD: 4.66 CM2
PISA TR MAX VEL: 3.3 M/S
PV MEAN GRADIENT: 0.54 MMHG
PV MV: 0.56 M/S
PV PEAK VELOCITY: 0.95 CM/S
RA MAJOR: 5.3 CM
RA PRESSURE: 8 MMHG
RA WIDTH: 4.27 CM
RIGHT VENTRICULAR END-DIASTOLIC DIMENSION: 4.24 CM
RV TISSUE DOPPLER FREE WALL SYSTOLIC VELOCITY 1 (APICAL 4 CHAMBER VIEW): 0.01 CM/S
SINUS: 3.1 CM
STJ: 2.78 CM
TDI LATERAL: 0.09 M/S
TDI SEPTAL: 0.07 M/S
TDI: 0.08 M/S
TR MAX PG: 44 MMHG
TRICUSPID ANNULAR PLANE SYSTOLIC EXCURSION: 2.08 CM
TV REST PULMONARY ARTERY PRESSURE: 52 MMHG

## 2023-04-26 PROCEDURE — 93306 TTE W/DOPPLER COMPLETE: CPT | Mod: 26,,, | Performed by: INTERNAL MEDICINE

## 2023-04-26 PROCEDURE — 93306 ECHO (CUPID ONLY): ICD-10-PCS | Mod: 26,,, | Performed by: INTERNAL MEDICINE

## 2023-04-26 PROCEDURE — 93306 TTE W/DOPPLER COMPLETE: CPT

## 2023-04-27 ENCOUNTER — TELEPHONE (OUTPATIENT)
Dept: CARDIOLOGY | Facility: CLINIC | Age: 78
End: 2023-04-27
Payer: MEDICARE

## 2023-04-27 NOTE — TELEPHONE ENCOUNTER
Patient contacted and was advised that   Echo findings are the same as the one done in 04-22.  Stable   The patient stated understanding with no questions or concerns

## 2023-04-30 ENCOUNTER — EXTERNAL CHRONIC CARE MANAGEMENT (OUTPATIENT)
Dept: PRIMARY CARE CLINIC | Facility: CLINIC | Age: 78
End: 2023-04-30
Payer: MEDICARE

## 2023-04-30 PROCEDURE — 99490 CHRNC CARE MGMT STAFF 1ST 20: CPT | Mod: S$PBB,,, | Performed by: FAMILY MEDICINE

## 2023-04-30 PROCEDURE — 99490 CHRNC CARE MGMT STAFF 1ST 20: CPT | Mod: PBBFAC | Performed by: FAMILY MEDICINE

## 2023-04-30 PROCEDURE — 99490 PR CHRONIC CARE MGMT, 1ST 20 MIN: ICD-10-PCS | Mod: S$PBB,,, | Performed by: FAMILY MEDICINE

## 2023-05-21 RX ORDER — ATORVASTATIN CALCIUM 40 MG/1
TABLET, FILM COATED ORAL
Qty: 90 TABLET | Refills: 2 | Status: SHIPPED | OUTPATIENT
Start: 2023-05-21 | End: 2024-02-09

## 2023-05-31 ENCOUNTER — EXTERNAL CHRONIC CARE MANAGEMENT (OUTPATIENT)
Dept: PRIMARY CARE CLINIC | Facility: CLINIC | Age: 78
End: 2023-05-31
Payer: MEDICARE

## 2023-05-31 PROCEDURE — 99490 CHRNC CARE MGMT STAFF 1ST 20: CPT | Mod: S$PBB,,, | Performed by: FAMILY MEDICINE

## 2023-05-31 PROCEDURE — 99490 CHRNC CARE MGMT STAFF 1ST 20: CPT | Mod: PBBFAC | Performed by: FAMILY MEDICINE

## 2023-05-31 PROCEDURE — 99490 PR CHRONIC CARE MGMT, 1ST 20 MIN: ICD-10-PCS | Mod: S$PBB,,, | Performed by: FAMILY MEDICINE

## 2023-06-08 PROBLEM — I27.9 PULMONARY HEART DISEASE: Status: ACTIVE | Noted: 2023-06-08

## 2023-06-12 ENCOUNTER — TELEPHONE (OUTPATIENT)
Dept: CARDIOLOGY | Facility: CLINIC | Age: 78
End: 2023-06-12
Payer: MEDICARE

## 2023-06-12 ENCOUNTER — OFFICE VISIT (OUTPATIENT)
Dept: INTERNAL MEDICINE | Facility: CLINIC | Age: 78
End: 2023-06-12
Payer: MEDICARE

## 2023-06-12 VITALS
OXYGEN SATURATION: 96 % | WEIGHT: 142.88 LBS | HEIGHT: 69 IN | DIASTOLIC BLOOD PRESSURE: 68 MMHG | BODY MASS INDEX: 21.16 KG/M2 | HEART RATE: 71 BPM | SYSTOLIC BLOOD PRESSURE: 102 MMHG

## 2023-06-12 DIAGNOSIS — G40.909 SEIZURE DISORDER: ICD-10-CM

## 2023-06-12 DIAGNOSIS — Z86.79 HISTORY OF SUBDURAL HEMATOMA: ICD-10-CM

## 2023-06-12 DIAGNOSIS — I48.3 TYPICAL ATRIAL FLUTTER: ICD-10-CM

## 2023-06-12 DIAGNOSIS — I35.0 AORTIC VALVE STENOSIS, ETIOLOGY OF CARDIAC VALVE DISEASE UNSPECIFIED: ICD-10-CM

## 2023-06-12 DIAGNOSIS — Z00.00 ENCOUNTER FOR PREVENTIVE HEALTH EXAMINATION: Primary | ICD-10-CM

## 2023-06-12 DIAGNOSIS — F32.5 MAJOR DEPRESSIVE DISORDER IN FULL REMISSION, UNSPECIFIED WHETHER RECURRENT: ICD-10-CM

## 2023-06-12 DIAGNOSIS — I65.29 STENOSIS OF CAROTID ARTERY, UNSPECIFIED LATERALITY: ICD-10-CM

## 2023-06-12 DIAGNOSIS — I10 HYPERTENSION, UNSPECIFIED TYPE: ICD-10-CM

## 2023-06-12 DIAGNOSIS — I50.22 CHRONIC SYSTOLIC CHF (CONGESTIVE HEART FAILURE): ICD-10-CM

## 2023-06-12 DIAGNOSIS — I27.9 PULMONARY HEART DISEASE: ICD-10-CM

## 2023-06-12 DIAGNOSIS — E78.5 HYPERLIPIDEMIA, UNSPECIFIED HYPERLIPIDEMIA TYPE: ICD-10-CM

## 2023-06-12 DIAGNOSIS — I25.118 CORONARY ARTERY DISEASE OF NATIVE ARTERY OF NATIVE HEART WITH STABLE ANGINA PECTORIS: ICD-10-CM

## 2023-06-12 DIAGNOSIS — D69.2 SENILE PURPURA: ICD-10-CM

## 2023-06-12 DIAGNOSIS — H91.90 DECREASED HEARING, UNSPECIFIED LATERALITY: ICD-10-CM

## 2023-06-12 DIAGNOSIS — Z85.118 HISTORY OF LUNG CANCER: ICD-10-CM

## 2023-06-12 DIAGNOSIS — J44.9 CHRONIC OBSTRUCTIVE PULMONARY DISEASE, UNSPECIFIED COPD TYPE: ICD-10-CM

## 2023-06-12 DIAGNOSIS — Z72.0 TOBACCO USE: ICD-10-CM

## 2023-06-12 PROCEDURE — 99999 PR PBB SHADOW E&M-EST. PATIENT-LVL V: CPT | Mod: PBBFAC,,, | Performed by: NURSE PRACTITIONER

## 2023-06-12 PROCEDURE — G0439 PR MEDICARE ANNUAL WELLNESS SUBSEQUENT VISIT: ICD-10-PCS | Mod: ,,, | Performed by: NURSE PRACTITIONER

## 2023-06-12 PROCEDURE — 99999 PR PBB SHADOW E&M-EST. PATIENT-LVL V: ICD-10-PCS | Mod: PBBFAC,,, | Performed by: NURSE PRACTITIONER

## 2023-06-12 PROCEDURE — G0439 PPPS, SUBSEQ VISIT: HCPCS | Mod: ,,, | Performed by: NURSE PRACTITIONER

## 2023-06-12 PROCEDURE — 99215 OFFICE O/P EST HI 40 MIN: CPT | Mod: PBBFAC | Performed by: NURSE PRACTITIONER

## 2023-06-12 RX ORDER — NAPROXEN SODIUM 220 MG
220 TABLET ORAL
COMMUNITY

## 2023-06-12 NOTE — TELEPHONE ENCOUNTER
Spoke with pt in regards to getting appt scheduled due to his bp running low.           ----- Message from Theresa Garnica NP sent at 6/12/2023 10:21 AM CDT -----  Pt would like a call back to discuss scheduling an apt.   Thanks,   Theresa

## 2023-06-12 NOTE — PATIENT INSTRUCTIONS
Counseling and Referral of Other Preventative  (Italic type indicates deductible and co-insurance are waived)    Patient Name: Humberto Carlson  Today's Date: 6/12/2023    Health Maintenance       Date Due Completion Date    COVID-19 Vaccine (5 - Booster for Pfizer series) 08/08/2022 6/13/2022    Hemoglobin A1c (Prediabetes) 09/26/2023 9/26/2022    Lipid Panel 09/26/2027 9/26/2022    TETANUS VACCINE 07/22/2029 7/22/2019        Orders Placed This Encounter   Procedures    Ambulatory referral/consult to Audiology       The following information is provided to all patients.  This information is to help you find resources for any of the problems found today that may be affecting your health:                Living healthy guide: www.[facility].[place].gov      Understanding Diabetes: www.diabetes.org      Eating healthy: www.cdc.gov/healthyweight      [facility] home safety checklist: www.cdc.gov/steadi/patient.html      Agency on Aging: www.goea.[place].gov      Alcoholics anonymous (AA): www.aa.org      Physical Activity: www.kate.nih.gov/jf6fywq      Tobacco use: www.quitwithusla.org

## 2023-06-12 NOTE — PROGRESS NOTES
"  Humberto Carslon presented for a  Medicare AWV and comprehensive Health Risk Assessment today. The following components were reviewed and updated:    Medical history  Family History  Social history  Allergies and Current Medications  Health Risk Assessment  Health Maintenance  Care Team         ** See Completed Assessments for Annual Wellness Visit within the encounter summary.**         The following assessments were completed:  Living Situation  CAGE  Depression Screening  Timed Get Up and Go  Whisper Test  Cognitive Function Screening  Nutrition Screening  ADL Screening  PAQ Screening        Vitals:    06/12/23 1014   BP: 102/68   Pulse: 71   SpO2: 96%   Weight: 64.8 kg (142 lb 13.7 oz)   Height: 5' 9.49" (1.765 m)     Body mass index is 20.8 kg/m².  Physical Exam  Vitals and nursing note reviewed.   Constitutional:       Appearance: He is well-developed.   HENT:      Head: Normocephalic.      Left Ear: There is impacted cerumen.   Cardiovascular:      Rate and Rhythm: Normal rate. Rhythm irregular.      Heart sounds: Normal heart sounds.   Pulmonary:      Effort: Pulmonary effort is normal. No respiratory distress.      Breath sounds: Normal breath sounds.   Abdominal:      Palpations: Abdomen is soft. There is no mass.      Tenderness: There is no abdominal tenderness.   Musculoskeletal:         General: Normal range of motion.   Skin:     General: Skin is warm and dry.   Neurological:      Mental Status: He is alert and oriented to person, place, and time.      Motor: No abnormal muscle tone.   Psychiatric:         Speech: Speech normal.         Behavior: Behavior normal.             Diagnoses and health risks identified today and associated recommendations/orders:    1. Encounter for preventive health examination     Review for Opioid Screening: Pt does not have Rx for Opioids      Review for Substance Use Disorders: Patient does not use substance  per chart     Reports cardiac conditions are stable.  "   Reports he is at his respiratory baseline.     Scheduled  Audio/ENT    Encouraged healthy diet and exercise as tolerated  Discussed locations to receive COVID booster   Discussed s/s of heart failure (patient denies any s/s) and advised to follow up with cardiologist/ER (if severe) if occur. Patient expressed understanding.      Advised to avoid NSAIDs. He will discuss with PCp and cardiologist.     2. Seizure disorder  Denies recent activity.   Stable. Continue current treatment plan as previously prescribed with your  neurologist.     3. Major depressive disorder in full remission, unspecified whether recurrent  PHQ 2-0  Reports stress but is not problematic at this time. Patient knows to follow up with PCP if becomes problematic.    Discussed counseling. Psychiatry department contact information given to patient.   Continue current treatment plan as previously prescribed with your  pcp     4. Typical atrial flutter  Stable. Continue current treatment plan as previously prescribed with your  cardiologist.     5. Coronary artery disease of native artery of native heart with stable angina pectoris  Stable. Continue current treatment plan as previously prescribed with your  cardiologist.     6. Chronic systolic CHF (congestive heart failure)  Stable. Continue current treatment plan as previously prescribed with your  cardiologist.     7. Pulmonary heart disease  Echo 4/23  Stable. Continue current treatment plan as previously prescribed with your  cardiologist.     8. Chronic obstructive pulmonary disease, unspecified COPD type  Reports a chronic productive cough  Stable. Continue current treatment plan as previously prescribed with your  pcp    9. Senile purpura  Chronic  See pic for documentation  Advised to follow up with PCP for further evaluation and recommendations. Patient expressed understanding.      10. Aortic valve stenosis, etiology of cardiac valve disease unspecified  Echo 4/23  Continue current  treatment plan as previously prescribed with your  cardiologist.     11. Stenosis of carotid artery, unspecified laterality  US 4/22  Continue current treatment plan as previously prescribed with your  cardiologist.     12. Hyperlipidemia, unspecified hyperlipidemia type  Continue current treatment plan as previously prescribed with your  pcp and cardiologist.     13. Hypertension, unspecified type  On the low side today. Reports has been having lower readings. Reports has noted some intermittent lightheadedness.   Advised to follow up with cardiologist for further evaluation and recommendations. Patient expressed understanding.  Message sent to cardiology staff to please contact to schedule.     14. Tobacco use  Discussed the importance of smoking cessation and advised to quit smoking. Patient expressed understanding. Declines smoking cessation program.     15. History of lung cancer  Continue current treatment plan as previously prescribed with your  providers.     16. History of subdural hematoma  Continue current treatment plan as previously prescribed with your  providers.     17. Decreased hearing, unspecified laterality  Abnormal whisper test  Cerumen impaction noted on exam  Advise to follow up with PCP/ENT (ENT prior to audio) for further evaluation and recommendations. Patient expressed understanding.    - Ambulatory referral/consult to Audiology; Future      Provided Humberto with a 5-10 year written screening schedule and personal prevention plan. Recommendations were developed using the USPSTF age appropriate recommendations. Education, counseling, and referrals were provided as needed. After Visit Summary printed and given to patient which includes a list of additional screenings\tests needed.    Follow up in about 1 year (around 6/12/2024) for awv.    Theresa Garnica NP  I offered to discuss advanced care planning, including how to pick a person who would make decisions for you if you were unable to  make them for yourself, called a health care power of , and what kind of decisions you might make such as use of life sustaining treatments such as ventilators and tube feeding when faced with a life limiting illness recorded on a living will that they will need to know. (How you want to be cared for as you near the end of your natural life)     X  Patient has advanced directives on file, which we reviewed, and they do not wish to make changes.

## 2023-06-21 ENCOUNTER — OFFICE VISIT (OUTPATIENT)
Dept: CARDIOLOGY | Facility: CLINIC | Age: 78
End: 2023-06-21
Payer: MEDICARE

## 2023-06-21 ENCOUNTER — TELEPHONE (OUTPATIENT)
Dept: CARDIOLOGY | Facility: CLINIC | Age: 78
End: 2023-06-21
Payer: MEDICARE

## 2023-06-21 VITALS
HEART RATE: 70 BPM | DIASTOLIC BLOOD PRESSURE: 78 MMHG | BODY MASS INDEX: 21.16 KG/M2 | HEIGHT: 69 IN | SYSTOLIC BLOOD PRESSURE: 130 MMHG | WEIGHT: 142.88 LBS | OXYGEN SATURATION: 97 %

## 2023-06-21 DIAGNOSIS — I70.0 AORTIC CALCIFICATION: ICD-10-CM

## 2023-06-21 DIAGNOSIS — E78.2 MIXED HYPERLIPIDEMIA: ICD-10-CM

## 2023-06-21 DIAGNOSIS — I48.19 PERSISTENT ATRIAL FIBRILLATION: ICD-10-CM

## 2023-06-21 DIAGNOSIS — I10 ESSENTIAL HYPERTENSION: ICD-10-CM

## 2023-06-21 DIAGNOSIS — I10 ESSENTIAL HYPERTENSION: Primary | ICD-10-CM

## 2023-06-21 DIAGNOSIS — I50.22 CHRONIC SYSTOLIC CONGESTIVE HEART FAILURE: ICD-10-CM

## 2023-06-21 DIAGNOSIS — I25.118 CORONARY ARTERY DISEASE OF NATIVE ARTERY OF NATIVE HEART WITH STABLE ANGINA PECTORIS: ICD-10-CM

## 2023-06-21 DIAGNOSIS — I48.3 TYPICAL ATRIAL FLUTTER: ICD-10-CM

## 2023-06-21 DIAGNOSIS — I65.23 BILATERAL CAROTID ARTERY STENOSIS: ICD-10-CM

## 2023-06-21 DIAGNOSIS — I25.118 CORONARY ARTERY DISEASE OF NATIVE ARTERY OF NATIVE HEART WITH STABLE ANGINA PECTORIS: Primary | ICD-10-CM

## 2023-06-21 DIAGNOSIS — I35.0 NONRHEUMATIC AORTIC VALVE STENOSIS: ICD-10-CM

## 2023-06-21 PROCEDURE — 99214 OFFICE O/P EST MOD 30 MIN: CPT | Mod: PBBFAC,PO | Performed by: INTERNAL MEDICINE

## 2023-06-21 PROCEDURE — 99999 PR PBB SHADOW E&M-EST. PATIENT-LVL IV: CPT | Mod: PBBFAC,,, | Performed by: INTERNAL MEDICINE

## 2023-06-21 PROCEDURE — 99999 PR PBB SHADOW E&M-EST. PATIENT-LVL IV: ICD-10-PCS | Mod: PBBFAC,,, | Performed by: INTERNAL MEDICINE

## 2023-06-21 PROCEDURE — 99215 PR OFFICE/OUTPT VISIT, EST, LEVL V, 40-54 MIN: ICD-10-PCS | Mod: S$PBB,,, | Performed by: INTERNAL MEDICINE

## 2023-06-21 PROCEDURE — 99215 OFFICE O/P EST HI 40 MIN: CPT | Mod: S$PBB,,, | Performed by: INTERNAL MEDICINE

## 2023-06-21 NOTE — H&P (VIEW-ONLY)
Subjective:   Patient ID:  Humberto Carlson is a 78 y.o. male who presents for follow up of No chief complaint on file.      77 yo male f/u for low BP  PMH CAD old MI  CHFrEF 35%, mild AS, AFIB, lung cancer s/p removal of left lung in 2015, h/o fall two months after the procedure and brain bleeding s/p removal, HTN HLD smoker 1 ppd for 60 yrs, occasional drinking  Part time work.  ECHO EF 40% mild AS and apical HK  EKG NSR old septal infarct   MPI showed moderate sized severally fixed defect perfusion in apex, EF 35%  Carotid US 20% right and 50% left       06/2021 visit  No chest pain dizziness palpitation  Chronic BECKER after the lung procedure  Med compliance  EKG NASR HR 52 bpm, nonspecific STT change     visit  BP log reviewed. Occasional dizziness if looking up.  No chest pain weight control, leg swelling, palpitation. BECKER stable and the inhaler helps. 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 Chronic SOB due to mask and smoking.  Sleeps on 1 pillow and no leg swelling  EKG NSR and old septal infarct  BP checked regularly and controlled. Physically active at home     VISIT  Still smoking 1ppd. And chronic SOB and fatigue.  BP controlled at home and not taking the med yet.   No chest pain dizziness faint palpitation and leg swelling   echo EF 40 to 45% and mild AS; carotid US left 50 to 59% and right < 50%     visit  TODAY EKG AFL with variable AV block.  Chronic SOB. No dizziness and faint. Occasional lightheadedness. No chest pain  Physically active. Still smoking  AFIB [id number] score of 4    Interval history  C/o BP drop to 80 mmHg. His dizziness occurred when standing up quickly. No syncope papitation.   New dx of afib in 04-23. VR controlled. EF 45%.           Past Medical History:   Diagnosis Date    Acute bronchitis 12/2/2019    Asthma     Bleeding in brain     2015 - reports after fall - had  surgery to remove blood.    Cancer     Cataract     Chronic obstructive pulmonary disease 5/24/2021    Coronary artery disease of native artery of native heart with stable angina pectoris 10/26/2020    Encounter for blood transfusion     Essential hypertension 7/22/2019    GERD (gastroesophageal reflux disease)     Glaucoma     Hyperlipidemia     Major depression in partial remission 5/2/2022    Seizures        Past Surgical History:   Procedure Laterality Date    BRAIN SURGERY      CARPAL TUNNEL RELEASE      EYE SURGERY      LUNG SURGERY      Partial lung removed Left     REPAIR,BROW PTOSIS      toe nail removal         Social History     Tobacco Use    Smoking status: Every Day     Packs/day: 2.00     Types: Cigarettes    Smokeless tobacco: Never   Substance Use Topics    Alcohol use: No    Drug use: No       Family History   Problem Relation Age of Onset    Stroke Mother     Heart attack Father     Coronary artery disease Brother     Valvular heart disease Brother     Stroke Paternal Grandfather          ROS    Objective:   Physical Exam    Lab Results   Component Value Date    CHOL 159 09/26/2022    CHOL 174 01/25/2021    CHOL 187 02/25/2020     Lab Results   Component Value Date    HDL 48 09/26/2022    HDL 62 01/25/2021    HDL 49 02/25/2020     Lab Results   Component Value Date    LDLCALC 91.4 09/26/2022    LDLCALC 88.8 01/25/2021    LDLCALC 110.4 02/25/2020     Lab Results   Component Value Date    TRIG 98 09/26/2022    TRIG 116 01/25/2021    TRIG 138 02/25/2020     Lab Results   Component Value Date    CHOLHDL 30.2 09/26/2022    CHOLHDL 35.6 01/25/2021    CHOLHDL 26.2 02/25/2020       Chemistry        Component Value Date/Time     09/26/2022 0924    K 4.7 09/26/2022 0924     09/26/2022 0924    CO2 26 09/26/2022 0924    BUN 14 09/26/2022 0924    CREATININE 0.7 09/26/2022 0924     09/26/2022 0924        Component Value Date/Time    CALCIUM 9.2 09/26/2022 0924    ALKPHOS 41 (L) 09/26/2022  0924    AST 26 09/26/2022 0924    ALT 24 09/26/2022 0924    BILITOT 0.4 09/26/2022 0924    ESTGFRAFRICA >60.0 09/30/2021 1130    EGFRNONAA >60.0 09/30/2021 1130          Lab Results   Component Value Date    HGBA1C 5.5 09/26/2022     Lab Results   Component Value Date    TSH 1.455 09/26/2022     No results found for: INR, PROTIME  Lab Results   Component Value Date    WBC 7.76 03/27/2023    HGB 12.5 (L) 03/27/2023    HCT 39.7 (L) 03/27/2023    MCV 95 03/27/2023     03/27/2023     BMP  Sodium   Date Value Ref Range Status   09/26/2022 140 136 - 145 mmol/L Final     Potassium   Date Value Ref Range Status   09/26/2022 4.7 3.5 - 5.1 mmol/L Final     Chloride   Date Value Ref Range Status   09/26/2022 105 95 - 110 mmol/L Final     CO2   Date Value Ref Range Status   09/26/2022 26 23 - 29 mmol/L Final     BUN   Date Value Ref Range Status   09/26/2022 14 8 - 23 mg/dL Final     Creatinine   Date Value Ref Range Status   09/26/2022 0.7 0.5 - 1.4 mg/dL Final     Calcium   Date Value Ref Range Status   09/26/2022 9.2 8.7 - 10.5 mg/dL Final     Anion Gap   Date Value Ref Range Status   09/26/2022 9 8 - 16 mmol/L Final     eGFR if    Date Value Ref Range Status   09/30/2021 >60.0 >60 mL/min/1.73 m^2 Final     eGFR if non    Date Value Ref Range Status   09/30/2021 >60.0 >60 mL/min/1.73 m^2 Final     Comment:     Calculation used to obtain the estimated glomerular filtration  rate (eGFR) is the CKD-EPI equation.        BNP  @LABRCNTIP(BNP,BNPTRIAGEBLO)@  @LABRCNTIP(troponini)@  CrCl cannot be calculated (Patient's most recent lab result is older than the maximum 7 days allowed.).  No results found in the last 24 hours.  No results found in the last 24 hours.  No results found in the last 24 hours.    Assessment:      1. Coronary artery disease of native artery of native heart with stable angina pectoris    2. Chronic systolic congestive heart failure    3. Typical atrial flutter    4.  Mixed hyperlipidemia    5. Essential hypertension      CAD old MI  CHFrEF 45% now  New Dx of afib  Low BP episodes  Plan:     D/c aldactone to avoid hypotension  Arrange ANUM and DCCV  I have explained the risks, benefits, and alternatives of the procedure in detail with patient and family. The patient voices understanding and all questions have been addressed.  The patient agrees to proceed as planned.    Refer to EP   Continue Eliquis statin toprolXL and entresto  RTC after the procedure done

## 2023-06-21 NOTE — TELEPHONE ENCOUNTER
Pt called and appt made to see dr wiggins. pb      ----- Message from Gallo Carbajal MD sent at 6/21/2023 10:27 AM CDT -----  EP consult for Dr. Wiggins

## 2023-06-21 NOTE — PROGRESS NOTES
Subjective:   Patient ID:  Humberto Carlson is a 78 y.o. male who presents for follow up of No chief complaint on file.      77 yo male f/u for low BP  PMH CAD old MI  CHFrEF 35%, mild AS, AFIB, lung cancer s/p removal of left lung in 2015, h/o fall two months after the procedure and brain bleeding s/p removal, HTN HLD smoker 1 ppd for 60 yrs, occasional drinking  Part time work.  ECHO EF 40% mild AS and apical HK  EKG NSR old septal infarct   MPI showed moderate sized severally fixed defect perfusion in apex, EF 35%  Carotid US 20% right and 50% left       06/2021 visit  No chest pain dizziness palpitation  Chronic BECKER after the lung procedure  Med compliance  EKG NASR HR 52 bpm, nonspecific STT change     visit  BP log reviewed. Occasional dizziness if looking up.  No chest pain weight control, leg swelling, palpitation. BECKER stable and the inhaler helps. 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 Chronic SOB due to mask and smoking.  Sleeps on 1 pillow and no leg swelling  EKG NSR and old septal infarct  BP checked regularly and controlled. Physically active at home     VISIT  Still smoking 1ppd. And chronic SOB and fatigue.  BP controlled at home and not taking the med yet.   No chest pain dizziness faint palpitation and leg swelling   echo EF 40 to 45% and mild AS; carotid US left 50 to 59% and right < 50%     visit  TODAY EKG AFL with variable AV block.  Chronic SOB. No dizziness and faint. Occasional lightheadedness. No chest pain  Physically active. Still smoking  AFIB [id number] score of 4    Interval history  C/o BP drop to 80 mmHg. His dizziness occurred when standing up quickly. No syncope papitation.   New dx of afib in 04-23. VR controlled. EF 45%.           Past Medical History:   Diagnosis Date    Acute bronchitis 12/2/2019    Asthma     Bleeding in brain     2015 - reports after fall - had  surgery to remove blood.    Cancer     Cataract     Chronic obstructive pulmonary disease 5/24/2021    Coronary artery disease of native artery of native heart with stable angina pectoris 10/26/2020    Encounter for blood transfusion     Essential hypertension 7/22/2019    GERD (gastroesophageal reflux disease)     Glaucoma     Hyperlipidemia     Major depression in partial remission 5/2/2022    Seizures        Past Surgical History:   Procedure Laterality Date    BRAIN SURGERY      CARPAL TUNNEL RELEASE      EYE SURGERY      LUNG SURGERY      Partial lung removed Left     REPAIR,BROW PTOSIS      toe nail removal         Social History     Tobacco Use    Smoking status: Every Day     Packs/day: 2.00     Types: Cigarettes    Smokeless tobacco: Never   Substance Use Topics    Alcohol use: No    Drug use: No       Family History   Problem Relation Age of Onset    Stroke Mother     Heart attack Father     Coronary artery disease Brother     Valvular heart disease Brother     Stroke Paternal Grandfather          ROS    Objective:   Physical Exam    Lab Results   Component Value Date    CHOL 159 09/26/2022    CHOL 174 01/25/2021    CHOL 187 02/25/2020     Lab Results   Component Value Date    HDL 48 09/26/2022    HDL 62 01/25/2021    HDL 49 02/25/2020     Lab Results   Component Value Date    LDLCALC 91.4 09/26/2022    LDLCALC 88.8 01/25/2021    LDLCALC 110.4 02/25/2020     Lab Results   Component Value Date    TRIG 98 09/26/2022    TRIG 116 01/25/2021    TRIG 138 02/25/2020     Lab Results   Component Value Date    CHOLHDL 30.2 09/26/2022    CHOLHDL 35.6 01/25/2021    CHOLHDL 26.2 02/25/2020       Chemistry        Component Value Date/Time     09/26/2022 0924    K 4.7 09/26/2022 0924     09/26/2022 0924    CO2 26 09/26/2022 0924    BUN 14 09/26/2022 0924    CREATININE 0.7 09/26/2022 0924     09/26/2022 0924        Component Value Date/Time    CALCIUM 9.2 09/26/2022 0924    ALKPHOS 41 (L) 09/26/2022  0924    AST 26 09/26/2022 0924    ALT 24 09/26/2022 0924    BILITOT 0.4 09/26/2022 0924    ESTGFRAFRICA >60.0 09/30/2021 1130    EGFRNONAA >60.0 09/30/2021 1130          Lab Results   Component Value Date    HGBA1C 5.5 09/26/2022     Lab Results   Component Value Date    TSH 1.455 09/26/2022     No results found for: INR, PROTIME  Lab Results   Component Value Date    WBC 7.76 03/27/2023    HGB 12.5 (L) 03/27/2023    HCT 39.7 (L) 03/27/2023    MCV 95 03/27/2023     03/27/2023     BMP  Sodium   Date Value Ref Range Status   09/26/2022 140 136 - 145 mmol/L Final     Potassium   Date Value Ref Range Status   09/26/2022 4.7 3.5 - 5.1 mmol/L Final     Chloride   Date Value Ref Range Status   09/26/2022 105 95 - 110 mmol/L Final     CO2   Date Value Ref Range Status   09/26/2022 26 23 - 29 mmol/L Final     BUN   Date Value Ref Range Status   09/26/2022 14 8 - 23 mg/dL Final     Creatinine   Date Value Ref Range Status   09/26/2022 0.7 0.5 - 1.4 mg/dL Final     Calcium   Date Value Ref Range Status   09/26/2022 9.2 8.7 - 10.5 mg/dL Final     Anion Gap   Date Value Ref Range Status   09/26/2022 9 8 - 16 mmol/L Final     eGFR if    Date Value Ref Range Status   09/30/2021 >60.0 >60 mL/min/1.73 m^2 Final     eGFR if non    Date Value Ref Range Status   09/30/2021 >60.0 >60 mL/min/1.73 m^2 Final     Comment:     Calculation used to obtain the estimated glomerular filtration  rate (eGFR) is the CKD-EPI equation.        BNP  @LABRCNTIP(BNP,BNPTRIAGEBLO)@  @LABRCNTIP(troponini)@  CrCl cannot be calculated (Patient's most recent lab result is older than the maximum 7 days allowed.).  No results found in the last 24 hours.  No results found in the last 24 hours.  No results found in the last 24 hours.    Assessment:      1. Coronary artery disease of native artery of native heart with stable angina pectoris    2. Chronic systolic congestive heart failure    3. Typical atrial flutter    4.  Mixed hyperlipidemia    5. Essential hypertension      CAD old MI  CHFrEF 45% now  New Dx of afib  Low BP episodes  Plan:     D/c aldactone to avoid hypotension  Arrange ANUM and DCCV  I have explained the risks, benefits, and alternatives of the procedure in detail with patient and family. The patient voices understanding and all questions have been addressed.  The patient agrees to proceed as planned.    Refer to EP   Continue Eliquis statin toprolXL and entresto  RTC after the procedure done

## 2023-06-28 ENCOUNTER — HOSPITAL ENCOUNTER (OUTPATIENT)
Facility: HOSPITAL | Age: 78
Discharge: HOME | End: 2023-06-28
Attending: INTERNAL MEDICINE | Admitting: INTERNAL MEDICINE
Payer: MEDICARE

## 2023-06-28 ENCOUNTER — TELEPHONE (OUTPATIENT)
Dept: CARDIOLOGY | Facility: CLINIC | Age: 78
End: 2023-06-28
Payer: MEDICARE

## 2023-06-28 ENCOUNTER — ANESTHESIA (OUTPATIENT)
Dept: CARDIOLOGY | Facility: HOSPITAL | Age: 78
End: 2023-06-28
Payer: MEDICARE

## 2023-06-28 ENCOUNTER — ANESTHESIA EVENT (OUTPATIENT)
Dept: CARDIOLOGY | Facility: HOSPITAL | Age: 78
End: 2023-06-28
Payer: MEDICARE

## 2023-06-28 DIAGNOSIS — I48.92 ATRIAL FLUTTER, UNSPECIFIED TYPE: Primary | ICD-10-CM

## 2023-06-28 DIAGNOSIS — I48.19 PERSISTENT ATRIAL FIBRILLATION: Primary | ICD-10-CM

## 2023-06-28 DIAGNOSIS — I48.91 A-FIB: ICD-10-CM

## 2023-06-28 DIAGNOSIS — I48.19 PERSISTENT ATRIAL FIBRILLATION: ICD-10-CM

## 2023-06-28 DIAGNOSIS — I48.92 ATRIAL FLUTTER, UNSPECIFIED TYPE: ICD-10-CM

## 2023-06-28 LAB
ANION GAP SERPL CALC-SCNC: 10 MMOL/L (ref 8–16)
BSA FOR ECHO PROCEDURE: 1.77 M2
BUN SERPL-MCNC: 14 MG/DL (ref 8–23)
CALCIUM SERPL-MCNC: 9.6 MG/DL (ref 8.7–10.5)
CHLORIDE SERPL-SCNC: 102 MMOL/L (ref 95–110)
CO2 SERPL-SCNC: 29 MMOL/L (ref 23–29)
CREAT SERPL-MCNC: 0.9 MG/DL (ref 0.5–1.4)
EJECTION FRACTION: 45 %
ERYTHROCYTE [DISTWIDTH] IN BLOOD BY AUTOMATED COUNT: 13 % (ref 11.5–14.5)
EST. GFR  (NO RACE VARIABLE): >60 ML/MIN/1.73 M^2
GLUCOSE SERPL-MCNC: 114 MG/DL (ref 70–110)
HCT VFR BLD AUTO: 39.2 % (ref 40–54)
HGB BLD-MCNC: 12.9 G/DL (ref 14–18)
MCH RBC QN AUTO: 29.3 PG (ref 27–31)
MCHC RBC AUTO-ENTMCNC: 32.9 G/DL (ref 32–36)
MCV RBC AUTO: 89 FL (ref 82–98)
PLATELET # BLD AUTO: 227 K/UL (ref 150–450)
PMV BLD AUTO: 9.5 FL (ref 9.2–12.9)
POTASSIUM SERPL-SCNC: 4.8 MMOL/L (ref 3.5–5.1)
RBC # BLD AUTO: 4.4 M/UL (ref 4.6–6.2)
SODIUM SERPL-SCNC: 141 MMOL/L (ref 136–145)
WBC # BLD AUTO: 7.81 K/UL (ref 3.9–12.7)

## 2023-06-28 PROCEDURE — 93010 EKG 12-LEAD: ICD-10-PCS | Mod: ,,, | Performed by: INTERNAL MEDICINE

## 2023-06-28 PROCEDURE — 93005 ELECTROCARDIOGRAM TRACING: CPT | Mod: 59

## 2023-06-28 PROCEDURE — 37000009 HC ANESTHESIA EA ADD 15 MINS: Performed by: INTERNAL MEDICINE

## 2023-06-28 PROCEDURE — 93010 ELECTROCARDIOGRAM REPORT: CPT | Mod: ,,, | Performed by: INTERNAL MEDICINE

## 2023-06-28 PROCEDURE — 37000008 HC ANESTHESIA 1ST 15 MINUTES: Performed by: INTERNAL MEDICINE

## 2023-06-28 PROCEDURE — 63600175 PHARM REV CODE 636 W HCPCS: Performed by: NURSE ANESTHETIST, CERTIFIED REGISTERED

## 2023-06-28 PROCEDURE — 25000003 PHARM REV CODE 250: Performed by: NURSE ANESTHETIST, CERTIFIED REGISTERED

## 2023-06-28 PROCEDURE — 80048 BASIC METABOLIC PNL TOTAL CA: CPT | Performed by: INTERNAL MEDICINE

## 2023-06-28 PROCEDURE — 85027 COMPLETE CBC AUTOMATED: CPT | Performed by: INTERNAL MEDICINE

## 2023-06-28 PROCEDURE — 25000003 PHARM REV CODE 250: Performed by: INTERNAL MEDICINE

## 2023-06-28 PROCEDURE — 92960 CARDIOVERSION ELECTRIC EXT: CPT | Performed by: INTERNAL MEDICINE

## 2023-06-28 RX ORDER — SODIUM CHLORIDE 0.9 % (FLUSH) 0.9 %
10 SYRINGE (ML) INJECTION
Status: DISCONTINUED | OUTPATIENT
Start: 2023-06-28 | End: 2023-06-28 | Stop reason: HOSPADM

## 2023-06-28 RX ORDER — PHENYLEPHRINE HYDROCHLORIDE 10 MG/ML
INJECTION INTRAVENOUS
Status: DISCONTINUED | OUTPATIENT
Start: 2023-06-28 | End: 2023-06-28

## 2023-06-28 RX ORDER — SODIUM CHLORIDE 9 MG/ML
INJECTION, SOLUTION INTRAVENOUS ONCE
Status: DISCONTINUED | OUTPATIENT
Start: 2023-06-28 | End: 2023-06-28 | Stop reason: HOSPADM

## 2023-06-28 RX ORDER — SODIUM CHLORIDE 9 MG/ML
INJECTION, SOLUTION INTRAVENOUS CONTINUOUS PRN
Status: DISCONTINUED | OUTPATIENT
Start: 2023-06-28 | End: 2023-06-28

## 2023-06-28 RX ORDER — PROPOFOL 10 MG/ML
VIAL (ML) INTRAVENOUS
Status: DISCONTINUED | OUTPATIENT
Start: 2023-06-28 | End: 2023-06-28

## 2023-06-28 RX ORDER — LIDOCAINE HYDROCHLORIDE 10 MG/ML
INJECTION, SOLUTION EPIDURAL; INFILTRATION; INTRACAUDAL; PERINEURAL
Status: DISCONTINUED | OUTPATIENT
Start: 2023-06-28 | End: 2023-06-28

## 2023-06-28 RX ORDER — ETOMIDATE 2 MG/ML
INJECTION INTRAVENOUS
Status: DISCONTINUED | OUTPATIENT
Start: 2023-06-28 | End: 2023-06-28

## 2023-06-28 RX ADMIN — ETOMIDATE 2 MG: 2 INJECTION INTRAVENOUS at 12:06

## 2023-06-28 RX ADMIN — PROPOFOL 20 MG: 10 INJECTION, EMULSION INTRAVENOUS at 12:06

## 2023-06-28 RX ADMIN — LIDOCAINE HYDROCHLORIDE 3 ML: 10 SOLUTION INTRAVENOUS at 12:06

## 2023-06-28 RX ADMIN — PHENYLEPHRINE HYDROCHLORIDE 50 MCG: 10 INJECTION INTRAVENOUS at 12:06

## 2023-06-28 RX ADMIN — SODIUM CHLORIDE: 0.9 INJECTION, SOLUTION INTRAVENOUS at 12:06

## 2023-06-28 NOTE — TELEPHONE ENCOUNTER
----- Message from Lilly Preciado LPN sent at 6/28/2023  8:43 AM CDT -----    ----- Message -----  From: Phill Scherer RN  Sent: 6/28/2023   8:03 AM CDT  To: Lilly Preciado LPN    Message sent to [facility] dept.  ----- Message -----  From: Kathy Cisse  Sent: 6/27/2023   4:09 PM CDT  To: [facility] Special Card Proc Clinical Support    .Type:  Patient Returning Call    Who Called:Humberto   Who Left Message for Patient:Desiree   Does the patient know what this is regarding?:appointment 06/28/2023 (procedure)  Would the patient rather a call back or a response via [facility]? call  Best Call Back Number:.[phone]  Additional Information:   Thanks  LR

## 2023-06-28 NOTE — ANESTHESIA PREPROCEDURE EVALUATION
06/28/2023  Humberto Carlson is a 78 y.o., male.  PMH CAD old MI  CHFrEF 35%, mild AS, AFIB, lung cancer s/p removal of left lung in 2015, h/o fall two months after the procedure and brain bleeding s/p removal, HTN HLD smoker 1 ppd for 60 yrs, occasional drinking  Part time work.  ECHO EF 40% mild AS and apical HK  EKG NSR old septal infarct   MPI showed moderate sized severally fixed defect perfusion in apex, EF 35%  Carotid US 20% right and 50% left     Pre-op Assessment    I have reviewed the Patient Summary Reports.     I have reviewed the Nursing Notes.    I have reviewed the Medications.     Review of Systems  Anesthesia Hx:  No previous Anesthesia    Social:  Smoker    Hematology/Oncology:  Hematology Normal       -- Cancer in past history:  surgery    EENT/Dental:EENT/Dental Normal   Cardiovascular:   Exercise tolerance: good Hypertension Valvular problems/Murmurs, AS CAD   CHF NYHA Classification I ECG has been reviewed. 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 Bottom teeth intact  Chest/Lungs:  Normal Respiratory Rate  expiratory wheezes    Heart:  Rate: Normal  Rhythm: Regular Rhythm        Anesthesia Plan  Type of Anesthesia, risks & benefits discussed:    Anesthesia Type: MAC  Intra-op Monitoring Plan: Standard ASA Monitors  Post Op Pain Control Plan: multimodal  analgesia  Induction:  IV  Informed Consent: Informed consent signed with the Patient and all parties understand the risks and agree with anesthesia plan.  All questions answered.   ASA Score: 3  Day of Surgery Review of History & Physical: I have interviewed and examined the patient. I have reviewed the patient's H&P dated:     Ready For Surgery From Anesthesia Perspective.     .

## 2023-06-28 NOTE — DISCHARGE SUMMARY
[facility] - Cath Lab (Hospital)  Discharge Note  Short Stay    Procedure(s) (LRB):  Transesophageal echo (ANUM) intra-procedure log documentation (N/A)      OUTCOME: Patient tolerated treatment/procedure well without complication and is now ready for discharge.    DISPOSITION: Home or Self Care    FINAL DIAGNOSIS:  <principal problem not specified>    FOLLOWUP: In clinic    DISCHARGE INSTRUCTIONS:    Discharge Procedure Orders   Notify your health care provider if you experience any of the following:  persistent nausea and vomiting or diarrhea     Notify your health care provider if you experience any of the following:  severe uncontrolled pain     Notify your health care provider if you experience any of the following:  difficulty breathing or increased cough     Notify your health care provider if you experience any of the following:  persistent dizziness, light-headedness, or visual disturbances     Notify your health care provider if you experience any of the following:  increased confusion or weakness        TIME SPENT ON DISCHARGE: 10 minutes

## 2023-06-28 NOTE — OP NOTE
[facility] - Cath Lab (Hospital)  Brief Operative Note    Surgery Date: 6/28/2023     Surgeon(s) and Role:     * Gallo Carbajal MD - Primary    Assisting Surgeon: None    Pre-op Diagnosis:  Atrial flutter, unspecified type [I48.92]  Coronary artery disease of bypass graft of native heart with stable angina pectoris [I25.708]  Chronic systolic (congestive) heart failure [I50.22]    Post-op Diagnosis:  Post-Op Diagnosis Codes:     * Atrial flutter, unspecified type [I48.92]     * Coronary artery disease of bypass graft of native heart with stable angina pectoris [I25.708]     * Chronic systolic (congestive) heart failure [I50.22]    Procedure(s) (LRB):  Transesophageal echo (ANUM) intra-procedure log documentation (N/A)    Anesthesia: Monitor Anesthesia Care    Operative Findings:   Procedure Description: The risks, benefits, and alternatives of the procedure expalined in detail with patient and family. The patient voices understanding and all questions have been addressed. The patient agrees to proceed as planned.   The patient was sedated by anesthesiology service. The probe was advanced via throat into esophagus smoothly. The patient tolerated the procedure well and there was no complications. The probed was withdrawal at the end of study. The patient was hemodynamically stable.   Estimated Blood Loss: none.   Findings / Operative Note:   No NII thrombus. EF 40% dilated LA and mild MR and TR  After ANUM, DCCV x1 with 20 joules and afib was converted to SR     Ok to discharge to home once hemodynamically stable.  F/U with me in two to four weeks at cardiology clinic.  DIET DASH  ACTIVITY as tolerated      Estimated Blood Loss: * No values recorded between 6/28/2023 12:29 PM and 6/28/2023 12:45 PM *         Specimens:   Specimen (24h ago, onward)      None              Discharge Note    OUTCOME: Patient tolerated treatment/procedure well without complication and is now ready for discharge.    DISPOSITION: Home or Self  Care    FINAL DIAGNOSIS:  <principal problem not specified>    FOLLOWUP: In clinic    DISCHARGE INSTRUCTIONS:    Discharge Procedure Orders   Notify your health care provider if you experience any of the following:  persistent nausea and vomiting or diarrhea     Notify your health care provider if you experience any of the following:  severe uncontrolled pain     Notify your health care provider if you experience any of the following:  difficulty breathing or increased cough     Notify your health care provider if you experience any of the following:  persistent dizziness, light-headedness, or visual disturbances     Notify your health care provider if you experience any of the following:  increased confusion or weakness

## 2023-06-28 NOTE — TRANSFER OF CARE
"Anesthesia Transfer of Care Note    Patient: Humberto Carlson    Procedure(s) Performed: Procedure(s) (LRB):  Transesophageal echo (ANUM) intra-procedure log documentation (N/A)    Patient location: Other: cvru    Anesthesia Type: MAC    Transport from OR: Transported from OR on 2-3 L/min O2 by NC with adequate spontaneous ventilation    Post pain: adequate analgesia    Post assessment: no apparent anesthetic complications    Post vital signs: stable    Level of consciousness: sedated    Nausea/Vomiting: no nausea/vomiting    Complications: none    Transfer of care protocol was followed      Last vitals:   Visit Vitals  BP (!) 140/72   Pulse (!) 56   Temp 36.7 °C (98.1 °F) (Temporal)   Resp 18   Ht 5' 9" (1.753 m)   Wt 64.4 kg (142 lb)   SpO2 98%   BMI 20.97 kg/m²     "

## 2023-06-28 NOTE — ANESTHESIA POSTPROCEDURE EVALUATION
Anesthesia Post Evaluation    Patient: Humberto Carlson    Procedure(s) Performed: Procedure(s) (LRB):  Transesophageal echo (ANUM) intra-procedure log documentation (N/A)    Final Anesthesia Type: MAC      Patient location during evaluation: OPS  Patient participation: Yes- Able to Participate  Level of consciousness: awake and alert  Post-procedure vital signs: reviewed and stable  Pain management: adequate  Airway patency: patent    PONV status at discharge: No PONV  Anesthetic complications: no      Cardiovascular status: blood pressure returned to baseline  Respiratory status: unassisted  Hydration status: euvolemic  Follow-up not needed.          Vitals Value Taken Time   /72 06/28/23 1244   Temp 36.7 °C (98.1 °F) 06/28/23 1054   Pulse 56 06/28/23 1244   Resp 18 06/28/23 1054   SpO2 98 % 06/28/23 1244         No case tracking events are documented in the log.      Pain/Edie Score: No data recorded

## 2023-06-28 NOTE — DISCHARGE INSTRUCTIONS
ACTIVITY/SAFETY  Because of the aftereffect of the sedation you received, we advise you to refrain from the following activities for 24 hours.  1. Do not drive or operate machinery.  2. Do not operate appliances if alone. (stove, iron, lawnmower)  3. Do not sign legal papers or make important decisions.    Have standby assistance on stairways.  It is advised that you have someone stay with you for at least 8 hours after procedure    Comfort:  If you develop a sore throat gargle with warm salt water (1/2 tsp.of salt in 8oz of warm water) or use throat lozenges.     Some of the sedatives you received today may make you nauseated.  Begin with clear liquids and then progress to solid foods as tolerated.    Avoid eating for 1 hour after procedure due to numbing of throat before procedure.    CALL THE DOCTOR FOR:  SEVERE THROAT PAIN / DIFFICULTY SWALLOWING                                                  SEVERE ABDOMINAL OR CHEST PAIN                                                  VOMITING BLOOD.                  ACTIVITY LEVEL  You may feel sleepy for several hours.  Rest until you are more awake.  Gradually resume your normal activities over the next 24 hours.   For the next 8 hours, you should be watched by a responsible adult. This person should make sure your condition is not getting worse.   Don't drink any alcohol for the next 24 hours.  Don't drive, operate dangerous machinery, or make important business or personal decisions during the next 24 hours.  Your healthcare provider may tell you not to take any medicine by mouth for pain or sleep in the next 4 hours. These medicines may react with the medicines you were given in the hospital. This could cause a much stronger response than usual.     DIET  At home, begin with liquids and then progress to normal foods if you don't experience nausea.    MEDICATIONS  Continue home medications as before procedure.  You may take Tylenol every four hours as needed for minor  discomfort at pad sites or chest soreness.  If you take Coumadin, resume your regimen and continue to have your blood tested weekly as directed by your physician.    FOLLOW UP  You will be scheduled for a follow up appointment and EKG within two weeks of your procedure.              CALL YOUR HEALTHCARE PROVIDER IF YOU START TO HAVE THE FOLLOWING SYMPTOMS:        1.  Drowsiness that doesn't get better       2. Weakness or dizziness that doesn't get better            3. Repeated vomiting

## 2023-06-28 NOTE — NURSING
DC inst reviewed w/patient and dtr.  Verbalized understanding.   HL dc'd w/cath tip intact.  Dsg applied (gauze)  A & O X 3  Church=.  Smiling/talkative.

## 2023-06-29 ENCOUNTER — CLINICAL SUPPORT (OUTPATIENT)
Dept: AUDIOLOGY | Facility: CLINIC | Age: 78
End: 2023-06-29
Payer: MEDICARE

## 2023-06-29 ENCOUNTER — OFFICE VISIT (OUTPATIENT)
Dept: OTOLARYNGOLOGY | Facility: CLINIC | Age: 78
End: 2023-06-29
Payer: MEDICARE

## 2023-06-29 VITALS — TEMPERATURE: 98 F | HEIGHT: 69 IN | BODY MASS INDEX: 21.03 KG/M2 | WEIGHT: 142 LBS

## 2023-06-29 DIAGNOSIS — H93.13 TINNITUS OF BOTH EARS: ICD-10-CM

## 2023-06-29 DIAGNOSIS — H90.A31 MIXED CONDUCTIVE AND SENSORINEURAL HEARING LOSS OF RIGHT EAR WITH RESTRICTED HEARING OF LEFT EAR: Primary | ICD-10-CM

## 2023-06-29 DIAGNOSIS — H61.22 IMPACTED CERUMEN, LEFT EAR: ICD-10-CM

## 2023-06-29 DIAGNOSIS — H91.90 DECREASED HEARING, UNSPECIFIED LATERALITY: ICD-10-CM

## 2023-06-29 PROCEDURE — 99213 OFFICE O/P EST LOW 20 MIN: CPT | Mod: PBBFAC | Performed by: PHYSICIAN ASSISTANT

## 2023-06-29 PROCEDURE — 92567 TYMPANOMETRY: CPT | Mod: PBBFAC | Performed by: AUDIOLOGIST-HEARING AID FITTER

## 2023-06-29 PROCEDURE — 99999 PR PBB SHADOW E&M-EST. PATIENT-LVL I: CPT | Mod: PBBFAC,,, | Performed by: AUDIOLOGIST-HEARING AID FITTER

## 2023-06-29 PROCEDURE — 69210 PR REMOVAL IMPACTED CERUMEN REQUIRING INSTRUMENTATION, UNILATERAL: ICD-10-PCS | Mod: S$PBB,ICN,, | Performed by: PHYSICIAN ASSISTANT

## 2023-06-29 PROCEDURE — 92557 COMPREHENSIVE HEARING TEST: CPT | Mod: PBBFAC | Performed by: AUDIOLOGIST-HEARING AID FITTER

## 2023-06-29 PROCEDURE — 99213 OFFICE O/P EST LOW 20 MIN: CPT | Mod: 25,S$PBB,ICN, | Performed by: PHYSICIAN ASSISTANT

## 2023-06-29 PROCEDURE — 99999 PR PBB SHADOW E&M-EST. PATIENT-LVL III: CPT | Mod: PBBFAC,,, | Performed by: PHYSICIAN ASSISTANT

## 2023-06-29 PROCEDURE — 99211 OFF/OP EST MAY X REQ PHY/QHP: CPT | Mod: PBBFAC,27 | Performed by: AUDIOLOGIST-HEARING AID FITTER

## 2023-06-29 PROCEDURE — 99999 PR PBB SHADOW E&M-EST. PATIENT-LVL I: ICD-10-PCS | Mod: PBBFAC,,, | Performed by: AUDIOLOGIST-HEARING AID FITTER

## 2023-06-29 PROCEDURE — 99999 PR PBB SHADOW E&M-EST. PATIENT-LVL III: ICD-10-PCS | Mod: PBBFAC,,, | Performed by: PHYSICIAN ASSISTANT

## 2023-06-29 PROCEDURE — 69210 REMOVE IMPACTED EAR WAX UNI: CPT | Mod: S$PBB,ICN,, | Performed by: PHYSICIAN ASSISTANT

## 2023-06-29 PROCEDURE — 99213 PR OFFICE/OUTPT VISIT, EST, LEVL III, 20-29 MIN: ICD-10-PCS | Mod: 25,S$PBB,ICN, | Performed by: PHYSICIAN ASSISTANT

## 2023-06-29 PROCEDURE — 69210 REMOVE IMPACTED EAR WAX UNI: CPT | Mod: PBBFAC | Performed by: PHYSICIAN ASSISTANT

## 2023-06-29 NOTE — PROGRESS NOTES
Referring provider: Theresa Garnica NP    Humberto Carlson was seen 06/29/2023 for an audiological evaluation.  Patient complains of  hearing loss.  He reports hearing loss that has been gradually progressing over the last 6+ months.  He has noted any difference in hearing between the ears, with the right ear being the better hearing ear.  He has noted occasional tinnitus in both ears.  He has not had any recent issues with ear pain or ear drainage.  He denies a family history of hearing loss, and has not had any previous otologic surgery.  He has a history of significant loud noise exposure. He denies issues with dizziness.  He saw ENT prior to exam for cerumen disimpaction.    Results reveal a mild-to-severe mixed hearing loss 250-8000 Hz for the right ear, and a mild-to-moderate sensorineural hearing loss 250-8000 Hz for the left ear.   Speech Reception Thresholds were 45 dBHL for the right ear and 35 dBHL for the left ear.   Word recognition scores were fair for the right ear and good for the left ear.   Tympanograms were Type A for the right ear and Type A for the left ear.    Patient was counseled on the above findings.    Recommendations:  Hearing aids, binaural. Patient is provided information packet.   Annual audiogram to monitor asymmetry  ENT review

## 2023-06-29 NOTE — PROGRESS NOTES
Subjective     Patient ID: Humberto Carlson is a 78 y.o. male.    Chief Complaint: Cerumen Impaction (Ear cleaning)    Patient is a very pleasant 78 y.o. male here to see me today for the first time for evaluation of hearing loss.  He reports hearing loss that has been gradually progressing over the last 6+ months.  He has noted any difference in hearing between the ears, with the right ear being the better hearing ear.  He has noted occasional tinnitus in both ears.  He has not had any recent issues with ear pain or ear drainage.  He denies a family history of hearing loss, and has not had any previous otologic surgery.  He has a history of significant loud noise exposure. He denies issues with dizziness.  He had recent wellness visit and was told he needed to use wax softening drops in left ear and see ENT for cerumen removal.        Review of Systems   Constitutional:  Negative for fever.   HENT:  Positive for hearing loss and tinnitus. Negative for nasal congestion, ear discharge, ear pain and postnasal drip.    Neurological:  Negative for dizziness.        Objective     Physical Exam  Constitutional:       General: He is not in acute distress.     Appearance: He is well-developed.   HENT:      Head: Normocephalic and atraumatic.      Right Ear: Tympanic membrane, ear canal and external ear normal. Decreased hearing noted.      Left Ear: Ear canal and external ear normal. Decreased hearing noted. There is impacted cerumen (removal described below).      Nose: Nose normal. No mucosal edema or rhinorrhea.      Mouth/Throat:      Mouth: Mucous membranes are moist.      Dentition: Has dentures (upper).      Pharynx: Oropharynx is clear. Uvula midline. No oropharyngeal exudate.   Eyes:      General: No scleral icterus.     Conjunctiva/sclera: Conjunctivae normal.      Right eye: Right conjunctiva is not injected. No chemosis.     Left eye: Left conjunctiva is not injected. No chemosis.     Pupils: Pupils are equal,  round, and reactive to light.   Neck:      Trachea: Trachea and phonation normal. No tracheal tenderness.   Pulmonary:      Effort: Pulmonary effort is normal.   Lymphadenopathy:      Head:      Right side of head: No preauricular or posterior auricular adenopathy.      Left side of head: No preauricular or posterior auricular adenopathy.      Cervical: No cervical adenopathy.      Right cervical: No superficial or deep cervical adenopathy.     Left cervical: No superficial or deep cervical adenopathy.   Skin:     General: Skin is warm and dry.      Findings: No erythema or rash.   Neurological:      Mental Status: He is alert and oriented to person, place, and time.      Cranial Nerves: No cranial nerve deficit.   Psychiatric:         Behavior: Behavior normal. Behavior is cooperative.         Thought Content: Thought content normal.         Procedure Note    CHIEF COMPLAINT:  Cerumen Impaction    Description:  The patient was seated in an exam chair.  An ear speculum was placed in the left EAC and was examined under the microscope.  Suction and/or loop curettes were used to remove a large cerumen impaction.  The tympanic membrane was visualized and was normal in appearance.  The patient tolerated the procedure well.          AUDIOGRAM:           Assessment and Plan     1. Mixed conductive and sensorineural hearing loss of right ear with restricted hearing of left ear    2. Tinnitus of both ears    3. Impacted cerumen, left ear        Reviewed the patient's recent audiogram and hearing loss in detail. He has a slight conductive drop in the right ear but otherwise symmetrical sensorineural hearing loss AU.  He is a good candidate for hearing aids, if and when he the patient is motivated.  He was given handouts with information and pricing of hearing aids, and will contact audiology when ready to proceed.  We also discussed the use hearing protection when exposed to loud noise, including lawn equipment.  Recommend  annual audiograms.    We also discussed that tinnitus is most often caused by a hearing loss, and that as the hair cells are damaged, either genetic or as a result of loud noise exposure, they then cause tinnitus.  Some patients find that restricting the salt or caffeine in their diet helps, and there is also an OTC supplement, lipoflavinoids, that some people find to be effective though their benefit is not fully proven.  Tinnitus tends to be louder in times of stress and fatigue, and may decrease with time.  Sound machines may also be an effective masking technique if needed at night.     Cerumen impaction:  Removed today without difficulty.  I would recommend the use of a wax softening drop, either over the counter Debrox or mineral oil, on a weekly basis.  I also instructed the patient to avoid Qtips.           No follow-ups on file.

## 2023-06-30 ENCOUNTER — EXTERNAL CHRONIC CARE MANAGEMENT (OUTPATIENT)
Dept: PRIMARY CARE CLINIC | Facility: CLINIC | Age: 78
End: 2023-06-30
Payer: MEDICARE

## 2023-06-30 PROCEDURE — 99490 CHRNC CARE MGMT STAFF 1ST 20: CPT | Mod: PBBFAC | Performed by: FAMILY MEDICINE

## 2023-06-30 PROCEDURE — 99490 CHRNC CARE MGMT STAFF 1ST 20: CPT | Mod: S$PBB,,, | Performed by: FAMILY MEDICINE

## 2023-06-30 PROCEDURE — 99490 PR CHRONIC CARE MGMT, 1ST 20 MIN: ICD-10-PCS | Mod: S$PBB,,, | Performed by: FAMILY MEDICINE

## 2023-07-03 VITALS
SYSTOLIC BLOOD PRESSURE: 130 MMHG | DIASTOLIC BLOOD PRESSURE: 70 MMHG | OXYGEN SATURATION: 95 % | BODY MASS INDEX: 21.03 KG/M2 | HEIGHT: 69 IN | WEIGHT: 142 LBS | HEART RATE: 65 BPM | TEMPERATURE: 98 F | RESPIRATION RATE: 22 BRPM

## 2023-07-12 ENCOUNTER — OFFICE VISIT (OUTPATIENT)
Dept: CARDIOLOGY | Facility: CLINIC | Age: 78
End: 2023-07-12
Payer: MEDICARE

## 2023-07-12 ENCOUNTER — CLINICAL SUPPORT (OUTPATIENT)
Dept: CARDIOLOGY | Facility: CLINIC | Age: 78
End: 2023-07-12
Payer: MEDICARE

## 2023-07-12 VITALS
BODY MASS INDEX: 21.19 KG/M2 | HEIGHT: 69 IN | SYSTOLIC BLOOD PRESSURE: 106 MMHG | OXYGEN SATURATION: 96 % | WEIGHT: 143.06 LBS | HEART RATE: 64 BPM | DIASTOLIC BLOOD PRESSURE: 68 MMHG

## 2023-07-12 VITALS
OXYGEN SATURATION: 98 % | HEIGHT: 69 IN | SYSTOLIC BLOOD PRESSURE: 120 MMHG | HEART RATE: 66 BPM | BODY MASS INDEX: 21.06 KG/M2 | WEIGHT: 142.19 LBS | DIASTOLIC BLOOD PRESSURE: 50 MMHG

## 2023-07-12 DIAGNOSIS — I48.92 ATRIAL FLUTTER, UNSPECIFIED TYPE: ICD-10-CM

## 2023-07-12 DIAGNOSIS — I48.19 PERSISTENT ATRIAL FIBRILLATION: ICD-10-CM

## 2023-07-12 DIAGNOSIS — I25.118 CORONARY ARTERY DISEASE OF NATIVE ARTERY OF NATIVE HEART WITH STABLE ANGINA PECTORIS: ICD-10-CM

## 2023-07-12 DIAGNOSIS — I50.22 CHRONIC SYSTOLIC CONGESTIVE HEART FAILURE: Primary | ICD-10-CM

## 2023-07-12 DIAGNOSIS — I48.3 TYPICAL ATRIAL FLUTTER: Primary | ICD-10-CM

## 2023-07-12 DIAGNOSIS — E78.2 MIXED HYPERLIPIDEMIA: ICD-10-CM

## 2023-07-12 DIAGNOSIS — I35.0 NONRHEUMATIC AORTIC VALVE STENOSIS: ICD-10-CM

## 2023-07-12 DIAGNOSIS — I10 ESSENTIAL HYPERTENSION: ICD-10-CM

## 2023-07-12 DIAGNOSIS — I48.3 TYPICAL ATRIAL FLUTTER: ICD-10-CM

## 2023-07-12 PROCEDURE — 99214 PR OFFICE/OUTPT VISIT, EST, LEVL IV, 30-39 MIN: ICD-10-PCS | Mod: S$PBB,,, | Performed by: INTERNAL MEDICINE

## 2023-07-12 PROCEDURE — 99205 OFFICE O/P NEW HI 60 MIN: CPT | Mod: S$PBB,,, | Performed by: INTERNAL MEDICINE

## 2023-07-12 PROCEDURE — 99205 PR OFFICE/OUTPT VISIT, NEW, LEVL V, 60-74 MIN: ICD-10-PCS | Mod: S$PBB,,, | Performed by: INTERNAL MEDICINE

## 2023-07-12 PROCEDURE — 99999 PR PBB SHADOW E&M-EST. PATIENT-LVL III: CPT | Mod: PBBFAC,,, | Performed by: INTERNAL MEDICINE

## 2023-07-12 PROCEDURE — 99214 OFFICE O/P EST MOD 30 MIN: CPT | Mod: S$PBB,,, | Performed by: INTERNAL MEDICINE

## 2023-07-12 PROCEDURE — 99999 PR PBB SHADOW E&M-EST. PATIENT-LVL III: ICD-10-PCS | Mod: PBBFAC,,, | Performed by: INTERNAL MEDICINE

## 2023-07-12 PROCEDURE — 99999 PR PBB SHADOW E&M-EST. PATIENT-LVL IV: CPT | Mod: PBBFAC,,, | Performed by: INTERNAL MEDICINE

## 2023-07-12 PROCEDURE — 99999 PR PBB SHADOW E&M-EST. PATIENT-LVL IV: ICD-10-PCS | Mod: PBBFAC,,, | Performed by: INTERNAL MEDICINE

## 2023-07-12 PROCEDURE — 99213 OFFICE O/P EST LOW 20 MIN: CPT | Mod: PBBFAC,27 | Performed by: INTERNAL MEDICINE

## 2023-07-12 PROCEDURE — 99214 OFFICE O/P EST MOD 30 MIN: CPT | Mod: PBBFAC,PO | Performed by: INTERNAL MEDICINE

## 2023-07-12 NOTE — PROGRESS NOTES
Subjective:   Patient ID:  Humberto Carlson is a 78 y.o. male who presents for evaluation of No chief complaint on file.      78 yoM referred for AFL management. He wa sin NSR 2022. AFL noted 4/23. EF was 40-45% in SR, 40% in AFL. He felt more fatigue and BECKER. He feels his symptoms started early 2023. He was started on OAC amd underwent CV mid June 2023 with improvement in symptoms.     Echo 4/23 in AF:  · The left ventricle is normal in size with mildly decreased systolic function.  · Grade II left ventricular diastolic dysfunction.  · The estimated PA systolic pressure is 52 mmHg.  · Normal right ventricular size with normal right ventricular systolic function.  · There is pulmonary hypertension.  · Intermediate central venous pressure (8 mmHg).  · The estimated ejection fraction is 45%.  · There are segmental left ventricular wall motion abnormalities.  · There is mild aortic valve stenosis.  · Aortic valve area is 1.33 cm2; peak velocity is 1.96 m/s; mean gradient is 8 mmHg.  · Mild mitral regurgitation.  · Mild tricuspid regurgitation.      Past Medical History:  12/2/2019: Acute bronchitis  No date: Asthma  No date: Bleeding in brain      Comment:  2015 - reports after fall - had surgery to remove blood.  No date: Cancer  No date: Cataract  5/24/2021: Chronic obstructive pulmonary disease  10/26/2020: Coronary artery disease of native artery of native heart   with stable angina pectoris  No date: Encounter for blood transfusion  7/22/2019: Essential hypertension  No date: GERD (gastroesophageal reflux disease)  No date: Glaucoma  No date: Hyperlipidemia  5/2/2022: Major depression in partial remission  No date: Seizures    Past Surgical History:  No date: BRAIN SURGERY  No date: CARPAL TUNNEL RELEASE  No date: EYE SURGERY  No date: LUNG SURGERY  No date: Partial lung removed; Left  No date: REPAIR,BROW PTOSIS  No date: toe nail removal  6/28/2023: TRANSESOPHAGEAL ECHOCARDIOGRAM WITH POSSIBLE CARDIOVERSION    (ANUM W/ POSS CARDIOVERSION); N/A      Comment:  Procedure: Transesophageal echo (ANUM) intra-procedure                log documentation;  Surgeon: Gallo Carbajal MD;  Location:                [facility] CATH LAB;  Service: Cardiology;  Laterality: N/A;    Social History    Socioeconomic History      Marital status:     Tobacco Use      Smoking status: Every Day        Packs/day: 2.00        Types: Cigarettes      Smokeless tobacco: Never    Substance and Sexual Activity      Alcohol use: No      Drug use: No      Sexual activity: Not Currently        Partners: Female    Social Determinants of Health  Financial Resource Strain: Low Risk       Difficulty of Paying Living Expenses: Not hard at all  Food Insecurity: No Food Insecurity      Worried About Running Out of Food in the Last Year: Never true      Ran Out of Food in the Last Year: Never true  Transportation Needs: No Transportation Needs      Lack of Transportation (Medical): No      Lack of Transportation (Non-Medical): No  Physical Activity: Inactive      Days of Exercise per Week: 0 days      Minutes of Exercise per Session: 0 min  Stress: Stress Concern Present      Feeling of Stress : Very much  Social Connections: Socially Isolated      Frequency of Communication with Friends and Family: More than three times a week      Frequency of Social Gatherings with Friends and Family: More than three times a week      Attends Islam Services: Never      Active Member of Clubs or Organizations: No      Marital Status:   Housing Stability: Low Risk       Unable to Pay for Housing in the Last Year: No      Number of Places Lived in the Last Year: 1      Unstable Housing in the Last Year: No    Review of patient's family history indicates:    Current Outpatient Medications:  albuterol (PROVENTIL/VENTOLIN HFA) 90 mcg/actuation inhaler, Rescue, Disp: 25.5 g, Rfl: 3  apixaban (ELIQUIS) 5 mg Tab, Take 1 tablet (5 mg total) by mouth 2 (two) times daily., Disp: 60  tablet, Rfl: 5  atorvastatin (LIPITOR) 40 MG tablet, TAKE 1 TABLET BY MOUTH ONCE A DAY, Disp: 90 tablet, Rfl: 2  brimonidine-timoloL (COMBIGAN) 0.2-0.5 % Drop, Inject 1 drop into the eye Twice daily., Disp: , Rfl:   EPINEPHrine (EPIPEN) 0.3 mg/0.3 mL AtIn, USE AS DIRECTED AS NEEDED, Disp: 2 each, Rfl: 0  fluticasone propionate (FLONASE) 50 mcg/actuation nasal spray, 1 SPRAY IN EACH NOSTRIL ONCE DAILY., Disp: 48 g, Rfl: 2  latanoprost 0.005 % ophthalmic solution, , Disp: , Rfl:   metoprolol succinate (TOPROL-XL) 25 MG 24 hr tablet, Take 0.5 tablets (12.5 mg total) by mouth once daily., Disp: 45 tablet, Rfl: 3  naproxen sodium (ANAPROX) 220 MG tablet, Take 220 mg by mouth., Disp: , Rfl:   phenytoin (DILANTIN) 100 MG ER capsule, Take 3 capsules (300 mg total) by mouth once daily., Disp: 270 capsule, Rfl: 3  sacubitriL-valsartan (ENTRESTO) 24-26 mg per tablet, Take 1 tablet by mouth 2 (two) times daily., Disp: 60 tablet, Rfl: 6  sertraline (ZOLOFT) 50 MG tablet, Take 1 tablet (50 mg total) by mouth once daily., Disp: 90 tablet, Rfl: 3  triamcinolone acetonide 0.1% (KENALOG) 0.1 % cream, Apply topically 2 (two) times daily., Disp: 28.4 g, Rfl: 0    No current facility-administered medications for this visit.        Review of Systems   Constitutional: Negative.   HENT: Negative.     Eyes: Negative.    Cardiovascular:  Negative for chest pain, dyspnea on exertion, leg swelling, near-syncope, palpitations and syncope.   Respiratory: Negative.  Negative for shortness of breath.    Endocrine: Negative.    Hematologic/Lymphatic: Negative.    Skin: Negative.    Musculoskeletal: Negative.    Gastrointestinal: Negative.    Genitourinary: Negative.    Neurological: Negative.  Negative for dizziness and light-headedness.   Psychiatric/Behavioral: Negative.     Allergic/Immunologic: Negative.      Objective:   Physical Exam  Constitutional:       General: He is not in acute distress.     Appearance: He is well-developed.   HENT:       Head: Normocephalic and atraumatic.   Eyes:      Pupils: Pupils are equal, round, and reactive to light.   Neck:      Thyroid: No thyromegaly.      Vascular: No JVD.   Cardiovascular:      Rate and Rhythm: Normal rate and regular rhythm.      Chest Wall: PMI is not displaced.      Heart sounds: Normal heart sounds, S1 normal and S2 normal. No murmur heard.    No friction rub. No gallop.   Pulmonary:      Effort: Pulmonary effort is normal. No respiratory distress.      Breath sounds: Normal breath sounds. No wheezing or rales.   Abdominal:      General: Bowel sounds are normal. There is no distension.      Palpations: Abdomen is soft.      Tenderness: There is no abdominal tenderness. There is no guarding or rebound.   Musculoskeletal:         General: No tenderness. Normal range of motion.      Cervical back: Normal range of motion.   Skin:     General: Skin is warm and dry.      Findings: No erythema or rash.   Neurological:      Mental Status: He is alert and oriented to person, place, and time.      Cranial Nerves: No cranial nerve deficit.   Psychiatric:         Behavior: Behavior normal.         Thought Content: Thought content normal.         Judgment: Judgment normal.       Assessment:      1. Typical atrial flutter        Plan:     78 yoM symptomatic, typical AFL with associated HF. I recommended CTI flutter for definitive management. Extensive discussion regarding risks, benefits, and alternative approaches to the procedure was had with the patient.  The patient voices understanding and all questions have been answered.  The patient would like to proceed as planned.    AFL ablation  BECKY  Hold OAC day of the procedure

## 2023-07-12 NOTE — PROGRESS NOTES
Subjective:   Patient ID:  Humberto Carlson is a 78 y.o. male who presents for follow up of No chief complaint on file.      77 yo male f/u for low BP  PMH CAD old MI  CHFrEF 35%, mild AS, AFL, AFIB, lung cancer s/p removal of left lung in 2015, h/o fall two months after the procedure and brain bleeding s/p removal, HTN HLD smoker 1 ppd for 60 yrs, occasional drinking  Part time work.  ECHO EF 40% mild AS and apical HK  EKG NSR old septal infarct   MPI showed moderate sized severally fixed defect perfusion in apex, EF 35%  Carotid US 20% right and 50% left       06/2021 visit  No chest pain dizziness palpitation  Chronic BECKER after the lung procedure  Med compliance  EKG NASR HR 52 bpm, nonspecific STT change     visit  BP log reviewed. Occasional dizziness if looking up.  No chest pain weight control, leg swelling, palpitation. BECKER stable and the inhaler helps. 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 Chronic SOB due to mask and smoking.  Sleeps on 1 pillow and no leg swelling  EKG NSR and old septal infarct  BP checked regularly and controlled. Physically active at home     VISIT  Still smoking 1ppd. And chronic SOB and fatigue.  BP controlled at home and not taking the med yet.   No chest pain dizziness faint palpitation and leg swelling   echo EF 40 to 45% and mild AS; carotid US left 50 to 59% and right < 50%     visit  TODAY EKG AFL with variable AV block.  Chronic SOB. No dizziness and faint. Occasional lightheadedness. No chest pain  Physically active. Still smoking  AFIB [id number] score of 4    06/23 visit  C/o BP drop to 80 mmHg. His dizziness occurred when standing up quickly. No syncope papitation.   New dx of afib in 04-23. VR controlled. EF 45%.     Interval history  S/p ANUM and dccv in 06/23. Now SR. Improved energy SOB. Can walk longer distance  No active bleeding. F/u with  EP          Past Medical History:   Diagnosis Date    Acute bronchitis 12/2/2019    Asthma     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    Cancer     Cataract     Chronic obstructive pulmonary disease 5/24/2021    Coronary artery disease of native artery of native heart with stable angina pectoris 10/26/2020    Encounter for blood transfusion     Essential hypertension 7/22/2019    GERD (gastroesophageal reflux disease)     Glaucoma     Hyperlipidemia     Major depression in partial remission 5/2/2022    Seizures        Past Surgical History:   Procedure Laterality Date    BRAIN SURGERY      CARPAL TUNNEL RELEASE      EYE SURGERY      LUNG SURGERY      Partial lung removed Left     REPAIR,BROW PTOSIS      toe nail removal      TRANSESOPHAGEAL ECHOCARDIOGRAM WITH POSSIBLE CARDIOVERSION (ANUM W/ POSS CARDIOVERSION) N/A 6/28/2023    Procedure: Transesophageal echo (ANUM) intra-procedure log documentation;  Surgeon: Gallo Carbajal MD;  Location: [facility] CATH LAB;  Service: Cardiology;  Laterality: N/A;       Social History     Tobacco Use    Smoking status: Every Day     Packs/day: 2.00     Types: Cigarettes    Smokeless tobacco: Never   Substance Use Topics    Alcohol use: No    Drug use: No       Family History   Problem Relation Age of Onset    Stroke Mother     Heart attack Father     Coronary artery disease Brother     Valvular heart disease Brother     Stroke Paternal Grandfather          ROS    Objective:   Physical Exam  HENT:      Head: Normocephalic.   Eyes:      Pupils: Pupils are equal, round, and reactive to light.   Neck:      Thyroid: No thyromegaly.      Vascular: Normal carotid pulses. No carotid bruit or JVD.   Cardiovascular:      Rate and Rhythm: Normal rate and regular rhythm. No extrasystoles are present.     Chest Wall: PMI is not displaced.      Pulses: Normal pulses.      Heart sounds: Normal heart sounds. No murmur heard.    No gallop. No S3 sounds.   Pulmonary:       Effort: No respiratory distress.      Breath sounds: Normal breath sounds. No stridor.   Abdominal:      General: Bowel sounds are normal.      Palpations: Abdomen is soft.      Tenderness: There is no abdominal tenderness. There is no rebound.   Musculoskeletal:         General: Normal range of motion.   Skin:     Findings: No rash.   Neurological:      Mental Status: He is alert and oriented to person, place, and time.   Psychiatric:         Behavior: Behavior normal.       Lab Results   Component Value Date    CHOL 159 09/26/2022    CHOL 174 01/25/2021    CHOL 187 02/25/2020     Lab Results   Component Value Date    HDL 48 09/26/2022    HDL 62 01/25/2021    HDL 49 02/25/2020     Lab Results   Component Value Date    LDLCALC 91.4 09/26/2022    LDLCALC 88.8 01/25/2021    LDLCALC 110.4 02/25/2020     Lab Results   Component Value Date    TRIG 98 09/26/2022    TRIG 116 01/25/2021    TRIG 138 02/25/2020     Lab Results   Component Value Date    CHOLHDL 30.2 09/26/2022    CHOLHDL 35.6 01/25/2021    CHOLHDL 26.2 02/25/2020       Chemistry        Component Value Date/Time     06/28/2023 1104    K 4.8 06/28/2023 1104     06/28/2023 1104    CO2 29 06/28/2023 1104    BUN 14 06/28/2023 1104    CREATININE 0.9 06/28/2023 1104     (H) 06/28/2023 1104        Component Value Date/Time    CALCIUM 9.6 06/28/2023 1104    ALKPHOS 41 (L) 09/26/2022 0924    AST 26 09/26/2022 0924    ALT 24 09/26/2022 0924    BILITOT 0.4 09/26/2022 0924    ESTGFRAFRICA >60.0 09/30/2021 1130    EGFRNONAA >60.0 09/30/2021 1130          Lab Results   Component Value Date    HGBA1C 5.5 09/26/2022     Lab Results   Component Value Date    TSH 1.455 09/26/2022     No results found for: INR, PROTIME  Lab Results   Component Value Date    WBC 7.81 06/28/2023    HGB 12.9 (L) 06/28/2023    HCT 39.2 (L) 06/28/2023    MCV 89 06/28/2023     06/28/2023     BMP  Sodium   Date Value Ref Range Status   06/28/2023 141 136 - 145 mmol/L Final      Potassium   Date Value Ref Range Status   06/28/2023 4.8 3.5 - 5.1 mmol/L Final     Chloride   Date Value Ref Range Status   06/28/2023 102 95 - 110 mmol/L Final     CO2   Date Value Ref Range Status   06/28/2023 29 23 - 29 mmol/L Final     BUN   Date Value Ref Range Status   06/28/2023 14 8 - 23 mg/dL Final     Creatinine   Date Value Ref Range Status   06/28/2023 0.9 0.5 - 1.4 mg/dL Final     Calcium   Date Value Ref Range Status   06/28/2023 9.6 8.7 - 10.5 mg/dL Final     Anion Gap   Date Value Ref Range Status   06/28/2023 10 8 - 16 mmol/L Final     eGFR if    Date Value Ref Range Status   09/30/2021 >60.0 >60 mL/min/1.73 m^2 Final     eGFR if non    Date Value Ref Range Status   09/30/2021 >60.0 >60 mL/min/1.73 m^2 Final     Comment:     Calculation used to obtain the estimated glomerular filtration  rate (eGFR) is the CKD-EPI equation.        BNP  @LABRCNTIP(BNP,BNPTRIAGEBLO)@  @LABRCNTIP(troponini)@  CrCl cannot be calculated (Patient's most recent lab result is older than the maximum 7 days allowed.).  No results found in the last 24 hours.  No results found in the last 24 hours.  No results found in the last 24 hours.    Assessment:      1. Chronic systolic congestive heart failure    2. Coronary artery disease of native artery of native heart with stable angina pectoris    3. Nonrheumatic aortic valve stenosis    4. Typical atrial flutter    5. Essential hypertension    6. Mixed hyperlipidemia      On SR  Plan:   Continue Eliquis statin toprolXL and entresto  Fu with EP  RTC in 3 M

## 2023-07-14 NOTE — PROGRESS NOTES
Patient is s/p Dank/Cv for Atrial Flutter and should have Ekg upon next Clinic visit or 1-2 weeks after

## 2023-07-25 ENCOUNTER — TELEPHONE (OUTPATIENT)
Dept: ELECTROPHYSIOLOGY | Facility: CLINIC | Age: 78
End: 2023-07-25
Payer: MEDICARE

## 2023-07-25 NOTE — TELEPHONE ENCOUNTER
I spoke with Mr. Carlson and scheduled his procedure for 8/31/23. Procedure details reviewed and instructions will be sent via mail as requested. He would like to be moved up if possible. He appreciated the call.

## 2023-07-28 DIAGNOSIS — I49.9 CARDIAC ARRHYTHMIA, UNSPECIFIED CARDIAC ARRHYTHMIA TYPE: ICD-10-CM

## 2023-07-28 DIAGNOSIS — I48.3 TYPICAL ATRIAL FLUTTER: Primary | ICD-10-CM

## 2023-07-31 ENCOUNTER — EXTERNAL CHRONIC CARE MANAGEMENT (OUTPATIENT)
Dept: PRIMARY CARE CLINIC | Facility: CLINIC | Age: 78
End: 2023-07-31
Payer: MEDICARE

## 2023-07-31 PROCEDURE — 99490 CHRNC CARE MGMT STAFF 1ST 20: CPT | Mod: PBBFAC | Performed by: FAMILY MEDICINE

## 2023-07-31 PROCEDURE — 99490 PR CHRONIC CARE MGMT, 1ST 20 MIN: ICD-10-PCS | Mod: S$PBB,,, | Performed by: FAMILY MEDICINE

## 2023-07-31 PROCEDURE — 99490 CHRNC CARE MGMT STAFF 1ST 20: CPT | Mod: S$PBB,,, | Performed by: FAMILY MEDICINE

## 2023-08-01 DIAGNOSIS — J30.9 ALLERGIC RHINITIS, UNSPECIFIED SEASONALITY, UNSPECIFIED TRIGGER: ICD-10-CM

## 2023-08-01 RX ORDER — FLUTICASONE PROPIONATE 50 MCG
SPRAY, SUSPENSION (ML) NASAL
Qty: 48 G | Refills: 2 | OUTPATIENT
Start: 2023-08-01

## 2023-08-01 NOTE — TELEPHONE ENCOUNTER
No care due was identified.  [facility] Embedded Care Due Messages. Reference number: [id number].   8/01/2023 10:45:11 AM CDT

## 2023-08-01 NOTE — TELEPHONE ENCOUNTER
Refill Decision Note   Humberto Carlson  is requesting a refill authorization.  Brief Assessment and Rationale for Refill:  Quick Discontinue     Medication Therapy Plan: Pharmacy is requesting new scripts for the following medications without required information, (sig/ frequency/qty/etc)     Medication Reconciliation Completed: No   Comments:     No Care Gaps recommended.     Note composed:1:10 PM 08/01/2023

## 2023-08-15 DIAGNOSIS — J30.9 ALLERGIC RHINITIS, UNSPECIFIED SEASONALITY, UNSPECIFIED TRIGGER: ICD-10-CM

## 2023-08-15 RX ORDER — FLUTICASONE PROPIONATE 50 MCG
SPRAY, SUSPENSION (ML) NASAL
Qty: 32 G | Refills: 2 | Status: SHIPPED | OUTPATIENT
Start: 2023-08-15 | End: 2024-03-27 | Stop reason: SDUPTHER

## 2023-08-15 NOTE — TELEPHONE ENCOUNTER
Refill Decision Note   Humberto Carlson  is requesting a refill authorization.  Brief Assessment and Rationale for Refill:  Approve     Medication Therapy Plan:         Comments:     Note composed:5:46 PM 08/15/2023

## 2023-08-15 NOTE — TELEPHONE ENCOUNTER
No care due was identified.  [facility] Embedded Care Due Messages. Reference number: [id number].   8/15/2023 4:21:35 PM CDT

## 2023-08-21 ENCOUNTER — LAB VISIT (OUTPATIENT)
Dept: LAB | Facility: HOSPITAL | Age: 78
End: 2023-08-21
Attending: INTERNAL MEDICINE
Payer: MEDICARE

## 2023-08-21 DIAGNOSIS — I49.9 CARDIAC ARRHYTHMIA, UNSPECIFIED CARDIAC ARRHYTHMIA TYPE: ICD-10-CM

## 2023-08-21 DIAGNOSIS — I48.3 TYPICAL ATRIAL FLUTTER: ICD-10-CM

## 2023-08-21 LAB
ANION GAP SERPL CALC-SCNC: 8 MMOL/L (ref 8–16)
BUN SERPL-MCNC: 14 MG/DL (ref 8–23)
CALCIUM SERPL-MCNC: 8.8 MG/DL (ref 8.7–10.5)
CHLORIDE SERPL-SCNC: 105 MMOL/L (ref 95–110)
CO2 SERPL-SCNC: 26 MMOL/L (ref 23–29)
CREAT SERPL-MCNC: 0.9 MG/DL (ref 0.5–1.4)
ERYTHROCYTE [DISTWIDTH] IN BLOOD BY AUTOMATED COUNT: 13.2 % (ref 11.5–14.5)
EST. GFR  (NO RACE VARIABLE): >60 ML/MIN/1.73 M^2
GLUCOSE SERPL-MCNC: 98 MG/DL (ref 70–110)
HCT VFR BLD AUTO: 36.5 % (ref 40–54)
HGB BLD-MCNC: 11.9 G/DL (ref 14–18)
MCH RBC QN AUTO: 29.8 PG (ref 27–31)
MCHC RBC AUTO-ENTMCNC: 32.6 G/DL (ref 32–36)
MCV RBC AUTO: 91 FL (ref 82–98)
PLATELET # BLD AUTO: 277 K/UL (ref 150–450)
PMV BLD AUTO: 11.4 FL (ref 9.2–12.9)
POTASSIUM SERPL-SCNC: 4.7 MMOL/L (ref 3.5–5.1)
RBC # BLD AUTO: 4 M/UL (ref 4.6–6.2)
SODIUM SERPL-SCNC: 139 MMOL/L (ref 136–145)
WBC # BLD AUTO: 7.54 K/UL (ref 3.9–12.7)

## 2023-08-21 PROCEDURE — 85730 THROMBOPLASTIN TIME PARTIAL: CPT | Performed by: INTERNAL MEDICINE

## 2023-08-21 PROCEDURE — 80048 BASIC METABOLIC PNL TOTAL CA: CPT | Performed by: INTERNAL MEDICINE

## 2023-08-21 PROCEDURE — 36415 COLL VENOUS BLD VENIPUNCTURE: CPT | Mod: PO | Performed by: INTERNAL MEDICINE

## 2023-08-21 PROCEDURE — 85610 PROTHROMBIN TIME: CPT | Performed by: INTERNAL MEDICINE

## 2023-08-21 PROCEDURE — 85027 COMPLETE CBC AUTOMATED: CPT | Performed by: INTERNAL MEDICINE

## 2023-08-22 LAB
APTT PPP: 33.9 SEC (ref 21–32)
INR PPP: 1.1 (ref 0.8–1.2)
PROTHROMBIN TIME: 12.1 SEC (ref 9–12.5)

## 2023-08-30 ENCOUNTER — TELEPHONE (OUTPATIENT)
Dept: ELECTROPHYSIOLOGY | Facility: CLINIC | Age: 78
End: 2023-08-30
Payer: MEDICARE

## 2023-08-30 NOTE — SUBJECTIVE & OBJECTIVE
Past Medical History:   Diagnosis Date    Acute bronchitis 12/2/2019    Asthma     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    Cancer     Cataract     Chronic obstructive pulmonary disease 5/24/2021    Coronary artery disease of native artery of native heart with stable angina pectoris 10/26/2020    Encounter for blood transfusion     Essential hypertension 7/22/2019    GERD (gastroesophageal reflux disease)     Glaucoma     Hyperlipidemia     Major depression in partial remission 5/2/2022    Seizures        Past Surgical History:   Procedure Laterality Date    BRAIN SURGERY      CARPAL TUNNEL RELEASE      EYE SURGERY      LUNG SURGERY      Partial lung removed Left     REPAIR,BROW PTOSIS      toe nail removal      TRANSESOPHAGEAL ECHOCARDIOGRAM WITH POSSIBLE CARDIOVERSION (ANUM W/ POSS CARDIOVERSION) N/A 6/28/2023    Procedure: Transesophageal echo (ANUM) intra-procedure log documentation;  Surgeon: Gallo Carbajal MD;  Location: [facility] CATH LAB;  Service: Cardiology;  Laterality: N/A;       Review of patient's allergies indicates:   Allergen Reactions    Wasp sting  [allergen ext-venom-honey bee]      Other reaction(s): swelling       No current facility-administered medications on file prior to encounter.     Current Outpatient Medications on File Prior to Encounter   Medication Sig    albuterol (PROVENTIL/VENTOLIN HFA) 90 mcg/actuation inhaler Rescue    apixaban (ELIQUIS) 5 mg Tab Take 1 tablet (5 mg total) by mouth 2 (two) times daily.    atorvastatin (LIPITOR) 40 MG tablet TAKE 1 TABLET BY MOUTH ONCE A DAY    brimonidine-timoloL (COMBIGAN) 0.2-0.5 % Drop Inject 1 drop into the eye Twice daily.    EPINEPHrine (EPIPEN) 0.3 mg/0.3 mL AtIn USE AS DIRECTED AS NEEDED    latanoprost 0.005 % ophthalmic solution     metoprolol succinate (TOPROL-XL) 25 MG 24 hr tablet Take 0.5 tablets (12.5 mg total) by mouth once daily.    naproxen sodium (ANAPROX) 220 MG tablet Take 220 mg by mouth.     phenytoin (DILANTIN) 100 MG ER capsule Take 3 capsules (300 mg total) by mouth once daily.    sacubitriL-valsartan (ENTRESTO) 24-26 mg per tablet Take 1 tablet by mouth 2 (two) times daily.    sertraline (ZOLOFT) 50 MG tablet Take 1 tablet (50 mg total) by mouth once daily.    triamcinolone acetonide 0.1% (KENALOG) 0.1 % cream Apply topically 2 (two) times daily.     Family History       Problem Relation (Age of Onset)    Coronary artery disease Brother    Heart attack Father    Stroke Mother, Paternal Grandfather    Valvular heart disease Brother          Tobacco Use    Smoking status: Every Day     Current packs/day: 2.00     Types: Cigarettes    Smokeless tobacco: Never   Substance and Sexual Activity    Alcohol use: No    Drug use: No    Sexual activity: Not Currently     Partners: Female     Review of Systems   All other systems reviewed and are negative.    Objective:     Vital Signs (Most Recent):    Vital Signs (24h Range):  BP: ()/()   Arterial Line BP: ()/()           There is no height or weight on file to calculate BMI.             Physical Exam  Vitals and nursing note reviewed.   Constitutional:       Appearance: Normal appearance.   HENT:      Head: Normocephalic.   Cardiovascular:      Rate and Rhythm: Normal rate and regular rhythm.      Pulses: Normal pulses.   Pulmonary:      Effort: Pulmonary effort is normal.      Breath sounds: Normal breath sounds.   Abdominal:      General: Abdomen is flat.   Musculoskeletal:      Right lower leg: No edema.      Left lower leg: No edema.   Skin:     General: Skin is warm.   Neurological:      General: No focal deficit present.      Mental Status: He is alert.            Significant Labs: All pertinent lab results from the last 24 hours have been reviewed.

## 2023-08-30 NOTE — HPI
78 yoM referred for AFL management. He wa sin NSR 2022. AFL noted 4/23. EF was 40-45% in SR, 40% in AFL. He felt more fatigue and BECKER. He feels his symptoms started early 2023. He was started on OAC amd underwent CV mid June 2023 with improvement in symptoms.      ECG: Sinus bradycardia, rate 49bpm, qrs 88, res222

## 2023-08-30 NOTE — TELEPHONE ENCOUNTER
Spoke to Mr. Carlson    CONFIRMED procedure arrival time of 0515    Reiterated instructions including:  -Directions to check in desk  -NPO after midnight night prior to procedure  -High importance of HOLDING Eliquis am of procedure, Metoprolol as directed  -Pre-procedure LABS done, no alerts  -Confirmed no fever, new cough, or shortness of breath  -Confirmed no redness, rash, irritation, or yeast infection to groin area.     Patient verbalized understanding of above and appreciated the call.

## 2023-08-30 NOTE — ASSESSMENT & PLAN NOTE
Patient here for CTI RFA for CTI dependent atrial flutter    Anticoagulation: apixiban  EF (most recent): 45%  AAD/AVN agents: Toprol 25    The risks, benefits and alternatives of the procedure were explained to the patient, patient's family and/or surrogate decision maker. Risks include (but not limited to) bleeding, hematoma, infection, pain, vascular damage, damage to structures surrounding the vasculature, myocardial damage [perforation, valvular damage], cardiac tamponade, CVA, MI, and death. Patient is understanding that repeat ablations may be required. Patient is understanding that they are at risk for developing or being diagnosed with atrial fibrillation which may requiring AAD therapy and/or ablation(s) in the future. All questions were answered. Patient is understanding of these risks, and would like to proceed. Consents signed.

## 2023-08-31 ENCOUNTER — HOSPITAL ENCOUNTER (OUTPATIENT)
Facility: HOSPITAL | Age: 78
Discharge: HOME | End: 2023-08-31
Attending: INTERNAL MEDICINE | Admitting: INTERNAL MEDICINE
Payer: MEDICARE

## 2023-08-31 ENCOUNTER — ANESTHESIA (OUTPATIENT)
Dept: MEDSURG UNIT | Facility: HOSPITAL | Age: 78
End: 2023-08-31
Payer: MEDICARE

## 2023-08-31 ENCOUNTER — ANESTHESIA EVENT (OUTPATIENT)
Dept: MEDSURG UNIT | Facility: HOSPITAL | Age: 78
End: 2023-08-31
Payer: MEDICARE

## 2023-08-31 ENCOUNTER — EXTERNAL CHRONIC CARE MANAGEMENT (OUTPATIENT)
Dept: PRIMARY CARE CLINIC | Facility: CLINIC | Age: 78
End: 2023-08-31
Payer: MEDICARE

## 2023-08-31 VITALS
TEMPERATURE: 98 F | DIASTOLIC BLOOD PRESSURE: 56 MMHG | OXYGEN SATURATION: 97 % | HEART RATE: 61 BPM | HEIGHT: 70 IN | WEIGHT: 135 LBS | BODY MASS INDEX: 19.33 KG/M2 | SYSTOLIC BLOOD PRESSURE: 129 MMHG | RESPIRATION RATE: 20 BRPM

## 2023-08-31 DIAGNOSIS — I48.92 ATRIAL FLUTTER: ICD-10-CM

## 2023-08-31 DIAGNOSIS — I48.3 TYPICAL ATRIAL FLUTTER: ICD-10-CM

## 2023-08-31 DIAGNOSIS — Z98.890 POST-OPERATIVE STATE: ICD-10-CM

## 2023-08-31 DIAGNOSIS — I49.9 ARRHYTHMIA: ICD-10-CM

## 2023-08-31 PROCEDURE — 93005 ELECTROCARDIOGRAM TRACING: CPT | Mod: 59

## 2023-08-31 PROCEDURE — C1894 INTRO/SHEATH, NON-LASER: HCPCS | Performed by: INTERNAL MEDICINE

## 2023-08-31 PROCEDURE — D9220A PRA ANESTHESIA: Mod: ANES,,, | Performed by: ANESTHESIOLOGY

## 2023-08-31 PROCEDURE — D9220A PRA ANESTHESIA: ICD-10-PCS | Mod: ANES,,, | Performed by: ANESTHESIOLOGY

## 2023-08-31 PROCEDURE — 93010 EKG 12-LEAD: ICD-10-PCS | Mod: 76,,, | Performed by: INTERNAL MEDICINE

## 2023-08-31 PROCEDURE — D9220A PRA ANESTHESIA: Mod: CRNA,,, | Performed by: NURSE ANESTHETIST, CERTIFIED REGISTERED

## 2023-08-31 PROCEDURE — 25000003 PHARM REV CODE 250: Performed by: INTERNAL MEDICINE

## 2023-08-31 PROCEDURE — 63600175 PHARM REV CODE 636 W HCPCS: Performed by: NURSE ANESTHETIST, CERTIFIED REGISTERED

## 2023-08-31 PROCEDURE — C1730 CATH, EP, 19 OR FEW ELECT: HCPCS | Performed by: INTERNAL MEDICINE

## 2023-08-31 PROCEDURE — 99490 CHRNC CARE MGMT STAFF 1ST 20: CPT | Mod: PBBFAC | Performed by: FAMILY MEDICINE

## 2023-08-31 PROCEDURE — 99490 PR CHRONIC CARE MGMT, 1ST 20 MIN: ICD-10-PCS | Mod: S$PBB,,, | Performed by: FAMILY MEDICINE

## 2023-08-31 PROCEDURE — 37000008 HC ANESTHESIA 1ST 15 MINUTES: Performed by: INTERNAL MEDICINE

## 2023-08-31 PROCEDURE — D9220A PRA ANESTHESIA: ICD-10-PCS | Mod: CRNA,,, | Performed by: NURSE ANESTHETIST, CERTIFIED REGISTERED

## 2023-08-31 PROCEDURE — 27201423 OPTIME MED/SURG SUP & DEVICES STERILE SUPPLY: Performed by: INTERNAL MEDICINE

## 2023-08-31 PROCEDURE — 25000003 PHARM REV CODE 250: Performed by: NURSE ANESTHETIST, CERTIFIED REGISTERED

## 2023-08-31 PROCEDURE — 37000009 HC ANESTHESIA EA ADD 15 MINS: Performed by: INTERNAL MEDICINE

## 2023-08-31 PROCEDURE — C1733 CATH, EP, OTHR THAN COOL-TIP: HCPCS | Performed by: INTERNAL MEDICINE

## 2023-08-31 PROCEDURE — 93653 COMPRE EP EVAL TX SVT: CPT | Performed by: INTERNAL MEDICINE

## 2023-08-31 PROCEDURE — 99490 CHRNC CARE MGMT STAFF 1ST 20: CPT | Mod: S$PBB,,, | Performed by: FAMILY MEDICINE

## 2023-08-31 PROCEDURE — 93653 COMPRE EP EVAL TX SVT: CPT | Mod: GC,,, | Performed by: INTERNAL MEDICINE

## 2023-08-31 PROCEDURE — 93010 ELECTROCARDIOGRAM REPORT: CPT | Mod: ,,, | Performed by: INTERNAL MEDICINE

## 2023-08-31 PROCEDURE — 93005 ELECTROCARDIOGRAM TRACING: CPT

## 2023-08-31 PROCEDURE — 93653 PR ELECTROPHYS EVAL, COMPREHEN, W/SUPRAVENT TACHYCARD TRMT: ICD-10-PCS | Mod: GC,,, | Performed by: INTERNAL MEDICINE

## 2023-08-31 PROCEDURE — 93010 ELECTROCARDIOGRAM REPORT: CPT | Mod: 76,,, | Performed by: INTERNAL MEDICINE

## 2023-08-31 RX ORDER — SODIUM CHLORIDE 0.9 % (FLUSH) 0.9 %
3 SYRINGE (ML) INJECTION
Status: DISCONTINUED | OUTPATIENT
Start: 2023-08-31 | End: 2023-08-31 | Stop reason: HOSPADM

## 2023-08-31 RX ORDER — KETAMINE HCL IN 0.9 % NACL 50 MG/5 ML
SYRINGE (ML) INTRAVENOUS
Status: DISCONTINUED | OUTPATIENT
Start: 2023-08-31 | End: 2023-08-31

## 2023-08-31 RX ORDER — FENTANYL CITRATE 50 UG/ML
INJECTION, SOLUTION INTRAMUSCULAR; INTRAVENOUS
Status: DISCONTINUED | OUTPATIENT
Start: 2023-08-31 | End: 2023-08-31

## 2023-08-31 RX ORDER — LIDOCAINE HYDROCHLORIDE 20 MG/ML
INJECTION, SOLUTION INFILTRATION; PERINEURAL
Status: DISCONTINUED | OUTPATIENT
Start: 2023-08-31 | End: 2023-08-31 | Stop reason: HOSPADM

## 2023-08-31 RX ORDER — PROPOFOL 10 MG/ML
VIAL (ML) INTRAVENOUS CONTINUOUS PRN
Status: DISCONTINUED | OUTPATIENT
Start: 2023-08-31 | End: 2023-08-31

## 2023-08-31 RX ORDER — LIDOCAINE HYDROCHLORIDE 20 MG/ML
INJECTION, SOLUTION EPIDURAL; INFILTRATION; INTRACAUDAL; PERINEURAL
Status: DISCONTINUED | OUTPATIENT
Start: 2023-08-31 | End: 2023-08-31

## 2023-08-31 RX ORDER — FENTANYL CITRATE 50 UG/ML
25 INJECTION, SOLUTION INTRAMUSCULAR; INTRAVENOUS EVERY 5 MIN PRN
Status: DISCONTINUED | OUTPATIENT
Start: 2023-08-31 | End: 2023-08-31 | Stop reason: HOSPADM

## 2023-08-31 RX ORDER — HEPARIN SOD,PORCINE/0.9 % NACL 1000/500ML
INTRAVENOUS SOLUTION INTRAVENOUS
Status: DISCONTINUED | OUTPATIENT
Start: 2023-08-31 | End: 2023-08-31 | Stop reason: HOSPADM

## 2023-08-31 RX ORDER — ACETAMINOPHEN 325 MG/1
650 TABLET ORAL EVERY 4 HOURS PRN
Status: DISCONTINUED | OUTPATIENT
Start: 2023-08-31 | End: 2023-08-31 | Stop reason: HOSPADM

## 2023-08-31 RX ORDER — PHENYLEPHRINE HCL IN 0.9% NACL 1 MG/10 ML
SYRINGE (ML) INTRAVENOUS
Status: DISCONTINUED | OUTPATIENT
Start: 2023-08-31 | End: 2023-08-31

## 2023-08-31 RX ADMIN — Medication 100 MCG: at 08:08

## 2023-08-31 RX ADMIN — SODIUM CHLORIDE: 0.9 INJECTION, SOLUTION INTRAVENOUS at 07:08

## 2023-08-31 RX ADMIN — Medication 10 MG: at 07:08

## 2023-08-31 RX ADMIN — LIDOCAINE HYDROCHLORIDE 30 MG: 20 INJECTION, SOLUTION EPIDURAL; INFILTRATION; INTRACAUDAL; PERINEURAL at 07:08

## 2023-08-31 RX ADMIN — FENTANYL CITRATE 50 MCG: 50 INJECTION INTRAMUSCULAR; INTRAVENOUS at 07:08

## 2023-08-31 RX ADMIN — PROPOFOL 100 MCG/KG/MIN: 10 INJECTION, EMULSION INTRAVENOUS at 07:08

## 2023-08-31 NOTE — H&P
[facility] - Short Stay Cardiac Unit  Cardiac Electrophysiology  History and Physical     Admission Date: 8/31/2023  Code Status: Prior   Attending Provider: Liam Wiggins MD   Principal Problem:Atrial flutter    Subjective:     Chief Complaint:  AFL     HPI:  78 yoM referred for AFL management. He wa sin NSR 2022. AFL noted 4/23. EF was 40-45% in SR, 40% in AFL. He felt more fatigue and BECKER. He feels his symptoms started early 2023. He was started on OAC amd underwent CV mid June 2023 with improvement in symptoms.      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    Cancer     Cataract     Chronic obstructive pulmonary disease 5/24/2021    Coronary artery disease of native artery of native heart with stable angina pectoris 10/26/2020    Encounter for blood transfusion     Essential hypertension 7/22/2019    GERD (gastroesophageal reflux disease)     Glaucoma     Hyperlipidemia     Major depression in partial remission 5/2/2022    Seizures        Past Surgical History:   Procedure Laterality Date    BRAIN SURGERY      CARPAL TUNNEL RELEASE      EYE SURGERY      LUNG SURGERY      Partial lung removed Left     REPAIR,BROW PTOSIS      toe nail removal      TRANSESOPHAGEAL ECHOCARDIOGRAM WITH POSSIBLE CARDIOVERSION (ANUM W/ POSS CARDIOVERSION) N/A 6/28/2023    Procedure: Transesophageal echo (ANUM) intra-procedure log documentation;  Surgeon: Gallo Carbajal MD;  Location: [facility] CATH LAB;  Service: Cardiology;  Laterality: N/A;       Review of patient's allergies indicates:   Allergen Reactions    Wasp sting  [allergen ext-venom-honey bee]      Other reaction(s): swelling       No current facility-administered medications on file prior to encounter.     Current Outpatient Medications on File Prior to Encounter   Medication Sig     albuterol (PROVENTIL/VENTOLIN HFA) 90 mcg/actuation inhaler Rescue    apixaban (ELIQUIS) 5 mg Tab Take 1 tablet (5 mg total) by mouth 2 (two) times daily.    atorvastatin (LIPITOR) 40 MG tablet TAKE 1 TABLET BY MOUTH ONCE A DAY    brimonidine-timoloL (COMBIGAN) 0.2-0.5 % Drop Inject 1 drop into the eye Twice daily.    EPINEPHrine (EPIPEN) 0.3 mg/0.3 mL AtIn USE AS DIRECTED AS NEEDED    latanoprost 0.005 % ophthalmic solution     metoprolol succinate (TOPROL-XL) 25 MG 24 hr tablet Take 0.5 tablets (12.5 mg total) by mouth once daily.    naproxen sodium (ANAPROX) 220 MG tablet Take 220 mg by mouth.    phenytoin (DILANTIN) 100 MG ER capsule Take 3 capsules (300 mg total) by mouth once daily.    sacubitriL-valsartan (ENTRESTO) 24-26 mg per tablet Take 1 tablet by mouth 2 (two) times daily.    sertraline (ZOLOFT) 50 MG tablet Take 1 tablet (50 mg total) by mouth once daily.    triamcinolone acetonide 0.1% (KENALOG) 0.1 % cream Apply topically 2 (two) times daily.     Family History       Problem Relation (Age of Onset)    Coronary artery disease Brother    Heart attack Father    Stroke Mother, Paternal Grandfather    Valvular heart disease Brother          Tobacco Use    Smoking status: Every Day     Current packs/day: 2.00     Types: Cigarettes    Smokeless tobacco: Never   Substance and Sexual Activity    Alcohol use: No    Drug use: No    Sexual activity: Not Currently     Partners: Female     Review of Systems   All other systems reviewed and are negative.    Objective:     Vital Signs (Most Recent):    Vital Signs (24h Range):  BP: ()/()   Arterial Line BP: ()/()           There is no height or weight on file to calculate BMI.             Physical Exam  Vitals and nursing note reviewed.   Constitutional:       Appearance: Normal appearance.   HENT:      Head: Normocephalic.   Cardiovascular:      Rate and Rhythm: Normal rate and regular rhythm.      Pulses: Normal pulses.   Pulmonary:      Effort:  Pulmonary effort is normal.      Breath sounds: Normal breath sounds.   Abdominal:      General: Abdomen is flat.   Musculoskeletal:      Right lower leg: No edema.      Left lower leg: No edema.   Skin:     General: Skin is warm.   Neurological:      General: No focal deficit present.      Mental Status: He is alert.            Significant Labs: All pertinent lab results from the last 24 hours have been reviewed.      Assessment and Plan:     * Atrial flutter  Patient here for CTI RFA for CTI dependent atrial flutter    Anticoagulation: apixiban  EF (most recent): 45%  AAD/AVN agents: Toprol 25    The risks, benefits and alternatives of the procedure were explained to the patient, patient's family and/or surrogate decision maker. Risks include (but not limited to) bleeding, hematoma, infection, pain, vascular damage, damage to structures surrounding the vasculature, myocardial damage [perforation, valvular damage], cardiac tamponade, CVA, MI, and death. Patient is understanding that repeat ablations may be required. Patient is understanding that they are at risk for developing or being diagnosed with atrial fibrillation which may requiring AAD therapy and/or ablation(s) in the future. All questions were answered. Patient is understanding of these risks, and would like to proceed. Consents signed.        Linda Mcclure MD  Cardiac Electrophysiology  [facility] - Short Stay Cardiac Unit

## 2023-08-31 NOTE — PROGRESS NOTES
Patient admitted to recovery see [facility] for complete assessment pacu bcg's maintained safety measures verified patient instructed on pain scale and patient verbalized understanding. Called for ekg and it was done and placed in chart. And called patient's son and updated on patient location with the permission of patient.

## 2023-08-31 NOTE — NURSING
Received report from [facility] PACU RN. Patient s/p Ablation, AAOx3. VSS, no c/o pain or discomfort at this time, resp even and unlabored. Gauze/tegaderm dressing to R groin has small drop of dried blood at upper corner. No active bleeding. No hematoma noted. Post procedure protocol reviewed with patient and patient's family. Understanding verbalized. Family members called to bedside. Nurse call bell within reach. Will continue to monitor per post procedure protocol.

## 2023-08-31 NOTE — TRANSFER OF CARE
"Anesthesia Transfer of Care Note    Patient: Humberto Carlson    Procedure(s) Performed: Procedure(s) (LRB):  Ablation, Atrial Flutter, Typical (N/A)    Patient location: PACU    Anesthesia Type: general    Transport from OR: Transported from OR on 6-10 L/min O2 by face mask with adequate spontaneous ventilation    Post pain: adequate analgesia    Post assessment: no apparent anesthetic complications and tolerated procedure well    Post vital signs: stable    Level of consciousness: awake, alert and oriented    Nausea/Vomiting: no nausea/vomiting    Complications: none    Transfer of care protocol was followed      Last vitals:   Visit Vitals  BP (!) 143/90 (BP Location: Left arm, Patient Position: Lying)   Pulse 68   Temp 36.4 °C (97.5 °F) (Temporal)   Resp 16   Ht 5' 10" (1.778 m)   Wt 61.2 kg (135 lb)   SpO2 98%   BMI 19.37 kg/m²     "

## 2023-08-31 NOTE — NURSING
Patient ambulated in [facility] and voided in restroom. Returned to stretcher. Will monitor site and vitals and D/C when ready.

## 2023-08-31 NOTE — HOSPITAL COURSE
Patient underwent successful RFA of the CTI for treatment of CTI-dependent atrial flutter. No evidence of intra- or post-procedure complications. Post-ablation ECG shows NSR, and no acute abnormalities.     EP medications at discharge:  Antiarrhythmics and/or AVN agents: unchanged.   Patient was instructed to continue their oral anticoagulation as previously prescribed  Patient was instructed to take ibuprofen 800 mg TID x 3 days for pericarditis.     Groin access sites without hematoma or bleeding. Activity restrictions given to patient. Instructed to seek medical attention for shortness of breath, chest discomfort not alleviated with NSAIDs, bleeding/hematoma formation at the access sites, acute onset of neurologic symptoms, N/V, or hematemesis. At discharge the patient denied CP, SOB, access site bleeding/hematoma, or any other complaints or evidence of complications.    ----------------------------------------------------------------------------------------

## 2023-08-31 NOTE — NURSING TRANSFER
Nursing Transfer Note      8/31/2023   11:18 AM    Nurse giving handoff:josh fuller   Nurse receiving handoff:ellen fuller     Reason patient is being transferred: d/c critieria met     Transfer To: cath lab holding 9    Transfer via stretcher    Transfer with cardiac monitoring, bedside monitor     Transported by ellen fuller     Transfer Vital Signs:  Blood Pressure:see epic   Heart Rate:see epic   O2:room air   Temperature:see epic   Respirations:see epic     Telemetry: yes   Order for Tele Monitor? Yes    Additional Lines: none at this time     4eyes on Skin: yes    Medicines sent: none    Any special needs or follow-up needed: groin checks     Patient belongings transferred with patient: n/a    Chart send with patient: Yes    Notified: reported to ellen fuller     Patient reassessed at: see epic  (date, time)  1  Upon arrival to floor: to room no complaints no distress noted.

## 2023-08-31 NOTE — ANESTHESIA PREPROCEDURE EVALUATION
08/31/2023  Humberto Carlson is a 78 y.o., male.      Pre-op Assessment    I have reviewed the Patient Summary Reports.     I have reviewed the Nursing Notes. I have reviewed the NPO Status.   I have reviewed the Medications.     Review of Systems  Anesthesia Hx:  No problems with previous Anesthesia  History of prior surgery of interest to airway management or planning: Denies Family Hx of Anesthesia complications.   Denies Personal Hx of Anesthesia complications.   Hematology/Oncology:  Hematology Normal   Oncology Normal     EENT/Dental:   chronic allergic rhinitis   Cardiovascular:   Exercise tolerance: good Hypertension Valvular problems/Murmurs, AS CAD  Dysrhythmias  Angina, with exertion CHF PVD ECG has been reviewed. ANUM:  ? Mildly decreased systolic function.  ? Atrial fibrillation observed.  ? There is mild left ventricular global hypokinesis.  ? Normal right ventricular size with low normal right ventricular systolic function.  ? Normal appearing left atrial appendage. No thrombus is present in the appendage. Abnormal appendage velocities.  ? Mild mitral regurgitation.  ? Mild tricuspid regurgitation.  ? No thrombus present in the inferior vena cava.  ? No plaque present.  ? There is mild aortic valve stenosis.  ? The estimated ejection fraction is 45%.  ? A 200 J synchronized cardioversion was successfully performed with restoration of normal sinus rhythm   Pulmonary:   COPD    Renal/:  Renal/ Normal     Hepatic/GI:   GERD    Musculoskeletal:  Musculoskeletal Normal    Neurological:   Seizures    Endocrine:  Endocrine Normal    Dermatological:  Skin Normal    Psych:   depression          Physical Exam  General: Well nourished, Cooperative, Alert and Oriented    Airway:  Mallampati: II   Mouth Opening: Normal  TM Distance: Normal  Tongue: Normal  Neck ROM: Normal  ROM    Dental:  Intact        Anesthesia Plan  Type of Anesthesia, risks & benefits discussed:    Anesthesia Type: Gen Natural Airway  Intra-op Monitoring Plan: Standard ASA Monitors  Post Op Pain Control Plan: IV/PO Opioids PRN  Induction:  IV  Informed Consent: Informed consent signed with the Patient and all parties understand the risks and agree with anesthesia plan.  All questions answered.   ASA Score: 3  Day of Surgery Review of History & Physical: H&P Update referred to the surgeon/provider.    Ready For Surgery From Anesthesia Perspective.     .

## 2023-08-31 NOTE — DISCHARGE SUMMARY
[facility] - Cardiology  Cardiac Electrophysiology  Discharge Summary      Patient Name: Humberto Carlson  MRN: [id number]  Admission Date: 8/31/2023  Hospital Length of Stay: 0 days  Discharge Date and Time:  08/31/2023 11:55 AM  Attending Physician: Liam Wiggins MD    Discharging Provider: Linda Mcclure MD  Primary Care Physician: Zeke Lamb MD    HPI:   78 yoM referred for AFL management. He wa sin NSR 2022. AFL noted 4/23. EF was 40-45% in SR, 40% in AFL. He felt more fatigue and BECKER. He feels his symptoms started early 2023. He was started on OAC amd underwent CV mid June 2023 with improvement in symptoms.      ECG: Sinus bradycardia, rate 49bpm, qrs 88, [id number]      Procedure(s) (LRB):  Ablation, Atrial Flutter, Typical (N/A)     Indwelling Lines/Drains at time of discharge:  Lines/Drains/Airways     None                 Hospital Course:  Patient underwent successful RFA of the CTI for treatment of CTI-dependent atrial flutter. No evidence of intra- or post-procedure complications. Post-ablation ECG shows NSR, and no acute abnormalities.     EP medications at discharge:   Antiarrhythmics and/or AVN agents: unchanged.    Patient was instructed to continue their oral anticoagulation as previously prescribed   Patient was instructed to take ibuprofen 800 mg TID x 3 days for pericarditis.     Groin access sites without hematoma or bleeding. Activity restrictions given to patient. Instructed to seek medical attention for shortness of breath, chest discomfort not alleviated with NSAIDs, bleeding/hematoma formation at the access sites, acute onset of neurologic symptoms, N/V, or hematemesis. At discharge the patient denied CP, SOB, access site bleeding/hematoma, or any other complaints or evidence of complications.    ----------------------------------------------------------------------------------------      Goals of Care Treatment Preferences:  Code Status: Full Code    Living Will: Yes               Consults:     Significant Diagnostic Studies: N/A    Pending Diagnostic Studies:     Procedure Component Value Units Date/Time    Transesophageal echo (ANUM) [[id number]]     Order Status: Sent Lab Status: No result           Final Active Diagnoses:    Diagnosis Date Noted POA    PRINCIPAL PROBLEM:  Atrial flutter [I48.92] 04/19/2023 Yes      Problems Resolved During this Admission:     No new Assessment & Plan notes have been filed under this hospital service since the last note was generated.  Service: Arrhythmia      Discharged Condition: stable    Disposition:     Follow Up:   Follow-up Information     Liam Wiggins MD Follow up in 3 month(s).    Specialties: Electrophysiology, Cardiovascular Disease, Cardiology  Contact information:  [facility] [id number]  [phone]                       Patient Instructions:   No discharge procedures on file.  Medications:  Reconciled Home Medications:      Medication List      CONTINUE taking these medications    albuterol 90 mcg/actuation inhaler  Commonly known as: PROVENTIL/VENTOLIN HFA  Rescue     apixaban 5 mg Tab  Commonly known as: ELIQUIS  Take 1 tablet (5 mg total) by mouth 2 (two) times daily.     atorvastatin 40 MG tablet  Commonly known as: LIPITOR  TAKE 1 TABLET BY MOUTH ONCE A DAY     brimonidine-timoloL 0.2-0.5 % Drop  Commonly known as: COMBIGAN  Inject 1 drop into the eye Twice daily.     ENTRESTO 24-26 mg per tablet  Generic drug: sacubitriL-valsartan  Take 1 tablet by mouth 2 (two) times daily.     EPINEPHrine 0.3 mg/0.3 mL Atin  Commonly known as: EPIPEN  USE AS DIRECTED AS NEEDED     fluticasone propionate 50 mcg/actuation nasal spray  Commonly known as: FLONASE  1 SPRAY IN EACH NOSTRIL ONCE DAILY.     latanoprost 0.005 % ophthalmic solution     metoprolol succinate 25 MG 24 hr tablet  Commonly known as: TOPROL-XL  Take 0.5 tablets (12.5 mg total) by mouth once daily.     naproxen sodium 220 MG tablet  Commonly known as:  ANAPROX  Take 220 mg by mouth.     phenytoin 100 MG ER capsule  Commonly known as: DILANTIN  Take 3 capsules (300 mg total) by mouth once daily.     sertraline 50 MG tablet  Commonly known as: ZOLOFT  Take 1 tablet (50 mg total) by mouth once daily.     triamcinolone acetonide 0.1% 0.1 % cream  Commonly known as: KENALOG  Apply topically 2 (two) times daily.            Time spent on the discharge of patient: 45 minutes    Linda Mcclure MD  Cardiac Electrophysiology  [facility] - Cardiology

## 2023-08-31 NOTE — NURSING
MD NORRIS Mcclure contacted patient monitor stating A Fib post Aflutter ablation. Repeat EKG ordered. Will monitor.

## 2023-08-31 NOTE — ANESTHESIA POSTPROCEDURE EVALUATION
Anesthesia Post Evaluation    Patient: Humberto Carlson    Procedure(s) Performed: Procedure(s) (LRB):  Ablation, Atrial Flutter, Typical (N/A)    Final Anesthesia Type: general      Patient location during evaluation: PACU  Patient participation: Yes- Able to Participate  Level of consciousness: awake and alert and oriented  Post-procedure vital signs: reviewed and stable  Pain management: adequate  Airway patency: patent    PONV status at discharge: No PONV  Anesthetic complications: no      Cardiovascular status: blood pressure returned to baseline  Respiratory status: unassisted, room air and spontaneous ventilation  Hydration status: euvolemic  Follow-up not needed.          Vitals Value Taken Time   /60 08/31/23 1101   Temp 36.3 °C (97.3 °F) 08/31/23 1030   Pulse 49 08/31/23 1115   Resp 18 08/31/23 1100   SpO2 98 % 08/31/23 1115   Vitals shown include unvalidated device data.      No case tracking events are documented in the log.      Pain/Edie Score: Edie Score: 10 (8/31/2023  9:30 AM)

## 2023-08-31 NOTE — BRIEF OP NOTE
Attending: Liam Wiggins MD  Date of Procedure: 08/31/23    Post-operative Diagnosis: CTI-dependent atrial flutter    Procedure Performed: Radiofrequency ablation of the CTI.     Description of Procedure: The patient was brought to the EP lab in the fasting state. Prepped and draped in sterile fashion. Safety timeout was performed. Sedation administered by anesthesia staff. Ultrasound guided venous access of the right femoral vein was performed x3. HALO, CS, and ablation catheters placed. RFA to the CTI with confirmation of bidirectional block.     EBL: <10 mL    Specimens: none  Complications: no immediate    Plan:  Bedrest x 4 hours  ECG on upon arrival to PACU  Resume oral anticoagulation following bedrest if there is no evidence of access site bleeding or hematoma  Medication changes: none  Plan for discharge following bedrest if patient tolerating PO intake, voiding, and ambulatory without evidence of complications     The attending physician was present for entire duration of the procedure    Linda Mcclure MD, PGY7  Electrophysiology

## 2023-09-12 RX ORDER — SPIRONOLACTONE 25 MG/1
25 TABLET ORAL
Qty: 30 TABLET | Refills: 9 | Status: SHIPPED | OUTPATIENT
Start: 2023-09-12

## 2023-09-27 ENCOUNTER — OFFICE VISIT (OUTPATIENT)
Dept: INTERNAL MEDICINE | Facility: CLINIC | Age: 78
End: 2023-09-27
Payer: MEDICARE

## 2023-09-27 ENCOUNTER — LAB VISIT (OUTPATIENT)
Dept: LAB | Facility: HOSPITAL | Age: 78
End: 2023-09-27
Attending: FAMILY MEDICINE
Payer: MEDICARE

## 2023-09-27 VITALS
WEIGHT: 135.38 LBS | HEART RATE: 80 BPM | DIASTOLIC BLOOD PRESSURE: 60 MMHG | OXYGEN SATURATION: 98 % | SYSTOLIC BLOOD PRESSURE: 132 MMHG | TEMPERATURE: 96 F | BODY MASS INDEX: 19.38 KG/M2 | HEIGHT: 70 IN

## 2023-09-27 DIAGNOSIS — Z00.00 ANNUAL PHYSICAL EXAM: Primary | ICD-10-CM

## 2023-09-27 DIAGNOSIS — Z79.899 ENCOUNTER FOR LONG-TERM (CURRENT) USE OF MEDICATIONS: ICD-10-CM

## 2023-09-27 DIAGNOSIS — Z00.00 ANNUAL PHYSICAL EXAM: ICD-10-CM

## 2023-09-27 DIAGNOSIS — F17.200 SMOKING: ICD-10-CM

## 2023-09-27 LAB
ALBUMIN SERPL BCP-MCNC: 4 G/DL (ref 3.5–5.2)
ALP SERPL-CCNC: 48 U/L (ref 55–135)
ALT SERPL W/O P-5'-P-CCNC: 17 U/L (ref 10–44)
ANION GAP SERPL CALC-SCNC: 6 MMOL/L (ref 8–16)
AST SERPL-CCNC: 24 U/L (ref 10–40)
BASOPHILS # BLD AUTO: 0.05 K/UL (ref 0–0.2)
BASOPHILS NFR BLD: 0.7 % (ref 0–1.9)
BILIRUB SERPL-MCNC: 0.4 MG/DL (ref 0.1–1)
BUN SERPL-MCNC: 11 MG/DL (ref 8–23)
CALCIUM SERPL-MCNC: 9.2 MG/DL (ref 8.7–10.5)
CHLORIDE SERPL-SCNC: 100 MMOL/L (ref 95–110)
CHOLEST SERPL-MCNC: 147 MG/DL (ref 120–199)
CHOLEST/HDLC SERPL: 3 {RATIO} (ref 2–5)
CO2 SERPL-SCNC: 30 MMOL/L (ref 23–29)
CREAT SERPL-MCNC: 0.8 MG/DL (ref 0.5–1.4)
DIFFERENTIAL METHOD: ABNORMAL
EOSINOPHIL # BLD AUTO: 0.4 K/UL (ref 0–0.5)
EOSINOPHIL NFR BLD: 5.4 % (ref 0–8)
ERYTHROCYTE [DISTWIDTH] IN BLOOD BY AUTOMATED COUNT: 13.6 % (ref 11.5–14.5)
EST. GFR  (NO RACE VARIABLE): >60 ML/MIN/1.73 M^2
ESTIMATED AVG GLUCOSE: 114 MG/DL (ref 68–131)
GLUCOSE SERPL-MCNC: 120 MG/DL (ref 70–110)
HBA1C MFR BLD: 5.6 % (ref 4–5.6)
HCT VFR BLD AUTO: 38.7 % (ref 40–54)
HDLC SERPL-MCNC: 49 MG/DL (ref 40–75)
HDLC SERPL: 33.3 % (ref 20–50)
HGB BLD-MCNC: 12.7 G/DL (ref 14–18)
IMM GRANULOCYTES # BLD AUTO: 0.03 K/UL (ref 0–0.04)
IMM GRANULOCYTES NFR BLD AUTO: 0.4 % (ref 0–0.5)
LDLC SERPL CALC-MCNC: 79.4 MG/DL (ref 63–159)
LYMPHOCYTES # BLD AUTO: 2.8 K/UL (ref 1–4.8)
LYMPHOCYTES NFR BLD: 37.5 % (ref 18–48)
MCH RBC QN AUTO: 30.5 PG (ref 27–31)
MCHC RBC AUTO-ENTMCNC: 32.8 G/DL (ref 32–36)
MCV RBC AUTO: 93 FL (ref 82–98)
MONOCYTES # BLD AUTO: 0.5 K/UL (ref 0.3–1)
MONOCYTES NFR BLD: 6.5 % (ref 4–15)
NEUTROPHILS # BLD AUTO: 3.8 K/UL (ref 1.8–7.7)
NEUTROPHILS NFR BLD: 49.5 % (ref 38–73)
NONHDLC SERPL-MCNC: 98 MG/DL
NRBC BLD-RTO: 0 /100 WBC
PLATELET # BLD AUTO: 281 K/UL (ref 150–450)
PMV BLD AUTO: 10.8 FL (ref 9.2–12.9)
POTASSIUM SERPL-SCNC: 4.8 MMOL/L (ref 3.5–5.1)
PROT SERPL-MCNC: 6.7 G/DL (ref 6–8.4)
RBC # BLD AUTO: 4.17 M/UL (ref 4.6–6.2)
SODIUM SERPL-SCNC: 136 MMOL/L (ref 136–145)
TRIGL SERPL-MCNC: 93 MG/DL (ref 30–150)
TSH SERPL DL<=0.005 MIU/L-ACNC: 1.47 UIU/ML (ref 0.4–4)
WBC # BLD AUTO: 7.58 K/UL (ref 3.9–12.7)

## 2023-09-27 PROCEDURE — 85025 COMPLETE CBC W/AUTO DIFF WBC: CPT | Performed by: FAMILY MEDICINE

## 2023-09-27 PROCEDURE — 99999 PR PBB SHADOW E&M-EST. PATIENT-LVL III: CPT | Mod: PBBFAC,,, | Performed by: FAMILY MEDICINE

## 2023-09-27 PROCEDURE — 36415 COLL VENOUS BLD VENIPUNCTURE: CPT | Performed by: FAMILY MEDICINE

## 2023-09-27 PROCEDURE — 99397 PER PM REEVAL EST PAT 65+ YR: CPT | Mod: GZ,S$PBB,, | Performed by: FAMILY MEDICINE

## 2023-09-27 PROCEDURE — 99213 OFFICE O/P EST LOW 20 MIN: CPT | Mod: PBBFAC | Performed by: FAMILY MEDICINE

## 2023-09-27 PROCEDURE — 99397 PR PREVENTIVE VISIT,EST,65 & OVER: ICD-10-PCS | Mod: GZ,S$PBB,, | Performed by: FAMILY MEDICINE

## 2023-09-27 PROCEDURE — 84443 ASSAY THYROID STIM HORMONE: CPT | Performed by: FAMILY MEDICINE

## 2023-09-27 PROCEDURE — 83036 HEMOGLOBIN GLYCOSYLATED A1C: CPT | Performed by: FAMILY MEDICINE

## 2023-09-27 PROCEDURE — 99999 PR PBB SHADOW E&M-EST. PATIENT-LVL III: ICD-10-PCS | Mod: PBBFAC,,, | Performed by: FAMILY MEDICINE

## 2023-09-27 PROCEDURE — 80061 LIPID PANEL: CPT | Performed by: FAMILY MEDICINE

## 2023-09-27 PROCEDURE — 80053 COMPREHEN METABOLIC PANEL: CPT | Performed by: FAMILY MEDICINE

## 2023-09-27 RX ORDER — SERTRALINE HYDROCHLORIDE 50 MG/1
50 TABLET, FILM COATED ORAL DAILY
Qty: 90 TABLET | Refills: 3 | Status: SHIPPED | OUTPATIENT
Start: 2023-09-27 | End: 2023-12-14 | Stop reason: SDUPTHER

## 2023-09-27 NOTE — PROGRESS NOTES
Subjective:       Patient ID: Humberto Carlson is a 78 y.o. male.    Chief Complaint: Annual Exam    HPI    Patient Active Problem List   Diagnosis    Hyperlipidemia    Essential hypertension    History of anaphylaxis    Elevated blood sugar    Seizure disorder    Screening for prostate cancer    Pre-diabetes    Weight loss    Dyspnea    Chronic bilateral low back pain    Acute rhinitis    History of subdural hematoma    Coronary artery disease of native artery of native heart with stable angina pectoris    Chronic systolic congestive heart failure    Smoker    Nonrheumatic aortic valve stenosis    Aortic calcification    Bilateral carotid artery stenosis    Chronic obstructive pulmonary disease    Major depression in partial remission    Hx of cancer of lung    Atrial flutter    Pulmonary heart disease       Past Medical History:   Diagnosis Date    Acute bronchitis 12/02/2019    Anticoagulant long-term use     Asthma     Atrial flutter     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    Cancer     Cataract     Chronic obstructive pulmonary disease 05/24/2021    Coronary artery disease of native artery of native heart with stable angina pectoris 10/26/2020    Encounter for blood transfusion     Essential hypertension 07/22/2019    GERD (gastroesophageal reflux disease)     Glaucoma     Hyperlipidemia     Major depression in partial remission 05/02/2022    Seizures        Past Surgical History:   Procedure Laterality Date    ABLATION, ATRIAL FLUTTER, TYPICAL N/A 8/31/2023    Procedure: Ablation, Atrial Flutter, Typical;  Surgeon: Liam Wiggins MD;  Location: [facility] EP LAB;  Service: Cardiology;  Laterality: N/A;  AFL, RFA, BECKY, ANUM (cx if SR), BENJI benz, 3 Prep    BRAIN SURGERY      CARPAL TUNNEL RELEASE      EYE SURGERY      LUNG SURGERY      Partial lung removed Left     REPAIR,BROW PTOSIS      toe nail removal      TRANSESOPHAGEAL ECHOCARDIOGRAM WITH POSSIBLE CARDIOVERSION  (ANUM W/ POSS CARDIOVERSION) N/A 6/28/2023    Procedure: Transesophageal echo (ANUM) intra-procedure log documentation;  Surgeon: Gallo Carbajal MD;  Location: [facility] CATH LAB;  Service: Cardiology;  Laterality: N/A;       Family History   Problem Relation Age of Onset    Stroke Mother     Heart attack Father     Coronary artery disease Brother     Valvular heart disease Brother     Stroke Paternal Grandfather        Social History     Tobacco Use   Smoking Status Every Day    Current packs/day: 2.00    Types: Cigarettes   Smokeless Tobacco Never       Wt Readings from Last 5 Encounters:   09/27/23 61.4 kg (135 lb 5.8 oz)   08/31/23 61.2 kg (135 lb)   07/12/23 64.9 kg (143 lb 1.3 oz)   07/12/23 64.5 kg (142 lb 3.2 oz)   06/29/23 64.4 kg (141 lb 15.6 oz)       For further HPI details, see assessment and plan.    Review of Systems   Constitutional:  Negative for chills and fever.   Respiratory:  Positive for cough. Negative for shortness of breath.    Cardiovascular:  Negative for chest pain.   Neurological:  Negative for dizziness.       Objective:      Vitals:    09/27/23 0946   BP: 132/60   Pulse: 80   Temp: 96 °F (35.6 °C)       Physical Exam  Constitutional:       General: He is not in acute distress.     Appearance: He is not ill-appearing, toxic-appearing or diaphoretic.   Cardiovascular:      Rate and Rhythm: Normal rate and regular rhythm.   Pulmonary:      Effort: Pulmonary effort is normal. No respiratory distress.   Neurological:      General: No focal deficit present.      Mental Status: He is alert.   Psychiatric:         Mood and Affect: Mood normal.         Behavior: Behavior normal.         Assessment:       1. Annual physical exam    2. Encounter for long-term (current) use of medications    3. Smoking        Plan:   Annual physical exam  -     CBC Auto Differential; Future; Expected date: 09/27/2023  -     Comprehensive Metabolic Panel; Future; Expected date: 09/27/2023  -     Hemoglobin A1C; Future;  Expected date: 09/27/2023  -     Lipid Panel; Future; Expected date: 09/27/2023  -     TSH; Future; Expected date: 09/27/2023  -     CT Chest Without Contrast; Future; Expected date: 09/27/2023    Encounter for long-term (current) use of medications  -     CBC Auto Differential; Future; Expected date: 09/27/2023  -     Comprehensive Metabolic Panel; Future; Expected date: 09/27/2023  -     Hemoglobin A1C; Future; Expected date: 09/27/2023  -     Lipid Panel; Future; Expected date: 09/27/2023  -     TSH; Future; Expected date: 09/27/2023    Smoking  -     CT Chest Without Contrast; Future; Expected date: 09/27/2023    Other orders  -     sertraline (ZOLOFT) 50 MG tablet; Take 1 tablet (50 mg total) by mouth once daily.  Dispense: 90 tablet; Refill: 3        Patient presents for follow-up     Annual exam   He is got no active complaints   He does stay physically active.  No formal exercise but is frequently doing manual work.    He does wear a seat belt.      Still smoking Does not think it ever quit.  I encouraged him to at least try.  No alcohol since 2015  No drugs    Mental health seems to be in a good place.  Does have some issues with an adult child who stresses him - but doesn't feel overtly depressed.    Excessive coffee -  Was drinking 20-30 cups/ day  Now down to 10 cups /day.  Continue cutting back advisable    Reviewed recent lab work  Noted chronic anemia   Noted checked iron, folate, ferritin, B12 roughly 6 months ago.  No substantial abnormalities.  Anemia is chronic dating back 3 years but does seem to be slowly trending downward.  Would recommend continued monitoring and if dropping any further consulting Hematology    Recent ablation   Patient reports feeling well since  Reports went well - has fu with cards      Immunization  Flu shot today.    Cancer screening  History of lung cancer  No CT for a few years  Would recommend updating given >pack day for decades.    6 mo f/u    This note was verbally  dictated, please excuse any type errors.

## 2023-09-30 ENCOUNTER — EXTERNAL CHRONIC CARE MANAGEMENT (OUTPATIENT)
Dept: PRIMARY CARE CLINIC | Facility: CLINIC | Age: 78
End: 2023-09-30
Payer: MEDICARE

## 2023-09-30 PROCEDURE — 99490 PR CHRONIC CARE MGMT, 1ST 20 MIN: ICD-10-PCS | Mod: S$PBB,,, | Performed by: FAMILY MEDICINE

## 2023-09-30 PROCEDURE — 99490 CHRNC CARE MGMT STAFF 1ST 20: CPT | Mod: S$PBB,,, | Performed by: FAMILY MEDICINE

## 2023-09-30 PROCEDURE — 99490 CHRNC CARE MGMT STAFF 1ST 20: CPT | Mod: PBBFAC | Performed by: FAMILY MEDICINE

## 2023-10-10 ENCOUNTER — HOSPITAL ENCOUNTER (OUTPATIENT)
Dept: RADIOLOGY | Facility: HOSPITAL | Age: 78
Discharge: HOME | End: 2023-10-10
Attending: FAMILY MEDICINE
Payer: MEDICARE

## 2023-10-10 DIAGNOSIS — Z00.00 ANNUAL PHYSICAL EXAM: ICD-10-CM

## 2023-10-10 DIAGNOSIS — F17.200 SMOKING: ICD-10-CM

## 2023-10-10 PROCEDURE — 71250 CT CHEST WITHOUT CONTRAST: ICD-10-PCS | Mod: 26,,, | Performed by: RADIOLOGY

## 2023-10-10 PROCEDURE — 71250 CT THORAX DX C-: CPT | Mod: 26,,, | Performed by: RADIOLOGY

## 2023-10-10 PROCEDURE — 71250 CT THORAX DX C-: CPT | Mod: TC

## 2023-10-10 RX ORDER — APIXABAN 5 MG/1
5 TABLET, FILM COATED ORAL 2 TIMES DAILY
Qty: 60 TABLET | Refills: 5 | Status: SHIPPED | OUTPATIENT
Start: 2023-10-10 | End: 2023-12-06 | Stop reason: ALTCHOICE

## 2023-10-25 ENCOUNTER — OFFICE VISIT (OUTPATIENT)
Dept: CARDIOLOGY | Facility: CLINIC | Age: 78
End: 2023-10-25
Payer: MEDICARE

## 2023-10-25 VITALS
DIASTOLIC BLOOD PRESSURE: 63 MMHG | SYSTOLIC BLOOD PRESSURE: 100 MMHG | BODY MASS INDEX: 19.47 KG/M2 | OXYGEN SATURATION: 97 % | HEIGHT: 70 IN | WEIGHT: 136 LBS | HEART RATE: 80 BPM

## 2023-10-25 DIAGNOSIS — I25.118 CORONARY ARTERY DISEASE OF NATIVE ARTERY OF NATIVE HEART WITH STABLE ANGINA PECTORIS: ICD-10-CM

## 2023-10-25 DIAGNOSIS — I65.23 BILATERAL CAROTID ARTERY STENOSIS: ICD-10-CM

## 2023-10-25 DIAGNOSIS — I27.9 PULMONARY HEART DISEASE: ICD-10-CM

## 2023-10-25 DIAGNOSIS — I35.0 NONRHEUMATIC AORTIC VALVE STENOSIS: ICD-10-CM

## 2023-10-25 DIAGNOSIS — I10 ESSENTIAL HYPERTENSION: ICD-10-CM

## 2023-10-25 DIAGNOSIS — F17.200 SMOKER: ICD-10-CM

## 2023-10-25 DIAGNOSIS — I70.0 AORTIC CALCIFICATION: ICD-10-CM

## 2023-10-25 DIAGNOSIS — E78.2 MIXED HYPERLIPIDEMIA: ICD-10-CM

## 2023-10-25 DIAGNOSIS — I50.22 CHRONIC SYSTOLIC CONGESTIVE HEART FAILURE: Primary | ICD-10-CM

## 2023-10-25 DIAGNOSIS — I48.3 TYPICAL ATRIAL FLUTTER: ICD-10-CM

## 2023-10-25 PROCEDURE — 99214 OFFICE O/P EST MOD 30 MIN: CPT | Mod: PBBFAC,PO | Performed by: INTERNAL MEDICINE

## 2023-10-25 PROCEDURE — 99214 PR OFFICE/OUTPT VISIT, EST, LEVL IV, 30-39 MIN: ICD-10-PCS | Mod: S$PBB,,, | Performed by: INTERNAL MEDICINE

## 2023-10-25 PROCEDURE — 99999 PR PBB SHADOW E&M-EST. PATIENT-LVL IV: ICD-10-PCS | Mod: PBBFAC,,, | Performed by: INTERNAL MEDICINE

## 2023-10-25 PROCEDURE — 99999 PR PBB SHADOW E&M-EST. PATIENT-LVL IV: CPT | Mod: PBBFAC,,, | Performed by: INTERNAL MEDICINE

## 2023-10-25 PROCEDURE — 99214 OFFICE O/P EST MOD 30 MIN: CPT | Mod: S$PBB,,, | Performed by: INTERNAL MEDICINE

## 2023-10-25 NOTE — PROGRESS NOTES
Subjective:   Patient ID:  Humberto Carlson is a 78 y.o. male who presents for follow up of No chief complaint on file.      77 yo male f/u for 3 m f/u  PMH CAD old MI  CHFrEF 35%, mild AS, AFL s/p CTI RFA in 07/23 by Dr. Wiggins at [facility], AFIB, lung cancer s/p removal of left lung in 2015, h/o fall two months after the procedure and brain bleeding s/p removal, HTN HLD smoker 1 ppd for 60 yrs, occasional drinking  Part time work.  ECHO EF 40% mild AS and apical HK  EKG NSR old septal infarct   MPI showed moderate sized severally fixed defect perfusion in apex, EF 35%  Carotid US 20% right and 50% left       06/2021 visit  No chest pain dizziness palpitation  Chronic BECKER after the lung procedure  Med compliance  EKG NASR HR 52 bpm, nonspecific STT change     visit  BP log reviewed. Occasional dizziness if looking up.  No chest pain weight control, leg swelling, palpitation. BECKER stable and the inhaler helps. 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 Chronic SOB due to mask and smoking.  Sleeps on 1 pillow and no leg swelling  EKG NSR and old septal infarct  BP checked regularly and controlled. Physically active at home     VISIT  Still smoking 1ppd. And chronic SOB and fatigue.  BP controlled at home and not taking the med yet.   No chest pain dizziness faint palpitation and leg swelling   echo EF 40 to 45% and mild AS; carotid US left 50 to 59% and right < 50%     visit  TODAY EKG AFL with variable AV block.  Chronic SOB. No dizziness and faint. Occasional lightheadedness. No chest pain  Physically active. Still smoking  AFIB [id number] score of 4    06/23 visit  C/o BP drop to 80 mmHg. His dizziness occurred when standing up quickly. No syncope papitation.   New dx of afib in 04-23. VR controlled. EF 45%.     07/23 visit  S/p ANUM and dccv in 06/23. Now SR. Improved energy SOB. Can walk longer  distance  No active bleeding. F/u with EP    Interval history  S/p CTI RFA on 07/27/23 at [facility] by Dr. Wiggins. Now improved energy.  No active bleeding. Chronic smoking  No dizziness faint chest pain orthopnea PND and leg swelling               Past Medical History:   Diagnosis Date    Acute bronchitis 12/02/2019    Anticoagulant long-term use     Asthma     Atrial flutter     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    Cancer     Cataract     Chronic obstructive pulmonary disease 05/24/2021    Coronary artery disease of native artery of native heart with stable angina pectoris 10/26/2020    Encounter for blood transfusion     Essential hypertension 07/22/2019    GERD (gastroesophageal reflux disease)     Glaucoma     Hyperlipidemia     Major depression in partial remission 05/02/2022    Seizures        Past Surgical History:   Procedure Laterality Date    ABLATION, ATRIAL FLUTTER, TYPICAL N/A 8/31/2023    Procedure: Ablation, Atrial Flutter, Typical;  Surgeon: Liam Wiggins MD;  Location: [facility] EP LAB;  Service: Cardiology;  Laterality: N/A;  AFL, RFA, BECKY, ANUM (cx if SR), BENJI bezn, 3 Prep    BRAIN SURGERY      CARPAL TUNNEL RELEASE      EYE SURGERY      LUNG SURGERY      Partial lung removed Left     REPAIR,BROW PTOSIS      toe nail removal      TRANSESOPHAGEAL ECHOCARDIOGRAM WITH POSSIBLE CARDIOVERSION (ANUM W/ POSS CARDIOVERSION) N/A 6/28/2023    Procedure: Transesophageal echo (ANUM) intra-procedure log documentation;  Surgeon: Gallo Carbajal MD;  Location: [facility] CATH LAB;  Service: Cardiology;  Laterality: N/A;       Social History     Tobacco Use    Smoking status: Every Day     Current packs/day: 2.00     Types: Cigarettes    Smokeless tobacco: Never   Substance Use Topics    Alcohol use: No    Drug use: No       Family History   Problem Relation Age of Onset    Stroke Mother     Heart attack Father     Coronary artery disease Brother     Valvular heart disease Brother      Stroke Paternal Grandfather          ROS    Objective:   Physical Exam    Lab Results   Component Value Date    CHOL 147 09/27/2023    CHOL 159 09/26/2022    CHOL 174 01/25/2021     Lab Results   Component Value Date    HDL 49 09/27/2023    HDL 48 09/26/2022    HDL 62 01/25/2021     Lab Results   Component Value Date    LDLCALC 79.4 09/27/2023    LDLCALC 91.4 09/26/2022    LDLCALC 88.8 01/25/2021     Lab Results   Component Value Date    TRIG 93 09/27/2023    TRIG 98 09/26/2022    TRIG 116 01/25/2021     Lab Results   Component Value Date    CHOLHDL 33.3 09/27/2023    CHOLHDL 30.2 09/26/2022    CHOLHDL 35.6 01/25/2021       Chemistry        Component Value Date/Time     09/27/2023 1042    K 4.8 09/27/2023 1042     09/27/2023 1042    CO2 30 (H) 09/27/2023 1042    BUN 11 09/27/2023 1042    CREATININE 0.8 09/27/2023 1042     (H) 09/27/2023 1042        Component Value Date/Time    CALCIUM 9.2 09/27/2023 1042    ALKPHOS 48 (L) 09/27/2023 1042    AST 24 09/27/2023 1042    ALT 17 09/27/2023 1042    BILITOT 0.4 09/27/2023 1042    ESTGFRAFRICA >60.0 09/30/2021 1130    EGFRNONAA >60.0 09/30/2021 1130          Lab Results   Component Value Date    HGBA1C 5.6 09/27/2023     Lab Results   Component Value Date    TSH 1.465 09/27/2023     Lab Results   Component Value Date    INR 1.1 08/21/2023     Lab Results   Component Value Date    WBC 7.58 09/27/2023    HGB 12.7 (L) 09/27/2023    HCT 38.7 (L) 09/27/2023    MCV 93 09/27/2023     09/27/2023     BMP  Sodium   Date Value Ref Range Status   09/27/2023 136 136 - 145 mmol/L Final     Potassium   Date Value Ref Range Status   09/27/2023 4.8 3.5 - 5.1 mmol/L Final     Chloride   Date Value Ref Range Status   09/27/2023 100 95 - 110 mmol/L Final     CO2   Date Value Ref Range Status   09/27/2023 30 (H) 23 - 29 mmol/L Final     BUN   Date Value Ref Range Status   09/27/2023 11 8 - 23 mg/dL Final     Creatinine   Date Value Ref Range Status   09/27/2023 0.8  0.5 - 1.4 mg/dL Final     Calcium   Date Value Ref Range Status   09/27/2023 9.2 8.7 - 10.5 mg/dL Final     Anion Gap   Date Value Ref Range Status   09/27/2023 6 (L) 8 - 16 mmol/L Final     eGFR if    Date Value Ref Range Status   09/30/2021 >60.0 >60 mL/min/1.73 m^2 Final     eGFR if non    Date Value Ref Range Status   09/30/2021 >60.0 >60 mL/min/1.73 m^2 Final     Comment:     Calculation used to obtain the estimated glomerular filtration  rate (eGFR) is the CKD-EPI equation.        BNP  @LABRCNTIP(BNP,BNPTRIAGEBLO)@  @LABRCNTIP(troponini)@  CrCl cannot be calculated (Patient's most recent lab result is older than the maximum 7 days allowed.).  No results found in the last 24 hours.  No results found in the last 24 hours.  No results found in the last 24 hours.    Assessment:      1. Chronic systolic congestive heart failure    2. Essential hypertension    3. Mixed hyperlipidemia    4. Coronary artery disease of native artery of native heart with stable angina pectoris    5. Nonrheumatic aortic valve stenosis    6. Aortic calcification    7. Bilateral carotid artery stenosis    8. Typical atrial flutter    9. Pulmonary heart disease    10. Smoker      CHfrEF compensated FC III  CAD stable  AFL on SR s/p CTi RFA  Smoker    Plan:   Carotid US ordered  Continue Eliquis statin entresto toprolXL aldactone  Smoking cessation  RTC in 6 M

## 2023-10-31 ENCOUNTER — EXTERNAL CHRONIC CARE MANAGEMENT (OUTPATIENT)
Dept: PRIMARY CARE CLINIC | Facility: CLINIC | Age: 78
End: 2023-10-31
Payer: MEDICARE

## 2023-10-31 PROCEDURE — 99439 PR CHRONIC CARE MGMT, EA ADDTL 20 MIN: ICD-10-PCS | Mod: S$PBB,,, | Performed by: FAMILY MEDICINE

## 2023-10-31 PROCEDURE — 99490 PR CHRONIC CARE MGMT, 1ST 20 MIN: ICD-10-PCS | Mod: S$PBB,,, | Performed by: FAMILY MEDICINE

## 2023-10-31 PROCEDURE — 99439 CHRNC CARE MGMT STAF EA ADDL: CPT | Mod: S$PBB,,, | Performed by: FAMILY MEDICINE

## 2023-10-31 PROCEDURE — 99490 CHRNC CARE MGMT STAFF 1ST 20: CPT | Mod: S$PBB,,, | Performed by: FAMILY MEDICINE

## 2023-10-31 PROCEDURE — 99439 CHRNC CARE MGMT STAF EA ADDL: CPT | Mod: PBBFAC,25,27 | Performed by: FAMILY MEDICINE

## 2023-10-31 PROCEDURE — 99490 CHRNC CARE MGMT STAFF 1ST 20: CPT | Mod: PBBFAC,25 | Performed by: FAMILY MEDICINE

## 2023-11-03 ENCOUNTER — HOSPITAL ENCOUNTER (OUTPATIENT)
Dept: CARDIOLOGY | Facility: HOSPITAL | Age: 78
Discharge: HOME | End: 2023-11-03
Attending: INTERNAL MEDICINE
Payer: MEDICARE

## 2023-11-03 VITALS
HEIGHT: 70 IN | DIASTOLIC BLOOD PRESSURE: 72 MMHG | BODY MASS INDEX: 19.47 KG/M2 | WEIGHT: 136 LBS | SYSTOLIC BLOOD PRESSURE: 130 MMHG

## 2023-11-03 DIAGNOSIS — I25.118 CORONARY ARTERY DISEASE OF NATIVE ARTERY OF NATIVE HEART WITH STABLE ANGINA PECTORIS: ICD-10-CM

## 2023-11-03 DIAGNOSIS — I70.0 AORTIC CALCIFICATION: ICD-10-CM

## 2023-11-03 LAB
LEFT ARM DIASTOLIC BLOOD PRESSURE: 72 MMHG
LEFT ARM SYSTOLIC BLOOD PRESSURE: 130 MMHG
LEFT CBA DIAS: 15 CM/S
LEFT CBA SYS: 50 CM/S
LEFT CCA DIST DIAS: 11 CM/S
LEFT CCA DIST SYS: 46 CM/S
LEFT CCA MID DIAS: 10 CM/S
LEFT CCA MID SYS: 61 CM/S
LEFT CCA PROX DIAS: 10 CM/S
LEFT CCA PROX SYS: 83 CM/S
LEFT ECA DIAS: 6 CM/S
LEFT ECA SYS: 84 CM/S
LEFT ICA DIST DIAS: 14 CM/S
LEFT ICA DIST SYS: 65 CM/S
LEFT ICA MID DIAS: 29 CM/S
LEFT ICA MID SYS: 144 CM/S
LEFT ICA PROX DIAS: 29 CM/S
LEFT ICA PROX SYS: 149 CM/S
LEFT VERTEBRAL DIAS: 4 CM/S
LEFT VERTEBRAL SYS: 31 CM/S
OHS CV CAROTID RIGHT ICA EDV HIGHEST: 28
OHS CV CAROTID ULTRASOUND LEFT ICA/CCA RATIO: 3.24
OHS CV CAROTID ULTRASOUND RIGHT ICA/CCA RATIO: 2.45
OHS CV PV CAROTID LEFT HIGHEST CCA: 83
OHS CV PV CAROTID LEFT HIGHEST ICA: 149
OHS CV PV CAROTID RIGHT HIGHEST CCA: 56
OHS CV PV CAROTID RIGHT HIGHEST ICA: 103
OHS CV US CAROTID LEFT HIGHEST EDV: 29
RIGHT ARM DIASTOLIC BLOOD PRESSURE: 69 MMHG
RIGHT ARM SYSTOLIC BLOOD PRESSURE: 126 MMHG
RIGHT CBA DIAS: 7 CM/S
RIGHT CBA SYS: 38 CM/S
RIGHT CCA DIST DIAS: 6 CM/S
RIGHT CCA DIST SYS: 42 CM/S
RIGHT CCA MID DIAS: 8 CM/S
RIGHT CCA MID SYS: 54 CM/S
RIGHT CCA PROX DIAS: 4 CM/S
RIGHT CCA PROX SYS: 56 CM/S
RIGHT ECA DIAS: 8 CM/S
RIGHT ECA SYS: 74 CM/S
RIGHT ICA DIST DIAS: 28 CM/S
RIGHT ICA DIST SYS: 103 CM/S
RIGHT ICA MID DIAS: 20 CM/S
RIGHT ICA MID SYS: 73 CM/S
RIGHT ICA PROX DIAS: 25 CM/S
RIGHT ICA PROX SYS: 78 CM/S
RIGHT VERTEBRAL DIAS: 13 CM/S
RIGHT VERTEBRAL SYS: 70 CM/S

## 2023-11-03 PROCEDURE — 93880 EXTRACRANIAL BILAT STUDY: CPT | Mod: 26,,, | Performed by: INTERNAL MEDICINE

## 2023-11-03 PROCEDURE — 93880 CV US DOPPLER CAROTID (CUPID ONLY): ICD-10-PCS | Mod: 26,,, | Performed by: INTERNAL MEDICINE

## 2023-11-03 PROCEDURE — 93880 EXTRACRANIAL BILAT STUDY: CPT

## 2023-11-06 ENCOUNTER — TELEPHONE (OUTPATIENT)
Dept: CARDIOLOGY | Facility: CLINIC | Age: 78
End: 2023-11-06
Payer: MEDICARE

## 2023-11-06 NOTE — TELEPHONE ENCOUNTER
Spoke with pt in regards to test results. Informed pt Carotid us showed mild Dz. He should continue current rx and f/u as scheduled.  Pt verbalized understanding information.           ----- Message from Gallo Carbajal MD sent at 11/5/2023 11:35 AM CST -----  Carotid us showed mild Dz  Continue current Rx  F/U as scheduled

## 2023-11-30 ENCOUNTER — EXTERNAL CHRONIC CARE MANAGEMENT (OUTPATIENT)
Dept: PRIMARY CARE CLINIC | Facility: CLINIC | Age: 78
End: 2023-11-30
Payer: MEDICARE

## 2023-11-30 DIAGNOSIS — I10 ESSENTIAL HYPERTENSION: Primary | ICD-10-CM

## 2023-11-30 PROCEDURE — 99490 CHRNC CARE MGMT STAFF 1ST 20: CPT | Mod: PBBFAC | Performed by: FAMILY MEDICINE

## 2023-11-30 PROCEDURE — 99490 CHRNC CARE MGMT STAFF 1ST 20: CPT | Mod: S$PBB,,, | Performed by: FAMILY MEDICINE

## 2023-11-30 PROCEDURE — 99490 PR CHRONIC CARE MGMT, 1ST 20 MIN: ICD-10-PCS | Mod: S$PBB,,, | Performed by: FAMILY MEDICINE

## 2023-12-06 ENCOUNTER — OFFICE VISIT (OUTPATIENT)
Dept: CARDIOLOGY | Facility: CLINIC | Age: 78
End: 2023-12-06
Payer: MEDICARE

## 2023-12-06 ENCOUNTER — HOSPITAL ENCOUNTER (OUTPATIENT)
Dept: CARDIOLOGY | Facility: HOSPITAL | Age: 78
Discharge: HOME | End: 2023-12-06
Attending: INTERNAL MEDICINE
Payer: MEDICARE

## 2023-12-06 VITALS
DIASTOLIC BLOOD PRESSURE: 70 MMHG | HEIGHT: 70 IN | SYSTOLIC BLOOD PRESSURE: 126 MMHG | HEART RATE: 64 BPM | BODY MASS INDEX: 20.13 KG/M2 | RESPIRATION RATE: 18 BRPM | WEIGHT: 140.63 LBS

## 2023-12-06 DIAGNOSIS — I10 ESSENTIAL HYPERTENSION: ICD-10-CM

## 2023-12-06 DIAGNOSIS — I48.3 TYPICAL ATRIAL FLUTTER: Primary | ICD-10-CM

## 2023-12-06 PROCEDURE — 99214 OFFICE O/P EST MOD 30 MIN: CPT | Mod: PBBFAC | Performed by: INTERNAL MEDICINE

## 2023-12-06 PROCEDURE — 99999 PR PBB SHADOW E&M-EST. PATIENT-LVL IV: ICD-10-PCS | Mod: PBBFAC,,, | Performed by: INTERNAL MEDICINE

## 2023-12-06 PROCEDURE — 93010 ELECTROCARDIOGRAM REPORT: CPT | Mod: ,,, | Performed by: INTERNAL MEDICINE

## 2023-12-06 PROCEDURE — 93010 EKG 12-LEAD: ICD-10-PCS | Mod: ,,, | Performed by: INTERNAL MEDICINE

## 2023-12-06 PROCEDURE — 99999 PR PBB SHADOW E&M-EST. PATIENT-LVL IV: CPT | Mod: PBBFAC,,, | Performed by: INTERNAL MEDICINE

## 2023-12-06 PROCEDURE — 93005 ELECTROCARDIOGRAM TRACING: CPT

## 2023-12-06 PROCEDURE — 99214 PR OFFICE/OUTPT VISIT, EST, LEVL IV, 30-39 MIN: ICD-10-PCS | Mod: S$PBB,,, | Performed by: INTERNAL MEDICINE

## 2023-12-06 PROCEDURE — 99214 OFFICE O/P EST MOD 30 MIN: CPT | Mod: S$PBB,,, | Performed by: INTERNAL MEDICINE

## 2023-12-06 NOTE — PROGRESS NOTES
Subjective:   Patient ID:  Humberto Carlson is a 78 y.o. male who presents for follow-up of typical atrial flutter      78 yoM referred for AFL management.     7/23: He was in NSR 2022. AFL noted 4/23. EF was 40-45% in SR, 40% in AFL. He felt more fatigue and BECKER. He feels his symptoms started early 2023. He was started on OAC amd underwent CV mid June 2023 with improvement in symptoms.     Interval history: CTI ablation 8/31/23, no recurrence.     Echo 4/23 in AFL:  · The left ventricle is normal in size with mildly decreased systolic function.  · Grade II left ventricular diastolic dysfunction.  · The estimated PA systolic pressure is 52 mmHg.  · Normal right ventricular size with normal right ventricular systolic function.  · There is pulmonary hypertension.  · Intermediate central venous pressure (8 mmHg).  · The estimated ejection fraction is 45%.  · There are segmental left ventricular wall motion abnormalities.  · There is mild aortic valve stenosis.  · Aortic valve area is 1.33 cm2; peak velocity is 1.96 m/s; mean gradient is 8 mmHg.  · Mild mitral regurgitation.  · Mild tricuspid regurgitation.    Past Medical History:  12/02/2019: Acute bronchitis  No date: Anticoagulant long-term use  No date: Asthma  No date: Atrial flutter  No date: Bleeding in brain      Comment:  2015 - reports after fall - had surgery to remove blood.  No date: Cancer  No date: Cataract  05/24/2021: Chronic obstructive pulmonary disease  10/26/2020: Coronary artery disease of native artery of native heart   with stable angina pectoris  No date: Encounter for blood transfusion  07/22/2019: Essential hypertension  No date: GERD (gastroesophageal reflux disease)  No date: Glaucoma  No date: Hyperlipidemia  05/02/2022: Major depression in partial remission  No date: Seizures    Past Surgical History:  8/31/2023: ABLATION, ATRIAL FLUTTER, TYPICAL; N/A      Comment:  Procedure: Ablation, Atrial Flutter, Typical;  Surgeon:                 Liam Wiggins MD;  Location: [facility] EP LAB;                 Service: Cardiology;  Laterality: N/A;  AFL, RFA, BECKY,                ANUM (cx if SR), BENJI benz, 3 Prep  No date: BRAIN SURGERY  No date: CARPAL TUNNEL RELEASE  No date: EYE SURGERY  No date: LUNG SURGERY  No date: Partial lung removed; Left  No date: REPAIR,BROW PTOSIS  No date: toe nail removal  6/28/2023: TRANSESOPHAGEAL ECHOCARDIOGRAM WITH POSSIBLE CARDIOVERSION   (ANUM W/ POSS CARDIOVERSION); N/A      Comment:  Procedure: Transesophageal echo (ANUM) intra-procedure                log documentation;  Surgeon: Gallo Carbajal MD;  Location:                [facility] CATH LAB;  Service: Cardiology;  Laterality: N/A;    Social History    Socioeconomic History      Marital status:     Tobacco Use      Smoking status: Every Day        Packs/day: 2.00        Types: Cigarettes      Smokeless tobacco: Never    Substance and Sexual Activity      Alcohol use: No      Drug use: No      Sexual activity: Not Currently        Partners: Female    Social Determinants of Health  Financial Resource Strain: Low Risk  (6/12/2023)      Overall Financial Resource Strain (CARDIA)          Difficulty of Paying Living Expenses: Not hard at all  Food Insecurity: No Food Insecurity (6/12/2023)      Hunger Vital Sign          Worried About Running Out of Food in the Last Year: Never true          Ran Out of Food in the Last Year: Never true  Transportation Needs: No Transportation Needs (6/12/2023)      PRAPARE - Transportation          Lack of Transportation (Medical): No          Lack of Transportation (Non-Medical): No  Physical Activity: Inactive (6/12/2023)      Exercise Vital Sign          Days of Exercise per Week: 0 days          Minutes of Exercise per Session: 0 min  Stress: Stress Concern Present (6/12/2023)      [employer] [place] [employer] - Occupational Stress Questionnaire          Feeling of Stress : Very much  Social Connections: Socially Isolated  (6/12/2023)      Social Connection and Isolation Panel [NHANES]          Frequency of Communication with Friends and Family: More than three times a week          Frequency of Social Gatherings with Friends and Family: More than three times a week          Attends Gnosticist Services: Never          Active Member of Clubs or Organizations: No          Marital Status:   Housing Stability: Low Risk  (6/12/2023)      Housing Stability Vital Sign          Unable to Pay for Housing in the Last Year: No          Number of Places Lived in the Last Year: 1          Unstable Housing in the Last Year: No    Review of patient's family history indicates:  Problem: Stroke      Relation: Mother          Age of Onset: (Not Specified)  Problem: Heart attack      Relation: Father          Age of Onset: (Not Specified)  Problem: Coronary artery disease      Relation: Brother          Age of Onset: (Not Specified)  Problem: Valvular heart disease      Relation: Brother          Age of Onset: (Not Specified)  Problem: Stroke      Relation: Paternal Grandfather          Age of Onset: (Not Specified)      Current Outpatient Medications:  albuterol (PROVENTIL/VENTOLIN HFA) 90 mcg/actuation inhaler, Rescue, Disp: 25.5 g, Rfl: 3  atorvastatin (LIPITOR) 40 MG tablet, TAKE 1 TABLET BY MOUTH ONCE A DAY, Disp: 90 tablet, Rfl: 2  brimonidine-timoloL (COMBIGAN) 0.2-0.5 % Drop, Inject 1 drop into the eye Twice daily., Disp: , Rfl:   ELIQUIS 5 mg Tab, TAKE 1 TABLET BY MOUTH TWICE A DAY, Disp: 60 tablet, Rfl: 5  ENTRESTO 24-26 mg per tablet, TAKE 1 TABLET BY MOUTH TWICE A DAY, Disp: 60 tablet, Rfl: 6  EPINEPHrine (EPIPEN) 0.3 mg/0.3 mL AtIn, USE AS DIRECTED AS NEEDED, Disp: 2 each, Rfl: 0  fluticasone propionate (FLONASE) 50 mcg/actuation nasal spray, 1 SPRAY IN EACH NOSTRIL ONCE DAILY., Disp: 32 g, Rfl: 2  latanoprost 0.005 % ophthalmic solution, , Disp: , Rfl:   metoprolol succinate (TOPROL-XL) 25 MG 24 hr tablet, Take 0.5 tablets (12.5 mg  total) by mouth once daily., Disp: 45 tablet, Rfl: 3  naproxen sodium (ANAPROX) 220 MG tablet, Take 220 mg by mouth., Disp: , Rfl:   phenytoin (DILANTIN) 100 MG ER capsule, Take 3 capsules (300 mg total) by mouth once daily., Disp: 270 capsule, Rfl: 3  sertraline (ZOLOFT) 50 MG tablet, Take 1 tablet (50 mg total) by mouth once daily., Disp: 90 tablet, Rfl: 3  spironolactone (ALDACTONE) 25 MG tablet, TAKE 1 TABLET BY MOUTH ONCE A DAY, Disp: 30 tablet, Rfl: 9  triamcinolone acetonide 0.1% (KENALOG) 0.1 % cream, Apply topically 2 (two) times daily. (Patient not taking: Reported on 10/25/2023), Disp: 28.4 g, Rfl: 0    No current facility-administered medications for this visit.              Review of Systems   Constitutional: Negative.   HENT: Negative.     Eyes: Negative.    Cardiovascular:  Negative for chest pain, dyspnea on exertion, leg swelling, near-syncope, palpitations and syncope.   Respiratory: Negative.  Negative for shortness of breath.    Endocrine: Negative.    Hematologic/Lymphatic: Negative.    Skin: Negative.    Musculoskeletal: Negative.    Gastrointestinal: Negative.    Genitourinary: Negative.    Neurological: Negative.  Negative for dizziness and light-headedness.   Psychiatric/Behavioral: Negative.     Allergic/Immunologic: Negative.        Objective:   Physical Exam  Constitutional:       General: He is not in acute distress.     Appearance: He is well-developed.   HENT:      Head: Normocephalic and atraumatic.   Eyes:      Pupils: Pupils are equal, round, and reactive to light.   Neck:      Thyroid: No thyromegaly.      Vascular: No JVD.   Cardiovascular:      Rate and Rhythm: Normal rate and regular rhythm.      Chest Wall: PMI is not displaced.      Heart sounds: Normal heart sounds, S1 normal and S2 normal. No murmur heard.     No friction rub. No gallop.   Pulmonary:      Effort: Pulmonary effort is normal. No respiratory distress.      Breath sounds: Normal breath sounds. No wheezing or  rales.   Abdominal:      General: Bowel sounds are normal. There is no distension.      Palpations: Abdomen is soft.      Tenderness: There is no abdominal tenderness. There is no guarding or rebound.   Musculoskeletal:         General: No tenderness. Normal range of motion.      Cervical back: Normal range of motion.   Skin:     General: Skin is warm and dry.      Findings: No erythema or rash.   Neurological:      Mental Status: He is alert and oriented to person, place, and time.      Cranial Nerves: No cranial nerve deficit.   Psychiatric:         Behavior: Behavior normal.         Thought Content: Thought content normal.         Judgment: Judgment normal.       ECg: NSR nl KS, QRS, QTc; PVCs    Assessment:      1. Typical atrial flutter        Plan:     78 yoM here for arrhythmia management. No recurrence since his ablation. Can stop OAC. RTC 6m

## 2023-12-08 ENCOUNTER — OFFICE VISIT (OUTPATIENT)
Dept: FAMILY MEDICINE | Facility: CLINIC | Age: 78
End: 2023-12-08
Payer: MEDICARE

## 2023-12-08 ENCOUNTER — HOSPITAL ENCOUNTER (OUTPATIENT)
Dept: RADIOLOGY | Facility: HOSPITAL | Age: 78
Discharge: HOME | End: 2023-12-08
Attending: NURSE PRACTITIONER
Payer: MEDICARE

## 2023-12-08 VITALS
TEMPERATURE: 99 F | SYSTOLIC BLOOD PRESSURE: 98 MMHG | BODY MASS INDEX: 19.88 KG/M2 | HEIGHT: 70 IN | WEIGHT: 138.88 LBS | HEART RATE: 71 BPM | DIASTOLIC BLOOD PRESSURE: 52 MMHG | OXYGEN SATURATION: 96 %

## 2023-12-08 DIAGNOSIS — J44.1 COPD WITH EXACERBATION: ICD-10-CM

## 2023-12-08 DIAGNOSIS — J44.1 COPD WITH EXACERBATION: Primary | ICD-10-CM

## 2023-12-08 PROCEDURE — 99214 OFFICE O/P EST MOD 30 MIN: CPT | Mod: PBBFAC,PO | Performed by: NURSE PRACTITIONER

## 2023-12-08 PROCEDURE — 99214 PR OFFICE/OUTPT VISIT, EST, LEVL IV, 30-39 MIN: ICD-10-PCS | Mod: S$PBB,,, | Performed by: NURSE PRACTITIONER

## 2023-12-08 PROCEDURE — 71046 X-RAY EXAM CHEST 2 VIEWS: CPT | Mod: 26,,, | Performed by: RADIOLOGY

## 2023-12-08 PROCEDURE — 71046 X-RAY EXAM CHEST 2 VIEWS: CPT | Mod: TC,PO

## 2023-12-08 PROCEDURE — 99999 PR PBB SHADOW E&M-EST. PATIENT-LVL IV: CPT | Mod: PBBFAC,,, | Performed by: NURSE PRACTITIONER

## 2023-12-08 PROCEDURE — 99999 PR PBB SHADOW E&M-EST. PATIENT-LVL IV: ICD-10-PCS | Mod: PBBFAC,,, | Performed by: NURSE PRACTITIONER

## 2023-12-08 PROCEDURE — 71046 XR CHEST PA AND LATERAL: ICD-10-PCS | Mod: 26,,, | Performed by: RADIOLOGY

## 2023-12-08 PROCEDURE — 99214 OFFICE O/P EST MOD 30 MIN: CPT | Mod: S$PBB,,, | Performed by: NURSE PRACTITIONER

## 2023-12-08 RX ORDER — CEFDINIR 300 MG/1
300 CAPSULE ORAL 2 TIMES DAILY
Qty: 20 CAPSULE | Refills: 0 | Status: SHIPPED | OUTPATIENT
Start: 2023-12-08 | End: 2023-12-18

## 2023-12-08 RX ORDER — PREDNISONE 20 MG/1
TABLET ORAL
Qty: 20 TABLET | Refills: 0 | Status: SHIPPED | OUTPATIENT
Start: 2023-12-08 | End: 2024-03-27

## 2023-12-08 NOTE — PROGRESS NOTES
Subjective:       Patient ID: Humberto Carlson is a 78 y.o. male.    Chief Complaint: Cough  Pt reports to clinic with chief complaint of worsening productive cough.  Reports thick brown sputum.  Present for >1 week; no fever chills.  Notes wheezing and SOB.  Using previously prescribed inhaler without relief.  Daily smoker.      Past Medical History:   Diagnosis Date    Acute bronchitis 12/02/2019    Anticoagulant long-term use     Asthma     Atrial flutter     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    Cancer     Cataract     Chronic obstructive pulmonary disease 05/24/2021    Coronary artery disease of native artery of native heart with stable angina pectoris 10/26/2020    Encounter for blood transfusion     Essential hypertension 07/22/2019    GERD (gastroesophageal reflux disease)     Glaucoma     Hyperlipidemia     Major depression in partial remission 05/02/2022    Seizures       Current Outpatient Medications on File Prior to Visit   Medication Sig Dispense Refill    albuterol (PROVENTIL/VENTOLIN HFA) 90 mcg/actuation inhaler Rescue 25.5 g 3    atorvastatin (LIPITOR) 40 MG tablet TAKE 1 TABLET BY MOUTH ONCE A DAY 90 tablet 2    brimonidine-timoloL (COMBIGAN) 0.2-0.5 % Drop Inject 1 drop into the eye Twice daily.      ENTRESTO 24-26 mg per tablet TAKE 1 TABLET BY MOUTH TWICE A DAY 60 tablet 6    EPINEPHrine (EPIPEN) 0.3 mg/0.3 mL AtIn USE AS DIRECTED AS NEEDED 2 each 0    fluticasone propionate (FLONASE) 50 mcg/actuation nasal spray 1 SPRAY IN EACH NOSTRIL ONCE DAILY. 32 g 2    latanoprost 0.005 % ophthalmic solution       metoprolol succinate (TOPROL-XL) 25 MG 24 hr tablet Take 0.5 tablets (12.5 mg total) by mouth once daily. 45 tablet 3    naproxen sodium (ANAPROX) 220 MG tablet Take 220 mg by mouth.      phenytoin (DILANTIN) 100 MG ER capsule Take 3 capsules (300 mg total) by mouth once daily. 270 capsule 3    sertraline (ZOLOFT) 50 MG tablet Take 1 tablet (50 mg  total) by mouth once daily. 90 tablet 3    spironolactone (ALDACTONE) 25 MG tablet TAKE 1 TABLET BY MOUTH ONCE A DAY 30 tablet 9    triamcinolone acetonide 0.1% (KENALOG) 0.1 % cream Apply topically 2 (two) times daily. 28.4 g 0     No current facility-administered medications on file prior to visit.      Cough  This is a new problem. The current episode started 1 to 4 weeks ago. The problem has been gradually worsening. The cough is Productive of brown sputum. Associated symptoms include shortness of breath and wheezing. Pertinent negatives include no fever, nasal congestion or postnasal drip. Nothing aggravates the symptoms. He has tried a beta-agonist inhaler for the symptoms. The treatment provided no relief. His past medical history is significant for COPD.     Review of Systems   Constitutional:  Negative for fatigue and fever.   HENT:  Negative for congestion and postnasal drip.    Respiratory:  Positive for cough, shortness of breath and wheezing.    Cardiovascular: Negative.    Gastrointestinal: Negative.    Genitourinary: Negative.    Musculoskeletal: Negative.    Neurological: Negative.    Psychiatric/Behavioral: Negative.         Objective:      Physical Exam  Vitals reviewed.   HENT:      Head: Normocephalic.      Right Ear: External ear normal.      Left Ear: External ear normal.      Mouth/Throat:      Mouth: Mucous membranes are dry.   Eyes:      Extraocular Movements: Extraocular movements intact.   Cardiovascular:      Rate and Rhythm: Normal rate.      Heart sounds: Normal heart sounds.   Pulmonary:      Effort: Pulmonary effort is normal.      Breath sounds: Wheezing, rhonchi and rales present.   Musculoskeletal:         General: No swelling.      Cervical back: Normal range of motion.   Skin:     Coloration: Skin is not jaundiced.   Neurological:      Mental Status: He is alert and oriented to person, place, and time.   Psychiatric:         Behavior: Behavior normal.         Assessment:       1.  COPD with exacerbation        Plan:   COPD with exacerbation  -     X-Ray Chest PA And Lateral; Future; Expected date: 12/08/2023  -     cefdinir (OMNICEF) 300 MG capsule; Take 1 capsule (300 mg total) by mouth 2 (two) times daily. for 10 days  Dispense: 20 capsule; Refill: 0  -     predniSONE (DELTASONE) 20 MG tablet; Take 3 tabs daily for 3 days; then take 2 tabs daily for 3 day; then take 1 tab daily for 3 days; then take 0.5 tab daily for 4 days  Dispense: 20 tablet; Refill: 0    OTC mucinex. Follow up if no improvment    No follow-ups on file.

## 2023-12-14 RX ORDER — SERTRALINE HYDROCHLORIDE 50 MG/1
50 TABLET, FILM COATED ORAL DAILY
Qty: 90 TABLET | Refills: 3 | Status: SHIPPED | OUTPATIENT
Start: 2023-12-14

## 2023-12-14 NOTE — TELEPHONE ENCOUNTER
No care due was identified.  [facility] Embedded Care Due Messages. Reference number: [id number].   12/14/2023 9:23:41 AM CST

## 2023-12-21 DIAGNOSIS — G40.909 SEIZURE DISORDER: ICD-10-CM

## 2023-12-21 DIAGNOSIS — G40.409 GRAND MAL EPILEPSY, CONTROLLED: ICD-10-CM

## 2023-12-21 RX ORDER — PHENYTOIN SODIUM 100 MG/1
300 CAPSULE, EXTENDED RELEASE ORAL
Qty: 270 CAPSULE | Refills: 3 | Status: SHIPPED | OUTPATIENT
Start: 2023-12-21

## 2023-12-31 ENCOUNTER — EXTERNAL CHRONIC CARE MANAGEMENT (OUTPATIENT)
Dept: PRIMARY CARE CLINIC | Facility: CLINIC | Age: 78
End: 2023-12-31
Payer: MEDICARE

## 2023-12-31 PROCEDURE — 99490 CHRNC CARE MGMT STAFF 1ST 20: CPT | Mod: PBBFAC | Performed by: FAMILY MEDICINE

## 2023-12-31 PROCEDURE — 99490 CHRNC CARE MGMT STAFF 1ST 20: CPT | Mod: S$PBB,,, | Performed by: FAMILY MEDICINE

## 2024-01-31 ENCOUNTER — EXTERNAL CHRONIC CARE MANAGEMENT (OUTPATIENT)
Dept: PRIMARY CARE CLINIC | Facility: CLINIC | Age: 79
End: 2024-01-31
Payer: MEDICARE

## 2024-01-31 PROCEDURE — 99490 CHRNC CARE MGMT STAFF 1ST 20: CPT | Mod: S$PBB,,, | Performed by: FAMILY MEDICINE

## 2024-01-31 PROCEDURE — 99490 CHRNC CARE MGMT STAFF 1ST 20: CPT | Mod: PBBFAC | Performed by: FAMILY MEDICINE

## 2024-02-09 RX ORDER — ATORVASTATIN CALCIUM 40 MG/1
TABLET, FILM COATED ORAL
Qty: 90 TABLET | Refills: 2 | Status: SHIPPED | OUTPATIENT
Start: 2024-02-09

## 2024-02-29 ENCOUNTER — EXTERNAL CHRONIC CARE MANAGEMENT (OUTPATIENT)
Dept: PRIMARY CARE CLINIC | Facility: CLINIC | Age: 79
End: 2024-02-29
Payer: MEDICARE

## 2024-02-29 PROCEDURE — 99439 CHRNC CARE MGMT STAF EA ADDL: CPT | Mod: S$PBB,,, | Performed by: FAMILY MEDICINE

## 2024-02-29 PROCEDURE — 99439 CHRNC CARE MGMT STAF EA ADDL: CPT | Mod: PBBFAC,27 | Performed by: FAMILY MEDICINE

## 2024-02-29 PROCEDURE — 99490 CHRNC CARE MGMT STAFF 1ST 20: CPT | Mod: PBBFAC | Performed by: FAMILY MEDICINE

## 2024-02-29 PROCEDURE — 99490 CHRNC CARE MGMT STAFF 1ST 20: CPT | Mod: S$PBB,,, | Performed by: FAMILY MEDICINE

## 2024-03-04 RX ORDER — SACUBITRIL AND VALSARTAN 24; 26 MG/1; MG/1
1 TABLET, FILM COATED ORAL 2 TIMES DAILY
Qty: 60 TABLET | Refills: 6 | Status: SHIPPED | OUTPATIENT
Start: 2024-03-04

## 2024-03-18 DIAGNOSIS — I10 ESSENTIAL HYPERTENSION: ICD-10-CM

## 2024-03-21 RX ORDER — METOPROLOL SUCCINATE 25 MG/1
TABLET, EXTENDED RELEASE ORAL
Qty: 45 TABLET | Refills: 3 | Status: SHIPPED | OUTPATIENT
Start: 2024-03-21

## 2024-03-27 ENCOUNTER — OFFICE VISIT (OUTPATIENT)
Dept: INTERNAL MEDICINE | Facility: CLINIC | Age: 79
End: 2024-03-27
Payer: MEDICARE

## 2024-03-27 ENCOUNTER — LAB VISIT (OUTPATIENT)
Dept: LAB | Facility: HOSPITAL | Age: 79
End: 2024-03-27
Attending: FAMILY MEDICINE
Payer: MEDICARE

## 2024-03-27 VITALS
WEIGHT: 138 LBS | RESPIRATION RATE: 18 BRPM | OXYGEN SATURATION: 91 % | DIASTOLIC BLOOD PRESSURE: 72 MMHG | HEART RATE: 68 BPM | SYSTOLIC BLOOD PRESSURE: 136 MMHG | BODY MASS INDEX: 19.8 KG/M2

## 2024-03-27 DIAGNOSIS — D64.9 CHRONIC ANEMIA: ICD-10-CM

## 2024-03-27 DIAGNOSIS — D69.2 SENILE PURPURA: ICD-10-CM

## 2024-03-27 DIAGNOSIS — G40.909 SEIZURE DISORDER: ICD-10-CM

## 2024-03-27 DIAGNOSIS — M19.90 OSTEOARTHRITIS, UNSPECIFIED OSTEOARTHRITIS TYPE, UNSPECIFIED SITE: ICD-10-CM

## 2024-03-27 DIAGNOSIS — J30.9 ALLERGIC RHINITIS, UNSPECIFIED SEASONALITY, UNSPECIFIED TRIGGER: ICD-10-CM

## 2024-03-27 DIAGNOSIS — I27.9 PULMONARY HEART DISEASE: ICD-10-CM

## 2024-03-27 DIAGNOSIS — Z79.1 LONG TERM (CURRENT) USE OF NON-STEROIDAL ANTI-INFLAMMATORIES (NSAID): ICD-10-CM

## 2024-03-27 DIAGNOSIS — I50.22 CHRONIC SYSTOLIC CHF (CONGESTIVE HEART FAILURE): ICD-10-CM

## 2024-03-27 DIAGNOSIS — I70.0 AORTIC CALCIFICATION: ICD-10-CM

## 2024-03-27 DIAGNOSIS — F33.41 RECURRENT MAJOR DEPRESSIVE DISORDER, IN PARTIAL REMISSION: ICD-10-CM

## 2024-03-27 DIAGNOSIS — I25.118 CORONARY ARTERY DISEASE OF NATIVE ARTERY OF NATIVE HEART WITH STABLE ANGINA PECTORIS: ICD-10-CM

## 2024-03-27 DIAGNOSIS — J44.9 CHRONIC OBSTRUCTIVE PULMONARY DISEASE, UNSPECIFIED COPD TYPE: Primary | ICD-10-CM

## 2024-03-27 DIAGNOSIS — F17.200 SMOKING: ICD-10-CM

## 2024-03-27 DIAGNOSIS — I48.3 TYPICAL ATRIAL FLUTTER: ICD-10-CM

## 2024-03-27 DIAGNOSIS — K59.00 CONSTIPATION, UNSPECIFIED CONSTIPATION TYPE: ICD-10-CM

## 2024-03-27 LAB
ALBUMIN SERPL BCP-MCNC: 4.3 G/DL (ref 3.5–5.2)
ALP SERPL-CCNC: 55 U/L (ref 55–135)
ALT SERPL W/O P-5'-P-CCNC: 16 U/L (ref 10–44)
ANION GAP SERPL CALC-SCNC: 10 MMOL/L (ref 8–16)
AST SERPL-CCNC: 23 U/L (ref 10–40)
BASOPHILS # BLD AUTO: 0.07 K/UL (ref 0–0.2)
BASOPHILS NFR BLD: 0.8 % (ref 0–1.9)
BILIRUB SERPL-MCNC: 0.3 MG/DL (ref 0.1–1)
BUN SERPL-MCNC: 11 MG/DL (ref 8–23)
CALCIUM SERPL-MCNC: 9.7 MG/DL (ref 8.7–10.5)
CHLORIDE SERPL-SCNC: 105 MMOL/L (ref 95–110)
CO2 SERPL-SCNC: 26 MMOL/L (ref 23–29)
CREAT SERPL-MCNC: 0.8 MG/DL (ref 0.5–1.4)
DIFFERENTIAL METHOD BLD: ABNORMAL
EOSINOPHIL # BLD AUTO: 0.9 K/UL (ref 0–0.5)
EOSINOPHIL NFR BLD: 10.9 % (ref 0–8)
ERYTHROCYTE [DISTWIDTH] IN BLOOD BY AUTOMATED COUNT: 12.8 % (ref 11.5–14.5)
EST. GFR  (NO RACE VARIABLE): >60 ML/MIN/1.73 M^2
GLUCOSE SERPL-MCNC: 101 MG/DL (ref 70–110)
HCT VFR BLD AUTO: 42.2 % (ref 40–54)
HGB BLD-MCNC: 13.7 G/DL (ref 14–18)
IMM GRANULOCYTES # BLD AUTO: 0.02 K/UL (ref 0–0.04)
IMM GRANULOCYTES NFR BLD AUTO: 0.2 % (ref 0–0.5)
LYMPHOCYTES # BLD AUTO: 3.4 K/UL (ref 1–4.8)
LYMPHOCYTES NFR BLD: 39.8 % (ref 18–48)
MCH RBC QN AUTO: 30.4 PG (ref 27–31)
MCHC RBC AUTO-ENTMCNC: 32.5 G/DL (ref 32–36)
MCV RBC AUTO: 94 FL (ref 82–98)
MONOCYTES # BLD AUTO: 0.7 K/UL (ref 0.3–1)
MONOCYTES NFR BLD: 8 % (ref 4–15)
NEUTROPHILS # BLD AUTO: 3.4 K/UL (ref 1.8–7.7)
NEUTROPHILS NFR BLD: 40.3 % (ref 38–73)
NRBC BLD-RTO: 0 /100 WBC
PLATELET # BLD AUTO: 262 K/UL (ref 150–450)
PMV BLD AUTO: 10.2 FL (ref 9.2–12.9)
POTASSIUM SERPL-SCNC: 4.9 MMOL/L (ref 3.5–5.1)
PROT SERPL-MCNC: 7 G/DL (ref 6–8.4)
RBC # BLD AUTO: 4.51 M/UL (ref 4.6–6.2)
SODIUM SERPL-SCNC: 141 MMOL/L (ref 136–145)
WBC # BLD AUTO: 8.54 K/UL (ref 3.9–12.7)

## 2024-03-27 PROCEDURE — 99213 OFFICE O/P EST LOW 20 MIN: CPT | Mod: PBBFAC | Performed by: FAMILY MEDICINE

## 2024-03-27 PROCEDURE — 80053 COMPREHEN METABOLIC PANEL: CPT | Performed by: FAMILY MEDICINE

## 2024-03-27 PROCEDURE — 85025 COMPLETE CBC W/AUTO DIFF WBC: CPT | Performed by: FAMILY MEDICINE

## 2024-03-27 PROCEDURE — 99214 OFFICE O/P EST MOD 30 MIN: CPT | Mod: S$PBB,,, | Performed by: FAMILY MEDICINE

## 2024-03-27 PROCEDURE — 99999 PR PBB SHADOW E&M-EST. PATIENT-LVL III: CPT | Mod: PBBFAC,,, | Performed by: FAMILY MEDICINE

## 2024-03-27 PROCEDURE — 36415 COLL VENOUS BLD VENIPUNCTURE: CPT | Performed by: FAMILY MEDICINE

## 2024-03-27 PROCEDURE — G2211 COMPLEX E/M VISIT ADD ON: HCPCS | Mod: S$PBB,,, | Performed by: FAMILY MEDICINE

## 2024-03-27 RX ORDER — PANTOPRAZOLE SODIUM 20 MG/1
20 TABLET, DELAYED RELEASE ORAL DAILY
Qty: 90 TABLET | Refills: 1 | Status: SHIPPED | OUTPATIENT
Start: 2024-03-27 | End: 2025-03-27

## 2024-03-27 RX ORDER — FLUTICASONE PROPIONATE 50 MCG
SPRAY, SUSPENSION (ML) NASAL
Qty: 32 G | Refills: 2 | Status: SHIPPED | OUTPATIENT
Start: 2024-03-27

## 2024-03-27 RX ORDER — ALBUTEROL SULFATE 90 UG/1
AEROSOL, METERED RESPIRATORY (INHALATION)
Qty: 25.5 G | Refills: 3 | Status: SHIPPED | OUTPATIENT
Start: 2024-03-27

## 2024-03-27 NOTE — PROGRESS NOTES
Subjective:       Patient ID: Humberto Carlson is a 79 y.o. male.    Chief Complaint: Follow-up (Happy 79th Birthday! No New issues!)    Follow-up  Pertinent negatives include no chest pain, chills or fever.       79    Patient Active Problem List   Diagnosis    Hyperlipidemia    Essential hypertension    History of anaphylaxis    Elevated blood sugar    Seizure disorder    Screening for prostate cancer    Pre-diabetes    Weight loss    Dyspnea    Chronic bilateral low back pain    Acute rhinitis    History of subdural hematoma    Coronary artery disease of native artery of native heart with stable angina pectoris    Chronic systolic congestive heart failure    Smoker    Nonrheumatic aortic valve stenosis    Aortic calcification    Bilateral carotid artery stenosis    Chronic obstructive pulmonary disease    Major depression in partial remission    Hx of cancer of lung    Atrial flutter    Pulmonary heart disease    Senile purpura       Past Medical History:   Diagnosis Date    Acute bronchitis 12/02/2019    Anticoagulant long-term use     Asthma     Atrial flutter     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    Cancer     Cataract     Chronic obstructive pulmonary disease 05/24/2021    Coronary artery disease of native artery of native heart with stable angina pectoris 10/26/2020    Encounter for blood transfusion     Essential hypertension 07/22/2019    GERD (gastroesophageal reflux disease)     Glaucoma     Hyperlipidemia     Major depression in partial remission 05/02/2022    Seizures        Past Surgical History:   Procedure Laterality Date    ABLATION, ATRIAL FLUTTER, TYPICAL N/A 8/31/2023    Procedure: Ablation, Atrial Flutter, Typical;  Surgeon: Liam Wiggins MD;  Location: [facility] EP LAB;  Service: Cardiology;  Laterality: N/A;  AFL, RFA, BECKY, ANUM (cx if SR), BENJI benz, 3 Prep    BRAIN SURGERY      CARPAL TUNNEL RELEASE      EYE SURGERY      LUNG SURGERY      Partial  lung removed Left     REPAIR,BROW PTOSIS      toe nail removal      TRANSESOPHAGEAL ECHOCARDIOGRAM WITH POSSIBLE CARDIOVERSION (ANUM W/ POSS CARDIOVERSION) N/A 6/28/2023    Procedure: Transesophageal echo (ANUM) intra-procedure log documentation;  Surgeon: Gallo Carbajal MD;  Location: [facility] CATH LAB;  Service: Cardiology;  Laterality: N/A;       Family History   Problem Relation Age of Onset    Stroke Mother     Heart attack Father     Coronary artery disease Brother     Valvular heart disease Brother     Stroke Paternal Grandfather        Social History     Tobacco Use   Smoking Status Every Day    Current packs/day: 2.00    Average packs/day: 2.0 packs/day for 54.2 years (108.5 ttl pk-yrs)    Types: Cigarettes    Start date: 1970    Passive exposure: Past   Smokeless Tobacco Never       Wt Readings from Last 5 Encounters:   03/27/24 62.6 kg (138 lb)   12/08/23 63 kg (138 lb 14.2 oz)   12/06/23 63.8 kg (140 lb 10.5 oz)   11/03/23 61.7 kg (136 lb)   10/25/23 61.7 kg (136 lb 0.4 oz)       For further HPI details, see assessment and plan.    Review of Systems   Constitutional:  Negative for chills and fever.   HENT:  Negative for trouble swallowing.    Respiratory:  Negative for shortness of breath.    Cardiovascular:  Negative for chest pain.   Gastrointestinal:  Negative for constipation and diarrhea.   Genitourinary:  Negative for difficulty urinating.   Neurological:  Negative for dizziness.       Objective:      Vitals:    03/27/24 0821   BP: 136/72   Pulse: 68   Resp: 18       Physical Exam  Constitutional:       General: He is not in acute distress.     Appearance: He is not ill-appearing.   Pulmonary:      Effort: Pulmonary effort is normal. No respiratory distress.   Neurological:      General: No focal deficit present.      Mental Status: He is alert.   Psychiatric:         Mood and Affect: Mood normal.         Behavior: Behavior normal.         Assessment:       1. Chronic obstructive pulmonary disease,  unspecified COPD type    2. Smoking    3. Recurrent major depressive disorder, in partial remission    4. Typical atrial flutter    5. Osteoarthritis, unspecified osteoarthritis type, unspecified site    6. Long term (current) use of non-steroidal anti-inflammatories (nsaid)    7. Senile purpura    8. Seizure disorder    9. Pulmonary heart disease    10. Coronary artery disease of native artery of native heart with stable angina pectoris    11. Aortic calcification    12. Chronic systolic CHF (congestive heart failure)    13. Chronic anemia    14. Constipation, unspecified constipation type        Plan:   Chronic obstructive pulmonary disease, unspecified COPD type  Smoking    Patient does utilize albuterol.  He is still smoking does not plan to quit.  Not interested in pharmaceutical intervention.  O2 sat as low but he feels at baseline.  No current exacerbation.    Recurrent major depressive disorder, in partial remission  With Zoloft 50.  Continue medication    Typical atrial flutter  Asymptomatic   No palpitations or chest pain.  Reviewed recent cardiology electrophysiology consultation.  Anticoagulation was discontinued.  Follow up plan 6 months from December.  Caffeine take has been reduced.  There was a time in the recent past he was drinking up to 30 cups of coffee a day.  He was reduced it down to 10.  Suspect this is a contributing factor to any kind of arrhythmia    Osteoarthritis, unspecified osteoarthritis type, unspecified site  Long term (current) use of non-steroidal anti-inflammatories (nsaid)    Patient necessitates frequent use of naproxen for his osteoarthritis.  Especially if he is quite physically active.  Given long-term use of NSAID and smoking history I do worry he is at increased risk of GI bleed.  Plan to initiate proton pump inhibitor Protonix once daily for gastric lining protection purposes.  Encouraged limit naproxen as much as he can.  We will be monitoring his renal  function    Senile purpura  Ongoing bruising.  Not terrible.  Monitor.  No other bleeding    Seizure disorder  Controlled.  No recent seizures.  On Dilantin per Neurology    Pulmonary heart disease  Coronary artery disease of native artery of native heart with stable angina pectoris  Aortic calcification  Chronic CHF ( mild left ventricular global hypokinesis/mildly decreased systolic function identified on June 28, 2023 echo)  Asymptomatic   No change in breathing or chest pain or swelling issues  Continue current meds   Metoprolol, Entresto, Lipitor, spironolactone  Patient is not on aspirin.  He states because he was previously on the blood thinner.  Given he is on no anticoagulation presently can consider returned to such.  However I have apprehension given advanced age, ongoing bruising, frequent NSAID use.  Encourage he discuss with his cardiologist    Mild chronic anemia   Continue to monitor.  Updating CBC    Allergic rhinitis  Continue use of Flonase    Constipation   Discussed hydration and fiber intake.  Encouraged Metamucil supplementation.  If this fails we can consider prescription medication    Cbc & CMP today    6 month f/u      Visit today included increased complexity associated with the care of the above mentioned conditions , which was addressed while instituting co-management of the longitudinal care of the patient due to the serious and/or complex managed problem(s) .    I have evaluated and discussed management associated with medical care services that serve as the continuing focal point for all needed health care services and/or with medical care services that are part of ongoing care related to my patient's single, serious condition or a complex condition(s).    I am providing ongoing care and I am the primary care provider for this patient, and they are being managed, monitored, and/or observed for their chronic conditions over time.     I have addressed their ongoing health maintenance  requirements and needs for all health care services and reviewed co-management plans provided by specialty providers when available.    Health Maintenance Due   Topic Date Due    RSV Vaccine (Age 60+ and Pregnant patients) (1 - 1-dose 60+ series) Never done    Influenza Vaccine (1) 09/01/2023    COVID-19 Vaccine (5 - 2023-24 season) 09/01/2023     Will update RSV shot.      This note was verbally dictated, please excuse any type errors.

## 2024-03-31 ENCOUNTER — EXTERNAL CHRONIC CARE MANAGEMENT (OUTPATIENT)
Dept: PRIMARY CARE CLINIC | Facility: CLINIC | Age: 79
End: 2024-03-31
Payer: MEDICARE

## 2024-03-31 PROCEDURE — 99490 CHRNC CARE MGMT STAFF 1ST 20: CPT | Mod: S$PBB,,, | Performed by: FAMILY MEDICINE

## 2024-03-31 PROCEDURE — 99439 CHRNC CARE MGMT STAF EA ADDL: CPT | Mod: PBBFAC | Performed by: FAMILY MEDICINE

## 2024-03-31 PROCEDURE — 99439 CHRNC CARE MGMT STAF EA ADDL: CPT | Mod: S$PBB,,, | Performed by: FAMILY MEDICINE

## 2024-03-31 PROCEDURE — 99490 CHRNC CARE MGMT STAFF 1ST 20: CPT | Mod: PBBFAC | Performed by: FAMILY MEDICINE

## 2024-04-30 ENCOUNTER — EXTERNAL CHRONIC CARE MANAGEMENT (OUTPATIENT)
Dept: PRIMARY CARE CLINIC | Facility: CLINIC | Age: 79
End: 2024-04-30
Payer: MEDICARE

## 2024-04-30 PROCEDURE — 99490 CHRNC CARE MGMT STAFF 1ST 20: CPT | Mod: S$PBB,,, | Performed by: FAMILY MEDICINE

## 2024-04-30 PROCEDURE — 99439 CHRNC CARE MGMT STAF EA ADDL: CPT | Mod: S$PBB,,, | Performed by: FAMILY MEDICINE

## 2024-04-30 PROCEDURE — 99490 CHRNC CARE MGMT STAFF 1ST 20: CPT | Mod: PBBFAC | Performed by: FAMILY MEDICINE

## 2024-04-30 PROCEDURE — 99439 CHRNC CARE MGMT STAF EA ADDL: CPT | Mod: PBBFAC,27 | Performed by: FAMILY MEDICINE

## 2024-05-08 ENCOUNTER — OFFICE VISIT (OUTPATIENT)
Dept: CARDIOLOGY | Facility: CLINIC | Age: 79
End: 2024-05-08
Payer: MEDICARE

## 2024-05-08 VITALS
HEART RATE: 75 BPM | BODY MASS INDEX: 20.58 KG/M2 | HEIGHT: 70 IN | DIASTOLIC BLOOD PRESSURE: 62 MMHG | SYSTOLIC BLOOD PRESSURE: 134 MMHG | OXYGEN SATURATION: 97 % | WEIGHT: 143.75 LBS

## 2024-05-08 DIAGNOSIS — I70.0 AORTIC CALCIFICATION: ICD-10-CM

## 2024-05-08 DIAGNOSIS — M79.605 PAIN IN BOTH LOWER EXTREMITIES: Primary | ICD-10-CM

## 2024-05-08 DIAGNOSIS — I25.118 CORONARY ARTERY DISEASE OF NATIVE ARTERY OF NATIVE HEART WITH STABLE ANGINA PECTORIS: ICD-10-CM

## 2024-05-08 DIAGNOSIS — I35.0 NONRHEUMATIC AORTIC VALVE STENOSIS: ICD-10-CM

## 2024-05-08 DIAGNOSIS — F17.200 SMOKER: ICD-10-CM

## 2024-05-08 DIAGNOSIS — I65.23 BILATERAL CAROTID ARTERY STENOSIS: ICD-10-CM

## 2024-05-08 DIAGNOSIS — I10 ESSENTIAL HYPERTENSION: ICD-10-CM

## 2024-05-08 DIAGNOSIS — M79.604 PAIN IN BOTH LOWER EXTREMITIES: Primary | ICD-10-CM

## 2024-05-08 DIAGNOSIS — I27.9 PULMONARY HEART DISEASE: ICD-10-CM

## 2024-05-08 DIAGNOSIS — I50.22 CHRONIC SYSTOLIC CONGESTIVE HEART FAILURE: ICD-10-CM

## 2024-05-08 DIAGNOSIS — E78.2 MIXED HYPERLIPIDEMIA: ICD-10-CM

## 2024-05-08 DIAGNOSIS — I48.3 TYPICAL ATRIAL FLUTTER: ICD-10-CM

## 2024-05-08 PROCEDURE — 99213 OFFICE O/P EST LOW 20 MIN: CPT | Mod: PBBFAC,PO | Performed by: INTERNAL MEDICINE

## 2024-05-08 PROCEDURE — 99999 PR PBB SHADOW E&M-EST. PATIENT-LVL III: CPT | Mod: PBBFAC,,, | Performed by: INTERNAL MEDICINE

## 2024-05-08 PROCEDURE — 99214 OFFICE O/P EST MOD 30 MIN: CPT | Mod: S$PBB,,, | Performed by: INTERNAL MEDICINE

## 2024-05-08 RX ORDER — ASPIRIN 81 MG/1
81 TABLET ORAL DAILY
Start: 2024-05-08

## 2024-05-08 NOTE — PROGRESS NOTES
Subjective:   Patient ID:  Humberto Carlson is a 79 y.o. male who presents for follow up of No chief complaint on file.      80 yo male f/u for 6 m f/u  PMH CAD old MI  CHFrEF 35%, mild AS, AFL s/p CTI RFA in 07/23 by Dr. Wiggins at [facility], AFIB, lung cancer s/p removal of left lung in 2015, h/o fall two months after the procedure and brain bleeding s/p removal, HTN HLD smoker 1 ppd for 60 yrs, occasional drinking  Part time work.  ECHO EF 40% mild AS and apical HK  EKG NSR old septal infarct   MPI showed moderate sized severally fixed defect perfusion in apex, EF 35%  Carotid US 20% right and 50% left       06/2021 visit  No chest pain dizziness palpitation  Chronic BECKER after the lung procedure  Med compliance  EKG NASR HR 52 bpm, nonspecific STT change     visit  BP log reviewed. Occasional dizziness if looking up.  No chest pain weight control, leg swelling, palpitation. BECKER stable and the inhaler helps. 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 Chronic SOB due to mask and smoking.  Sleeps on 1 pillow and no leg swelling  EKG NSR and old septal infarct  BP checked regularly and controlled. Physically active at home     VISIT  Still smoking 1ppd. And chronic SOB and fatigue.  BP controlled at home and not taking the med yet.   No chest pain dizziness faint palpitation and leg swelling   echo EF 40 to 45% and mild AS; carotid US left 50 to 59% and right < 50%     visit  EKG AFL with variable AV block.  Chronic SOB. No dizziness and faint. Occasional lightheadedness. No chest pain  Physically active. Still smoking  AFIB [id number] score of 4    06/23 visit  C/o BP drop to 80 mmHg. His dizziness occurred when standing up quickly. No syncope papitation.   New dx of afib in 04-23. VR controlled. EF 45%.     07/23 visit  S/p ANUM and dccv in 06/23. Now SR. Improved energy SOB. Can walk longer distance  No  active bleeding. F/u with EP    10/23 visit  S/p CTI RFA on 07/27/23 at [facility] by Dr. Wiggins. Now improved energy.  No active bleeding. Chronic smoking  No dizziness faint chest pain orthopnea PND and leg swelling     Interval history  Yard work and maintaince work   Occasional lightheadedness if standing up quickly. No syncope chest pain palpitation.  2023 carotid US right < 39% and left < 49% lesions  BP LDL and A1c controlled  Smoking leg numbness and worse with sneezing          Past Medical History:   Diagnosis Date    Acute bronchitis 12/02/2019    Anticoagulant long-term use     Asthma     Atrial flutter     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    Cancer     Cataract     Chronic obstructive pulmonary disease 05/24/2021    Coronary artery disease of native artery of native heart with stable angina pectoris 10/26/2020    Encounter for blood transfusion     Essential hypertension 07/22/2019    GERD (gastroesophageal reflux disease)     Glaucoma     Hyperlipidemia     Major depression in partial remission 05/02/2022    Seizures        Past Surgical History:   Procedure Laterality Date    ABLATION, ATRIAL FLUTTER, TYPICAL N/A 8/31/2023    Procedure: Ablation, Atrial Flutter, Typical;  Surgeon: Liam Wiggins MD;  Location: [facility] EP LAB;  Service: Cardiology;  Laterality: N/A;  AFL, RFA, BECKY, ANUM (cx if SR), BENJI benz, 3 Prep    BRAIN SURGERY      CARPAL TUNNEL RELEASE      EYE SURGERY      LUNG SURGERY      Partial lung removed Left     REPAIR,BROW PTOSIS      toe nail removal      TRANSESOPHAGEAL ECHOCARDIOGRAM WITH POSSIBLE CARDIOVERSION (ANUM W/ POSS CARDIOVERSION) N/A 6/28/2023    Procedure: Transesophageal echo (ANUM) intra-procedure log documentation;  Surgeon: Gallo Carbajal MD;  Location: [facility] CATH LAB;  Service: Cardiology;  Laterality: N/A;       Social History     Tobacco Use    Smoking status: Every Day     Current packs/day: 2.00     Average packs/day: 2.0  packs/day for 54.3 years (108.7 ttl pk-yrs)     Types: Cigarettes     Start date: 1970     Passive exposure: Past    Smokeless tobacco: Never   Substance Use Topics    Alcohol use: No    Drug use: No       Family History   Problem Relation Name Age of Onset    Stroke Mother      Heart attack Father      Coronary artery disease Brother      Valvular heart disease Brother      Stroke Paternal Grandfather           ROS    Objective:   Physical Exam  HENT:      Head: Normocephalic.   Eyes:      Pupils: Pupils are equal, round, and reactive to light.   Neck:      Thyroid: No thyromegaly.      Vascular: Normal carotid pulses. No carotid bruit or JVD.   Cardiovascular:      Rate and Rhythm: Normal rate and regular rhythm. No extrasystoles are present.     Chest Wall: PMI is not displaced.      Pulses: Normal pulses.      Heart sounds: Murmur heard.      No gallop. No S3 sounds.   Pulmonary:      Effort: No respiratory distress.      Breath sounds: Normal breath sounds. No stridor.   Abdominal:      General: Bowel sounds are normal.      Palpations: Abdomen is soft.      Tenderness: There is no abdominal tenderness. There is no rebound.   Skin:     Findings: No rash.   Neurological:      Mental Status: He is alert and oriented to person, place, and time.   Psychiatric:         Behavior: Behavior normal.         Lab Results   Component Value Date    CHOL 147 09/27/2023    CHOL 159 09/26/2022    CHOL 174 01/25/2021     Lab Results   Component Value Date    HDL 49 09/27/2023    HDL 48 09/26/2022    HDL 62 01/25/2021     Lab Results   Component Value Date    LDLCALC 79.4 09/27/2023    LDLCALC 91.4 09/26/2022    LDLCALC 88.8 01/25/2021     Lab Results   Component Value Date    TRIG 93 09/27/2023    TRIG 98 09/26/2022    TRIG 116 01/25/2021     Lab Results   Component Value Date    CHOLHDL 33.3 09/27/2023    CHOLHDL 30.2 09/26/2022    CHOLHDL 35.6 01/25/2021       Chemistry        Component Value Date/Time     03/27/2024  0854    K 4.9 03/27/2024 0854     03/27/2024 0854    CO2 26 03/27/2024 0854    BUN 11 03/27/2024 0854    CREATININE 0.8 03/27/2024 0854     03/27/2024 0854        Component Value Date/Time    CALCIUM 9.7 03/27/2024 0854    ALKPHOS 55 03/27/2024 0854    AST 23 03/27/2024 0854    ALT 16 03/27/2024 0854    BILITOT 0.3 03/27/2024 0854    ESTGFRAFRICA >60.0 09/30/2021 1130    EGFRNONAA >60.0 09/30/2021 1130          Lab Results   Component Value Date    HGBA1C 5.6 09/27/2023     Lab Results   Component Value Date    TSH 1.465 09/27/2023     Lab Results   Component Value Date    INR 1.1 08/21/2023     Lab Results   Component Value Date    WBC 8.54 03/27/2024    HGB 13.7 (L) 03/27/2024    HCT 42.2 03/27/2024    MCV 94 03/27/2024     03/27/2024     BMP  Sodium   Date Value Ref Range Status   03/27/2024 141 136 - 145 mmol/L Final     Potassium   Date Value Ref Range Status   03/27/2024 4.9 3.5 - 5.1 mmol/L Final     Chloride   Date Value Ref Range Status   03/27/2024 105 95 - 110 mmol/L Final     CO2   Date Value Ref Range Status   03/27/2024 26 23 - 29 mmol/L Final     BUN   Date Value Ref Range Status   03/27/2024 11 8 - 23 mg/dL Final     Creatinine   Date Value Ref Range Status   03/27/2024 0.8 0.5 - 1.4 mg/dL Final     Calcium   Date Value Ref Range Status   03/27/2024 9.7 8.7 - 10.5 mg/dL Final     Anion Gap   Date Value Ref Range Status   03/27/2024 10 8 - 16 mmol/L Final     eGFR if    Date Value Ref Range Status   09/30/2021 >60.0 >60 mL/min/1.73 m^2 Final     eGFR if non    Date Value Ref Range Status   09/30/2021 >60.0 >60 mL/min/1.73 m^2 Final     Comment:     Calculation used to obtain the estimated glomerular filtration  rate (eGFR) is the CKD-EPI equation.        BNP  @LABRCNTIP(BNP,BNPTRIAGEBLO)@  @LABRCNTIP(troponini)@  CrCl cannot be calculated (Patient's most recent lab result is older than the maximum 7 days allowed.).  No results found in the last 24  hours.  No results found in the last 24 hours.  No results found in the last 24 hours.    Assessment:      1. Pain in both lower extremities    2. Aortic calcification    3. Bilateral carotid artery stenosis    4. Chronic systolic congestive heart failure    5. Coronary artery disease of native artery of native heart with stable angina pectoris    6. Essential hypertension    7. Mixed hyperlipidemia    8. Nonrheumatic aortic valve stenosis    9. Pulmonary heart disease    10. Typical atrial flutter    11. Smoker      Afib afl ON SR  CHFrEF compensated  Mild AS    Plan:     MARIA DEL CARMEN and LE artrial US ordered  Continue statin entresto toprolXL aldactone  Smoking cessation  Add asa 81 mg Otc    Counseled DASH  Check Lipid profile with PCP in 6 months  Recommend heart-healthy diet, weight control and regular exercise.  Arvind. Risk modification.   I have reviewed all pertinent labs and cardiac studies independently. Plans and recommendations have been formulated under my direct supervision. All questions answered and patient voiced understanding.   If symptoms persist go to the ED  RTC in 6 months

## 2024-05-10 ENCOUNTER — OFFICE VISIT (OUTPATIENT)
Dept: INTERNAL MEDICINE | Facility: CLINIC | Age: 79
End: 2024-05-10
Payer: MEDICARE

## 2024-05-10 VITALS
SYSTOLIC BLOOD PRESSURE: 120 MMHG | HEIGHT: 70 IN | OXYGEN SATURATION: 95 % | WEIGHT: 142.44 LBS | BODY MASS INDEX: 20.39 KG/M2 | DIASTOLIC BLOOD PRESSURE: 62 MMHG | TEMPERATURE: 98 F | HEART RATE: 66 BPM

## 2024-05-10 DIAGNOSIS — I10 ESSENTIAL HYPERTENSION: ICD-10-CM

## 2024-05-10 DIAGNOSIS — L72.9 SKIN CYST: Primary | ICD-10-CM

## 2024-05-10 PROCEDURE — 99215 OFFICE O/P EST HI 40 MIN: CPT | Mod: PBBFAC

## 2024-05-10 PROCEDURE — 99214 OFFICE O/P EST MOD 30 MIN: CPT | Mod: S$PBB,,,

## 2024-05-10 PROCEDURE — 99999 PR PBB SHADOW E&M-EST. PATIENT-LVL V: CPT | Mod: PBBFAC,,,

## 2024-05-10 RX ORDER — CLINDAMYCIN HYDROCHLORIDE 300 MG/1
300 CAPSULE ORAL 3 TIMES DAILY
Qty: 30 CAPSULE | Refills: 0 | Status: SHIPPED | OUTPATIENT
Start: 2024-05-10 | End: 2024-05-20

## 2024-05-10 RX ORDER — SULFAMETHOXAZOLE AND TRIMETHOPRIM 800; 160 MG/1; MG/1
1 TABLET ORAL 2 TIMES DAILY
Qty: 20 TABLET | Refills: 0 | Status: SHIPPED | OUTPATIENT
Start: 2024-05-10 | End: 2024-05-10

## 2024-05-10 NOTE — PATIENT INSTRUCTIONS
Recommend daily probiotic while taking the clindamycin (antibiotic)  Get the probiotic from over the counter   Probably will be cheaper     Examples of Probiotics   VSL # 3  Align  Florastor  Culturelle

## 2024-05-10 NOTE — PROGRESS NOTES
Humberto Carlson  05/10/2024  [id number]    Zeke Lamb MD  Patient Care Team:  Zeke Lamb MD as PCP - General (Family Medicine)  MARGIE Ballesteros MD as Consulting Physician (Gastroenterology)  Dory Whitaker NP as Nurse Practitioner (Neurology)  Gallo Carbajal MD as Consulting Physician (Cardiology)  Robb Meadows MD (Ophthalmology)          Visit Type:an urgent visit for a new problem    Chief Complaint:  Chief Complaint   Patient presents with    Insect Bite     Right leg         History of Present Illness:    Possible insect bite on right leg  Noticed it a few weeks ago  No fever, body aches, or chills  Area is tender to touch   Covers it was Band-Aid  Notices daily drainage on it     History:  Past Medical History:   Diagnosis Date    Acute bronchitis 12/02/2019    Anticoagulant long-term use     Asthma     Atrial flutter     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    Cancer     Cataract     Chronic obstructive pulmonary disease 05/24/2021    Coronary artery disease of native artery of native heart with stable angina pectoris 10/26/2020    Encounter for blood transfusion     Essential hypertension 07/22/2019    GERD (gastroesophageal reflux disease)     Glaucoma     Hyperlipidemia     Major depression in partial remission 05/02/2022    Seizures      Past Surgical History:   Procedure Laterality Date    ABLATION, ATRIAL FLUTTER, TYPICAL N/A 8/31/2023    Procedure: Ablation, Atrial Flutter, Typical;  Surgeon: Liam Wiggins MD;  Location: [facility] EP LAB;  Service: Cardiology;  Laterality: N/A;  AFL, RFA, BECKY, ANUM (cx if SR), BENJI benz, 3 Prep    BRAIN SURGERY      CARPAL TUNNEL RELEASE      EYE SURGERY      LUNG SURGERY      Partial lung removed Left     REPAIR,BROW PTOSIS      toe nail removal      TRANSESOPHAGEAL ECHOCARDIOGRAM WITH POSSIBLE CARDIOVERSION (ANUM W/ POSS CARDIOVERSION) N/A 6/28/2023    Procedure: Transesophageal echo (ANUM) intra-procedure  log documentation;  Surgeon: Gallo Carbajal MD;  Location: [facility] CATH LAB;  Service: Cardiology;  Laterality: N/A;     Family History   Problem Relation Name Age of Onset    Stroke Mother      Heart attack Father      Coronary artery disease Brother      Valvular heart disease Brother      Stroke Paternal Grandfather       Social History     Socioeconomic History    Marital status:    Tobacco Use    Smoking status: Every Day     Current packs/day: 2.00     Average packs/day: 2.0 packs/day for 54.4 years (108.7 ttl pk-yrs)     Types: Cigarettes     Start date: 1970     Passive exposure: Past    Smokeless tobacco: Never   Substance and Sexual Activity    Alcohol use: No    Drug use: No    Sexual activity: Not Currently     Partners: Female     Social Determinants of Health     Financial Resource Strain: Low Risk  (6/12/2023)    Overall Financial Resource Strain (CARDIA)     Difficulty of Paying Living Expenses: Not hard at all   Food Insecurity: No Food Insecurity (6/12/2023)    Hunger Vital Sign     Worried About Running Out of Food in the Last Year: Never true     Ran Out of Food in the Last Year: Never true   Transportation Needs: No Transportation Needs (6/12/2023)    PRAPARE - Transportation     Lack of Transportation (Medical): No     Lack of Transportation (Non-Medical): No   Physical Activity: Inactive (6/12/2023)    Exercise Vital Sign     Days of Exercise per Week: 0 days     Minutes of Exercise per Session: 0 min   Stress: Stress Concern Present (6/12/2023)    [employer] [place] [employer] - Occupational Stress Questionnaire     Feeling of Stress : Very much   Housing Stability: Low Risk  (6/12/2023)    Housing Stability Vital Sign     Unable to Pay for Housing in the Last Year: No     Number of Places Lived in the Last Year: 1     Unstable Housing in the Last Year: No     Patient Active Problem List   Diagnosis    Hyperlipidemia    Essential hypertension    History of anaphylaxis    Elevated  blood sugar    Seizure disorder    Screening for prostate cancer    Pre-diabetes    Weight loss    Dyspnea    Chronic bilateral low back pain    Acute rhinitis    History of subdural hematoma    Coronary artery disease of native artery of native heart with stable angina pectoris    Chronic systolic congestive heart failure    Smoker    Nonrheumatic aortic valve stenosis    Aortic calcification    Bilateral carotid artery stenosis    Chronic obstructive pulmonary disease    Major depression in partial remission    Hx of cancer of lung    Atrial flutter    Pulmonary heart disease    Senile purpura    Pain in both lower extremities     Review of patient's allergies indicates:   Allergen Reactions    Wasp sting  [allergen ext-venom-honey bee]      Other reaction(s): swelling       The following were reviewed at this visit: active problem list, medication list, allergies, family history, social history, and health maintenance.    Medications:  Current Outpatient Medications on File Prior to Visit   Medication Sig Dispense Refill    albuterol (PROVENTIL/VENTOLIN HFA) 90 mcg/actuation inhaler Rescue 25.5 g 3    aspirin (ECOTRIN) 81 MG EC tablet Take 1 tablet (81 mg total) by mouth once daily.      atorvastatin (LIPITOR) 40 MG tablet TAKE 1 TABLET BY MOUTH ONCE A DAY 90 tablet 2    brimonidine-timoloL (COMBIGAN) 0.2-0.5 % Drop Inject 1 drop into the eye Twice daily.      ENTRESTO 24-26 mg per tablet TAKE ONE TABLET BY MOUTH TWICE DAILY 60 tablet 6    EPINEPHrine (EPIPEN) 0.3 mg/0.3 mL AtIn USE AS DIRECTED AS NEEDED 2 each 0    fluticasone propionate (FLONASE) 50 mcg/actuation nasal spray 1 SPRAY IN EACH NOSTRIL ONCE DAILY. 32 g 2    latanoprost 0.005 % ophthalmic solution       metoprolol succinate (TOPROL-XL) 25 MG 24 hr tablet TAKE (1/2) TABLET BY MOUTH DAILY. 45 tablet 3    naproxen sodium (ANAPROX) 220 MG tablet Take 220 mg by mouth.      pantoprazole (PROTONIX) 20 MG tablet Take 1 tablet (20 mg total) by mouth once  daily. 90 tablet 1    phenytoin (DILANTIN) 100 MG ER capsule TAKE 3 CAPSULES BY MOUTH ONCE DAILY 270 capsule 3    sertraline (ZOLOFT) 50 MG tablet Take 1 tablet (50 mg total) by mouth once daily. 90 tablet 3    spironolactone (ALDACTONE) 25 MG tablet TAKE 1 TABLET BY MOUTH ONCE A DAY 30 tablet 9     No current facility-administered medications on file prior to visit.       Medications have been reviewed and reconciled with patient at this visit.  Barriers to medications reviewed with patient.    Adverse reactions to current medications reviewed with patient..    Over the counter medications reviewed and reconciled with patient.    Exam:  Wt Readings from Last 3 Encounters:   05/08/24 65.2 kg (143 lb 11.8 oz)   03/27/24 62.6 kg (138 lb)   12/08/23 63 kg (138 lb 14.2 oz)     Temp Readings from Last 3 Encounters:   12/08/23 98.9 °F (37.2 °C)   09/27/23 96 °F (35.6 °C) (Tympanic)   08/31/23 97.8 °F (36.6 °C) (Temporal)     BP Readings from Last 3 Encounters:   05/08/24 134/62   03/27/24 136/72   12/08/23 (!) 98/52     Pulse Readings from Last 3 Encounters:   05/08/24 75   03/27/24 68   12/08/23 71     There is no height or weight on file to calculate BMI.      Review of Systems   Constitutional:  Negative for chills, fever and malaise/fatigue.   Respiratory:  Negative for shortness of breath.    Cardiovascular:  Negative for chest pain and palpitations.   Skin:  Positive for rash. Negative for itching.     Physical Exam  Nursing note reviewed.   Cardiovascular:      Rate and Rhythm: Normal rate and regular rhythm.      Pulses: Normal pulses.      Heart sounds: Normal heart sounds.   Pulmonary:      Effort: Pulmonary effort is normal. No respiratory distress.      Breath sounds: Normal breath sounds.   Musculoskeletal:         General: Swelling and tenderness present.   Skin:     Findings: Wound present.   Neurological:      Mental Status: He is alert and oriented to person, place, and time.   Psychiatric:         Mood  and Affect: Mood normal.         Behavior: Behavior normal.         Thought Content: Thought content normal.         Judgment: Judgment normal.          Laboratory Reviewed ({Yes)  Lab Results   Component Value Date    WBC 8.54 03/27/2024    HGB 13.7 (L) 03/27/2024    HCT 42.2 03/27/2024     03/27/2024    CHOL 147 09/27/2023    TRIG 93 09/27/2023    HDL 49 09/27/2023    ALT 16 03/27/2024    AST 23 03/27/2024     03/27/2024    K 4.9 03/27/2024     03/27/2024    CREATININE 0.8 03/27/2024    BUN 11 03/27/2024    CO2 26 03/27/2024    TSH 1.465 09/27/2023    PSA 0.88 02/25/2020    INR 1.1 08/21/2023    HGBA1C 5.6 09/27/2023       Humberto was seen today for insect bite.    Diagnoses and all orders for this visit:    Skin cyst  -     clindamycin (CLEOCIN) 300 MG capsule; Take 1 capsule (300 mg total) by mouth 3 (three) times daily. for 10 days  -     Ambulatory referral/consult to General Surgery; Future    Essential hypertension  At visit, Blood pressure is at goal. Continue current medications      Other orders  -     Discontinue: sulfamethoxazole-trimethoprim 800-160mg (BACTRIM DS) 800-160 mg Tab; Take 1 tablet by mouth 2 (two) times daily.      Discussed with his PCP, Dr. Lamb  [place] that it was likely a cyst vs insect bite  Will start on clindamycin and refer to gen surg  Recommended taking daily probiotic while on clindamycin  Was given list of probiotics during the visit       Care Plan/Goals: Reviewed    Goals    None         Follow up: No follow-ups on file.    After visit summary was printed and given to patient upon discharge today.  Patient goals and care plan are included in After Visit Summary.

## 2024-05-13 ENCOUNTER — OFFICE VISIT (OUTPATIENT)
Dept: SURGERY | Facility: CLINIC | Age: 79
End: 2024-05-13
Payer: MEDICARE

## 2024-05-13 VITALS
DIASTOLIC BLOOD PRESSURE: 60 MMHG | BODY MASS INDEX: 20.39 KG/M2 | HEIGHT: 70 IN | SYSTOLIC BLOOD PRESSURE: 124 MMHG | TEMPERATURE: 99 F | WEIGHT: 142.44 LBS | HEART RATE: 51 BPM

## 2024-05-13 DIAGNOSIS — L72.9 SKIN CYST: ICD-10-CM

## 2024-05-13 PROCEDURE — 99999 PR PBB SHADOW E&M-EST. PATIENT-LVL IV: CPT | Mod: PBBFAC,,, | Performed by: SURGERY

## 2024-05-13 PROCEDURE — 10060 I&D ABSCESS SIMPLE/SINGLE: CPT | Mod: PBBFAC | Performed by: SURGERY

## 2024-05-13 PROCEDURE — 99202 OFFICE O/P NEW SF 15 MIN: CPT | Mod: 25,S$PBB,, | Performed by: SURGERY

## 2024-05-13 PROCEDURE — 99214 OFFICE O/P EST MOD 30 MIN: CPT | Mod: PBBFAC | Performed by: SURGERY

## 2024-05-13 RX ORDER — MUPIROCIN 20 MG/G
OINTMENT TOPICAL 2 TIMES DAILY
Qty: 22 G | Refills: 0 | Status: SHIPPED | OUTPATIENT
Start: 2024-05-13

## 2024-05-13 RX ORDER — HYDROCODONE BITARTRATE AND ACETAMINOPHEN 5; 325 MG/1; MG/1
TABLET ORAL
Qty: 12 TABLET | Refills: 0 | Status: SHIPPED | OUTPATIENT
Start: 2024-05-13 | End: 2024-06-03 | Stop reason: ALTCHOICE

## 2024-05-13 NOTE — PROGRESS NOTES
Patient ID: Humberto Carlson is a 79 y.o. male.    Chief Complaint: Consult (Right leg cyst)      HPI:  Draining wound on the right posterior lateral leg    Review of Systems   Skin:         Draining wound on the right posterior lateral leg     Current Outpatient Medications   Medication Sig Dispense Refill    albuterol (PROVENTIL/VENTOLIN HFA) 90 mcg/actuation inhaler Rescue 25.5 g 3    aspirin (ECOTRIN) 81 MG EC tablet Take 1 tablet (81 mg total) by mouth once daily.      atorvastatin (LIPITOR) 40 MG tablet TAKE 1 TABLET BY MOUTH ONCE A DAY 90 tablet 2    brimonidine-timoloL (COMBIGAN) 0.2-0.5 % Drop Inject 1 drop into the eye Twice daily.      clindamycin (CLEOCIN) 300 MG capsule Take 1 capsule (300 mg total) by mouth 3 (three) times daily. for 10 days 30 capsule 0    ENTRESTO 24-26 mg per tablet TAKE ONE TABLET BY MOUTH TWICE DAILY 60 tablet 6    EPINEPHrine (EPIPEN) 0.3 mg/0.3 mL AtIn USE AS DIRECTED AS NEEDED 2 each 0    fluticasone propionate (FLONASE) 50 mcg/actuation nasal spray 1 SPRAY IN EACH NOSTRIL ONCE DAILY. 32 g 2    latanoprost 0.005 % ophthalmic solution       metoprolol succinate (TOPROL-XL) 25 MG 24 hr tablet TAKE (1/2) TABLET BY MOUTH DAILY. 45 tablet 3    naproxen sodium (ANAPROX) 220 MG tablet Take 220 mg by mouth.      pantoprazole (PROTONIX) 20 MG tablet Take 1 tablet (20 mg total) by mouth once daily. 90 tablet 1    phenytoin (DILANTIN) 100 MG ER capsule TAKE 3 CAPSULES BY MOUTH ONCE DAILY 270 capsule 3    sertraline (ZOLOFT) 50 MG tablet Take 1 tablet (50 mg total) by mouth once daily. 90 tablet 3    spironolactone (ALDACTONE) 25 MG tablet TAKE 1 TABLET BY MOUTH ONCE A DAY 30 tablet 9     No current facility-administered medications for this visit.       Review of patient's allergies indicates:   Allergen Reactions    Wasp sting  [allergen ext-venom-honey bee]      Other reaction(s): swelling       Past Medical History:   Diagnosis Date    Acute bronchitis 12/02/2019    Anticoagulant  long-term use     Asthma     Atrial flutter     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    Cancer     Cataract     Chronic obstructive pulmonary disease 05/24/2021    Coronary artery disease of native artery of native heart with stable angina pectoris 10/26/2020    Encounter for blood transfusion     Essential hypertension 07/22/2019    GERD (gastroesophageal reflux disease)     Glaucoma     Hyperlipidemia     Major depression in partial remission 05/02/2022    Seizures        Past Surgical History:   Procedure Laterality Date    ABLATION, ATRIAL FLUTTER, TYPICAL N/A 8/31/2023    Procedure: Ablation, Atrial Flutter, Typical;  Surgeon: Liam Wiggins MD;  Location: [facility] EP LAB;  Service: Cardiology;  Laterality: N/A;  AFL, RFA, BECKY, ANUM (cx if SR), BENJI benz, 3 Prep    BRAIN SURGERY      CARPAL TUNNEL RELEASE      EYE SURGERY      LUNG SURGERY      Partial lung removed Left     REPAIR,BROW PTOSIS      toe nail removal      TRANSESOPHAGEAL ECHOCARDIOGRAM WITH POSSIBLE CARDIOVERSION (ANUM W/ POSS CARDIOVERSION) N/A 6/28/2023    Procedure: Transesophageal echo (ANUM) intra-procedure log documentation;  Surgeon: Gallo Carbajal MD;  Location: [facility] CATH LAB;  Service: Cardiology;  Laterality: N/A;       Social History     Socioeconomic History    Marital status:    Tobacco Use    Smoking status: Every Day     Current packs/day: 2.00     Average packs/day: 2.0 packs/day for 54.4 years (108.7 ttl pk-yrs)     Types: Cigarettes     Start date: 1970     Passive exposure: Past    Smokeless tobacco: Never   Substance and Sexual Activity    Alcohol use: No    Drug use: No    Sexual activity: Not Currently     Partners: Female     Social Determinants of Health     Financial Resource Strain: Low Risk  (6/12/2023)    Overall Financial Resource Strain (CARDIA)     Difficulty of Paying Living Expenses: Not hard at all   Food Insecurity: No Food Insecurity (6/12/2023)    Hunger Vital Sign      Worried About Running Out of Food in the Last Year: Never true     Ran Out of Food in the Last Year: Never true   Transportation Needs: No Transportation Needs (6/12/2023)    PRAPARE - Transportation     Lack of Transportation (Medical): No     Lack of Transportation (Non-Medical): No   Physical Activity: Inactive (6/12/2023)    Exercise Vital Sign     Days of Exercise per Week: 0 days     Minutes of Exercise per Session: 0 min   Stress: Stress Concern Present (6/12/2023)    [employer] [place] [employer] - Occupational Stress Questionnaire     Feeling of Stress : Very much   Housing Stability: Low Risk  (6/12/2023)    Housing Stability Vital Sign     Unable to Pay for Housing in the Last Year: No     Number of Places Lived in the Last Year: 1     Unstable Housing in the Last Year: No       Vitals:    05/13/24 1611   BP: 124/60   Pulse: (!) 51   Temp: 98.6 °F (37 °C)       Physical Exam  Vitals reviewed.   Skin:     Comments: On the posterior lateral portion of the right leg is a 2 cm x 1 cm open wound with purulent material, caseous material and surrounding erythema     Assessment & Plan:  Leg abscess secondary to a sebaceous cyst.      Incision and drainage in clinic.  The wound be left open to heal by secondary intention.  Risks include infection bleeding

## 2024-05-13 NOTE — PATIENT INSTRUCTIONS
Is okay to shower and get the area wet.      You need to remove and replace the Band-Aid twice a day.      Once you removed the Band-Aid apply a little bit of the antibiotic ointment and then place a new Band-Aid          Please call the office for any increasing pain, redness or drainage    Our office phone numbers are  [phone] and

## 2024-05-13 NOTE — PROCEDURES
"Incision & Drainage    Date/Time: 5/13/2024 4:20 PM    Performed by: Christo Barry MD  Authorized by: Christo Barry MD    Time out: Immediately prior to procedure a "time out" was called to verify the correct patient, procedure, equipment, support staff and site/side marked as required.    Consent Done?:  Yes (Verbal)    Type:  Abscess  Body area:  Lower extremity  Location details:  Right leg  Anesthesia:  Local infiltration  Local anesthetic: Lidocaine 1% with epinephrine  Scalpel size:  15  Incision type:  Elliptical  Incision depth: subcutaneous    Complexity:  Simple  Drainage characteristics: More like calcified fat.  Wound treatment:  Incision and drainage (The wound was then closed with interrupted 3-0 Prolene suture)  Packing material: Antibiotic ointment and a Band-Aid.  Pain Assessment: 3    Postoperative instructions and follow up instructions were provided    "

## 2024-05-17 ENCOUNTER — HOSPITAL ENCOUNTER (OUTPATIENT)
Dept: CARDIOLOGY | Facility: HOSPITAL | Age: 79
Discharge: HOME | End: 2024-05-17
Attending: INTERNAL MEDICINE
Payer: MEDICARE

## 2024-05-17 VITALS
HEIGHT: 70 IN | WEIGHT: 142 LBS | SYSTOLIC BLOOD PRESSURE: 124 MMHG | DIASTOLIC BLOOD PRESSURE: 60 MMHG | BODY MASS INDEX: 20.33 KG/M2

## 2024-05-17 VITALS
WEIGHT: 142 LBS | HEIGHT: 70 IN | SYSTOLIC BLOOD PRESSURE: 134 MMHG | DIASTOLIC BLOOD PRESSURE: 76 MMHG | BODY MASS INDEX: 20.33 KG/M2

## 2024-05-17 DIAGNOSIS — M79.605 PAIN IN BOTH LOWER EXTREMITIES: ICD-10-CM

## 2024-05-17 DIAGNOSIS — M79.604 PAIN IN BOTH LOWER EXTREMITIES: ICD-10-CM

## 2024-05-17 PROCEDURE — 93922 UPR/L XTREMITY ART 2 LEVELS: CPT | Mod: 26,,, | Performed by: INTERNAL MEDICINE

## 2024-05-17 PROCEDURE — 93925 LOWER EXTREMITY STUDY: CPT | Mod: 26,,, | Performed by: INTERNAL MEDICINE

## 2024-05-17 PROCEDURE — 93922 UPR/L XTREMITY ART 2 LEVELS: CPT

## 2024-05-17 PROCEDURE — 93925 LOWER EXTREMITY STUDY: CPT

## 2024-05-18 LAB
LEFT ABI: 0.84
LEFT ANT TIBIAL SYS PSV: 56 CM/S
LEFT ARM BP: 145 MMHG
LEFT CFA PSV: 107 CM/S
LEFT DORSALIS PEDIS: 122 MMHG
LEFT PERONEAL SYS PSV: 46 CM/S
LEFT POPLITEAL PSV: 74 CM/S
LEFT POST TIBIAL SYS PSV: 37 CM/S
LEFT POSTERIOR TIBIAL: 86 MMHG
LEFT PROFUNDA SYS PSV: 214 CM/S
LEFT SUPER FEMORAL DIST SYS PSV: 69 CM/S
LEFT SUPER FEMORAL MID SYS PSV: 111 CM/S
LEFT SUPER FEMORAL OSTIAL SYS PSV: 88 CM/S
LEFT SUPER FEMORAL PROX SYS PSV: 109 CM/S
LEFT TBI: 0.24
LEFT TIB/PER TRUNK SYS PSV: 48 CM/S
LEFT TOE PRESSURE: 35 MMHG
OHS CV LEFT LOWER EXTREMITY ABI (NO CALC): 0.84
OHS CV RIGHT ABI LOWER EXTREMITY (NO CALC): 0.87
RIGHT ABI: 0.87
RIGHT ANT TIBIAL SYS PSV: 43 CM/S
RIGHT ARM BP: 134 MMHG
RIGHT CFA PSV: 175 CM/S
RIGHT DORSALIS PEDIS: 126 MMHG
RIGHT PERONEAL SYS PSV: 35 CM/S
RIGHT POPLITEAL PSV: 56 CM/S
RIGHT POST TIBIAL SYS PSV: 46 CM/S
RIGHT POSTERIOR TIBIAL: 124 MMHG
RIGHT PROFUNDA SYS PSV: 285 CM/S
RIGHT SUPER FEMORAL DIST SYS PSV: 70 CM/S
RIGHT SUPER FEMORAL MID SYS PSV: 124 CM/S
RIGHT SUPER FEMORAL OSTIAL SYS PSV: 55 CM/S
RIGHT SUPER FEMORAL PROX SYS PSV: 93 CM/S
RIGHT TBI: 0.12
RIGHT TIB/PER TRUNK SYS PSV: 34 CM/S
RIGHT TOE PRESSURE: 17 MMHG

## 2024-05-21 ENCOUNTER — TELEPHONE (OUTPATIENT)
Dept: CARDIOLOGY | Facility: CLINIC | Age: 79
End: 2024-05-21
Payer: MEDICARE

## 2024-05-21 NOTE — TELEPHONE ENCOUNTER
Spoke with pt in regards to test results. Pt verbalized understanding information.                ----- Message from Gallo Carbajal MD sent at 5/21/2024  4:11 PM CDT -----  LE arterial US showed mild Dz  Continue current Rx.   F/U as scheduled

## 2024-05-22 ENCOUNTER — TELEPHONE (OUTPATIENT)
Dept: SURGERY | Facility: CLINIC | Age: 79
End: 2024-05-22
Payer: MEDICARE

## 2024-05-22 NOTE — TELEPHONE ENCOUNTER
Contacting patient to let him know that we rescheduled his appt for 5/29/24 at the [facility], he advised that he would rather wait until that following Monday that we are back at [facility] so that eh does not have to go all the way to the [facility]. I rescheduled again to 6/3/24.

## 2024-05-23 ENCOUNTER — HOSPITAL ENCOUNTER (EMERGENCY)
Facility: HOSPITAL | Age: 79
Discharge: HOME | End: 2024-05-23
Attending: STUDENT IN AN ORGANIZED HEALTH CARE EDUCATION/TRAINING PROGRAM
Payer: MEDICARE

## 2024-05-23 VITALS
SYSTOLIC BLOOD PRESSURE: 169 MMHG | BODY MASS INDEX: 19.37 KG/M2 | DIASTOLIC BLOOD PRESSURE: 75 MMHG | OXYGEN SATURATION: 97 % | TEMPERATURE: 99 F | WEIGHT: 135 LBS | HEART RATE: 62 BPM | RESPIRATION RATE: 18 BRPM

## 2024-05-23 DIAGNOSIS — T63.481A INSECT STINGS, ACCIDENTAL OR UNINTENTIONAL, INITIAL ENCOUNTER: ICD-10-CM

## 2024-05-23 DIAGNOSIS — T78.40XA ALLERGIC REACTION, INITIAL ENCOUNTER: Primary | ICD-10-CM

## 2024-05-23 PROCEDURE — 96372 THER/PROPH/DIAG INJ SC/IM: CPT | Performed by: REGISTERED NURSE

## 2024-05-23 PROCEDURE — 99284 EMERGENCY DEPT VISIT MOD MDM: CPT | Mod: 25

## 2024-05-23 PROCEDURE — 63600175 PHARM REV CODE 636 W HCPCS: Performed by: REGISTERED NURSE

## 2024-05-23 RX ORDER — DEXAMETHASONE SODIUM PHOSPHATE 4 MG/ML
8 INJECTION, SOLUTION INTRA-ARTICULAR; INTRALESIONAL; INTRAMUSCULAR; INTRAVENOUS; SOFT TISSUE
Status: COMPLETED | OUTPATIENT
Start: 2024-05-23 | End: 2024-05-23

## 2024-05-23 RX ORDER — DIPHENHYDRAMINE HYDROCHLORIDE 50 MG/ML
25 INJECTION INTRAMUSCULAR; INTRAVENOUS
Status: COMPLETED | OUTPATIENT
Start: 2024-05-23 | End: 2024-05-23

## 2024-05-23 RX ORDER — PREDNISONE 20 MG/1
40 TABLET ORAL DAILY
Qty: 10 TABLET | Refills: 0 | Status: SHIPPED | OUTPATIENT
Start: 2024-05-23 | End: 2024-05-28

## 2024-05-23 RX ADMIN — DEXAMETHASONE SODIUM PHOSPHATE 8 MG: 4 INJECTION INTRA-ARTICULAR; INTRALESIONAL; INTRAMUSCULAR; INTRAVENOUS; SOFT TISSUE at 07:05

## 2024-05-23 RX ADMIN — DIPHENHYDRAMINE HYDROCHLORIDE 25 MG: 50 INJECTION, SOLUTION INTRAMUSCULAR; INTRAVENOUS at 07:05

## 2024-05-24 ENCOUNTER — PATIENT OUTREACH (OUTPATIENT)
Dept: EMERGENCY MEDICINE | Facility: HOSPITAL | Age: 79
End: 2024-05-24
Payer: MEDICARE

## 2024-05-24 NOTE — ED PROVIDER NOTES
Encounter Date: 5/23/2024       History     Chief Complaint   Patient presents with    Insect Bite     Stung by yellow jacket 3 hours pta, swelling to lt. hand, denies SOB     79-year-old male presents emergency department with complaints of swelling to the left hand.  Patient reports being stung by a yellow jacket prior to arrival.  Patient denies any sore throat, shortness of breath, chest pain, nausea/vomiting or any other symptoms.    The history is provided by the patient.     Review of patient's allergies indicates:   Allergen Reactions    Wasp sting  [allergen ext-venom-honey bee]      Other reaction(s): swelling     Past Medical History:   Diagnosis Date    Acute bronchitis 12/02/2019    Anticoagulant long-term use     Asthma     Atrial flutter     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    Cancer     Cataract     Chronic obstructive pulmonary disease 05/24/2021    Coronary artery disease of native artery of native heart with stable angina pectoris 10/26/2020    Encounter for blood transfusion     Essential hypertension 07/22/2019    GERD (gastroesophageal reflux disease)     Glaucoma     Hyperlipidemia     Major depression in partial remission 05/02/2022    Seizures      Past Surgical History:   Procedure Laterality Date    ABLATION, ATRIAL FLUTTER, TYPICAL N/A 8/31/2023    Procedure: Ablation, Atrial Flutter, Typical;  Surgeon: Liam Wiggins MD;  Location: [facility] EP LAB;  Service: Cardiology;  Laterality: N/A;  AFL, RFA, BECKY, ANUM (cx if SR), BENJI benz, 3 Prep    BRAIN SURGERY      CARPAL TUNNEL RELEASE      EYE SURGERY      LUNG SURGERY      Partial lung removed Left     REPAIR,BROW PTOSIS      toe nail removal      TRANSESOPHAGEAL ECHOCARDIOGRAM WITH POSSIBLE CARDIOVERSION (ANUM W/ POSS CARDIOVERSION) N/A 6/28/2023    Procedure: Transesophageal echo (ANUM) intra-procedure log documentation;  Surgeon: Gallo Carbajal MD;  Location: [facility] CATH LAB;  Service: Cardiology;   Laterality: N/A;     Family History   Problem Relation Name Age of Onset    Stroke Mother      Heart attack Father      Coronary artery disease Brother      Valvular heart disease Brother      Stroke Paternal Grandfather       Social History     Tobacco Use    Smoking status: Every Day     Current packs/day: 2.00     Average packs/day: 2.0 packs/day for 54.4 years (108.8 ttl pk-yrs)     Types: Cigarettes     Start date: 1970     Passive exposure: Past    Smokeless tobacco: Never   Substance Use Topics    Alcohol use: No    Drug use: No     Review of Systems   Constitutional:  Negative for fever.   HENT:  Negative for sore throat.    Respiratory:  Negative for shortness of breath.    Cardiovascular:  Negative for chest pain.   Gastrointestinal:  Negative for nausea.   Genitourinary:  Negative for dysuria.   Musculoskeletal:  Negative for back pain.        +left hand swelling   Skin:  Negative for rash.   Neurological:  Negative for weakness.   Hematological:  Does not bruise/bleed easily.   All other systems reviewed and are negative.      Physical Exam     Initial Vitals [05/23/24 1831]   BP Pulse Resp Temp SpO2   (!) 169/75 62 18 98.6 °F (37 °C) 97 %      MAP       --         Physical Exam    Constitutional: He appears well-developed and well-nourished. No distress.   HENT:   Head: Normocephalic and atraumatic.   Nose: Nose normal.   Mouth/Throat: Uvula is midline and oropharynx is clear and moist.   Eyes: Conjunctivae and EOM are normal. Pupils are equal, round, and reactive to light.   Neck: Neck supple.   Normal range of motion.  Cardiovascular:  Normal rate and regular rhythm.           Pulmonary/Chest: Effort normal and breath sounds normal. No respiratory distress. He has no decreased breath sounds. He has no wheezes. He has no rales.   Abdominal: Abdomen is soft. Bowel sounds are normal. There is no abdominal tenderness.   Musculoskeletal:         General: Normal range of motion.      Cervical back: Normal  range of motion and neck supple.     Neurological: He is alert and oriented to person, place, and time. He has normal strength. GCS eye subscore is 4. GCS verbal subscore is 5. GCS motor subscore is 6.   Skin: Skin is warm and dry. Capillary refill takes less than 2 seconds. No rash noted.   Swelling and erythema noted to the left hand, capillary refill less than 3 seconds, distal pulses +2   Psychiatric: He has a normal mood and affect. His speech is normal and behavior is normal.         ED Course   Procedures  Labs Reviewed - No data to display       Imaging Results    None          Medications   dexAMETHasone injection 8 mg (8 mg Intramuscular Given 5/23/24 1933)   diphenhydrAMINE injection 25 mg (25 mg Intramuscular Given 5/23/24 1932)     Medical Decision Making  Risk  Prescription drug management.  Risk Details: I discussed with patient and/or family/caretaker that evaluation in the ED does not suggest any emergent or life threatening medical conditions requiring immediate intervention beyond what was provided in the ED, and I believe patient is safe for discharge.  Regardless, an unremarkable evaluation in the ED does not preclude the development or presence of a serious of life threatening condition. As such, patient was instructed to return immediately for any worsening or change in current symptoms.                                        Clinical Impression:  Final diagnoses:  [T78.40XA] Allergic reaction, initial encounter (Primary)  [T63.481A] Insect stings, accidental or unintentional, initial encounter          ED Disposition Condition    Discharge Stable          ED Prescriptions       Medication Sig Dispense Start Date End Date Auth. Provider    predniSONE (DELTASONE) 20 MG tablet Take 2 tablets (40 mg total) by mouth once daily. for 5 days 10 tablet 5/23/2024 5/28/2024 Humberto David Jr., FNP          Follow-up Information       Follow up With Specialties Details Why Contact Hung Lamb  Zeke PERAZA MD Family Medicine In 1 week  [address] [facility] [id number]  [phone]               Humberto David Jr., P  05/23/24 1948

## 2024-05-27 NOTE — PROGRESS NOTES
Susan Gonzales  ED Navigator  Emergency Department    Project: [facility] ED Navigator  Role: Community Health Worker    Date: 05/27/2024  Patient Name: Humberto Carlson  MRN: [id number]  PCP: Zeke Lamb MD    Assessment:     Humberto Carlson is a 79 y.o. male who has presented to ED for allergic reaction. Patient has visited the ED 1 times in the past 3 months. Patient did not contact PCP.     ED Navigator Initial Assessment    ED Navigator Enrollment Documentation  Consent to Services  Does patient consent to completing the assessment?: Yes  Contact  Method of Initial Contact: Phone  Transportation  Does the patient have issues with Transportation?: No  Does the patient have transportation to and from healthcare appointments?: Yes  Insurance Coverage  Do you have coverage/adequate coverage?: Yes  Type/kind of coverage: MSSP  Is patient able to afford co-pays/deductibles?: Yes  Is patient able to afford HME or supplies?: Yes  Does patient have an established [facility] PCP?: Yes  Able to access?: Yes  Does the patient have a lack of adequate coverage?: No  Specialist Appointment  Did the patient come to the ED to see a specialist?: No  Does the patient have a pending specialist referral?: No  Does the patient have a specialist appointment made?: No  PCP Follow Up Appointment  Has the patient had an appointment with a primary care provider in the past year?: Yes  Approximate date: 5/10/24  Provider: SOBIA SUAREZ NP  Does the patient have a follow up appontment with a PCP?: No  When was the last time you saw your PCP?: 5/10/24  Why does the patient not have a follow up scheduled?: Other (see comments)  Medications  Is patient able to afford medication?: Yes  Is patient unable to get medication due to lack of transportation?: No  Psychological  Does the patient have psycho-social concerns?: Yes  What concerns does the patient have?: Anxiety and/or Depression (Comment: Hx of Depression charted)  Food  Does the patient have  concerns about food?: No  Communication/Education  Does the patient have limited English proficiency/English not primary language?: No  Does patient have low literacy and/or low health literacy?: Yes  Does patient have concerns with care?: No  Does patient have dissatisfaction with care?: No  Other Financial Concerns  Does the patient have immediate financial distress?: No  Does the patient have general financial concerns?: No  Other Social Barriers/Concerns  Does the patient have any additional barriers or concerns?: Other (see comments)  Primary Barrier  Barriers identified: Cognitive barrier (health literacy, language and communication, etc.), Psychological barrier (mistrust, anxiety, etc.) (Comment: Hx of depression)  Root Cause of ED Utilization: Chronic Conditions  Plan to address Chronic Conditions: Encourage patient to contact PCP/specialist for follow up per ED discharge instructions  Next steps: Provided Education  Additional Documentation: Pt was seen in the ED on 5/23/24 for allergic reaction. I spoke with pt for Post ED visit follow up navigation to assist with scheduling a 7-day Post ED visit follow up appt. Pt states that he had not contacted pcp to schedule a 7-day Post ED visit follow up appt and declined assistance, stating that he is getting better and does not need a follow up. Pt states that he has no transportation issues and has no additional needs at this time. Closing Encounter.    Susan Gonzales           Social History     Socioeconomic History    Marital status:    Tobacco Use    Smoking status: Every Day     Current packs/day: 2.00     Average packs/day: 2.0 packs/day for 54.4 years (108.8 ttl pk-yrs)     Types: Cigarettes     Start date: 1970     Passive exposure: Past    Smokeless tobacco: Never   Substance and Sexual Activity    Alcohol use: No    Drug use: No    Sexual activity: Not Currently     Partners: Female     Social Determinants of Health     Financial Resource  Strain: Low Risk  (5/27/2024)    Overall Financial Resource Strain (CARDIA)     Difficulty of Paying Living Expenses: Not hard at all   Food Insecurity: No Food Insecurity (5/27/2024)    Hunger Vital Sign     Worried About Running Out of Food in the Last Year: Never true     Ran Out of Food in the Last Year: Never true   Transportation Needs: No Transportation Needs (5/27/2024)    PRAPARE - Transportation     Lack of Transportation (Medical): No     Lack of Transportation (Non-Medical): No   Physical Activity: Inactive (6/12/2023)    Exercise Vital Sign     Days of Exercise per Week: 0 days     Minutes of Exercise per Session: 0 min   Stress: Stress Concern Present (5/27/2024)    [place] [employer] - Occupational Stress Questionnaire     Feeling of Stress : To some extent   Housing Stability: Low Risk  (5/27/2024)    Housing Stability Vital Sign     Unable to Pay for Housing in the Last Year: No     Homeless in the Last Year: No       Plan:   Pt was seen in the ED on 5/23/24 for allergic reaction. I spoke with pt for Post ED visit follow up navigation to assist with scheduling a 7-day Post ED visit follow up appt. Pt states that he had not contacted pcp to schedule a 7-day Post ED visit follow up appt and declined assistance, stating that he is getting better and does not need a follow up. Pt states that he has no transportation issues and has no additional needs at this time. Closing Encounter.    Susan Gonzales    Medicare Status: Enrolled  This patient has a Medicare coverage.    Appointment made with: Zeke Lamb MD

## 2024-05-31 ENCOUNTER — EXTERNAL CHRONIC CARE MANAGEMENT (OUTPATIENT)
Dept: PRIMARY CARE CLINIC | Facility: CLINIC | Age: 79
End: 2024-05-31
Payer: MEDICARE

## 2024-05-31 PROCEDURE — 99490 CHRNC CARE MGMT STAFF 1ST 20: CPT | Mod: S$PBB,,, | Performed by: FAMILY MEDICINE

## 2024-05-31 PROCEDURE — 99490 CHRNC CARE MGMT STAFF 1ST 20: CPT | Mod: PBBFAC | Performed by: FAMILY MEDICINE

## 2024-06-03 ENCOUNTER — OFFICE VISIT (OUTPATIENT)
Dept: SURGERY | Facility: CLINIC | Age: 79
End: 2024-06-03
Payer: MEDICARE

## 2024-06-03 VITALS
WEIGHT: 141 LBS | TEMPERATURE: 98 F | DIASTOLIC BLOOD PRESSURE: 64 MMHG | SYSTOLIC BLOOD PRESSURE: 127 MMHG | BODY MASS INDEX: 20.23 KG/M2 | HEART RATE: 60 BPM

## 2024-06-03 DIAGNOSIS — L72.9 SKIN CYST: Primary | ICD-10-CM

## 2024-06-03 PROCEDURE — 99999 PR PBB SHADOW E&M-EST. PATIENT-LVL IV: CPT | Mod: PBBFAC,,,

## 2024-06-03 PROCEDURE — 99024 POSTOP FOLLOW-UP VISIT: CPT | Mod: POP,,,

## 2024-06-03 PROCEDURE — 99214 OFFICE O/P EST MOD 30 MIN: CPT | Mod: PBBFAC

## 2024-06-03 NOTE — PROGRESS NOTES
Patient ID: Humberto Carlson is a 79 y.o. male.    Chief Complaint: No chief complaint on file.      HPI:  Draining wound on the right posterior lateral leg      Interval History:  Since the last clinic visit during which patient underwent subcutaneous mass excision of right posterior thigh, he is done well.  He denies any pain or drainage in the area.  Denies fevers, chills, nausea, and vomiting.    Review of Systems   Constitutional:  Negative for chills, fever and unexpected weight change.   HENT:  Negative for congestion.    Eyes:  Negative for visual disturbance.   Respiratory:  Negative for shortness of breath.    Cardiovascular:  Negative for chest pain.   Gastrointestinal:  Negative for abdominal distention, abdominal pain, constipation, nausea, rectal pain and vomiting.   Genitourinary:  Negative for dysuria.   Musculoskeletal:  Negative for arthralgias.   Skin:  Negative for rash.   Neurological:  Negative for light-headedness.   Hematological:  Negative for adenopathy.       Current Outpatient Medications   Medication Sig Dispense Refill    albuterol (PROVENTIL/VENTOLIN HFA) 90 mcg/actuation inhaler Rescue 25.5 g 3    aspirin (ECOTRIN) 81 MG EC tablet Take 1 tablet (81 mg total) by mouth once daily.      atorvastatin (LIPITOR) 40 MG tablet TAKE 1 TABLET BY MOUTH ONCE A DAY 90 tablet 2    brimonidine-timoloL (COMBIGAN) 0.2-0.5 % Drop Inject 1 drop into the eye Twice daily.      ENTRESTO 24-26 mg per tablet TAKE ONE TABLET BY MOUTH TWICE DAILY 60 tablet 6    EPINEPHrine (EPIPEN) 0.3 mg/0.3 mL AtIn USE AS DIRECTED AS NEEDED 2 each 0    fluticasone propionate (FLONASE) 50 mcg/actuation nasal spray 1 SPRAY IN EACH NOSTRIL ONCE DAILY. 32 g 2    HYDROcodone-acetaminophen (NORCO) 5-325 mg per tablet Take 1 tablet by mouth every 6 hours for pain control 12 tablet 0    latanoprost 0.005 % ophthalmic solution       metoprolol succinate (TOPROL-XL) 25 MG 24 hr tablet TAKE (1/2) TABLET BY MOUTH DAILY. 45 tablet 3     mupirocin (BACTROBAN) 2 % ointment Apply topically 2 (two) times daily. 22 g 0    naproxen sodium (ANAPROX) 220 MG tablet Take 220 mg by mouth.      pantoprazole (PROTONIX) 20 MG tablet Take 1 tablet (20 mg total) by mouth once daily. 90 tablet 1    phenytoin (DILANTIN) 100 MG ER capsule TAKE 3 CAPSULES BY MOUTH ONCE DAILY 270 capsule 3    sertraline (ZOLOFT) 50 MG tablet Take 1 tablet (50 mg total) by mouth once daily. 90 tablet 3    spironolactone (ALDACTONE) 25 MG tablet TAKE 1 TABLET BY MOUTH ONCE A DAY 30 tablet 9     No current facility-administered medications for this visit.       Review of patient's allergies indicates:   Allergen Reactions    Wasp sting  [allergen ext-venom-honey bee]      Other reaction(s): swelling       Past Medical History:   Diagnosis Date    Acute bronchitis 12/02/2019    Anticoagulant long-term use     Asthma     Atrial flutter     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    Cancer     Cataract     Chronic obstructive pulmonary disease 05/24/2021    Coronary artery disease of native artery of native heart with stable angina pectoris 10/26/2020    Encounter for blood transfusion     Essential hypertension 07/22/2019    GERD (gastroesophageal reflux disease)     Glaucoma     Hyperlipidemia     Major depression in partial remission 05/02/2022    Seizures        Past Surgical History:   Procedure Laterality Date    ABLATION, ATRIAL FLUTTER, TYPICAL N/A 8/31/2023    Procedure: Ablation, Atrial Flutter, Typical;  Surgeon: Liam Wiggins MD;  Location: [facility] EP LAB;  Service: Cardiology;  Laterality: N/A;  AFL, RFA, BECKY, ANUM (cx if SR), BENJI benz, 3 Prep    BRAIN SURGERY      CARPAL TUNNEL RELEASE      EYE SURGERY      LUNG SURGERY      Partial lung removed Left     REPAIR,BROW PTOSIS      toe nail removal      TRANSESOPHAGEAL ECHOCARDIOGRAM WITH POSSIBLE CARDIOVERSION (ANUM W/ POSS CARDIOVERSION) N/A 6/28/2023    Procedure: Transesophageal echo  (ANUM) intra-procedure log documentation;  Surgeon: Gallo Carbajal MD;  Location: [facility] CATH LAB;  Service: Cardiology;  Laterality: N/A;       Social History     Socioeconomic History    Marital status:    Tobacco Use    Smoking status: Every Day     Current packs/day: 2.00     Average packs/day: 2.0 packs/day for 54.4 years (108.8 ttl pk-yrs)     Types: Cigarettes     Start date: 1970     Passive exposure: Past    Smokeless tobacco: Never   Substance and Sexual Activity    Alcohol use: No    Drug use: No    Sexual activity: Not Currently     Partners: Female     Social Determinants of Health     Financial Resource Strain: Low Risk  (5/27/2024)    Overall Financial Resource Strain (CARDIA)     Difficulty of Paying Living Expenses: Not hard at all   Food Insecurity: No Food Insecurity (5/27/2024)    Hunger Vital Sign     Worried About Running Out of Food in the Last Year: Never true     Ran Out of Food in the Last Year: Never true   Transportation Needs: No Transportation Needs (5/27/2024)    PRAPARE - Transportation     Lack of Transportation (Medical): No     Lack of Transportation (Non-Medical): No   Physical Activity: Inactive (6/12/2023)    Exercise Vital Sign     Days of Exercise per Week: 0 days     Minutes of Exercise per Session: 0 min   Stress: Stress Concern Present (5/27/2024)    [place] [employer] - Occupational Stress Questionnaire     Feeling of Stress : To some extent   Housing Stability: Low Risk  (5/27/2024)    Housing Stability Vital Sign     Unable to Pay for Housing in the Last Year: No     Homeless in the Last Year: No       Vitals:    06/03/24 1423   BP: 127/64   Pulse: 60   Temp: 97.8 °F (36.6 °C)       Physical Exam  Vitals reviewed.   Constitutional:       General: He is not in acute distress.     Appearance: He is well-developed. He is not toxic-appearing.   HENT:      Head: Normocephalic and atraumatic.      Right Ear: External ear normal.      Left Ear: External  ear normal.      Nose: Nose normal.      Mouth/Throat:      Mouth: Mucous membranes are moist.   Eyes:      Extraocular Movements: Extraocular movements intact.   Cardiovascular:      Rate and Rhythm: Normal rate.   Pulmonary:      Effort: Pulmonary effort is normal. No respiratory distress.   Musculoskeletal:      Cervical back: Normal range of motion and neck supple.   Skin:     General: Skin is warm and dry.          Neurological:      Mental Status: He is alert and oriented to person, place, and time.   Psychiatric:         Behavior: Behavior normal.       Assessment & Plan:  79-year-old male with right posterior thigh mass excised in clinic who has recovered as expected with stitches removed today.      - no further interventions or restrictions.    - return to clinic as needed or if any issues arise.      Mary Swanson PA-C  [facility]

## 2024-06-12 RX ORDER — SPIRONOLACTONE 25 MG/1
25 TABLET ORAL
Qty: 30 TABLET | Refills: 9 | Status: SHIPPED | OUTPATIENT
Start: 2024-06-12

## 2024-06-30 ENCOUNTER — EXTERNAL CHRONIC CARE MANAGEMENT (OUTPATIENT)
Dept: PRIMARY CARE CLINIC | Facility: CLINIC | Age: 79
End: 2024-06-30
Payer: MEDICARE

## 2024-06-30 PROCEDURE — 99490 CHRNC CARE MGMT STAFF 1ST 20: CPT | Mod: S$PBB,,, | Performed by: FAMILY MEDICINE

## 2024-06-30 PROCEDURE — 99490 CHRNC CARE MGMT STAFF 1ST 20: CPT | Mod: PBBFAC | Performed by: FAMILY MEDICINE

## 2024-07-29 ENCOUNTER — TELEPHONE (OUTPATIENT)
Dept: NEUROLOGY | Facility: CLINIC | Age: 79
End: 2024-07-29
Payer: MEDICARE

## 2024-07-29 ENCOUNTER — OFFICE VISIT (OUTPATIENT)
Dept: INTERNAL MEDICINE | Facility: CLINIC | Age: 79
End: 2024-07-29
Payer: MEDICARE

## 2024-07-29 VITALS
SYSTOLIC BLOOD PRESSURE: 104 MMHG | BODY MASS INDEX: 20.54 KG/M2 | WEIGHT: 138.69 LBS | DIASTOLIC BLOOD PRESSURE: 60 MMHG | HEART RATE: 55 BPM | OXYGEN SATURATION: 95 % | HEIGHT: 69 IN

## 2024-07-29 DIAGNOSIS — Z72.0 TOBACCO USE: ICD-10-CM

## 2024-07-29 DIAGNOSIS — I35.0 NONRHEUMATIC AORTIC VALVE STENOSIS: ICD-10-CM

## 2024-07-29 DIAGNOSIS — F32.5 MAJOR DEPRESSIVE DISORDER IN FULL REMISSION, UNSPECIFIED WHETHER RECURRENT: ICD-10-CM

## 2024-07-29 DIAGNOSIS — I73.9 PVD (PERIPHERAL VASCULAR DISEASE): ICD-10-CM

## 2024-07-29 DIAGNOSIS — Z74.09 OTHER REDUCED MOBILITY: ICD-10-CM

## 2024-07-29 DIAGNOSIS — G40.909 SEIZURE DISORDER: ICD-10-CM

## 2024-07-29 DIAGNOSIS — I27.9 PULMONARY HEART DISEASE: ICD-10-CM

## 2024-07-29 DIAGNOSIS — Z85.118 HISTORY OF LUNG CANCER: ICD-10-CM

## 2024-07-29 DIAGNOSIS — D69.2 SENILE PURPURA: ICD-10-CM

## 2024-07-29 DIAGNOSIS — I65.23 BILATERAL CAROTID ARTERY STENOSIS: ICD-10-CM

## 2024-07-29 DIAGNOSIS — J44.9 CHRONIC OBSTRUCTIVE PULMONARY DISEASE, UNSPECIFIED COPD TYPE: ICD-10-CM

## 2024-07-29 DIAGNOSIS — I50.22 CHRONIC SYSTOLIC CONGESTIVE HEART FAILURE: ICD-10-CM

## 2024-07-29 DIAGNOSIS — E78.2 MIXED HYPERLIPIDEMIA: ICD-10-CM

## 2024-07-29 DIAGNOSIS — Z00.00 ENCOUNTER FOR PREVENTIVE HEALTH EXAMINATION: Primary | ICD-10-CM

## 2024-07-29 DIAGNOSIS — I10 ESSENTIAL HYPERTENSION: ICD-10-CM

## 2024-07-29 DIAGNOSIS — Z86.79 HISTORY OF SUBDURAL HEMATOMA: ICD-10-CM

## 2024-07-29 DIAGNOSIS — I48.3 TYPICAL ATRIAL FLUTTER: ICD-10-CM

## 2024-07-29 DIAGNOSIS — I25.118 CORONARY ARTERY DISEASE OF NATIVE ARTERY OF NATIVE HEART WITH STABLE ANGINA PECTORIS: ICD-10-CM

## 2024-07-29 PROCEDURE — G0439 PPPS, SUBSEQ VISIT: HCPCS | Mod: ,,, | Performed by: NURSE PRACTITIONER

## 2024-07-29 PROCEDURE — 99215 OFFICE O/P EST HI 40 MIN: CPT | Mod: PBBFAC | Performed by: NURSE PRACTITIONER

## 2024-07-29 PROCEDURE — 99999 PR PBB SHADOW E&M-EST. PATIENT-LVL V: CPT | Mod: PBBFAC,,, | Performed by: NURSE PRACTITIONER

## 2024-07-29 NOTE — TELEPHONE ENCOUNTER
----- Message from Theresa Garnica NP sent at 7/29/2024  9:35 AM CDT -----  Pt would like a call back to discuss scheduling apt.   Thanks,   Theresa

## 2024-07-29 NOTE — PROGRESS NOTES
"  Humberto Carlson presented for a  Medicare AWV and comprehensive Health Risk Assessment today. The following components were reviewed and updated:    Medical history  Family History  Social history  Allergies and Current Medications  Health Risk Assessment  Health Maintenance  Care Team         ** See Completed Assessments for Annual Wellness Visit within the encounter summary.**         The following assessments were completed:  Living Situation  CAGE  Depression Screening  Timed Get Up and Go  Whisper Test-na  Cognitive Function Screening  Nutrition Screening  ADL Screening  PAQ Screening      Opioid documentation:      Patient does not have a current opioid prescription.        Vitals:    07/29/24 0938   BP: 104/60   Pulse: (!) 55   SpO2: 95%   Weight: 62.9 kg (138 lb 10.7 oz)   Height: 5' 9.09" (1.755 m)     Body mass index is 20.42 kg/m².  Physical Exam  Vitals and nursing note reviewed.   Constitutional:       Appearance: He is well-developed.   HENT:      Head: Normocephalic.   Cardiovascular:      Rate and Rhythm: Normal rate and regular rhythm.      Heart sounds: Murmur heard.   Pulmonary:      Effort: Pulmonary effort is normal. No respiratory distress.      Breath sounds: Normal breath sounds.   Abdominal:      Palpations: Abdomen is soft. There is no mass.      Tenderness: There is no abdominal tenderness.   Musculoskeletal:         General: Normal range of motion.   Skin:     General: Skin is warm and dry.   Neurological:      Mental Status: He is alert and oriented to person, place, and time.      Motor: No abnormal muscle tone.   Psychiatric:         Speech: Speech normal.         Behavior: Behavior normal.               Diagnoses and health risks identified today and associated recommendations/orders:    1. Encounter for preventive health examination  Discussed receiving rsv and covid vaccine at pharmacy.     Message sent to neurology staff to please contact to schedule  Reports stress but is not " problematic at this time. Patient knows to follow up with PCP if becomes problematic.    Reports cardiac conditions are stable.    Encouraged healthy diet and exercise as tolerated      Reports tingling to BLE when sneezes, chronic. Advised to follow up with PCP/neurologist for further evaluation and recommendations. Patient expressed understanding.      2. Seizure disorder  Dilantin  No recent activity per pt  Continue current treatment plan as previously prescribed with your  neurologist.     3. Major depressive disorder in full remission, unspecified whether recurrent  PHQ 2-0  Continue current treatment plan as previously prescribed with your  pcp     4. Typical atrial flutter  Stable. Continue current treatment plan as previously prescribed with your  cardiologist.     5. PVD (peripheral vascular disease)  US 5/24  Continue current treatment plan as previously prescribed with your  cardiologist.     6. Senile purpura  Chronic  See pic for documentation  Continue current treatment plan as previously prescribed with your  pcp     7. Pulmonary heart disease  Echo 4/23  Continue current treatment plan as previously prescribed with your  cardiologist.     8. Chronic obstructive pulmonary disease, unspecified COPD type  Reports chronic, productive cough. Reports he is at his respiratory baseline  Stable. Continue current treatment plan as previously prescribed with your  pcp     9. Chronic systolic congestive heart failure  Stable. Continue current treatment plan as previously prescribed with your  cardiologist.     10. Coronary artery disease of native artery of native heart with stable angina pectoris  Stable. Continue current treatment plan as previously prescribed with your  cardiologist.     11. Bilateral carotid artery stenosis  US 11/23  Continue current treatment plan as previously prescribed with your  cardiologist.     12. Essential hypertension  Stable. Continue current treatment plan as previously  prescribed with your  pcp and cardiologist.     13. Mixed hyperlipidemia  Stable. Continue current treatment plan as previously prescribed with your  pcp and cardiologist.     14. Nonrheumatic aortic valve stenosis  Echo 4/23  Continue current treatment plan as previously prescribed with your  cardiologist.     15. History of lung cancer  Continue current treatment plan as previously prescribed with your  providers.     16. History of subdural hematoma  Abnormal cognitive function screening.    Reports ongoing memory difficulties since surgery  Advised to follow up with PCP/neurologist for further evaluation and recommendations. Patient expressed understanding.      17. Tobacco use  Discussed the importance of smoking cessation and advised to quit smoking. Patient expressed understanding. Declines smoking cessation program.     18. Other reduced mobility  Abnormal timed get up and go test. Denies any falls in the last 12 months    Fall precautions reviewed with patient. Advised to follow up with PCP for further recommendations. Patient expressed understanding.         Provided Humberto with a 5-10 year written screening schedule and personal prevention plan. Recommendations were developed using the USPSTF age appropriate recommendations. Education, counseling, and referrals were provided as needed. After Visit Summary printed and given to patient which includes a list of additional screenings\tests needed.    Follow up in about 1 year (around 7/29/2025) for awv.    Theresa Garnica NP  I offered to discuss advanced care planning, including how to pick a person who would make decisions for you if you were unable to make them for yourself, called a health care power of , and what kind of decisions you might make such as use of life sustaining treatments such as ventilators and tube feeding when faced with a life limiting illness recorded on a living will that they will need to know. (How you want to be cared  for as you near the end of your natural life)     X  Patient has advanced directives on file, which we reviewed, and they do not wish to make changes.

## 2024-07-29 NOTE — TELEPHONE ENCOUNTER
Attempted to contact patient and person who answer the line stated I had the wrong number. I tried calling back and they told me the same thing. I was unable to get in touch with patient.

## 2024-07-29 NOTE — PATIENT INSTRUCTIONS
Counseling and Referral of Other Preventative  (Italic type indicates deductible and co-insurance are waived)    Patient Name: Humberto Carlson  Today's Date: 7/29/2024    Health Maintenance       Date Due Completion Date    RSV Vaccine (Age 60+ and Pregnant patients) (1 - 1-dose 60+ series) Never done ---    COVID-19 Vaccine (5 - 2023-24 season) 09/01/2023 6/13/2022    Influenza Vaccine (1) 09/01/2024 9/26/2022    Hemoglobin A1c (Prediabetes) 09/27/2024 9/27/2023    Lipid Panel 09/27/2028 9/27/2023    TETANUS VACCINE 07/22/2029 7/22/2019        No orders of the defined types were placed in this encounter.      The following information is provided to all patients.  This information is to help you find resources for any of the problems found today that may be affecting your health:                  Living healthy guide: www.[facility].[place].[facility]      Understanding Diabetes: www.diabetes.org      Eating healthy: www.cdc.gov/healthyweight      [facility] home safety checklist: www.cdc.gov/steadi/patient.html      Agency on Aging: www.goea.[place].[facility]      Alcoholics anonymous (AA): www.aa.org      Physical Activity: www.kate.nih.gov/if8fbca      Tobacco use: www.quitwithusla.org

## 2024-07-31 ENCOUNTER — EXTERNAL CHRONIC CARE MANAGEMENT (OUTPATIENT)
Dept: PRIMARY CARE CLINIC | Facility: CLINIC | Age: 79
End: 2024-07-31
Payer: MEDICARE

## 2024-07-31 PROCEDURE — 99490 CHRNC CARE MGMT STAFF 1ST 20: CPT | Mod: PBBFAC | Performed by: FAMILY MEDICINE

## 2024-07-31 PROCEDURE — 99490 CHRNC CARE MGMT STAFF 1ST 20: CPT | Mod: S$PBB,,, | Performed by: FAMILY MEDICINE

## 2024-08-23 RX ORDER — EPINEPHRINE 0.3 MG/.3ML
1 INJECTION SUBCUTANEOUS ONCE AS NEEDED
Qty: 2 EACH | Refills: 0 | Status: SHIPPED | OUTPATIENT
Start: 2024-08-23 | End: 2024-08-23

## 2024-08-31 ENCOUNTER — EXTERNAL CHRONIC CARE MANAGEMENT (OUTPATIENT)
Dept: PRIMARY CARE CLINIC | Facility: CLINIC | Age: 79
End: 2024-08-31
Payer: MEDICARE

## 2024-08-31 PROCEDURE — 99490 CHRNC CARE MGMT STAFF 1ST 20: CPT | Mod: PBBFAC | Performed by: FAMILY MEDICINE

## 2024-08-31 PROCEDURE — 99439 CHRNC CARE MGMT STAF EA ADDL: CPT | Mod: S$PBB,,, | Performed by: FAMILY MEDICINE

## 2024-08-31 PROCEDURE — 99439 CHRNC CARE MGMT STAF EA ADDL: CPT | Mod: PBBFAC | Performed by: FAMILY MEDICINE

## 2024-08-31 PROCEDURE — 99490 CHRNC CARE MGMT STAFF 1ST 20: CPT | Mod: S$PBB,,, | Performed by: FAMILY MEDICINE

## 2024-09-23 RX ORDER — PANTOPRAZOLE SODIUM 20 MG/1
20 TABLET, DELAYED RELEASE ORAL DAILY
Qty: 90 TABLET | Refills: 1 | Status: SHIPPED | OUTPATIENT
Start: 2024-09-23 | End: 2025-09-23

## 2024-09-23 NOTE — TELEPHONE ENCOUNTER
No care due was identified.  [facility] Embedded Care Due Messages. Reference number: [id number].   9/23/2024 3:16:41 PM CDT

## 2024-09-27 ENCOUNTER — LAB VISIT (OUTPATIENT)
Dept: LAB | Facility: HOSPITAL | Age: 79
End: 2024-09-27
Attending: FAMILY MEDICINE
Payer: MEDICARE

## 2024-09-27 ENCOUNTER — OFFICE VISIT (OUTPATIENT)
Dept: INTERNAL MEDICINE | Facility: CLINIC | Age: 79
End: 2024-09-27
Payer: MEDICARE

## 2024-09-27 VITALS
BODY MASS INDEX: 20.87 KG/M2 | TEMPERATURE: 97 F | RESPIRATION RATE: 20 BRPM | WEIGHT: 140.88 LBS | HEART RATE: 64 BPM | HEIGHT: 69 IN | OXYGEN SATURATION: 98 % | SYSTOLIC BLOOD PRESSURE: 132 MMHG | DIASTOLIC BLOOD PRESSURE: 70 MMHG

## 2024-09-27 DIAGNOSIS — J30.9 ALLERGIC RHINITIS, UNSPECIFIED SEASONALITY, UNSPECIFIED TRIGGER: ICD-10-CM

## 2024-09-27 DIAGNOSIS — Z72.0 TOBACCO USE: ICD-10-CM

## 2024-09-27 DIAGNOSIS — I25.118 CORONARY ARTERY DISEASE OF NATIVE ARTERY OF NATIVE HEART WITH STABLE ANGINA PECTORIS: ICD-10-CM

## 2024-09-27 DIAGNOSIS — I48.3 TYPICAL ATRIAL FLUTTER: ICD-10-CM

## 2024-09-27 DIAGNOSIS — J44.9 CHRONIC OBSTRUCTIVE PULMONARY DISEASE, UNSPECIFIED COPD TYPE: ICD-10-CM

## 2024-09-27 DIAGNOSIS — M25.552 BILATERAL HIP PAIN: ICD-10-CM

## 2024-09-27 DIAGNOSIS — E78.2 MIXED HYPERLIPIDEMIA: ICD-10-CM

## 2024-09-27 DIAGNOSIS — Z79.899 ENCOUNTER FOR LONG-TERM (CURRENT) USE OF MEDICATIONS: ICD-10-CM

## 2024-09-27 DIAGNOSIS — F33.41 RECURRENT MAJOR DEPRESSIVE DISORDER, IN PARTIAL REMISSION: ICD-10-CM

## 2024-09-27 DIAGNOSIS — M25.551 BILATERAL HIP PAIN: ICD-10-CM

## 2024-09-27 DIAGNOSIS — I65.23 BILATERAL CAROTID ARTERY STENOSIS: ICD-10-CM

## 2024-09-27 DIAGNOSIS — G40.909 SEIZURE DISORDER: ICD-10-CM

## 2024-09-27 DIAGNOSIS — F17.200 SMOKER: ICD-10-CM

## 2024-09-27 DIAGNOSIS — M79.605 PAIN IN BOTH LOWER EXTREMITIES: ICD-10-CM

## 2024-09-27 DIAGNOSIS — I50.22 CHRONIC SYSTOLIC CONGESTIVE HEART FAILURE: ICD-10-CM

## 2024-09-27 DIAGNOSIS — I35.0 NONRHEUMATIC AORTIC VALVE STENOSIS: ICD-10-CM

## 2024-09-27 DIAGNOSIS — Z12.2 SCREENING FOR LUNG CANCER: ICD-10-CM

## 2024-09-27 DIAGNOSIS — M79.604 PAIN IN BOTH LOWER EXTREMITIES: ICD-10-CM

## 2024-09-27 DIAGNOSIS — I10 ESSENTIAL HYPERTENSION: ICD-10-CM

## 2024-09-27 DIAGNOSIS — Z79.899 ENCOUNTER FOR LONG-TERM (CURRENT) USE OF MEDICATIONS: Primary | ICD-10-CM

## 2024-09-27 LAB
ALBUMIN SERPL BCP-MCNC: 4 G/DL (ref 3.5–5.2)
ALP SERPL-CCNC: 50 U/L (ref 55–135)
ALT SERPL W/O P-5'-P-CCNC: 16 U/L (ref 10–44)
ANION GAP SERPL CALC-SCNC: 8 MMOL/L (ref 8–16)
AST SERPL-CCNC: 25 U/L (ref 10–40)
BASOPHILS # BLD AUTO: 0.06 K/UL (ref 0–0.2)
BASOPHILS NFR BLD: 0.8 % (ref 0–1.9)
BILIRUB SERPL-MCNC: 0.4 MG/DL (ref 0.1–1)
BUN SERPL-MCNC: 10 MG/DL (ref 8–23)
CALCIUM SERPL-MCNC: 9.1 MG/DL (ref 8.7–10.5)
CHLORIDE SERPL-SCNC: 104 MMOL/L (ref 95–110)
CHOLEST SERPL-MCNC: 170 MG/DL (ref 120–199)
CHOLEST/HDLC SERPL: 3.3 {RATIO} (ref 2–5)
CO2 SERPL-SCNC: 26 MMOL/L (ref 23–29)
CREAT SERPL-MCNC: 0.8 MG/DL (ref 0.5–1.4)
DIFFERENTIAL METHOD BLD: ABNORMAL
EOSINOPHIL # BLD AUTO: 0.9 K/UL (ref 0–0.5)
EOSINOPHIL NFR BLD: 11.8 % (ref 0–8)
ERYTHROCYTE [DISTWIDTH] IN BLOOD BY AUTOMATED COUNT: 12.8 % (ref 11.5–14.5)
EST. GFR  (NO RACE VARIABLE): >60 ML/MIN/1.73 M^2
ESTIMATED AVG GLUCOSE: 114 MG/DL (ref 68–131)
GLUCOSE SERPL-MCNC: 97 MG/DL (ref 70–110)
HBA1C MFR BLD: 5.6 % (ref 4–5.6)
HCT VFR BLD AUTO: 39.1 % (ref 40–54)
HDLC SERPL-MCNC: 51 MG/DL (ref 40–75)
HDLC SERPL: 30 % (ref 20–50)
HGB BLD-MCNC: 12.6 G/DL (ref 14–18)
IMM GRANULOCYTES # BLD AUTO: 0.01 K/UL (ref 0–0.04)
IMM GRANULOCYTES NFR BLD AUTO: 0.1 % (ref 0–0.5)
LDLC SERPL CALC-MCNC: 97.8 MG/DL (ref 63–159)
LYMPHOCYTES # BLD AUTO: 3.1 K/UL (ref 1–4.8)
LYMPHOCYTES NFR BLD: 39.4 % (ref 18–48)
MAGNESIUM SERPL-MCNC: 1.9 MG/DL (ref 1.6–2.6)
MCH RBC QN AUTO: 30.1 PG (ref 27–31)
MCHC RBC AUTO-ENTMCNC: 32.2 G/DL (ref 32–36)
MCV RBC AUTO: 93 FL (ref 82–98)
MONOCYTES # BLD AUTO: 0.6 K/UL (ref 0.3–1)
MONOCYTES NFR BLD: 7.9 % (ref 4–15)
NEUTROPHILS # BLD AUTO: 3.1 K/UL (ref 1.8–7.7)
NEUTROPHILS NFR BLD: 40 % (ref 38–73)
NONHDLC SERPL-MCNC: 119 MG/DL
NRBC BLD-RTO: 0 /100 WBC
PLATELET # BLD AUTO: 261 K/UL (ref 150–450)
PMV BLD AUTO: 10.3 FL (ref 9.2–12.9)
POTASSIUM SERPL-SCNC: 4.7 MMOL/L (ref 3.5–5.1)
PROT SERPL-MCNC: 6.7 G/DL (ref 6–8.4)
RBC # BLD AUTO: 4.19 M/UL (ref 4.6–6.2)
SODIUM SERPL-SCNC: 138 MMOL/L (ref 136–145)
TRIGL SERPL-MCNC: 106 MG/DL (ref 30–150)
TSH SERPL DL<=0.005 MIU/L-ACNC: 1.96 UIU/ML (ref 0.4–4)
VIT B12 SERPL-MCNC: 728 PG/ML (ref 210–950)
WBC # BLD AUTO: 7.74 K/UL (ref 3.9–12.7)

## 2024-09-27 PROCEDURE — 85025 COMPLETE CBC W/AUTO DIFF WBC: CPT | Performed by: FAMILY MEDICINE

## 2024-09-27 PROCEDURE — 99999 PR PBB SHADOW E&M-EST. PATIENT-LVL V: CPT | Mod: PBBFAC,,, | Performed by: FAMILY MEDICINE

## 2024-09-27 PROCEDURE — 83036 HEMOGLOBIN GLYCOSYLATED A1C: CPT | Performed by: FAMILY MEDICINE

## 2024-09-27 PROCEDURE — 82607 VITAMIN B-12: CPT | Performed by: FAMILY MEDICINE

## 2024-09-27 PROCEDURE — 36415 COLL VENOUS BLD VENIPUNCTURE: CPT | Performed by: FAMILY MEDICINE

## 2024-09-27 PROCEDURE — 99215 OFFICE O/P EST HI 40 MIN: CPT | Mod: PBBFAC | Performed by: FAMILY MEDICINE

## 2024-09-27 PROCEDURE — 80053 COMPREHEN METABOLIC PANEL: CPT | Performed by: FAMILY MEDICINE

## 2024-09-27 PROCEDURE — 84443 ASSAY THYROID STIM HORMONE: CPT | Performed by: FAMILY MEDICINE

## 2024-09-27 PROCEDURE — 83735 ASSAY OF MAGNESIUM: CPT | Performed by: FAMILY MEDICINE

## 2024-09-27 PROCEDURE — 80061 LIPID PANEL: CPT | Performed by: FAMILY MEDICINE

## 2024-09-27 RX ORDER — FLUTICASONE PROPIONATE 50 MCG
SPRAY, SUSPENSION (ML) NASAL
Qty: 32 G | Refills: 2 | Status: SHIPPED | OUTPATIENT
Start: 2024-09-27

## 2024-09-27 RX ORDER — SACUBITRIL AND VALSARTAN 24; 26 MG/1; MG/1
1 TABLET, FILM COATED ORAL 2 TIMES DAILY
Qty: 60 TABLET | Refills: 6 | Status: SHIPPED | OUTPATIENT
Start: 2024-09-27

## 2024-09-27 RX ORDER — ALBUTEROL SULFATE 90 UG/1
INHALANT RESPIRATORY (INHALATION)
Qty: 25.5 G | Refills: 3 | Status: SHIPPED | OUTPATIENT
Start: 2024-09-27

## 2024-09-27 RX ORDER — SERTRALINE HYDROCHLORIDE 50 MG/1
50 TABLET, FILM COATED ORAL DAILY
Qty: 90 TABLET | Refills: 3 | Status: SHIPPED | OUTPATIENT
Start: 2024-09-27

## 2024-09-27 RX ORDER — METOPROLOL SUCCINATE 25 MG/1
12.5 TABLET, EXTENDED RELEASE ORAL DAILY
Qty: 45 TABLET | Refills: 3 | Status: SHIPPED | OUTPATIENT
Start: 2024-09-27

## 2024-09-27 RX ORDER — ATORVASTATIN CALCIUM 40 MG/1
40 TABLET, FILM COATED ORAL DAILY
Qty: 90 TABLET | Refills: 2 | Status: SHIPPED | OUTPATIENT
Start: 2024-09-27

## 2024-09-27 NOTE — PROGRESS NOTES
Subjective:       Patient ID: Humberto Carlson is a 79 y.o. male.    Chief Complaint: Follow-up    HPI    Patient Active Problem List   Diagnosis    Hyperlipidemia    Essential hypertension    History of anaphylaxis    Elevated blood sugar    Seizure disorder    Screening for prostate cancer    Pre-diabetes    Weight loss    Dyspnea    Chronic bilateral low back pain    Acute rhinitis    History of subdural hematoma    Coronary artery disease of native artery of native heart with stable angina pectoris    Chronic systolic congestive heart failure    Smoker    Nonrheumatic aortic valve stenosis    Aortic calcification    Bilateral carotid artery stenosis    Chronic obstructive pulmonary disease    Major depression in partial remission    Hx of cancer of lung    Atrial flutter    Pulmonary heart disease    Senile purpura    Pain in both lower extremities       Past Medical History:   Diagnosis Date    Acute bronchitis 12/02/2019    Anticoagulant long-term use     Asthma     Atrial flutter     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    Cancer     Cataract     Chronic obstructive pulmonary disease 05/24/2021    Coronary artery disease of native artery of native heart with stable angina pectoris 10/26/2020    Encounter for blood transfusion     Essential hypertension 07/22/2019    GERD (gastroesophageal reflux disease)     Glaucoma     Hyperlipidemia     Major depression in partial remission 05/02/2022    Seizures        Past Surgical History:   Procedure Laterality Date    ABLATION, ATRIAL FLUTTER, TYPICAL N/A 8/31/2023    Procedure: Ablation, Atrial Flutter, Typical;  Surgeon: Liam Wiggins MD;  Location: [facility] EP LAB;  Service: Cardiology;  Laterality: N/A;  AFL, RFA, BECKY, ANUM (cx if SR), BENJI benz, 3 Prep    BRAIN SURGERY      CARPAL TUNNEL RELEASE      EYE SURGERY      LUNG SURGERY      Partial lung removed Left     REPAIR,BROW PTOSIS      toe nail removal       TRANSESOPHAGEAL ECHOCARDIOGRAM WITH POSSIBLE CARDIOVERSION (ANUM W/ POSS CARDIOVERSION) N/A 6/28/2023    Procedure: Transesophageal echo (ANUM) intra-procedure log documentation;  Surgeon: Gallo Carbajal MD;  Location: [facility] CATH LAB;  Service: Cardiology;  Laterality: N/A;       Family History   Problem Relation Name Age of Onset    Stroke Mother      Heart attack Father      Coronary artery disease Brother      Valvular heart disease Brother      Stroke Paternal Grandfather         Social History     Tobacco Use   Smoking Status Every Day    Current packs/day: 2.00    Average packs/day: 2.0 packs/day for 54.7 years (109.5 ttl pk-yrs)    Types: Cigarettes    Start date: 1970    Passive exposure: Past   Smokeless Tobacco Never       Wt Readings from Last 5 Encounters:   09/27/24 63.9 kg (140 lb 14 oz)   07/29/24 62.9 kg (138 lb 10.7 oz)   06/03/24 64 kg (141 lb)   05/23/24 61.2 kg (135 lb)   05/17/24 64.4 kg (142 lb)     History of Present Illness    CHIEF COMPLAINT:  Patient presents today for follow up.    CARDIOVASCULAR:  He has a history of chronic systolic congestive heart failure, coronary artery disease, bilateral carotid artery stenosis, typical atrial flutter, hypertension, hyperlipidemia, and nonrheumatic aortic valve stenosis. He denies chest pain, trouble breathing, or leg swelling. He reports occasional irregular heartbeats, describing episodes where he can see his heart beating, then stopping, followed by several rapid beats before returning to normal rhythm. Not clincially symptomatic - no dizzy/no lightheaded    RESPIRATORY:  He has a history of lung cancer, COPD, and mild to moderate emphysema. His breathing is reported as stable. He uses an albuterol inhaler. Annual chest CTs are recommended for lung cancer screening. A low-dose CT of the chest has been ordered. Does not believe / want he will ever quit smoking. Aware I strongly encouraged he does so.    NEUROLOGICAL:  He reports taking Dilantin for  seizure disorder and denies any recent seizures.    PSYCHIATRIC:  He reports his depression is well-controlled on sertraline (Zoloft) 50 mg.    OPHTHALMOLOGICAL:  He reports glaucoma in the right eye and macular degeneration in the left eye, which was reportedly misdiagnosed and could not be corrected. He is currently under the care of an ophthalmologist for these vision issues.    MUSCULOSKELETAL:  He reports significant pain in his right hip and back due to osteoarthritis. He has difficulty standing still for more than five minutes without experiencing hip pain. He also mentions having a bone spur on his spine, which he believes contributes to his back pain. He takes naproxen for pain management, typically consuming two tablets when the pain starts and up to four tablets total per day. (Also on PPI for GI lining protection)    ALLERGIES:  He has a history of yellow jacket sting, which resulted in significant swelling at the site of the sting that continued to spread. He has an EpiPen for emergency use in case of severe allergic reactions.    GASTROINTESTINAL:  He reports ongoing issues with chronic constipation. He expresses a preference for addressing the constipation through dietary modifications rather than medication at this time. He indicates a willingness to increase consumption of fruits /vegetables  to improve his fiber intake. Now aware of fiber supplements    HEMATOLOGICAL:  He has chronic mild anemia that has been monitored. Recent labs from 6 months ago showed mildly diminished hemoglobin, but normal hematocrit.    MEDICATIONS:  He reports taking Entresto, Metoprolol 25 mg (half tablet), Atorvastatin (Lipitor) 40 mg, Aspirin 81 mg, Spironolactone, Albuterol inhaler, Pantoprazole (Protonix), and Flonase. He confirms adherence to all prescribed medications and denies any issues with side effects or tolerability. He has an adequate supply of albuterol inhalers.    SOCIAL HISTORY:  He is a current smoker  and expresses reluctance to quit, stating he has had it all his life. He reports reducing coffee intake by approximately half.    SURGICAL HISTORY:  He underwent posterior thigh mass excision for an abscess on the back of his leg. He reports no current issues related to this procedure.        Objective:      Vitals:    09/27/24 0751   BP: 132/70   Pulse: 64   Resp: 20   Temp: 97.1 °F (36.2 °C)       Physical Exam  Constitutional:       General: He is not in acute distress.     Appearance: He is not ill-appearing.   Pulmonary:      Effort: Pulmonary effort is normal. No respiratory distress.   Neurological:      General: No focal deficit present.      Mental Status: He is alert.   Psychiatric:         Mood and Affect: Mood normal.         Behavior: Behavior normal.         Assessment:       1. Encounter for long-term (current) use of medications    2. Tobacco use    3. Screening for lung cancer    4. Chronic obstructive pulmonary disease, unspecified COPD type    5. Allergic rhinitis, unspecified seasonality, unspecified trigger    6. Essential hypertension    7. Seizure disorder    8. Recurrent major depressive disorder, in partial remission    9. Nonrheumatic aortic valve stenosis    10. Mixed hyperlipidemia    11. Typical atrial flutter    12. Bilateral carotid artery stenosis    13. Chronic systolic congestive heart failure    14. Coronary artery disease of native artery of native heart with stable angina pectoris    15. Pain in both lower extremities    16. Smoker        Plan:   Assessment & Plan    Assessed seizure disorder, noting good control with Dilantin and no recent seizures  Evaluated depression management, determining adequate control with current Zoloft dosage  Reviewed cardiovascular conditions (CHF, CAD, hypertension), noting stable status with current medication regimen  Considered chronic mild anemia, deciding to continue monitoring based on recent lab results  Evaluated COPD management, noting  stable breathing with current albuterol use  Assessed atrial flutter symptoms, attributing occasional irregular heartbeats to this condition rather than valve issues. Continue BB and decrease caffeine further advised.  Considered risks of long-term NSAID use for osteoarthritis pain management  Evaluated chronic constipation,  he is deciding to dietary changes before considering medication  Reviewed recent imaging studies, noting no suspicious masses or nodules on chest CT, but presence of mild to moderate emphysema    SMOKING CESSATION:  - Educated on the importance of smoking cessation for overall health.  - Patient to continue efforts to quit smoking.  - Explained the purpose of annual chest CTs for lung cancer screening.  - Low-dose CT of chest ordered for lung cancer screening (to be scheduled in about 1 month).    ATRIAL FLUTTER:  - Explained the connection between caffeine intake and irregular heartbeats.  - Patient to reduce caffeine intake to help manage atrial flutter symptoms.    CHRONIC CONSTIPATION:  - Discussed the benefits of increasing fiber intake for managing chronic constipation.  - Provided information on various sources of dietary fiber, including fruits, vegetables, and fiber supplements.  - Recommend increasing fiber intake through diet (e.g., fruits, vegetables) and supplements to address chronic constipation.  -if conservative measures fail we will consider linzess.    SEIZURE DISORDER:  - Continued Dilantin for seizure disorder.    DEPRESSION:  - Continued Zoloft 50 mg (sertraline) for depression.    HEART FAILURE:  - Continued Entresto for heart failure and hypertension.  - Continued metoprolol for heart failure and hypertension.  - Continued spironolactone for hypertension and heart failure.    HYPERLIPIDEMIA:  - Continued atorvastatin (Lipitor) for hyperlipidemia and coronary artery disease.    CORONARY ARTERY DISEASE: stable. No CP. No SOB  - Continued aspirin 81 mg daily.  -continue f/u  w cardiology for his numerous CVD comorbidities    COPD:  - Continued albuterol inhaler as needed for COPD. Stop smoking    GASTRIC PROTECTION:  - Continued pantoprazole (Protonix) for stomach protection.    SEASONAL ALLERGIES:  - Continued Flonase for seasonal allergies.    MEDICATIONS/SUPPLEMENTS:  - Refilled Lipitor 40 mg.  - Refilled Entresto.  - Refilled metoprolol 25 mg (half tablet).  - Refilled Zoloft.    LABS:  - CBC, metabolic panel, hemoglobin A1c, lipid profile, TSH, vitamin B12 level, and magnesium level ordered.    HIP OSTEOARTHRITIS:  - Referred to orthopedic department for evaluation and potential steroid injection for osteoarthritis of the hips.  - Await call from orthopedic department for scheduling appointment regarding hip osteoarthritis.    FOLLOW UP:  - Follow up in 6 months.            This note was verbally dictated, please excuse any type errors.

## 2024-09-30 ENCOUNTER — EXTERNAL CHRONIC CARE MANAGEMENT (OUTPATIENT)
Dept: PRIMARY CARE CLINIC | Facility: CLINIC | Age: 79
End: 2024-09-30
Payer: MEDICARE

## 2024-09-30 PROCEDURE — 99490 CHRNC CARE MGMT STAFF 1ST 20: CPT | Mod: S$PBB,,, | Performed by: FAMILY MEDICINE

## 2024-09-30 PROCEDURE — 99490 CHRNC CARE MGMT STAFF 1ST 20: CPT | Mod: PBBFAC | Performed by: FAMILY MEDICINE

## 2024-10-22 DIAGNOSIS — M25.552 BILATERAL HIP PAIN: Primary | ICD-10-CM

## 2024-10-22 DIAGNOSIS — M25.551 BILATERAL HIP PAIN: Primary | ICD-10-CM

## 2024-10-24 ENCOUNTER — HOSPITAL ENCOUNTER (OUTPATIENT)
Dept: RADIOLOGY | Facility: HOSPITAL | Age: 79
Discharge: HOME | End: 2024-10-24
Attending: FAMILY MEDICINE
Payer: MEDICARE

## 2024-10-24 DIAGNOSIS — Z12.2 SCREENING FOR LUNG CANCER: ICD-10-CM

## 2024-10-24 DIAGNOSIS — Z72.0 TOBACCO USE: ICD-10-CM

## 2024-10-24 PROCEDURE — 71271 CT THORAX LUNG CANCER SCR C-: CPT | Mod: TC

## 2024-10-24 PROCEDURE — 71271 CT THORAX LUNG CANCER SCR C-: CPT | Mod: 26,,, | Performed by: RADIOLOGY

## 2024-10-28 ENCOUNTER — OFFICE VISIT (OUTPATIENT)
Dept: ORTHOPEDICS | Facility: CLINIC | Age: 79
End: 2024-10-28
Payer: MEDICARE

## 2024-10-28 ENCOUNTER — TELEPHONE (OUTPATIENT)
Dept: PAIN MEDICINE | Facility: CLINIC | Age: 79
End: 2024-10-28
Payer: MEDICARE

## 2024-10-28 ENCOUNTER — HOSPITAL ENCOUNTER (OUTPATIENT)
Dept: RADIOLOGY | Facility: HOSPITAL | Age: 79
Discharge: HOME | End: 2024-10-28
Attending: STUDENT IN AN ORGANIZED HEALTH CARE EDUCATION/TRAINING PROGRAM
Payer: MEDICARE

## 2024-10-28 VITALS — HEIGHT: 69 IN | WEIGHT: 140 LBS | BODY MASS INDEX: 20.73 KG/M2

## 2024-10-28 DIAGNOSIS — M25.552 BILATERAL HIP PAIN: ICD-10-CM

## 2024-10-28 DIAGNOSIS — M16.0 PRIMARY OSTEOARTHRITIS OF BOTH HIPS: Primary | ICD-10-CM

## 2024-10-28 DIAGNOSIS — Z79.899 ENCOUNTER FOR LONG-TERM (CURRENT) USE OF MEDICATIONS: ICD-10-CM

## 2024-10-28 DIAGNOSIS — G89.29 CHRONIC LOW BACK PAIN, UNSPECIFIED BACK PAIN LATERALITY, UNSPECIFIED WHETHER SCIATICA PRESENT: ICD-10-CM

## 2024-10-28 DIAGNOSIS — M54.50 CHRONIC LOW BACK PAIN, UNSPECIFIED BACK PAIN LATERALITY, UNSPECIFIED WHETHER SCIATICA PRESENT: ICD-10-CM

## 2024-10-28 DIAGNOSIS — M46.1 SI (SACROILIAC) JOINT INFLAMMATION: ICD-10-CM

## 2024-10-28 DIAGNOSIS — M25.551 BILATERAL HIP PAIN: ICD-10-CM

## 2024-10-28 DIAGNOSIS — M51.369 DEGENERATION OF INTERVERTEBRAL DISC OF LUMBAR REGION, UNSPECIFIED WHETHER PAIN PRESENT: ICD-10-CM

## 2024-10-28 PROCEDURE — 99999 PR PBB SHADOW E&M-EST. PATIENT-LVL IV: CPT | Mod: PBBFAC,,, | Performed by: STUDENT IN AN ORGANIZED HEALTH CARE EDUCATION/TRAINING PROGRAM

## 2024-10-28 PROCEDURE — 99214 OFFICE O/P EST MOD 30 MIN: CPT | Mod: PBBFAC,25,PO | Performed by: STUDENT IN AN ORGANIZED HEALTH CARE EDUCATION/TRAINING PROGRAM

## 2024-10-28 PROCEDURE — 73521 X-RAY EXAM HIPS BI 2 VIEWS: CPT | Mod: TC,PO

## 2024-10-31 ENCOUNTER — EXTERNAL CHRONIC CARE MANAGEMENT (OUTPATIENT)
Dept: PRIMARY CARE CLINIC | Facility: CLINIC | Age: 79
End: 2024-10-31
Payer: MEDICARE

## 2024-10-31 PROCEDURE — 99490 CHRNC CARE MGMT STAFF 1ST 20: CPT | Mod: PBBFAC | Performed by: FAMILY MEDICINE

## 2024-10-31 PROCEDURE — 99490 CHRNC CARE MGMT STAFF 1ST 20: CPT | Mod: S$PBB,,, | Performed by: FAMILY MEDICINE

## 2024-11-04 ENCOUNTER — HOSPITAL ENCOUNTER (OUTPATIENT)
Dept: CARDIOLOGY | Facility: HOSPITAL | Age: 79
Discharge: HOME | End: 2024-11-04
Attending: INTERNAL MEDICINE
Payer: MEDICARE

## 2024-11-04 ENCOUNTER — TELEPHONE (OUTPATIENT)
Dept: CARDIOLOGY | Facility: CLINIC | Age: 79
End: 2024-11-04
Payer: MEDICARE

## 2024-11-04 ENCOUNTER — OFFICE VISIT (OUTPATIENT)
Dept: CARDIOLOGY | Facility: CLINIC | Age: 79
End: 2024-11-04
Payer: MEDICARE

## 2024-11-04 VITALS
OXYGEN SATURATION: 97 % | BODY MASS INDEX: 20.36 KG/M2 | HEART RATE: 70 BPM | DIASTOLIC BLOOD PRESSURE: 76 MMHG | WEIGHT: 137.44 LBS | HEIGHT: 69 IN | RESPIRATION RATE: 16 BRPM | SYSTOLIC BLOOD PRESSURE: 163 MMHG

## 2024-11-04 DIAGNOSIS — E78.2 MIXED HYPERLIPIDEMIA: ICD-10-CM

## 2024-11-04 DIAGNOSIS — I25.118 CORONARY ARTERY DISEASE OF NATIVE ARTERY OF NATIVE HEART WITH STABLE ANGINA PECTORIS: ICD-10-CM

## 2024-11-04 DIAGNOSIS — I70.0 AORTIC CALCIFICATION: ICD-10-CM

## 2024-11-04 DIAGNOSIS — I10 ESSENTIAL HYPERTENSION: ICD-10-CM

## 2024-11-04 DIAGNOSIS — R55 SYNCOPE AND COLLAPSE: ICD-10-CM

## 2024-11-04 DIAGNOSIS — I27.9 PULMONARY HEART DISEASE: ICD-10-CM

## 2024-11-04 DIAGNOSIS — I35.0 NONRHEUMATIC AORTIC VALVE STENOSIS: ICD-10-CM

## 2024-11-04 DIAGNOSIS — I48.19 PERSISTENT ATRIAL FIBRILLATION: ICD-10-CM

## 2024-11-04 DIAGNOSIS — I50.22 CHRONIC SYSTOLIC CONGESTIVE HEART FAILURE: ICD-10-CM

## 2024-11-04 DIAGNOSIS — I65.23 BILATERAL CAROTID ARTERY STENOSIS: ICD-10-CM

## 2024-11-04 DIAGNOSIS — I48.92 ATRIAL FLUTTER, UNSPECIFIED TYPE: ICD-10-CM

## 2024-11-04 DIAGNOSIS — I95.1 ORTHOSTATIC HYPOTENSION: Primary | ICD-10-CM

## 2024-11-04 PROCEDURE — 93005 ELECTROCARDIOGRAM TRACING: CPT

## 2024-11-04 PROCEDURE — 99214 OFFICE O/P EST MOD 30 MIN: CPT | Mod: PBBFAC,25 | Performed by: INTERNAL MEDICINE

## 2024-11-04 PROCEDURE — 93010 ELECTROCARDIOGRAM REPORT: CPT | Mod: ,,, | Performed by: STUDENT IN AN ORGANIZED HEALTH CARE EDUCATION/TRAINING PROGRAM

## 2024-11-04 PROCEDURE — 99999 PR PBB SHADOW E&M-EST. PATIENT-LVL IV: CPT | Mod: PBBFAC,,, | Performed by: INTERNAL MEDICINE

## 2024-11-04 NOTE — TELEPHONE ENCOUNTER
Spoke with pt in regards to scheduling appt due to recent syncope episode. Pt was scheduled for an appt today.                     ----- Message from Roz sent at 11/4/2024  8:25 AM CST -----  Type:  Sooner Apoointment Request    Caller is requesting a sooner appointment.  Caller declined first available appointment listed below.  Caller will not accept being placed on the waitlist and is requesting a message be sent to doctor.  Name of Caller:Carl  When is the first available appointment?11/27  Symptoms:Patient Passed Out  Would the patient rather a call back or a response via [facility]? Callback  Best Call Back Number:[phone]  Additional Information: MRN: [id number].... Gallo Carbajal

## 2024-11-04 NOTE — PROGRESS NOTES
Subjective:   Patient ID:  Humberto Carlson is a 79 y.o. male who presents for follow up of No chief complaint on file.      78 yo male came in with syncope episode  PMH CAD old MI  CHFrEF 35%, mild AS, AFL s/p CTI RFA in 07/23 by Dr. Wiggins at [facility], AFIB, lung cancer s/p removal of left lung in 2015, h/o fall two months after the procedure and brain bleeding s/p removal, HTN HLD smoker 1 ppd for 60 yrs, occasional drinking  Part time work.  ECHO EF 40% mild AS and apical HK  EKG NSR old septal infarct   MPI showed moderate sized severally fixed defect perfusion in apex, EF 35%  Carotid US 20% right and 50% left       06/2021 visit  No chest pain dizziness palpitation  Chronic BECKER after the lung procedure  Med compliance  EKG NASR HR 52 bpm, nonspecific STT change     visit  BP log reviewed. Occasional dizziness if looking up.  No chest pain weight control, leg swelling, palpitation. BECKER stable and the inhaler helps. 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 Chronic SOB due to mask and smoking.  Sleeps on 1 pillow and no leg swelling  EKG NSR and old septal infarct  BP checked regularly and controlled. Physically active at home     VISIT  Still smoking 1ppd. And chronic SOB and fatigue.  BP controlled at home and not taking the med yet.   No chest pain dizziness faint palpitation and leg swelling   echo EF 40 to 45% and mild AS; carotid US left 50 to 59% and right < 50%     visit  EKG AFL with variable AV block.  Chronic SOB. No dizziness and faint. Occasional lightheadedness. No chest pain  Physically active. Still smoking  AFIB [id number] score of 4    06/23 visit  C/o BP drop to 80 mmHg. His dizziness occurred when standing up quickly. No syncope papitation.   New dx of afib in 04-23. VR controlled. EF 45%.     07/23 visit  S/p ANUM and dccv in 06/23. Now SR. Improved energy SOB. Can walk longer  distance  No active bleeding. F/u with EP    10/23 visit  S/p CTI RFA on 07/27/23 at [facility] by Dr. Wiggins. Now improved energy.  No active bleeding. Chronic smoking  No dizziness faint chest pain orthopnea PND and leg swelling     05/24 visit  Yard work and maintaince work   Occasional lightheadedness if standing up quickly. No syncope chest pain palpitation.  2023 carotid US right < 39% and left < 49% lesions  BP LDL and A1c controlled  Smoking leg numbness and worse with sneezing    Interval history  Sometime dizziness with quickly standing up.  One week ago, at home standing at counter, the black out. No dizziness. Woke up after hit the floor.  EKG reviewed by myself today reveals NSR nonspecific STT change. poor R progression on precordial leads. No change compared to prior one in   Lower back pain with sneezing  05/24 [facility] and LE arterial US showed mild Dz  LDL 99 BP high. Not took HTN med in AM.   In the office + OH  Laying  mmHG and sitting 163 mmHg  Standing  mmhg.   Pulse ok                 Past Medical History:   Diagnosis Date    Acute bronchitis 12/02/2019    Anticoagulant long-term use     Asthma     Atrial flutter     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    Cancer     Cataract     Chronic obstructive pulmonary disease 05/24/2021    Coronary artery disease of native artery of native heart with stable angina pectoris 10/26/2020    Encounter for blood transfusion     Essential hypertension 07/22/2019    GERD (gastroesophageal reflux disease)     Glaucoma     Hyperlipidemia     Major depression in partial remission 05/02/2022    Seizures        Past Surgical History:   Procedure Laterality Date    ABLATION, ATRIAL FLUTTER, TYPICAL N/A 8/31/2023    Procedure: Ablation, Atrial Flutter, Typical;  Surgeon: Liam Wiggins MD;  Location: [facility] EP LAB;  Service: Cardiology;  Laterality: N/A;  AFL, RFA, BECKY, ANUM (cx if SR), BENJI benz, 3 Prep    BRAIN  SURGERY      CARPAL TUNNEL RELEASE      EYE SURGERY      LUNG SURGERY      Partial lung removed Left     REPAIR,BROW PTOSIS      toe nail removal      TRANSESOPHAGEAL ECHOCARDIOGRAM WITH POSSIBLE CARDIOVERSION (ANUM W/ POSS CARDIOVERSION) N/A 6/28/2023    Procedure: Transesophageal echo (ANUM) intra-procedure log documentation;  Surgeon: Gallo Carbajal MD;  Location: [facility] CATH LAB;  Service: Cardiology;  Laterality: N/A;       Social History     Tobacco Use    Smoking status: Every Day     Current packs/day: 2.00     Average packs/day: 2.0 packs/day for 54.8 years (109.7 ttl pk-yrs)     Types: Cigarettes     Start date: 1970     Passive exposure: Past    Smokeless tobacco: Never   Substance Use Topics    Alcohol use: No    Drug use: No       Family History   Problem Relation Name Age of Onset    Stroke Mother      Heart attack Father      Coronary artery disease Brother      Valvular heart disease Brother      Stroke Paternal Grandfather           ROS    Objective:   Physical Exam  HENT:      Head: Normocephalic.   Eyes:      Pupils: Pupils are equal, round, and reactive to light.   Neck:      Thyroid: No thyromegaly.      Vascular: Normal carotid pulses. No carotid bruit or JVD.   Cardiovascular:      Rate and Rhythm: Normal rate and regular rhythm. No extrasystoles are present.     Chest Wall: PMI is not displaced.      Pulses: Normal pulses.      Heart sounds: Murmur heard.      No gallop. No S3 sounds.   Pulmonary:      Effort: No respiratory distress.      Breath sounds: Normal breath sounds. No stridor.   Abdominal:      General: Bowel sounds are normal.      Palpations: Abdomen is soft.      Tenderness: There is no abdominal tenderness. There is no rebound.   Skin:     Findings: No rash.   Neurological:      Mental Status: He is alert and oriented to person, place, and time.   Psychiatric:         Behavior: Behavior normal.         Lab Results   Component Value Date    CHOL 170 09/27/2024    CHOL 147 09/27/2023     CHOL 159 09/26/2022     Lab Results   Component Value Date    HDL 51 09/27/2024    HDL 49 09/27/2023    HDL 48 09/26/2022     Lab Results   Component Value Date    LDLCALC 97.8 09/27/2024    LDLCALC 79.4 09/27/2023    LDLCALC 91.4 09/26/2022     Lab Results   Component Value Date    TRIG 106 09/27/2024    TRIG 93 09/27/2023    TRIG 98 09/26/2022     Lab Results   Component Value Date    CHOLHDL 30.0 09/27/2024    CHOLHDL 33.3 09/27/2023    CHOLHDL 30.2 09/26/2022       Chemistry        Component Value Date/Time     09/27/2024 0823    K 4.7 09/27/2024 0823     09/27/2024 0823    CO2 26 09/27/2024 0823    BUN 10 09/27/2024 0823    CREATININE 0.8 09/27/2024 0823    GLU 97 09/27/2024 0823        Component Value Date/Time    CALCIUM 9.1 09/27/2024 0823    ALKPHOS 50 (L) 09/27/2024 0823    AST 25 09/27/2024 0823    ALT 16 09/27/2024 0823    BILITOT 0.4 09/27/2024 0823    ESTGFRAFRICA >60.0 09/30/2021 1130    EGFRNONAA >60.0 09/30/2021 1130          Lab Results   Component Value Date    HGBA1C 5.6 09/27/2024     Lab Results   Component Value Date    TSH 1.963 09/27/2024     Lab Results   Component Value Date    INR 1.1 08/21/2023     Lab Results   Component Value Date    WBC 7.74 09/27/2024    HGB 12.6 (L) 09/27/2024    HCT 39.1 (L) 09/27/2024    MCV 93 09/27/2024     09/27/2024     BMP  Sodium   Date Value Ref Range Status   09/27/2024 138 136 - 145 mmol/L Final     Potassium   Date Value Ref Range Status   09/27/2024 4.7 3.5 - 5.1 mmol/L Final     Chloride   Date Value Ref Range Status   09/27/2024 104 95 - 110 mmol/L Final     CO2   Date Value Ref Range Status   09/27/2024 26 23 - 29 mmol/L Final     BUN   Date Value Ref Range Status   09/27/2024 10 8 - 23 mg/dL Final     Creatinine   Date Value Ref Range Status   09/27/2024 0.8 0.5 - 1.4 mg/dL Final     Calcium   Date Value Ref Range Status   09/27/2024 9.1 8.7 - 10.5 mg/dL Final     Anion Gap   Date Value Ref Range Status   09/27/2024 8 8 - 16  mmol/L Final     eGFR if    Date Value Ref Range Status   09/30/2021 >60.0 >60 mL/min/1.73 m^2 Final     eGFR if non    Date Value Ref Range Status   09/30/2021 >60.0 >60 mL/min/1.73 m^2 Final     Comment:     Calculation used to obtain the estimated glomerular filtration  rate (eGFR) is the CKD-EPI equation.        BNP  @LABRCNTIP(BNP,BNPTRIAGEBLO)@  @LABRCNTIP(troponini)@  CrCl cannot be calculated (Patient's most recent lab result is older than the maximum 7 days allowed.).  No results found in the last 24 hours.  No results found in the last 24 hours.  No results found in the last 24 hours.    Assessment:      1. Orthostatic hypotension    2. Syncope and collapse    3. Aortic calcification    4. Chronic systolic congestive heart failure    5. Coronary artery disease of native artery of native heart with stable angina pectoris    6. Bilateral carotid artery stenosis    7. Essential hypertension    8. Mixed hyperlipidemia    9. Nonrheumatic aortic valve stenosis    10. Pulmonary heart disease      Syncope  Positional dizziness  + OH   Plan:   Advise abdominal binder or compression sock at 15 to 20 mmHg for orthostatic hypotension  Check carotid US  Fall precaution  RTC in 2m   Continue ASA Lipitor torpoXL entresto aldactone  DASH

## 2024-11-05 LAB
OHS QRS DURATION: 94 MS
OHS QTC CALCULATION: 391 MS

## 2024-11-19 ENCOUNTER — HOSPITAL ENCOUNTER (OUTPATIENT)
Dept: CARDIOLOGY | Facility: HOSPITAL | Age: 79
Discharge: HOME | End: 2024-11-19
Attending: INTERNAL MEDICINE
Payer: MEDICARE

## 2024-11-19 VITALS
WEIGHT: 137 LBS | DIASTOLIC BLOOD PRESSURE: 68 MMHG | BODY MASS INDEX: 20.29 KG/M2 | HEIGHT: 69 IN | SYSTOLIC BLOOD PRESSURE: 109 MMHG

## 2024-11-19 DIAGNOSIS — R55 SYNCOPE AND COLLAPSE: ICD-10-CM

## 2024-11-19 LAB
LEFT ARM DIASTOLIC BLOOD PRESSURE: 68 MMHG
LEFT ARM SYSTOLIC BLOOD PRESSURE: 109 MMHG
LEFT CBA DIAS: 13 CM/S
LEFT CBA SYS: 155 CM/S
LEFT CCA DIST DIAS: 15 CM/S
LEFT CCA DIST SYS: 67 CM/S
LEFT CCA MID DIAS: 12 CM/S
LEFT CCA MID SYS: 65 CM/S
LEFT CCA PROX DIAS: 8 CM/S
LEFT CCA PROX SYS: 82 CM/S
LEFT ECA DIAS: 4 CM/S
LEFT ECA SYS: 157 CM/S
LEFT ICA DIST DIAS: 18 CM/S
LEFT ICA DIST SYS: 63 CM/S
LEFT ICA MID DIAS: 43 CM/S
LEFT ICA MID SYS: 165 CM/S
LEFT ICA PROX DIAS: 40 CM/S
LEFT ICA PROX SYS: 168 CM/S
LEFT VERTEBRAL DIAS: 2 CM/S
LEFT VERTEBRAL SYS: 31 CM/S
OHS CV CAROTID RIGHT ICA EDV HIGHEST: 27
OHS CV CAROTID ULTRASOUND LEFT ICA/CCA RATIO: 2.51
OHS CV CAROTID ULTRASOUND RIGHT ICA/CCA RATIO: 1.88
OHS CV PV CAROTID LEFT HIGHEST CCA: 82
OHS CV PV CAROTID LEFT HIGHEST ICA: 168
OHS CV PV CAROTID RIGHT HIGHEST CCA: 76
OHS CV PV CAROTID RIGHT HIGHEST ICA: 105
OHS CV US CAROTID LEFT HIGHEST EDV: 43
RIGHT ARM DIASTOLIC BLOOD PRESSURE: 60 MMHG
RIGHT ARM SYSTOLIC BLOOD PRESSURE: 107 MMHG
RIGHT CBA DIAS: 7 CM/S
RIGHT CBA SYS: 54 CM/S
RIGHT CCA DIST DIAS: 8 CM/S
RIGHT CCA DIST SYS: 56 CM/S
RIGHT CCA MID DIAS: 11 CM/S
RIGHT CCA MID SYS: 61 CM/S
RIGHT CCA PROX DIAS: 8 CM/S
RIGHT CCA PROX SYS: 76 CM/S
RIGHT ECA DIAS: 7 CM/S
RIGHT ECA SYS: 73 CM/S
RIGHT ICA DIST DIAS: 27 CM/S
RIGHT ICA DIST SYS: 105 CM/S
RIGHT ICA MID DIAS: 24 CM/S
RIGHT ICA MID SYS: 74 CM/S
RIGHT ICA PROX DIAS: 22 CM/S
RIGHT ICA PROX SYS: 77 CM/S
RIGHT VERTEBRAL DIAS: 19 CM/S
RIGHT VERTEBRAL SYS: 68 CM/S

## 2024-11-19 PROCEDURE — 93880 EXTRACRANIAL BILAT STUDY: CPT

## 2024-11-19 PROCEDURE — 93880 EXTRACRANIAL BILAT STUDY: CPT | Mod: 26,,, | Performed by: INTERNAL MEDICINE

## 2024-11-20 ENCOUNTER — PATIENT MESSAGE (OUTPATIENT)
Dept: CARDIOLOGY | Facility: CLINIC | Age: 79
End: 2024-11-20
Payer: MEDICARE

## 2024-11-30 ENCOUNTER — EXTERNAL CHRONIC CARE MANAGEMENT (OUTPATIENT)
Dept: PRIMARY CARE CLINIC | Facility: CLINIC | Age: 79
End: 2024-11-30
Payer: MEDICARE

## 2024-11-30 PROCEDURE — 99490 CHRNC CARE MGMT STAFF 1ST 20: CPT | Mod: S$PBB,,, | Performed by: FAMILY MEDICINE

## 2024-11-30 PROCEDURE — 99490 CHRNC CARE MGMT STAFF 1ST 20: CPT | Mod: PBBFAC | Performed by: FAMILY MEDICINE

## 2024-12-09 ENCOUNTER — OFFICE VISIT (OUTPATIENT)
Dept: CARDIOLOGY | Facility: CLINIC | Age: 79
End: 2024-12-09
Payer: MEDICARE

## 2024-12-09 VITALS
HEIGHT: 69 IN | BODY MASS INDEX: 20.11 KG/M2 | SYSTOLIC BLOOD PRESSURE: 136 MMHG | WEIGHT: 135.81 LBS | DIASTOLIC BLOOD PRESSURE: 70 MMHG | HEART RATE: 62 BPM | OXYGEN SATURATION: 95 %

## 2024-12-09 DIAGNOSIS — I50.22 CHRONIC SYSTOLIC CONGESTIVE HEART FAILURE: ICD-10-CM

## 2024-12-09 DIAGNOSIS — E78.2 MIXED HYPERLIPIDEMIA: ICD-10-CM

## 2024-12-09 DIAGNOSIS — I27.9 PULMONARY HEART DISEASE: ICD-10-CM

## 2024-12-09 DIAGNOSIS — I48.3 TYPICAL ATRIAL FLUTTER: ICD-10-CM

## 2024-12-09 DIAGNOSIS — I25.118 CORONARY ARTERY DISEASE OF NATIVE ARTERY OF NATIVE HEART WITH STABLE ANGINA PECTORIS: ICD-10-CM

## 2024-12-09 DIAGNOSIS — I70.0 AORTIC CALCIFICATION: ICD-10-CM

## 2024-12-09 DIAGNOSIS — I10 ESSENTIAL HYPERTENSION: ICD-10-CM

## 2024-12-09 DIAGNOSIS — I95.1 ORTHOSTATIC HYPOTENSION: Primary | ICD-10-CM

## 2024-12-09 DIAGNOSIS — I35.0 NONRHEUMATIC AORTIC VALVE STENOSIS: ICD-10-CM

## 2024-12-09 DIAGNOSIS — I65.23 BILATERAL CAROTID ARTERY STENOSIS: ICD-10-CM

## 2024-12-09 PROCEDURE — 99214 OFFICE O/P EST MOD 30 MIN: CPT | Mod: PBBFAC | Performed by: INTERNAL MEDICINE

## 2024-12-09 PROCEDURE — 99999 PR PBB SHADOW E&M-EST. PATIENT-LVL IV: CPT | Mod: PBBFAC,,, | Performed by: INTERNAL MEDICINE

## 2024-12-09 PROCEDURE — 99214 OFFICE O/P EST MOD 30 MIN: CPT | Mod: S$PBB,,, | Performed by: INTERNAL MEDICINE

## 2024-12-09 RX ORDER — EZETIMIBE 10 MG/1
10 TABLET ORAL NIGHTLY
Qty: 90 TABLET | Refills: 3 | Status: SHIPPED | OUTPATIENT
Start: 2024-12-09 | End: 2025-12-09

## 2024-12-09 NOTE — PROGRESS NOTES
Subjective:   Patient ID:  Humberto Carlson is a 79 y.o. male who presents for follow up of Shortness of Breath and Chest Pain (Minor chest pain occasionally )      80 yo male came in with syncope episode  PMH CAD old MI  CHFrEF 35%, mild AS, AFL s/p CTI RFA in 07/23 by Dr. Wiggins at [facility], AFIB, lung cancer s/p removal of left lung in 2015, h/o fall two months after the procedure and brain bleeding s/p removal, HTN HLD smoker 1 ppd for 60 yrs, occasional drinking  Part time work.  ECHO EF 40% mild AS and apical HK  EKG NSR old septal infarct   MPI showed moderate sized severally fixed defect perfusion in apex, EF 35%  Carotid US 20% right and 50% left       06/2021 visit  No chest pain dizziness palpitation  Chronic BECKER after the lung procedure  Med compliance  EKG NASR HR 52 bpm, nonspecific STT change     visit  BP log reviewed. Occasional dizziness if looking up.  No chest pain weight control, leg swelling, palpitation. BECKER stable and the inhaler helps. 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 Chronic SOB due to mask and smoking.  Sleeps on 1 pillow and no leg swelling  EKG NSR and old septal infarct  BP checked regularly and controlled. Physically active at home     VISIT  Still smoking 1ppd. And chronic SOB and fatigue.  BP controlled at home and not taking the med yet.   No chest pain dizziness faint palpitation and leg swelling   echo EF 40 to 45% and mild AS; carotid US left 50 to 59% and right < 50%     visit  EKG AFL with variable AV block.  Chronic SOB. No dizziness and faint. Occasional lightheadedness. No chest pain  Physically active. Still smoking  AFIB [id number] score of 4    06/23 visit  C/o BP drop to 80 mmHg. His dizziness occurred when standing up quickly. No syncope papitation.   New dx of afib in 04-23. VR controlled. EF 45%.     07/23 visit  S/p ANUM and dccv in 06/23. Now SR.  Improved energy SOB. Can walk longer distance  No active bleeding. F/u with EP    10/23 visit  S/p CTI RFA on 07/27/23 at [facility] by Dr. Wiggins. Now improved energy.  No active bleeding. Chronic smoking  No dizziness faint chest pain orthopnea PND and leg swelling     05/24 visit  Yard work and maintaince work   Occasional lightheadedness if standing up quickly. No syncope chest pain palpitation.  2023 carotid US right < 39% and left < 49% lesions  BP LDL and A1c controlled  Smoking leg numbness and worse with sneezing    11/24 visit  Sometime dizziness with quickly standing up.  One week ago, at home standing at counter, the black out. No dizziness. Woke up after hit the floor.  EKG reviewed by myself today reveals NSR nonspecific STT change. poor R progression on precordial leads. No change compared to prior one in   Lower back pain with sneezing  05/24 [facility] and LE arterial US showed mild Dz  LDL 99 BP high. Not took HTN med in AM.   In the office + OH  Laying  mmHG and sitting 163 mmHg  Standing  mmhg.   Pulse ok     Interval history  + OH. Added compression sock. The positional dizziness remains. No syncope. And learning to sit down if having the dizziness  11/24 carotid US showed right < 39% and left < 49% lesions               Past Medical History:   Diagnosis Date    Acute bronchitis 12/02/2019    Anticoagulant long-term use     Asthma     Atrial flutter     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    Cancer     Cataract     Chronic obstructive pulmonary disease 05/24/2021    Coronary artery disease of native artery of native heart with stable angina pectoris 10/26/2020    Encounter for blood transfusion     Essential hypertension 07/22/2019    GERD (gastroesophageal reflux disease)     Glaucoma     Hyperlipidemia     Major depression in partial remission 05/02/2022    Seizures        Past Surgical History:   Procedure Laterality Date    ABLATION, ATRIAL  FLUTTER, TYPICAL N/A 8/31/2023    Procedure: Ablation, Atrial Flutter, Typical;  Surgeon: Liam Wiggins MD;  Location: [facility] EP LAB;  Service: Cardiology;  Laterality: N/A;  AFL, RFA, BECKY, ANUM (cx if SR), BENJI benz, 3 Prep    BRAIN SURGERY      CARPAL TUNNEL RELEASE      EYE SURGERY      LUNG SURGERY      Partial lung removed Left     REPAIR,BROW PTOSIS      toe nail removal      TRANSESOPHAGEAL ECHOCARDIOGRAM WITH POSSIBLE CARDIOVERSION (ANUM W/ POSS CARDIOVERSION) N/A 6/28/2023    Procedure: Transesophageal echo (ANUM) intra-procedure log documentation;  Surgeon: Gallo Carbajal MD;  Location: [facility] CATH LAB;  Service: Cardiology;  Laterality: N/A;       Social History     Tobacco Use    Smoking status: Every Day     Current packs/day: 2.00     Average packs/day: 2.0 packs/day for 54.9 years (109.9 ttl pk-yrs)     Types: Cigarettes     Start date: 1970     Passive exposure: Past    Smokeless tobacco: Never   Substance Use Topics    Alcohol use: No    Drug use: No       Family History   Problem Relation Name Age of Onset    Stroke Mother      Heart attack Father      Coronary artery disease Brother      Valvular heart disease Brother      Stroke Paternal Grandfather           ROS    Objective:   Physical Exam  HENT:      Head: Normocephalic.   Eyes:      Pupils: Pupils are equal, round, and reactive to light.   Neck:      Thyroid: No thyromegaly.      Vascular: Normal carotid pulses. No carotid bruit or JVD.   Cardiovascular:      Rate and Rhythm: Normal rate and regular rhythm. No extrasystoles are present.     Chest Wall: PMI is not displaced.      Pulses: Normal pulses.      Heart sounds: Murmur heard.      No gallop. No S3 sounds.   Pulmonary:      Effort: No respiratory distress.      Breath sounds: Normal breath sounds. No stridor.   Abdominal:      General: Bowel sounds are normal.      Palpations: Abdomen is soft.      Tenderness: There is no abdominal tenderness. There is no rebound.   Skin:     Findings: No  rash.   Neurological:      Mental Status: He is alert and oriented to person, place, and time.   Psychiatric:         Behavior: Behavior normal.         Lab Results   Component Value Date    CHOL 170 09/27/2024    CHOL 147 09/27/2023    CHOL 159 09/26/2022     Lab Results   Component Value Date    HDL 51 09/27/2024    HDL 49 09/27/2023    HDL 48 09/26/2022     Lab Results   Component Value Date    LDLCALC 97.8 09/27/2024    LDLCALC 79.4 09/27/2023    LDLCALC 91.4 09/26/2022     Lab Results   Component Value Date    TRIG 106 09/27/2024    TRIG 93 09/27/2023    TRIG 98 09/26/2022     Lab Results   Component Value Date    CHOLHDL 30.0 09/27/2024    CHOLHDL 33.3 09/27/2023    CHOLHDL 30.2 09/26/2022       Chemistry        Component Value Date/Time     09/27/2024 0823    K 4.7 09/27/2024 0823     09/27/2024 0823    CO2 26 09/27/2024 0823    BUN 10 09/27/2024 0823    CREATININE 0.8 09/27/2024 0823    GLU 97 09/27/2024 0823        Component Value Date/Time    CALCIUM 9.1 09/27/2024 0823    ALKPHOS 50 (L) 09/27/2024 0823    AST 25 09/27/2024 0823    ALT 16 09/27/2024 0823    BILITOT 0.4 09/27/2024 0823    ESTGFRAFRICA >60.0 09/30/2021 1130    EGFRNONAA >60.0 09/30/2021 1130          Lab Results   Component Value Date    HGBA1C 5.6 09/27/2024     Lab Results   Component Value Date    TSH 1.963 09/27/2024     Lab Results   Component Value Date    INR 1.1 08/21/2023     Lab Results   Component Value Date    WBC 7.74 09/27/2024    HGB 12.6 (L) 09/27/2024    HCT 39.1 (L) 09/27/2024    MCV 93 09/27/2024     09/27/2024     BMP  Sodium   Date Value Ref Range Status   09/27/2024 138 136 - 145 mmol/L Final     Potassium   Date Value Ref Range Status   09/27/2024 4.7 3.5 - 5.1 mmol/L Final     Chloride   Date Value Ref Range Status   09/27/2024 104 95 - 110 mmol/L Final     CO2   Date Value Ref Range Status   09/27/2024 26 23 - 29 mmol/L Final     BUN   Date Value Ref Range Status   09/27/2024 10 8 - 23 mg/dL Final      Creatinine   Date Value Ref Range Status   09/27/2024 0.8 0.5 - 1.4 mg/dL Final     Calcium   Date Value Ref Range Status   09/27/2024 9.1 8.7 - 10.5 mg/dL Final     Anion Gap   Date Value Ref Range Status   09/27/2024 8 8 - 16 mmol/L Final     eGFR if    Date Value Ref Range Status   09/30/2021 >60.0 >60 mL/min/1.73 m^2 Final     eGFR if non    Date Value Ref Range Status   09/30/2021 >60.0 >60 mL/min/1.73 m^2 Final     Comment:     Calculation used to obtain the estimated glomerular filtration  rate (eGFR) is the CKD-EPI equation.        BNP  @LABRCNTIP(BNP,BNPTRIAGEBLO)@  @LABRCNTIP(troponini)@  CrCl cannot be calculated (Patient's most recent lab result is older than the maximum 7 days allowed.).  No results found in the last 24 hours.  No results found in the last 24 hours.  No results found in the last 24 hours.    Assessment:      1. Orthostatic hypotension    2. Coronary artery disease of native artery of native heart with stable angina pectoris    3. Chronic systolic congestive heart failure    4. Aortic calcification    5. Bilateral carotid artery stenosis    6. Typical atrial flutter    7. Essential hypertension    8. Mixed hyperlipidemia    9. Nonrheumatic aortic valve stenosis    10. Pulmonary heart disease      OH improved    Plan:   Add xetia 10 mg qhs for carotid Dz and HLD  Continue lipitor and ASA    Continue  torpoXL entresto aldactone   Fall precaution  RTC in 3m

## 2024-12-31 ENCOUNTER — EXTERNAL CHRONIC CARE MANAGEMENT (OUTPATIENT)
Dept: PRIMARY CARE CLINIC | Facility: CLINIC | Age: 79
End: 2024-12-31
Payer: MEDICARE

## 2024-12-31 PROCEDURE — 99490 CHRNC CARE MGMT STAFF 1ST 20: CPT | Mod: PBBFAC | Performed by: FAMILY MEDICINE

## 2025-01-03 ENCOUNTER — TELEPHONE (OUTPATIENT)
Dept: NEUROLOGY | Facility: CLINIC | Age: 80
End: 2025-01-03
Payer: MEDICARE

## 2025-01-03 DIAGNOSIS — G40.409 GRAND MAL EPILEPSY, CONTROLLED: ICD-10-CM

## 2025-01-03 DIAGNOSIS — G40.909 SEIZURE DISORDER: ICD-10-CM

## 2025-01-03 RX ORDER — PHENYTOIN SODIUM 100 MG/1
300 CAPSULE, EXTENDED RELEASE ORAL DAILY
Qty: 270 CAPSULE | Refills: 3 | Status: SHIPPED | OUTPATIENT
Start: 2025-01-03 | End: 2026-01-03

## 2025-01-03 NOTE — TELEPHONE ENCOUNTER
----- Message from Ailyn sent at 1/3/2025 11:23 AM CST -----  Contact: [employer]  ..Type:  Patient Requesting Call    Who Called: [employer]  Does the patient know what this is regarding?: checking status of fax for med henytoin (DILANTIN) 100   Would the patient rather a call back or a response via [facility]?  Call     Best Call Back Number:[phone]  Additional Information:

## 2025-01-03 NOTE — TELEPHONE ENCOUNTER
----- Message from Gabriela sent at 1/3/2025  1:10 PM CST -----  e:  RX Refill Request        Who Called: pt        RX Name and Strength:   phenytoin (DILANTIN) 100 MG ER capsule      How is the patient currently taking it? (ex. 1XDay): 1 x day        Is this a 30 day or 90 day RX: 30        Preferred Pharmacy with phone number:         Local or Mail Order: local        Ordering Provider: shahnaz        Would the patient rather a call back or a response via [facility]? call        Best Call Back Number: [phone]        Additional Information: call back

## 2025-01-31 ENCOUNTER — EXTERNAL CHRONIC CARE MANAGEMENT (OUTPATIENT)
Dept: PRIMARY CARE CLINIC | Facility: CLINIC | Age: 80
End: 2025-01-31
Payer: MEDICARE

## 2025-01-31 PROCEDURE — 99490 CHRNC CARE MGMT STAFF 1ST 20: CPT | Mod: PBBFAC | Performed by: FAMILY MEDICINE

## 2025-01-31 PROCEDURE — 99490 CHRNC CARE MGMT STAFF 1ST 20: CPT | Mod: S$PBB,,, | Performed by: FAMILY MEDICINE

## 2025-02-05 ENCOUNTER — OFFICE VISIT (OUTPATIENT)
Dept: PAIN MEDICINE | Facility: CLINIC | Age: 80
End: 2025-02-05
Payer: MEDICARE

## 2025-02-05 VITALS
WEIGHT: 137.13 LBS | HEIGHT: 69 IN | RESPIRATION RATE: 17 BRPM | HEART RATE: 55 BPM | BODY MASS INDEX: 20.31 KG/M2 | SYSTOLIC BLOOD PRESSURE: 184 MMHG | DIASTOLIC BLOOD PRESSURE: 88 MMHG

## 2025-02-05 DIAGNOSIS — G89.29 CHRONIC LOW BACK PAIN, UNSPECIFIED BACK PAIN LATERALITY, UNSPECIFIED WHETHER SCIATICA PRESENT: ICD-10-CM

## 2025-02-05 DIAGNOSIS — M54.50 CHRONIC LOW BACK PAIN, UNSPECIFIED BACK PAIN LATERALITY, UNSPECIFIED WHETHER SCIATICA PRESENT: ICD-10-CM

## 2025-02-05 DIAGNOSIS — M25.551 BILATERAL HIP PAIN: ICD-10-CM

## 2025-02-05 DIAGNOSIS — M25.552 BILATERAL HIP PAIN: ICD-10-CM

## 2025-02-05 DIAGNOSIS — M16.10 HIP ARTHRITIS: ICD-10-CM

## 2025-02-05 DIAGNOSIS — M47.816 LUMBAR SPONDYLOSIS: Primary | ICD-10-CM

## 2025-02-05 PROCEDURE — 99999 PR PBB SHADOW E&M-EST. PATIENT-LVL V: CPT | Mod: PBBFAC,,, | Performed by: PHYSICAL MEDICINE & REHABILITATION

## 2025-02-05 PROCEDURE — G2211 COMPLEX E/M VISIT ADD ON: HCPCS | Mod: S$PBB,,, | Performed by: PHYSICAL MEDICINE & REHABILITATION

## 2025-02-05 PROCEDURE — 99204 OFFICE O/P NEW MOD 45 MIN: CPT | Mod: S$PBB,,, | Performed by: PHYSICAL MEDICINE & REHABILITATION

## 2025-02-05 PROCEDURE — 99215 OFFICE O/P EST HI 40 MIN: CPT | Mod: PBBFAC | Performed by: PHYSICAL MEDICINE & REHABILITATION

## 2025-02-05 NOTE — PROGRESS NOTES
New Patient Chronic Pain Note (Initial Visit)    Referring Physician: Liam Beach MD    PCP: Zeke Lamb MD    Chief Complaint: No chief complaint on file.       SUBJECTIVE:    Humberto Carlson is a 79 y.o. male who presents to the clinic for the evaluation of back and hip pain.  He was referred by Orthopedics for further evaluation and management of this pain.  He has a past medical history of seizure disorder, subdural hematoma history, depression, COPD, hyperlipidemia, hypertension, CAD, atrial flutter, lung cancer history, prediabetes, multiple other medical comorbidities as listed in his chart.  The pain started 2 years ago and symptoms have been worsening.The pain is located in the lumbosacral area and radiates to the right hip.  The pain is described as  sharp, stabbing, aching  and is rated as 3/10. The pain is rated with a score of  3/10 on the BEST day and a score of 10/10 on the WORST day.  Symptoms interfere with daily activity. The pain is exacerbated by activity.  The pain is mitigated by NSAIDs.     Patient denies night fever/night sweats, urinary incontinence, bowel incontinence, significant weight loss, significant motor weakness, and loss of sensations.    Pain Disability Index Review:         2/5/2025     9:04 AM   Last 3 PDI Scores   Pain Disability Index (PDI) 18       Non-Pharmacologic Treatments:  Physical Therapy/Home Exercise: yes, last year  Ice/Heat:yes  TENS: no  Acupuncture: no  Massage: no  Chiropractic: no    Other: no      Pain Medications:  - Opioids:  Norco  - Adjuvant Medications:  Naproxen, Zoloft  - Anti-Coagulants: Aspirin     report:  Reviewed and consistent with medication use as prescribed.    Pain Procedures:   Denies      Imaging:   X-ray bilateral hips 10/28/2024:  No evidence of acute fracture or dislocation. Mild joint space narrowing sclerosis of the bilateral hips. Osseous demineralization. Marked atherosclerotic disease. Lumbosacral spondylosis.      X-ray lumbar spine 09/24/2020:  There is mild dextroscoliosis of the upper thoracic spine. Vertebral body heights appear within normal limits. There is mild grade 1 anterolisthesis of L4 on L5. There is moderate disc height loss at L2-3 and L4-5. Mild asymmetric disc height loss at L3-4. Facet arthropathy demonstrated at L4-5 and L5-S1. No pars defects visualized. No fractures visualized. The remaining visualized osseous and soft tissue structures demonstrate no appreciable abnormality.     Past Medical History:   Diagnosis Date    Acute bronchitis 12/02/2019    Anticoagulant long-term use     Asthma     Atrial flutter     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    Cancer     Cataract     Chronic obstructive pulmonary disease 05/24/2021    Coronary artery disease of native artery of native heart with stable angina pectoris 10/26/2020    Encounter for blood transfusion     Essential hypertension 07/22/2019    GERD (gastroesophageal reflux disease)     Glaucoma     Hyperlipidemia     Major depression in partial remission 05/02/2022    Seizures      Past Surgical History:   Procedure Laterality Date    ABLATION, ATRIAL FLUTTER, TYPICAL N/A 8/31/2023    Procedure: Ablation, Atrial Flutter, Typical;  Surgeon: Liam Wiggins MD;  Location: [facility] EP LAB;  Service: Cardiology;  Laterality: N/A;  AFL, RFA, BECKY, ANUM (cx if SR), BENJI benz, 3 Prep    BRAIN SURGERY      CARPAL TUNNEL RELEASE      EYE SURGERY      LUNG SURGERY      Partial lung removed Left     REPAIR,BROW PTOSIS      toe nail removal      TRANSESOPHAGEAL ECHOCARDIOGRAM WITH POSSIBLE CARDIOVERSION (ANUM W/ POSS CARDIOVERSION) N/A 6/28/2023    Procedure: Transesophageal echo (ANUM) intra-procedure log documentation;  Surgeon: Gallo Carbajal MD;  Location: [facility] CATH LAB;  Service: Cardiology;  Laterality: N/A;     Social History     Socioeconomic History    Marital status:    Tobacco Use    Smoking status: Every Day      Current packs/day: 2.00     Average packs/day: 2.0 packs/day for 55.1 years (110.2 ttl pk-yrs)     Types: Cigarettes     Start date: 1970     Passive exposure: Past    Smokeless tobacco: Never   Substance and Sexual Activity    Alcohol use: No    Drug use: No    Sexual activity: Not Currently     Partners: Female     Social Drivers of Health     Financial Resource Strain: Low Risk  (7/29/2024)    Overall Financial Resource Strain (CARDIA)     Difficulty of Paying Living Expenses: Not hard at all   Food Insecurity: No Food Insecurity (7/29/2024)    Hunger Vital Sign     Worried About Running Out of Food in the Last Year: Never true     Ran Out of Food in the Last Year: Never true   Transportation Needs: No Transportation Needs (7/29/2024)    PRAPARE - Transportation     Lack of Transportation (Medical): No     Lack of Transportation (Non-Medical): No   Physical Activity: Inactive (7/29/2024)    Exercise Vital Sign     Days of Exercise per Week: 0 days     Minutes of Exercise per Session: 0 min   Stress: Stress Concern Present (7/29/2024)    [employer] [place] [employer] - Occupational Stress Questionnaire     Feeling of Stress : To some extent   Housing Stability: Low Risk  (7/29/2024)    Housing Stability Vital Sign     Unable to Pay for Housing in the Last Year: No     Homeless in the Last Year: No     Family History   Problem Relation Name Age of Onset    Stroke Mother      Heart attack Father      Coronary artery disease Brother      Valvular heart disease Brother      Stroke Paternal Grandfather         Review of patient's allergies indicates:   Allergen Reactions    Wasp sting  [allergen ext-venom-honey bee]      Other reaction(s): swelling    Yellow Jackets as well       Current Outpatient Medications   Medication Sig    albuterol (PROVENTIL/VENTOLIN HFA) 90 mcg/actuation inhaler Rescue    aspirin (ECOTRIN) 81 MG EC tablet Take 1 tablet (81 mg total) by mouth once daily.    atorvastatin  (LIPITOR) 40 MG tablet Take 1 tablet (40 mg total) by mouth once daily.    brimonidine-timoloL (COMBIGAN) 0.2-0.5 % Drop Inject 1 drop into the eye Twice daily.    ezetimibe (ZETIA) 10 mg tablet Take 1 tablet (10 mg total) by mouth every evening.    fluticasone propionate (FLONASE) 50 mcg/actuation nasal spray 1 SPRAY IN EACH NOSTRIL ONCE DAILY.    latanoprost 0.005 % ophthalmic solution     metoprolol succinate (TOPROL-XL) 25 MG 24 hr tablet Take 0.5 tablets (12.5 mg total) by mouth once daily.    mupirocin (BACTROBAN) 2 % ointment Apply topically 2 (two) times daily.    naproxen sodium (ANAPROX) 220 MG tablet Take 220 mg by mouth.    pantoprazole (PROTONIX) 20 MG tablet Take 1 tablet (20 mg total) by mouth once daily.    phenytoin (DILANTIN) 100 MG ER capsule Take 3 capsules (300 mg total) by mouth once daily. for 365 doses    sacubitriL-valsartan (ENTRESTO) 24-26 mg per tablet Take 1 tablet by mouth 2 (two) times daily.    sertraline (ZOLOFT) 50 MG tablet Take 1 tablet (50 mg total) by mouth once daily.    spironolactone (ALDACTONE) 25 MG tablet TAKE 1 TABLET BY MOUTH ONCE A DAY    EPINEPHrine (EPIPEN) 0.3 mg/0.3 mL AtIn Inject 0.3 mLs (0.3 mg total) into the muscle once as needed (allergic reaction).     No current facility-administered medications for this visit.       Review of Systems     GENERAL:  No weight loss, malaise or fevers.  HEENT:   No recent changes in vision or hearing  NECK:  Negative for lumps, no difficulty with swallowing.  RESPIRATORY:  Negative for cough, wheezing or shortness of breath, patient denies any recent URI.  CARDIOVASCULAR:  Negative for chest pain, leg swelling or palpitations.  GI:  Negative for abdominal discomfort, blood in stools or black stools or change in bowel habits.  MUSCULOSKELETAL:  See HPI.  SKIN:  Negative for lesions, rash, and itching.  PSYCH:  No mood disorder or recent psychosocial stressors.  Patients sleep is not disturbed secondary to  "pain.  HEMATOLOGY/LYMPHOLOGY:  Negative for prolonged bleeding, bruising easily or swollen nodes.  Patient is currently taking anti-coagulants  NEURO:   No history of headaches, syncope, paralysis, seizures or tremors.  All other reviewed and negative other than HPI.    OBJECTIVE:    BP (!) 184/88 (BP Location: Left arm, Patient Position: Sitting)   Pulse (!) 55   Resp 17   Ht 5' 9" (1.753 m)   Wt 62.2 kg (137 lb 2 oz)   BMI 20.25 kg/m²         Physical Exam    GENERAL: Well appearing, in no acute distress, alert and oriented x3.  PSYCH:  Mood and affect appropriate.  SKIN: Skin color, texture, turgor normal, no rashes or lesions.  HEAD/FACE:  Normocephalic, atraumatic. Cranial nerves grossly intact.  CV: RRR with palpation of the radial artery.  PULM: No evidence of respiratory difficulty, symmetric chest rise.  GI:  Soft and non-tender.  BACK: Straight leg raising in the sitting and supine positions is negative to radicular pain.  Mild pain to palpation over the facet joints of the lumbar spine or spinous processes.  Limited range of motion without significant pain reproduction. Directional preference:  Flexion. Axial loading positive bilaterally.  EXTREMITIES: No deformities, edema, or skin discoloration. Good capillary refill.  MUSCULOSKELETAL:  Hip and knee provocative maneuvers are unremarkable.  There is minimal pain with palpation over the sacroiliac joints bilaterally.  FABERs test is equivocal.  FADIRs test is negative.   Bilateral upper and lower extremity strength is normal and symmetric.  No atrophy or tone abnormalities are noted.  NEURO: Bilateral upper and lower extremity coordination and muscle stretch reflexes are physiologic and symmetric.  Plantar response are downgoing. No clonus.  No loss of sensation is noted.  GAIT:  Slow, antalgic.      LABS:  Lab Results   Component Value Date    WBC 7.74 09/27/2024    HGB 12.6 (L) 09/27/2024    HCT 39.1 (L) 09/27/2024    MCV 93 09/27/2024     " 09/27/2024       CMP  Sodium   Date Value Ref Range Status   09/27/2024 138 136 - 145 mmol/L Final     Potassium   Date Value Ref Range Status   09/27/2024 4.7 3.5 - 5.1 mmol/L Final     Chloride   Date Value Ref Range Status   09/27/2024 104 95 - 110 mmol/L Final     CO2   Date Value Ref Range Status   09/27/2024 26 23 - 29 mmol/L Final     Glucose   Date Value Ref Range Status   09/27/2024 97 70 - 110 mg/dL Final     BUN   Date Value Ref Range Status   09/27/2024 10 8 - 23 mg/dL Final     Creatinine   Date Value Ref Range Status   09/27/2024 0.8 0.5 - 1.4 mg/dL Final     Calcium   Date Value Ref Range Status   09/27/2024 9.1 8.7 - 10.5 mg/dL Final     Total Protein   Date Value Ref Range Status   09/27/2024 6.7 6.0 - 8.4 g/dL Final     Albumin   Date Value Ref Range Status   09/27/2024 4.0 3.5 - 5.2 g/dL Final     Total Bilirubin   Date Value Ref Range Status   09/27/2024 0.4 0.1 - 1.0 mg/dL Final     Comment:     For infants and newborns, interpretation of results should be based  on gestational age, weight and in agreement with clinical  observations.    Premature Infant recommended reference ranges:  Up to 24 hours.............<8.0 mg/dL  Up to 48 hours............<12.0 mg/dL  3-5 days..................<15.0 mg/dL  6-29 days.................<15.0 mg/dL       Alkaline Phosphatase   Date Value Ref Range Status   09/27/2024 50 (L) 55 - 135 U/L Final     AST   Date Value Ref Range Status   09/27/2024 25 10 - 40 U/L Final     ALT   Date Value Ref Range Status   09/27/2024 16 10 - 44 U/L Final     Anion Gap   Date Value Ref Range Status   09/27/2024 8 8 - 16 mmol/L Final     eGFR if    Date Value Ref Range Status   09/30/2021 >60.0 >60 mL/min/1.73 m^2 Final     eGFR if non    Date Value Ref Range Status   09/30/2021 >60.0 >60 mL/min/1.73 m^2 Final     Comment:     Calculation used to obtain the estimated glomerular filtration  rate (eGFR) is the CKD-EPI equation.          Lab Results    Component Value Date    HGBA1C 5.6 09/27/2024             ASSESSMENT: 79 y.o. year old male with lower back and hip pain, consistent with     1. Lumbar spondylosis        2. Bilateral hip pain  Ambulatory referral/consult to [facility]      3. Chronic low back pain, unspecified back pain laterality, unspecified whether sciatica present  Ambulatory referral/consult to [facility]      4. Hip arthritis              PLAN:   - Interventions:  Consider L3-5 diagnostic medial branch blocks bilaterally if conservative measures fail. .    - Anticoagulation use: Patient is taking ASA as 1° prevention      - Medications: I have stressed the importance of physical activity and a home exercise plan to help with pain and improve health. and Patient can continue with medications for now since they are providing benefits, using them appropriately, and without side effects.     Discussed alternative turmeric supplementation options              - Therapy:  Providing home exercises for patient to into his weekly routine    - Psychological:  Discussed coping mechanisms to help address chronic pain issues    - Labs:  Reviewed    - Imaging: Reviewed available imaging with patient and answered any questions they had regarding study.    - Consults/Referrals:  None at this time    - Records:  Reviewed/Obtain old records from outside physicians and imaging    - Follow up visit: return to clinic in 8-10 weeks or as needed    - Counseled patient regarding the importance of activity modification and physical therapy    - This condition does not require this patient to take time off of work, and the primary goal of our Pain Management services is to improve the patient's functional capacity.    - Patient Questions: Answered all of the patient's questions regarding diagnosis, therapy, and treatment        The above plan and management options were discussed at length with patient. Patient is in agreement with the above and  verbalized understanding.    I discussed the goals of interventional chronic pain management with the patient on today's visit.  I explained the utility of injections for diagnostic and therapeutic purposes.  We discussed a multimodal approach to pain including treating the patient's given worst pain at any given time.  We will use a systematic approach to addressing pain.  We will also adopt a multimodal approach that includes injections, adjuvant medications, physical therapy, at times psychiatry.  There may be a limited role for opioid use intermittently in the treatment of pain, more particularly for acute pain although no one approach can be used as a sole treatment modality.    I emphasized the importance of regular exercise, core strengthening and stretching, diet and weight loss as a cornerstone of long-term pain management.      Visit today included increased complexity associated with the care of the episodic problem of chronic pain which was addressed and continue to manage the longitudinal care of the patient due to the serious and/or complex managed problem(s) listed above.      Apollo Prescott MD  Interventional Pain Management  [facility]    Disclaimer:  This note was prepared using voice recognition system and is likely to have sound alike errors that may have been overlooked even after proof reading.  Please call me with any questions

## 2025-02-28 ENCOUNTER — EXTERNAL CHRONIC CARE MANAGEMENT (OUTPATIENT)
Dept: PRIMARY CARE CLINIC | Facility: CLINIC | Age: 80
End: 2025-02-28
Payer: MEDICARE

## 2025-02-28 PROCEDURE — 99490 CHRNC CARE MGMT STAFF 1ST 20: CPT | Mod: S$PBB,,, | Performed by: FAMILY MEDICINE

## 2025-02-28 PROCEDURE — 99490 CHRNC CARE MGMT STAFF 1ST 20: CPT | Mod: PBBFAC | Performed by: FAMILY MEDICINE

## 2025-03-18 NOTE — TELEPHONE ENCOUNTER
Refill Routing Note   Medication(s) are not appropriate for processing by [facility] for the following reason(s):        Non-participating provider    [facility] action(s):  Route               Appointments  past 12m or future 3m with PCP    Date Provider   Last Visit   12/9/2024 Gallo Carbajal MD   Next Visit   4/2/2025 Gallo Carbajal MD   ED visits in past 90 days: 0        Note composed:9:54 PM 03/17/2025

## 2025-03-24 NOTE — PROGRESS NOTES
Chronic Pain Note     Referring Physician: No ref. provider found    PCP: Zeke Lamb MD    Chief Complaint:   Chief Complaint   Patient presents with    Low-back Pain    Hip Pain     Right         SUBJECTIVE:  Interval History (4/2/2025):  Patient Humberto Carlson presents today for follow-up visit.  Patient is being seen for follow up of low back pain. Pain is primarily axial and worse with prolonged standing and walking. He also has some right hip pain. He denies radiating symptoms into the legs. He rates his pain 0/10 today but it will go up to 8/10 depending on his activities. 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  He has been taking tumeric with some relief.  Patient denies night fever/night sweats, urinary incontinence, bowel incontinence, significant weight loss and significant motor weakness.   Patient denies any other complaints or concerns at this time.  '    2/5/2025  Humberto Carlson is a 80 y.o. male who presents to the clinic for the evaluation of back and hip pain.  He was referred by Orthopedics for further evaluation and management of this pain.  He has a past medical history of seizure disorder, subdural hematoma history, depression, COPD, hyperlipidemia, hypertension, CAD, atrial flutter, lung cancer history, prediabetes, multiple other medical comorbidities as listed in his chart.  The pain started 2 years ago and symptoms have been worsening.The pain is located in the lumbosacral area and radiates to the right hip.  The pain is described as  sharp, stabbing, aching  and is rated as 3/10. The pain is rated with a score of  3/10 on the BEST day and a score of 10/10 on the WORST day.  Symptoms interfere with daily activity. The pain is exacerbated by activity.  The pain is mitigated by NSAIDs.     Patient denies night fever/night sweats, urinary incontinence, bowel incontinence, significant weight loss, significant motor  weakness, and loss of sensations.    Pain Disability Index Review:         4/2/2025    10:07 AM 2/5/2025     9:04 AM   Last 3 PDI Scores   Pain Disability Index (PDI) 1 18       Non-Pharmacologic Treatments:  Physical Therapy/Home Exercise: yes, last year  Ice/Heat:yes  TENS: no  Acupuncture: no  Massage: no  Chiropractic: no    Other: no      Pain Medications:  - Opioids:  Norco  - Adjuvant Medications:  Naproxen, Zoloft  - Anti-Coagulants: Aspirin     report:  Reviewed and consistent with medication use as prescribed.    Pain Procedures:   Denies      Imaging:   X-ray bilateral hips 10/28/2024:  No evidence of acute fracture or dislocation. Mild joint space narrowing sclerosis of the bilateral hips. Osseous demineralization. Marked atherosclerotic disease. Lumbosacral spondylosis.     X-ray lumbar spine 09/24/2020:  There is mild dextroscoliosis of the upper thoracic spine. Vertebral body heights appear within normal limits. There is mild grade 1 anterolisthesis of L4 on L5. There is moderate disc height loss at L2-3 and L4-5. Mild asymmetric disc height loss at L3-4. Facet arthropathy demonstrated at L4-5 and L5-S1. No pars defects visualized. No fractures visualized. The remaining visualized osseous and soft tissue structures demonstrate no appreciable abnormality.     Past Medical History:   Diagnosis Date    Acute bronchitis 12/02/2019    Anticoagulant long-term use     Asthma     Atrial flutter     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    Cancer     Cataract     Chronic obstructive pulmonary disease 05/24/2021    Coronary artery disease of native artery of native heart with stable angina pectoris 10/26/2020    Encounter for blood transfusion     Essential hypertension 07/22/2019    GERD (gastroesophageal reflux disease)     Glaucoma     Hyperlipidemia     Major depression in partial remission 05/02/2022    Seizures      Past Surgical History:   Procedure Laterality  Date    ABLATION, ATRIAL FLUTTER, TYPICAL N/A 8/31/2023    Procedure: Ablation, Atrial Flutter, Typical;  Surgeon: Liam Wiggins MD;  Location: [facility] EP LAB;  Service: Cardiology;  Laterality: N/A;  AFL, RFA, BECKY, ANUM (cx if SR), BENJI benz, 3 Prep    BRAIN SURGERY      CARPAL TUNNEL RELEASE      EYE SURGERY      LUNG SURGERY      Partial lung removed Left     REPAIR,BROW PTOSIS      toe nail removal      TRANSESOPHAGEAL ECHOCARDIOGRAM WITH POSSIBLE CARDIOVERSION (ANUM W/ POSS CARDIOVERSION) N/A 6/28/2023    Procedure: Transesophageal echo (ANUM) intra-procedure log documentation;  Surgeon: aGllo Carbajal MD;  Location: [facility] CATH LAB;  Service: Cardiology;  Laterality: N/A;     Social History     Socioeconomic History    Marital status:    Tobacco Use    Smoking status: Every Day     Current packs/day: 2.00     Average packs/day: 2.0 packs/day for 55.2 years (110.5 ttl pk-yrs)     Types: Cigarettes     Start date: 1970     Passive exposure: Past    Smokeless tobacco: Never   Substance and Sexual Activity    Alcohol use: No    Drug use: No    Sexual activity: Not Currently     Partners: Female     Social Drivers of Health     Financial Resource Strain: Low Risk  (3/27/2025)    Overall Financial Resource Strain (CARDIA)     Difficulty of Paying Living Expenses: Not hard at all   Food Insecurity: No Food Insecurity (3/27/2025)    Hunger Vital Sign     Worried About Running Out of Food in the Last Year: Never true     Ran Out of Food in the Last Year: Never true   Transportation Needs: No Transportation Needs (3/27/2025)    PRAPARE - Transportation     Lack of Transportation (Medical): No     Lack of Transportation (Non-Medical): No   Physical Activity: Inactive (3/27/2025)    Exercise Vital Sign     Days of Exercise per Week: 0 days     Minutes of Exercise per Session: 0 min   Stress: No Stress Concern Present (3/27/2025)    [employer] [employer] - Occupational Stress Questionnaire     Feeling  of Stress : Only a little   Housing Stability: Low Risk  (3/27/2025)    Housing Stability Vital Sign     Unable to Pay for Housing in the Last Year: No     Homeless in the Last Year: No     Family History   Problem Relation Name Age of Onset    Stroke Mother      Heart attack Father      Coronary artery disease Brother      Valvular heart disease Brother      Stroke Paternal Grandfather         Review of patient's allergies indicates:   Allergen Reactions    Wasp sting  [allergen ext-venom-honey bee]      Other reaction(s): swelling    Yellow Jackets as well       Current Outpatient Medications   Medication Sig    albuterol (PROVENTIL/VENTOLIN HFA) 90 mcg/actuation inhaler Rescue    aspirin (ECOTRIN) 81 MG EC tablet Take 1 tablet (81 mg total) by mouth once daily.    atorvastatin (LIPITOR) 40 MG tablet Take 1 tablet (40 mg total) by mouth once daily.    brimonidine-timoloL (COMBIGAN) 0.2-0.5 % Drop Inject 1 drop into the eye Twice daily.    dorzolamide (TRUSOPT) 2 % ophthalmic solution Place 1 drop into the left eye 2 (two) times daily.    ezetimibe (ZETIA) 10 mg tablet Take 1 tablet (10 mg total) by mouth every evening.    fluticasone propionate (FLONASE) 50 mcg/actuation nasal spray 1 SPRAY IN EACH NOSTRIL ONCE DAILY.    latanoprost 0.005 % ophthalmic solution     metoprolol succinate (TOPROL-XL) 25 MG 24 hr tablet Take 0.5 tablets (12.5 mg total) by mouth once daily.    mupirocin (BACTROBAN) 2 % ointment Apply topically 2 (two) times daily.    naproxen sodium (ANAPROX) 220 MG tablet Take 220 mg by mouth.    pantoprazole (PROTONIX) 20 MG tablet Take 1 tablet (20 mg total) by mouth once daily.    phenytoin (DILANTIN) 100 MG ER capsule Take 3 capsules (300 mg total) by mouth once daily. for 365 doses    sacubitriL-valsartan (ENTRESTO) 24-26 mg per tablet Take 1 tablet by mouth 2 (two) times daily.    sertraline (ZOLOFT) 50 MG tablet Take 1 tablet (50 mg total) by mouth once daily.    spironolactone (ALDACTONE) 25 MG  "tablet TAKE 1 TABLET BY MOUTH ONCE A DAY    diclofenac sodium (VOLTAREN) 1 % Gel Apply 2 g topically 4 (four) times daily as needed.    EPINEPHrine (EPIPEN) 0.3 mg/0.3 mL AtIn Inject 0.3 mLs (0.3 mg total) into the muscle once as needed (allergic reaction).     No current facility-administered medications for this visit.       Review of Systems     GENERAL:  No weight loss, malaise or fevers.  HEENT:   No recent changes in vision or hearing  NECK:  Negative for lumps, no difficulty with swallowing.  RESPIRATORY:  Negative for cough, wheezing or shortness of breath, patient denies any recent URI.  CARDIOVASCULAR:  Negative for chest pain, leg swelling or palpitations.  GI:  Negative for abdominal discomfort, blood in stools or black stools or change in bowel habits.  MUSCULOSKELETAL:  See HPI.  SKIN:  Negative for lesions, rash, and itching.  PSYCH:  No mood disorder or recent psychosocial stressors.  Patients sleep is not disturbed secondary to pain.  HEMATOLOGY/LYMPHOLOGY:  Negative for prolonged bleeding, bruising easily or swollen nodes.  Patient is currently taking anti-coagulants  NEURO:   No history of headaches, syncope, paralysis, seizures or tremors.  All other reviewed and negative other than HPI.    OBJECTIVE:    BP (!) 131/59   Pulse (!) 53   Resp 17   Ht 5' 9" (1.753 m)   Wt 63 kg (138 lb 14.2 oz)   BMI 20.51 kg/m²         Physical Exam    GENERAL: Well appearing, in no acute distress, alert and oriented x3.  PSYCH:  Mood and affect appropriate.  SKIN: Skin color, texture, turgor normal, no rashes or lesions.  HEAD/FACE:  Normocephalic, atraumatic. Cranial nerves grossly intact.  CV: RRR with palpation of the radial artery.  PULM: No evidence of respiratory difficulty, symmetric chest rise.  GI:  Soft and non-tender.  BACK: Straight leg raising in the sitting and supine positions is negative to radicular pain.  Mild pain to palpation over the facet joints of the lumbar spine or spinous processes.  " Limited range of motion without significant pain reproduction. Directional preference:  Flexion. Axial loading positive bilaterally.  EXTREMITIES: No deformities, edema, or skin discoloration. Good capillary refill.  MUSCULOSKELETAL:  Hip and knee provocative maneuvers are unremarkable.  There is minimal pain with palpation over the sacroiliac joints bilaterally.  FABERs test is equivocal.  FADIRs test is negative.   Bilateral upper and lower extremity strength is normal and symmetric.  No atrophy or tone abnormalities are noted.  NEURO: Bilateral upper and lower extremity coordination and muscle stretch reflexes are physiologic and symmetric.  Plantar response are downgoing. No clonus.  No loss of sensation is noted.  GAIT:  Slow, antalgic.      LABS:  Lab Results   Component Value Date    WBC 7.98 03/27/2025    HGB 13.1 (L) 03/27/2025    HCT 40.3 03/27/2025    MCV 92 03/27/2025     03/27/2025       CMP  Sodium   Date Value Ref Range Status   03/27/2025 139 136 - 145 mmol/L Final   09/27/2024 138 136 - 145 mmol/L Final     Potassium   Date Value Ref Range Status   03/27/2025 4.8 3.5 - 5.1 mmol/L Final   09/27/2024 4.7 3.5 - 5.1 mmol/L Final     Chloride   Date Value Ref Range Status   03/27/2025 102 95 - 110 mmol/L Final   09/27/2024 104 95 - 110 mmol/L Final     CO2   Date Value Ref Range Status   03/27/2025 28 23 - 29 mmol/L Final   09/27/2024 26 23 - 29 mmol/L Final     Glucose   Date Value Ref Range Status   09/27/2024 97 70 - 110 mg/dL Final     BUN   Date Value Ref Range Status   03/27/2025 12 8 - 23 mg/dL Final     Creatinine   Date Value Ref Range Status   03/27/2025 0.8 0.5 - 1.4 mg/dL Final     Calcium   Date Value Ref Range Status   03/27/2025 8.9 8.7 - 10.5 mg/dL Final   09/27/2024 9.1 8.7 - 10.5 mg/dL Final     Total Protein   Date Value Ref Range Status   09/27/2024 6.7 6.0 - 8.4 g/dL Final     Albumin   Date Value Ref Range Status   03/27/2025 4.1 3.5 - 5.2 g/dL Final   09/27/2024 4.0 3.5 -  5.2 g/dL Final     Total Bilirubin   Date Value Ref Range Status   09/27/2024 0.4 0.1 - 1.0 mg/dL Final     Comment:     For infants and newborns, interpretation of results should be based  on gestational age, weight and in agreement with clinical  observations.    Premature Infant recommended reference ranges:  Up to 24 hours.............<8.0 mg/dL  Up to 48 hours............<12.0 mg/dL  3-5 days..................<15.0 mg/dL  6-29 days.................<15.0 mg/dL       Bilirubin Total   Date Value Ref Range Status   03/27/2025 0.4 0.1 - 1.0 mg/dL Final     Comment:     For infants and newborns, interpretation of results should be based   on gestational age, weight and in agreement with clinical   observations.    Premature Infant recommended reference ranges:   0-24 hours:  <8.0 mg/dL   24-48 hours: <12.0 mg/dL   3-5 days:    <15.0 mg/dL   6-29 days:   <15.0 mg/dL     Alkaline Phosphatase   Date Value Ref Range Status   09/27/2024 50 (L) 55 - 135 U/L Final     ALP   Date Value Ref Range Status   03/27/2025 46 40 - 150 unit/L Final     AST   Date Value Ref Range Status   03/27/2025 22 11 - 45 unit/L Final   09/27/2024 25 10 - 40 U/L Final     ALT   Date Value Ref Range Status   03/27/2025 16 10 - 44 unit/L Final   09/27/2024 16 10 - 44 U/L Final     Anion Gap   Date Value Ref Range Status   03/27/2025 9 8 - 16 mmol/L Final     eGFR if    Date Value Ref Range Status   09/30/2021 >60.0 >60 mL/min/1.73 m^2 Final     eGFR if non    Date Value Ref Range Status   09/30/2021 >60.0 >60 mL/min/1.73 m^2 Final     Comment:     Calculation used to obtain the estimated glomerular filtration  rate (eGFR) is the CKD-EPI equation.          Lab Results   Component Value Date    HGBA1C 5.6 09/27/2024             ASSESSMENT: 80 y.o. year old male with lower back and hip pain, consistent with     1. Lumbar spondylosis        2. Arthritis of hip, unspecified laterality                PLAN:   -  Interventions:  Consider L3-5 diagnostic medial branch blocks bilaterally if conservative measures fail. .    Pt would like to refrain from any injections at this time    - Anticoagulation use: Patient is taking ASA as 1° prevention      - Medications: I have stressed the importance of physical activity and a home exercise plan to help with pain and improve health. and Patient can continue with medications for now since they are providing benefits, using them appropriately, and without side effects.     Discussed alternative turmeric supplementation options    Rx for voltaren gel given              - Therapy:  Providing home exercises for patient to into his weekly routine    - Psychological:  Discussed coping mechanisms to help address chronic pain issues    - Labs:  Reviewed    - Imaging: Reviewed available imaging with patient and answered any questions they had regarding study.    - Consults/Referrals:  None at this time    - Records:  Reviewed/Obtain old records from outside physicians and imaging    - Follow up visit: return to clinic in 4 months- pt request  Visit today included increased complexity associated with the care of the episodic problem of chronic pain which was addressed and continue to manage the longitudinal care of the patient due to the serious and/or complex managed problem(s) listed above.    - Counseled patient regarding the importance of activity modification and physical therapy    - This condition does not require this patient to take time off of work, and the primary goal of our Pain Management services is to improve the patient's functional capacity.    - Patient Questions: Answered all of the patient's questions regarding diagnosis, therapy, and treatment        The above plan and management options were discussed at length with patient. Patient is in agreement with the above and verbalized understanding.    I discussed the goals of interventional chronic pain management with the  patient on today's visit.  I explained the utility of injections for diagnostic and therapeutic purposes.  We discussed a multimodal approach to pain including treating the patient's given worst pain at any given time.  We will use a systematic approach to addressing pain.  We will also adopt a multimodal approach that includes injections, adjuvant medications, physical therapy, at times psychiatry.  There may be a limited role for opioid use intermittently in the treatment of pain, more particularly for acute pain although no one approach can be used as a sole treatment modality.    I emphasized the importance of regular exercise, core strengthening and stretching, diet and weight loss as a cornerstone of long-term pain management.      Visit today included increased complexity associated with the care of the episodic problem of chronic pain which was addressed and continue to manage the longitudinal care of the patient due to the serious and/or complex managed problem(s) listed above.      Nella Alegria NP  Interventional Pain Management  [facility]    Disclaimer:  This note was prepared using voice recognition system and is likely to have sound alike errors that may have been overlooked even after proof reading.  Please call me with any questions

## 2025-03-25 RX ORDER — SPIRONOLACTONE 25 MG/1
25 TABLET ORAL
Qty: 30 TABLET | Refills: 9 | Status: SHIPPED | OUTPATIENT
Start: 2025-03-25

## 2025-03-27 ENCOUNTER — OFFICE VISIT (OUTPATIENT)
Dept: INTERNAL MEDICINE | Facility: CLINIC | Age: 80
End: 2025-03-27
Payer: MEDICARE

## 2025-03-27 ENCOUNTER — LAB VISIT (OUTPATIENT)
Dept: LAB | Facility: HOSPITAL | Age: 80
End: 2025-03-27
Attending: FAMILY MEDICINE
Payer: MEDICARE

## 2025-03-27 VITALS
TEMPERATURE: 99 F | BODY MASS INDEX: 20.23 KG/M2 | HEART RATE: 60 BPM | OXYGEN SATURATION: 96 % | WEIGHT: 137 LBS | DIASTOLIC BLOOD PRESSURE: 78 MMHG | RESPIRATION RATE: 18 BRPM | SYSTOLIC BLOOD PRESSURE: 158 MMHG

## 2025-03-27 DIAGNOSIS — I10 ESSENTIAL HYPERTENSION: ICD-10-CM

## 2025-03-27 DIAGNOSIS — D64.9 MILD ANEMIA: ICD-10-CM

## 2025-03-27 DIAGNOSIS — J44.9 CHRONIC OBSTRUCTIVE PULMONARY DISEASE, UNSPECIFIED COPD TYPE: ICD-10-CM

## 2025-03-27 DIAGNOSIS — I50.22 CHRONIC SYSTOLIC CHF (CONGESTIVE HEART FAILURE): ICD-10-CM

## 2025-03-27 DIAGNOSIS — Z72.0 TOBACCO USE: Primary | ICD-10-CM

## 2025-03-27 DIAGNOSIS — E78.2 MIXED HYPERLIPIDEMIA: ICD-10-CM

## 2025-03-27 LAB
ABSOLUTE EOSINOPHIL (OHS): 0.81 K/UL
ABSOLUTE MONOCYTE (OHS): 0.59 K/UL (ref 0.3–1)
ABSOLUTE NEUTROPHIL COUNT (OHS): 3.45 K/UL (ref 1.8–7.7)
ALBUMIN SERPL BCP-MCNC: 4.1 G/DL (ref 3.5–5.2)
ALP SERPL-CCNC: 46 UNIT/L (ref 40–150)
ALT SERPL W/O P-5'-P-CCNC: 16 UNIT/L (ref 10–44)
ANION GAP (OHS): 9 MMOL/L (ref 8–16)
AST SERPL-CCNC: 22 UNIT/L (ref 11–45)
BASOPHILS # BLD AUTO: 0.06 K/UL
BASOPHILS NFR BLD AUTO: 0.8 %
BILIRUB SERPL-MCNC: 0.4 MG/DL (ref 0.1–1)
BUN SERPL-MCNC: 12 MG/DL (ref 8–23)
CALCIUM SERPL-MCNC: 8.9 MG/DL (ref 8.7–10.5)
CHLORIDE SERPL-SCNC: 102 MMOL/L (ref 95–110)
CHOLEST SERPL-MCNC: 133 MG/DL (ref 120–199)
CHOLEST/HDLC SERPL: 2.5 {RATIO} (ref 2–5)
CO2 SERPL-SCNC: 28 MMOL/L (ref 23–29)
CREAT SERPL-MCNC: 0.8 MG/DL (ref 0.5–1.4)
ERYTHROCYTE [DISTWIDTH] IN BLOOD BY AUTOMATED COUNT: 12.9 % (ref 11.5–14.5)
FERRITIN SERPL-MCNC: 100 NG/ML (ref 20–300)
GFR SERPLBLD CREATININE-BSD FMLA CKD-EPI: >60 ML/MIN/1.73/M2
GLUCOSE SERPL-MCNC: 110 MG/DL (ref 70–110)
HCT VFR BLD AUTO: 40.3 % (ref 40–54)
HDLC SERPL-MCNC: 53 MG/DL (ref 40–75)
HDLC SERPL: 39.8 % (ref 20–50)
HGB BLD-MCNC: 13.1 GM/DL (ref 14–18)
IMM GRANULOCYTES # BLD AUTO: 0.01 K/UL (ref 0–0.04)
IMM GRANULOCYTES NFR BLD AUTO: 0.1 % (ref 0–0.5)
IRON SATN MFR SERPL: 41 % (ref 20–50)
IRON SERPL-MCNC: 132 UG/DL (ref 45–160)
LDLC SERPL CALC-MCNC: 59.4 MG/DL (ref 63–159)
LYMPHOCYTES # BLD AUTO: 3.06 K/UL (ref 1–4.8)
MCH RBC QN AUTO: 30 PG (ref 27–50)
MCHC RBC AUTO-ENTMCNC: 32.5 G/DL (ref 32–36)
MCV RBC AUTO: 92 FL (ref 82–98)
NONHDLC SERPL-MCNC: 80 MG/DL
NUCLEATED RBC (/100WBC) (OHS): 0 /100 WBC
PLATELET # BLD AUTO: 290 K/UL (ref 150–450)
PMV BLD AUTO: 10.7 FL (ref 9.2–12.9)
POTASSIUM SERPL-SCNC: 4.8 MMOL/L (ref 3.5–5.1)
PROT SERPL-MCNC: 7.1 GM/DL (ref 6–8.4)
RBC # BLD AUTO: 4.37 M/UL (ref 4.6–6.2)
RELATIVE EOSINOPHIL (OHS): 10.2 %
RELATIVE LYMPHOCYTE (OHS): 38.3 % (ref 18–48)
RELATIVE MONOCYTE (OHS): 7.4 % (ref 4–15)
RELATIVE NEUTROPHIL (OHS): 43.2 % (ref 38–73)
SODIUM SERPL-SCNC: 139 MMOL/L (ref 136–145)
TIBC SERPL-MCNC: 323 UG/DL (ref 250–450)
TRANSFERRIN SERPL-MCNC: 218 MG/DL (ref 200–375)
TRIGL SERPL-MCNC: 103 MG/DL (ref 30–150)
WBC # BLD AUTO: 7.98 K/UL (ref 3.9–12.7)

## 2025-03-27 PROCEDURE — 99214 OFFICE O/P EST MOD 30 MIN: CPT | Mod: PBBFAC | Performed by: FAMILY MEDICINE

## 2025-03-27 PROCEDURE — 99999 PR PBB SHADOW E&M-EST. PATIENT-LVL IV: CPT | Mod: PBBFAC,,, | Performed by: FAMILY MEDICINE

## 2025-03-27 PROCEDURE — 85025 COMPLETE CBC W/AUTO DIFF WBC: CPT

## 2025-03-27 PROCEDURE — 80053 COMPREHEN METABOLIC PANEL: CPT

## 2025-03-27 PROCEDURE — 36415 COLL VENOUS BLD VENIPUNCTURE: CPT

## 2025-03-27 PROCEDURE — 80061 LIPID PANEL: CPT

## 2025-03-27 PROCEDURE — 84466 ASSAY OF TRANSFERRIN: CPT

## 2025-03-27 PROCEDURE — 82728 ASSAY OF FERRITIN: CPT

## 2025-03-27 RX ORDER — DORZOLAMIDE HCL 20 MG/ML
1 SOLUTION/ DROPS OPHTHALMIC 2 TIMES DAILY
COMMUNITY
Start: 2025-02-27

## 2025-03-27 NOTE — PATIENT INSTRUCTIONS
Let us do some blood work today.  Let us schedule a follow-up visit in just 1-2 weeks.  I want you to check your blood pressure every day.  Twice a day if possible.  Write them down.  Bring me your blood pressure machine.  Bring me your blood pressure log.    Additionally I want you to bring me all of your medicines because I want to be 100% confident in your drug list.  We will review the meds and your other chronic conditions and the lab work at our follow-up visit.

## 2025-03-27 NOTE — PROGRESS NOTES
Subjective:       Patient ID: Humberto Carlson is a 80 y.o. male.    Chief Complaint: follow up on chronic issues    HPI    Problem List[1]    Past Medical History:   Diagnosis Date    Acute bronchitis 12/02/2019    Anticoagulant long-term use     Asthma     Atrial flutter     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    Cancer     Cataract     Chronic obstructive pulmonary disease 05/24/2021    Coronary artery disease of native artery of native heart with stable angina pectoris 10/26/2020    Encounter for blood transfusion     Essential hypertension 07/22/2019    GERD (gastroesophageal reflux disease)     Glaucoma     Hyperlipidemia     Major depression in partial remission 05/02/2022    Seizures        Past Surgical History:   Procedure Laterality Date    ABLATION, ATRIAL FLUTTER, TYPICAL N/A 8/31/2023    Procedure: Ablation, Atrial Flutter, Typical;  Surgeon: Liam Wiggins MD;  Location: [facility] EP LAB;  Service: Cardiology;  Laterality: N/A;  AFL, RFA, BECKY, ANUM (cx if SR), anes, MB, 3 Prep    BRAIN SURGERY      CARPAL TUNNEL RELEASE      EYE SURGERY      LUNG SURGERY      Partial lung removed Left     REPAIR,BROW PTOSIS      toe nail removal      TRANSESOPHAGEAL ECHOCARDIOGRAM WITH POSSIBLE CARDIOVERSION (ANUM W/ POSS CARDIOVERSION) N/A 6/28/2023    Procedure: Transesophageal echo (ANUM) intra-procedure log documentation;  Surgeon: Gallo Carbajal MD;  Location: [facility] CATH LAB;  Service: Cardiology;  Laterality: N/A;       Family History   Problem Relation Name Age of Onset    Stroke Mother      Heart attack Father      Coronary artery disease Brother      Valvular heart disease Brother      Stroke Paternal Grandfather         Tobacco Use History[2]    Wt Readings from Last 5 Encounters:   03/27/25 62.1 kg (137 lb)   02/05/25 62.2 kg (137 lb 2 oz)   12/09/24 61.6 kg (135 lb 12.9 oz)   11/19/24 62.1 kg (137 lb)   11/04/24 62.4 kg (137 lb 7.3 oz)       For further HPI details, see  assessment and plan.    Review of Systems    Objective:      Vitals:    03/27/25 0822   BP: (!) 158/78   Pulse: 60   Resp: 18   Temp: 98.5 °F (36.9 °C)     BP Readings from Last 3 Encounters:   03/27/25 (!) 158/78   02/05/25 (!) 184/88   12/09/24 136/70       Physical Exam  Constitutional:       General: He is not in acute distress.     Appearance: He is not ill-appearing.   Pulmonary:      Effort: Pulmonary effort is normal. No respiratory distress.   Neurological:      General: No focal deficit present.      Mental Status: He is alert.   Psychiatric:         Mood and Affect: Mood normal.         Behavior: Behavior normal.         Assessment:       1. Tobacco use    2. Chronic obstructive pulmonary disease, unspecified COPD type    3. Essential hypertension    4. Chronic systolic CHF (congestive heart failure)    5. Mild anemia    6. Mixed hyperlipidemia        Plan:   Tobacco use    Chronic obstructive pulmonary disease, unspecified COPD type    Essential hypertension  -     Comprehensive Metabolic Panel; Future; Expected date: 03/27/2025    Chronic systolic CHF (congestive heart failure)    Mild anemia  -     CBC Auto Differential; Future; Expected date: 03/27/2025  -     Ferritin; Future; Expected date: 03/27/2025  -     Iron and TIBC; Future; Expected date: 03/27/2025    Mixed hyperlipidemia  -     Lipid Panel; Future; Expected date: 03/27/2025        Follow up on chronic conditions.      Still Smoking   Has his cigarettes in his front pocket presently.    He has no intention or plan to smoke.  He is aware we (his doctors) want him to quit smoking but he does not for see that happening.  No interested in medications.    Hypertension   His blood pressure is elevated today.  He does check periodically at home.  Reports it typically runs better at home.  I have serious concerns about some medication confusion.  Patient tells me he is no longer taking Entresto.  He tells me his cardiologist discontinued Entresto  and he is no longer taking it.  His cardiologist note clearly indicates he was to continue Entresto.  This is a concerning indication of polypharmacy.    Chronic systolic heart failure   Reason for his Entresto.  Presently seems to be euvolemic and without any trouble breathing.  Really need to ensure drug list accurate    I want him to bring me all of his medications so we can make sure his drug list is 100% accurate.    Hyperlipidemia   Patient was intended to be on Zetia and Lipitor.  We will update his lipid profile.      COPD   Albuterol as needed seems sufficient.  Smoking cessation important    Anemia   Mild   We will continue to monitor.      We will review blood work at our follow-up visit in the very near future.  Big priority on ensuring his drug list is 100% accurate    This note was verbally dictated, please excuse any type errors.         [1]   Patient Active Problem List  Diagnosis    Hyperlipidemia    Essential hypertension    History of anaphylaxis    Elevated blood sugar    Seizure disorder    Screening for prostate cancer    Pre-diabetes    Weight loss    Dyspnea    Chronic bilateral low back pain    Acute rhinitis    History of subdural hematoma    Coronary artery disease of native artery of native heart with stable angina pectoris    Chronic systolic congestive heart failure    Smoker    Nonrheumatic aortic valve stenosis    Aortic calcification    Bilateral carotid artery stenosis    Chronic obstructive pulmonary disease    Major depression in partial remission    Hx of cancer of lung    Atrial flutter    Pulmonary heart disease    Senile purpura    Pain in both lower extremities    Bilateral hip pain    Orthostatic hypotension   [2]   Social History  Tobacco Use   Smoking Status Every Day    Current packs/day: 2.00    Average packs/day: 2.0 packs/day for 55.2 years (110.5 ttl pk-yrs)    Types: Cigarettes    Start date: 1970    Passive exposure: Past   Smokeless Tobacco Never

## 2025-03-30 NOTE — TELEPHONE ENCOUNTER
No care due was identified.  [facility] Embedded Care Due Messages. Reference number: [id number].   3/30/2025 3:39:39 AM CDT

## 2025-03-31 ENCOUNTER — RESULTS FOLLOW-UP (OUTPATIENT)
Dept: INTERNAL MEDICINE | Facility: CLINIC | Age: 80
End: 2025-03-31

## 2025-03-31 ENCOUNTER — EXTERNAL CHRONIC CARE MANAGEMENT (OUTPATIENT)
Dept: PRIMARY CARE CLINIC | Facility: CLINIC | Age: 80
End: 2025-03-31
Payer: COMMERCIAL

## 2025-03-31 PROCEDURE — 99490 CHRNC CARE MGMT STAFF 1ST 20: CPT | Mod: PBBFAC | Performed by: FAMILY MEDICINE

## 2025-03-31 PROCEDURE — 99490 CHRNC CARE MGMT STAFF 1ST 20: CPT | Mod: S$PBB,,, | Performed by: FAMILY MEDICINE

## 2025-03-31 RX ORDER — SACUBITRIL AND VALSARTAN 24; 26 MG/1; MG/1
1 TABLET, FILM COATED ORAL 2 TIMES DAILY
Qty: 180 TABLET | Refills: 3 | Status: SHIPPED | OUTPATIENT
Start: 2025-03-31

## 2025-03-31 NOTE — TELEPHONE ENCOUNTER
Refill Routing Note   Medication(s) are not appropriate for processing by [facility] for the following reason(s):        Required vitals abnormal    [facility] action(s):  Defer               Appointments  past 12m or future 3m with PCP    Date Provider   Last Visit   3/27/2025 Zeke Lamb MD   Next Visit   4/10/2025 Zeke Lamb MD   ED visits in past 90 days: 0        Note composed:9:30 AM 03/31/2025

## 2025-04-01 ENCOUNTER — TELEPHONE (OUTPATIENT)
Dept: PAIN MEDICINE | Facility: CLINIC | Age: 80
End: 2025-04-01
Payer: COMMERCIAL

## 2025-04-02 ENCOUNTER — OFFICE VISIT (OUTPATIENT)
Dept: CARDIOLOGY | Facility: CLINIC | Age: 80
End: 2025-04-02
Payer: MEDICARE

## 2025-04-02 ENCOUNTER — OFFICE VISIT (OUTPATIENT)
Dept: PAIN MEDICINE | Facility: CLINIC | Age: 80
End: 2025-04-02
Payer: MEDICARE

## 2025-04-02 VITALS
HEART RATE: 54 BPM | BODY MASS INDEX: 20.76 KG/M2 | HEIGHT: 69 IN | DIASTOLIC BLOOD PRESSURE: 54 MMHG | OXYGEN SATURATION: 96 % | SYSTOLIC BLOOD PRESSURE: 116 MMHG | WEIGHT: 140.19 LBS

## 2025-04-02 VITALS
BODY MASS INDEX: 20.57 KG/M2 | DIASTOLIC BLOOD PRESSURE: 59 MMHG | HEIGHT: 69 IN | WEIGHT: 138.88 LBS | HEART RATE: 53 BPM | RESPIRATION RATE: 17 BRPM | SYSTOLIC BLOOD PRESSURE: 131 MMHG

## 2025-04-02 DIAGNOSIS — I25.118 CORONARY ARTERY DISEASE OF NATIVE ARTERY OF NATIVE HEART WITH STABLE ANGINA PECTORIS: ICD-10-CM

## 2025-04-02 DIAGNOSIS — M47.816 LUMBAR SPONDYLOSIS: Primary | ICD-10-CM

## 2025-04-02 DIAGNOSIS — I50.22 CHRONIC SYSTOLIC CONGESTIVE HEART FAILURE: Primary | ICD-10-CM

## 2025-04-02 DIAGNOSIS — M16.10 ARTHRITIS OF HIP, UNSPECIFIED LATERALITY: ICD-10-CM

## 2025-04-02 DIAGNOSIS — I48.3 TYPICAL ATRIAL FLUTTER: ICD-10-CM

## 2025-04-02 DIAGNOSIS — I35.0 NONRHEUMATIC AORTIC VALVE STENOSIS: ICD-10-CM

## 2025-04-02 DIAGNOSIS — I65.23 BILATERAL CAROTID ARTERY STENOSIS: ICD-10-CM

## 2025-04-02 DIAGNOSIS — E78.2 MIXED HYPERLIPIDEMIA: ICD-10-CM

## 2025-04-02 DIAGNOSIS — I10 ESSENTIAL HYPERTENSION: ICD-10-CM

## 2025-04-02 DIAGNOSIS — I70.0 AORTIC CALCIFICATION: ICD-10-CM

## 2025-04-02 DIAGNOSIS — I27.9 PULMONARY HEART DISEASE: ICD-10-CM

## 2025-04-02 PROCEDURE — G2211 COMPLEX E/M VISIT ADD ON: HCPCS | Mod: S$PBB,,, | Performed by: NURSE PRACTITIONER

## 2025-04-02 PROCEDURE — 99213 OFFICE O/P EST LOW 20 MIN: CPT | Mod: PBBFAC,27,PO | Performed by: INTERNAL MEDICINE

## 2025-04-02 PROCEDURE — 99999 PR PBB SHADOW E&M-EST. PATIENT-LVL III: CPT | Mod: PBBFAC,,, | Performed by: INTERNAL MEDICINE

## 2025-04-02 PROCEDURE — 99999 PR PBB SHADOW E&M-EST. PATIENT-LVL IV: CPT | Mod: PBBFAC,,, | Performed by: NURSE PRACTITIONER

## 2025-04-02 PROCEDURE — 99214 OFFICE O/P EST MOD 30 MIN: CPT | Mod: S$PBB,,, | Performed by: INTERNAL MEDICINE

## 2025-04-02 PROCEDURE — 99213 OFFICE O/P EST LOW 20 MIN: CPT | Mod: S$PBB,,, | Performed by: NURSE PRACTITIONER

## 2025-04-02 PROCEDURE — 99214 OFFICE O/P EST MOD 30 MIN: CPT | Mod: PBBFAC | Performed by: NURSE PRACTITIONER

## 2025-04-02 RX ORDER — DICLOFENAC SODIUM 10 MG/G
2 GEL TOPICAL 4 TIMES DAILY PRN
Qty: 200 G | Refills: 2 | Status: SHIPPED | OUTPATIENT
Start: 2025-04-02 | End: 2025-05-02

## 2025-04-02 NOTE — PROGRESS NOTES
Subjective:   Patient ID:  Humberto Carlson is a 80 y.o. male who presents for follow up of No chief complaint on file.      81 yo male came in with 3 m f/u  PMH CAD old MI  CHFrEF 35%, mild AS, AFL s/p CTI RFA in 07/23 by Dr. Wiggins at [facility], AFIB, lung cancer s/p removal of left lung in 2015, h/o fall two months after the procedure and brain bleeding s/p removal, HTN HLD smoker 1 ppd for 60 yrs, occasional drinking  Part time work.  ECHO EF 40% mild AS and apical HK  EKG NSR old septal infarct   MPI showed moderate sized severally fixed defect perfusion in apex, EF 35%  Carotid US 20% right and 50% left      VISIT  Still smoking 1ppd. And chronic SOB and fatigue.  BP controlled at home and not taking the med yet.   No chest pain dizziness faint palpitation and leg swelling   echo EF 40 to 45% and mild AS; carotid US left 50 to 59% and right < 50%     visit  EKG AFL with variable AV block.  Chronic SOB. No dizziness and faint. Occasional lightheadedness. No chest pain  Physically active. Still smoking  AFIB [id number] score of 4    06/23 visit  C/o BP drop to 80 mmHg. His dizziness occurred when standing up quickly. No syncope papitation.   New dx of afib in 04-23. VR controlled. EF 45%.     07/23 visit  S/p ANUM and dccv in 06/23. Now SR. Improved energy SOB. Can walk longer distance  No active bleeding. F/u with EP    10/23 visit  S/p CTI RFA on 07/27/23 at [facility] by Dr. Wiggins. Now improved energy.  No active bleeding. Chronic smoking  No dizziness faint chest pain orthopnea PND and leg swelling     05/24 visit  Yard work and maintaince work   Occasional lightheadedness if standing up quickly. No syncope chest pain palpitation.  2023 carotid US right < 39% and left < 49% lesions  BP LDL and A1c controlled  Smoking leg numbness and worse with sneezing    11/24 visit  Sometime dizziness with quickly standing up.  One week ago, at home standing at counter, the black out. No dizziness.  Woke up after hit the floor.  EKG reviewed by myself today reveals NSR nonspecific STT change. poor R progression on precordial leads. No change compared to prior one in   Lower back pain with sneezing  05/24 [facility] and LE arterial US showed mild Dz  LDL 99 BP high. Not took HTN med in AM.   In the office + OH  Laying  mmHG and sitting 163 mmHg  Standing  mmhg.   Pulse ok     12/24 visit  + OH. Added compression sock. The positional dizziness remains. No syncope. And learning to sit down if having the dizziness  11/24 carotid US showed right < 39% and left < 49% lesions     Interval history  [place] to deal with positional dizziness. No faint syncope  Now LDL 59 and HLD controlled  BP C                  Past Medical History:   Diagnosis Date    Acute bronchitis 12/02/2019    Anticoagulant long-term use     Asthma     Atrial flutter     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    Cancer     Cataract     Chronic obstructive pulmonary disease 05/24/2021    Coronary artery disease of native artery of native heart with stable angina pectoris 10/26/2020    Encounter for blood transfusion     Essential hypertension 07/22/2019    GERD (gastroesophageal reflux disease)     Glaucoma     Hyperlipidemia     Major depression in partial remission 05/02/2022    Seizures        Past Surgical History:   Procedure Laterality Date    ABLATION, ATRIAL FLUTTER, TYPICAL N/A 8/31/2023    Procedure: Ablation, Atrial Flutter, Typical;  Surgeon: Liam Wiggins MD;  Location: [facility] EP LAB;  Service: Cardiology;  Laterality: N/A;  AFL, RFA, BECKY, ANUM (cx if SR), BENJI benz, 3 Prep    BRAIN SURGERY      CARPAL TUNNEL RELEASE      EYE SURGERY      LUNG SURGERY      Partial lung removed Left     REPAIR,BROW PTOSIS      toe nail removal      TRANSESOPHAGEAL ECHOCARDIOGRAM WITH POSSIBLE CARDIOVERSION (ANUM W/ POSS CARDIOVERSION) N/A 6/28/2023    Procedure: Transesophageal echo (ANUM)  intra-procedure log documentation;  Surgeon: Gallo Carbajal MD;  Location: [facility] CATH LAB;  Service: Cardiology;  Laterality: N/A;       Social History[1]    Family History   Problem Relation Name Age of Onset    Stroke Mother      Heart attack Father      Coronary artery disease Brother      Valvular heart disease Brother      Stroke Paternal Grandfather           ROS    Objective:   Physical Exam  HENT:      Head: Normocephalic.   Eyes:      Pupils: Pupils are equal, round, and reactive to light.   Neck:      Thyroid: No thyromegaly.      Vascular: Normal carotid pulses. No carotid bruit or JVD.   Cardiovascular:      Rate and Rhythm: Normal rate and regular rhythm. No extrasystoles are present.     Chest Wall: PMI is not displaced.      Pulses: Normal pulses.      Heart sounds: Murmur heard.      No gallop. No S3 sounds.   Pulmonary:      Effort: No respiratory distress.      Breath sounds: Normal breath sounds. No stridor.   Abdominal:      General: Bowel sounds are normal.      Palpations: Abdomen is soft.      Tenderness: There is no abdominal tenderness. There is no rebound.   Skin:     Findings: No rash.   Neurological:      Mental Status: He is alert and oriented to person, place, and time.   Psychiatric:         Behavior: Behavior normal.         Lab Results   Component Value Date    CHOL 133 03/27/2025    CHOL 170 09/27/2024    CHOL 147 09/27/2023     Lab Results   Component Value Date    HDL 53 03/27/2025    HDL 51 09/27/2024    HDL 49 09/27/2023     Lab Results   Component Value Date    LDLCALC 97.8 09/27/2024    LDLCALC 79.4 09/27/2023    LDLCALC 91.4 09/26/2022     Lab Results   Component Value Date    TRIG 103 03/27/2025    TRIG 106 09/27/2024    TRIG 93 09/27/2023     Lab Results   Component Value Date    CHOLHDL 39.8 03/27/2025    CHOLHDL 30.0 09/27/2024    CHOLHDL 33.3 09/27/2023       Chemistry        Component Value Date/Time     03/27/2025 0906     09/27/2024 0823    K 4.8 03/27/2025  0906    K 4.7 09/27/2024 0823     03/27/2025 0906     09/27/2024 0823    CO2 28 03/27/2025 0906    CO2 26 09/27/2024 0823    BUN 12 03/27/2025 0906    CREATININE 0.8 03/27/2025 0906    GLU 97 09/27/2024 0823        Component Value Date/Time    CALCIUM 8.9 03/27/2025 0906    CALCIUM 9.1 09/27/2024 0823    ALKPHOS 46 03/27/2025 0906    ALKPHOS 50 (L) 09/27/2024 0823    AST 22 03/27/2025 0906    AST 25 09/27/2024 0823    ALT 16 03/27/2025 0906    ALT 16 09/27/2024 0823    BILITOT 0.4 03/27/2025 0906    BILITOT 0.4 09/27/2024 0823    ESTGFRAFRICA >60.0 09/30/2021 1130    EGFRNONAA >60.0 09/30/2021 1130          Lab Results   Component Value Date    HGBA1C 5.6 09/27/2024     Lab Results   Component Value Date    TSH 1.963 09/27/2024     Lab Results   Component Value Date    INR 1.1 08/21/2023     Lab Results   Component Value Date    WBC 7.98 03/27/2025    HGB 13.1 (L) 03/27/2025    HCT 40.3 03/27/2025    MCV 92 03/27/2025     03/27/2025     BMP  Sodium   Date Value Ref Range Status   03/27/2025 139 136 - 145 mmol/L Final   09/27/2024 138 136 - 145 mmol/L Final     Potassium   Date Value Ref Range Status   03/27/2025 4.8 3.5 - 5.1 mmol/L Final   09/27/2024 4.7 3.5 - 5.1 mmol/L Final     Chloride   Date Value Ref Range Status   03/27/2025 102 95 - 110 mmol/L Final   09/27/2024 104 95 - 110 mmol/L Final     CO2   Date Value Ref Range Status   03/27/2025 28 23 - 29 mmol/L Final   09/27/2024 26 23 - 29 mmol/L Final     BUN   Date Value Ref Range Status   03/27/2025 12 8 - 23 mg/dL Final     Creatinine   Date Value Ref Range Status   03/27/2025 0.8 0.5 - 1.4 mg/dL Final     Calcium   Date Value Ref Range Status   03/27/2025 8.9 8.7 - 10.5 mg/dL Final   09/27/2024 9.1 8.7 - 10.5 mg/dL Final     Anion Gap   Date Value Ref Range Status   03/27/2025 9 8 - 16 mmol/L Final     eGFR if    Date Value Ref Range Status   09/30/2021 >60.0 >60 mL/min/1.73 m^2 Final     eGFR if non African American    Date Value Ref Range Status   09/30/2021 >60.0 >60 mL/min/1.73 m^2 Final     Comment:     Calculation used to obtain the estimated glomerular filtration  rate (eGFR) is the CKD-EPI equation.        BNP  @LABRCNTIP(BNP,BNPTRIAGEBLO)@  @LABRCNTIP(troponini)@  Estimated Creatinine Clearance: 65.1 mL/min (based on SCr of 0.8 mg/dL).  No results found in the last 24 hours.  No results found in the last 24 hours.  No results found in the last 24 hours.    Assessment:      1. Chronic systolic congestive heart failure    2. Typical atrial flutter    3. Coronary artery disease of native artery of native heart with stable angina pectoris    4. Aortic calcification    5. Pulmonary heart disease    6. Nonrheumatic aortic valve stenosis    7. Mixed hyperlipidemia    8. Essential hypertension    9. Bilateral carotid artery stenosis        Plan:   Continue lipitor zetia 10 mg qhs for carotid Dz and HLD  Continue ASA torpoXL entresto aldactone for CAD and CHFrEF  RTC in 6m   Fall precaution                 [1]   Social History  Tobacco Use    Smoking status: Every Day     Current packs/day: 2.00     Average packs/day: 2.0 packs/day for 55.2 years (110.5 ttl pk-yrs)     Types: Cigarettes     Start date: 1970     Passive exposure: Past    Smokeless tobacco: Never   Substance Use Topics    Alcohol use: No    Drug use: No

## 2025-04-10 ENCOUNTER — OFFICE VISIT (OUTPATIENT)
Dept: INTERNAL MEDICINE | Facility: CLINIC | Age: 80
End: 2025-04-10
Payer: MEDICARE

## 2025-04-10 ENCOUNTER — TELEPHONE (OUTPATIENT)
Dept: INTERNAL MEDICINE | Facility: CLINIC | Age: 80
End: 2025-04-10
Payer: COMMERCIAL

## 2025-04-10 VITALS
BODY MASS INDEX: 20.73 KG/M2 | HEART RATE: 53 BPM | DIASTOLIC BLOOD PRESSURE: 60 MMHG | SYSTOLIC BLOOD PRESSURE: 120 MMHG | DIASTOLIC BLOOD PRESSURE: 60 MMHG | SYSTOLIC BLOOD PRESSURE: 110 MMHG | OXYGEN SATURATION: 98 % | HEIGHT: 69 IN | WEIGHT: 140 LBS | TEMPERATURE: 96 F

## 2025-04-10 DIAGNOSIS — Z79.899 ENCOUNTER FOR LONG-TERM (CURRENT) USE OF MEDICATIONS: ICD-10-CM

## 2025-04-10 DIAGNOSIS — I50.22 CHRONIC SYSTOLIC CHF (CONGESTIVE HEART FAILURE): ICD-10-CM

## 2025-04-10 DIAGNOSIS — J30.9 ALLERGIC RHINITIS, UNSPECIFIED SEASONALITY, UNSPECIFIED TRIGGER: ICD-10-CM

## 2025-04-10 DIAGNOSIS — Z72.0 TOBACCO USE: ICD-10-CM

## 2025-04-10 DIAGNOSIS — F17.200 SMOKER: ICD-10-CM

## 2025-04-10 DIAGNOSIS — F33.41 RECURRENT MAJOR DEPRESSIVE DISORDER, IN PARTIAL REMISSION: ICD-10-CM

## 2025-04-10 DIAGNOSIS — M19.90 OSTEOARTHRITIS, UNSPECIFIED OSTEOARTHRITIS TYPE, UNSPECIFIED SITE: ICD-10-CM

## 2025-04-10 DIAGNOSIS — D69.2 SENILE PURPURA: ICD-10-CM

## 2025-04-10 DIAGNOSIS — I50.22 CHRONIC SYSTOLIC CONGESTIVE HEART FAILURE: ICD-10-CM

## 2025-04-10 DIAGNOSIS — D64.9 MILD ANEMIA: ICD-10-CM

## 2025-04-10 DIAGNOSIS — I65.23 BILATERAL CAROTID ARTERY STENOSIS: ICD-10-CM

## 2025-04-10 DIAGNOSIS — M46.1 SI (SACROILIAC) JOINT INFLAMMATION: Primary | ICD-10-CM

## 2025-04-10 DIAGNOSIS — I10 ESSENTIAL HYPERTENSION: ICD-10-CM

## 2025-04-10 DIAGNOSIS — I25.118 CORONARY ARTERY DISEASE OF NATIVE ARTERY OF NATIVE HEART WITH STABLE ANGINA PECTORIS: ICD-10-CM

## 2025-04-10 DIAGNOSIS — G40.909 SEIZURE DISORDER: ICD-10-CM

## 2025-04-10 DIAGNOSIS — F32.5 MAJOR DEPRESSIVE DISORDER IN FULL REMISSION, UNSPECIFIED WHETHER RECURRENT: ICD-10-CM

## 2025-04-10 DIAGNOSIS — J44.9 CHRONIC OBSTRUCTIVE PULMONARY DISEASE, UNSPECIFIED COPD TYPE: ICD-10-CM

## 2025-04-10 PROCEDURE — 99999 PR PBB SHADOW E&M-EST. PATIENT-LVL III: CPT | Mod: PBBFAC,,, | Performed by: FAMILY MEDICINE

## 2025-04-10 PROCEDURE — 99213 OFFICE O/P EST LOW 20 MIN: CPT | Mod: PBBFAC | Performed by: FAMILY MEDICINE

## 2025-04-10 RX ORDER — ATORVASTATIN CALCIUM 40 MG/1
40 TABLET, FILM COATED ORAL DAILY
Qty: 90 TABLET | Refills: 3 | Status: SHIPPED | OUTPATIENT
Start: 2025-04-10

## 2025-04-10 RX ORDER — METOPROLOL SUCCINATE 25 MG/1
12.5 TABLET, EXTENDED RELEASE ORAL DAILY
Qty: 45 TABLET | Refills: 3 | Status: SHIPPED | OUTPATIENT
Start: 2025-04-10

## 2025-04-10 RX ORDER — PANTOPRAZOLE SODIUM 20 MG/1
20 TABLET, DELAYED RELEASE ORAL DAILY
Qty: 90 TABLET | Refills: 1 | Status: SHIPPED | OUTPATIENT
Start: 2025-04-10 | End: 2026-04-10

## 2025-04-10 NOTE — PROGRESS NOTES
Subjective:       Patient ID: Humberto Carlson is a 80 y.o. male.    Chief Complaint: Follow-up (Medicines/Htn/Lab/Home bp machine checked 110/60)    Follow-up  Pertinent negatives include no chest pain or fever.       Problem List[1]    Past Medical History:   Diagnosis Date    Acute bronchitis 12/02/2019    Anticoagulant long-term use     Asthma     Atrial flutter     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    Cancer     Cataract     Chronic obstructive pulmonary disease 05/24/2021    Coronary artery disease of native artery of native heart with stable angina pectoris 10/26/2020    Encounter for blood transfusion     Essential hypertension 07/22/2019    GERD (gastroesophageal reflux disease)     Glaucoma     Hyperlipidemia     Major depression in partial remission 05/02/2022    Seizures        Past Surgical History:   Procedure Laterality Date    ABLATION, ATRIAL FLUTTER, TYPICAL N/A 8/31/2023    Procedure: Ablation, Atrial Flutter, Typical;  Surgeon: Liam Wiggins MD;  Location: [facility] EP LAB;  Service: Cardiology;  Laterality: N/A;  AFL, RFA, BECKY, ANUM (cx if SR), BENJI benz, 3 Prep    BRAIN SURGERY      CARPAL TUNNEL RELEASE      EYE SURGERY      LUNG SURGERY      Partial lung removed Left     REPAIR,BROW PTOSIS      toe nail removal      TRANSESOPHAGEAL ECHOCARDIOGRAM WITH POSSIBLE CARDIOVERSION (ANUM W/ POSS CARDIOVERSION) N/A 6/28/2023    Procedure: Transesophageal echo (ANUM) intra-procedure log documentation;  Surgeon: Gallo Carbajal MD;  Location: [facility] CATH LAB;  Service: Cardiology;  Laterality: N/A;       Family History   Problem Relation Name Age of Onset    Stroke Mother      Heart attack Father      Coronary artery disease Brother      Valvular heart disease Brother      Stroke Paternal Grandfather         Tobacco Use History[2]    Wt Readings from Last 5 Encounters:   04/10/25 63.5 kg (139 lb 15.9 oz)   04/02/25 63.6 kg (140 lb 3.4 oz)   04/02/25 63 kg (138 lb 14.2  oz)   03/27/25 62.1 kg (137 lb)   02/05/25 62.2 kg (137 lb 2 oz)       For further HPI details, see assessment and plan.    Review of Systems   Constitutional:  Negative for fever.   HENT:  Negative for trouble swallowing.    Respiratory:  Negative for shortness of breath.    Cardiovascular:  Negative for chest pain.   Psychiatric/Behavioral:  Negative for dysphoric mood.        Objective:      Vitals:    04/10/25 1107   BP: 120/60   Pulse: (!) 53   Temp: 96 °F (35.6 °C)       Physical Exam  Constitutional:       General: He is not in acute distress.     Appearance: He is not ill-appearing.   Pulmonary:      Effort: Pulmonary effort is normal. No respiratory distress.   Neurological:      General: No focal deficit present.      Mental Status: He is alert.   Psychiatric:         Mood and Affect: Mood normal.         Behavior: Behavior normal.         Assessment:       1. SI (sacroiliac) joint inflammation    2. Essential hypertension    3. Seizure disorder    4. Senile purpura    5. Recurrent major depressive disorder, in partial remission    6. Chronic obstructive pulmonary disease, unspecified COPD type    7. Tobacco use    8. Mild anemia    9. Chronic systolic CHF (congestive heart failure)    10. Allergic rhinitis, unspecified seasonality, unspecified trigger    11. Bilateral carotid artery stenosis    12. Chronic systolic congestive heart failure    13. Coronary artery disease of native artery of native heart with stable angina pectoris    14. Smoker    15. Major depressive disorder in full remission, unspecified whether recurrent    16. Osteoarthritis, unspecified osteoarthritis type, unspecified site    17. Encounter for long-term (current) use of medications        Plan:   SI (sacroiliac) joint inflammation    Essential hypertension  -     metoprolol succinate (TOPROL-XL) 25 MG 24 hr tablet; Take 0.5 tablets (12.5 mg total) by mouth once daily.  Dispense: 45 tablet; Refill: 3    Seizure disorder    Senile  purpura    Recurrent major depressive disorder, in partial remission    Chronic obstructive pulmonary disease, unspecified COPD type    Tobacco use    Mild anemia    Chronic systolic CHF (congestive heart failure)    Allergic rhinitis, unspecified seasonality, unspecified trigger    Bilateral carotid artery stenosis    Chronic systolic congestive heart failure    Coronary artery disease of native artery of native heart with stable angina pectoris  -     atorvastatin (LIPITOR) 40 MG tablet; Take 1 tablet (40 mg total) by mouth once daily.  Dispense: 90 tablet; Refill: 3  -     metoprolol succinate (TOPROL-XL) 25 MG 24 hr tablet; Take 0.5 tablets (12.5 mg total) by mouth once daily.  Dispense: 45 tablet; Refill: 3  -     CBC Auto Differential; Future; Expected date: 04/10/2025  -     Comprehensive Metabolic Panel; Future; Expected date: 04/10/2025  -     TSH; Future; Expected date: 04/10/2025  -     Hemoglobin A1C; Future; Expected date: 04/10/2025    Smoker    Major depressive disorder in full remission, unspecified whether recurrent    Osteoarthritis, unspecified osteoarthritis type, unspecified site  -     pantoprazole (PROTONIX) 20 MG tablet; Take 1 tablet (20 mg total) by mouth once daily.  Dispense: 90 tablet; Refill: 1    Encounter for long-term (current) use of medications  -     CBC Auto Differential; Future; Expected date: 04/10/2025  -     Comprehensive Metabolic Panel; Future; Expected date: 04/10/2025  -     TSH; Future; Expected date: 04/10/2025  -     Hemoglobin A1C; Future; Expected date: 04/10/2025        For follow-up encounter     Hypertension   We checked his blood pressure machine and is within 10 points of manual check.  We will deem it accurate.  Encourage he continue to monitor his blood pressure at home.  His hypertension is controlled.  No change to meds.    Patient was blood pressure is widely variable.  Sometimes too low.  Sometimes too high.  Averaging seems to be decent.  Periodic  lightheadedness when stands too fast.  Advise if that continues to happened with increased frequency his blood pressures are consistently running too low I want him to let me know and we will have to cut back on his medication    Reviewed his April 2nd visit with Cardiology   Statin and Zetia were continued for hyperlipidemia and vascular/carotid disease.  Continue drugs.  Continue aspirin given vascular history  LDL 59.  Liver function normal.  Continue meds    Entresto and Aldactone and metoprolol and aspirin were continued for coronary artery disease and heart failure.  Continue those drugs.    Patient is not having any chest pain or trouble breathing.  Swelling.    Lumbar spondylosis   Arthritis of the hip sacroiliac joint inflammation  Reviewed the pain management department encounter .  Continue working with specialist  Considering medial branch blocks if conservative measures fail   Patient was use Voltaren gel.  I am okay with continuation.    Patient  use turmeric.  I am okay with continuation  Daily naproxen  Arthritis   Long-term use of NSAID   Patient takes a leave every day.  I do worry about the affects on his kidney function and stomach lining.  Fortunately he was taking Protonix.  I want him to continue that for gastric lining protection and we will continue to monitor his renal function    Senile purpura   Patient does bruise easily.  Monitor.  He is on aspirin could be a contributing factor.  If worsens we might need to consider discontinuation of aspirin      Asthma /COPD  Patient has albuterol.  Fortunately does not need it too often.  Continue as needed use.  Continue to encourage him to quit smoking     Mildly diminished hemoglobin.  Hematocrit normalized.  No ferritin or iron-deficiency.  Suspect  benign baseline.  We will monitor in the future      Allergic rhinitis   Patient does use Flonase but does not find much benefit.  Patient does have a lot of mucus production coming from his chest.   I worry this is more in line with COPD and than it was his allergic rhinitis.  Encouraged utilization of his albuterol.  Fails to improve or if this worsens we will get pulmonology involved    History of seizures   On Dilantin and doing well.  Has not had any seizures for quite some time     Depression   Patient reports he is doing well.  We will continue Zoloft at is.  50 mg sertraline        Polypharmacy   At last visit was quite concerned about incorrect drug list.  Patient was brought in all of his medications and I am very appreciative and we are cleaning up his drug list.  At present time drug list should be 100% accurate and up-to-date.    Labs in 6 months.  See me soon afterThis note was verbally dictated, please excuse any type errors.       [1]   Patient Active Problem List  Diagnosis    Hyperlipidemia    Essential hypertension    History of anaphylaxis    Elevated blood sugar    Seizure disorder    Screening for prostate cancer    Pre-diabetes    Weight loss    Dyspnea    Chronic bilateral low back pain    Acute rhinitis    History of subdural hematoma    Coronary artery disease of native artery of native heart with stable angina pectoris    Chronic systolic congestive heart failure    Smoker    Nonrheumatic aortic valve stenosis    Aortic calcification    Bilateral carotid artery stenosis    Chronic obstructive pulmonary disease    Major depression in partial remission    Hx of cancer of lung    Atrial flutter    Pulmonary heart disease    Senile purpura    Pain in both lower extremities    Bilateral hip pain    Orthostatic hypotension    SI (sacroiliac) joint inflammation   [2]   Social History  Tobacco Use   Smoking Status Every Day    Current packs/day: 2.00    Average packs/day: 2.0 packs/day for 55.3 years (110.5 ttl pk-yrs)    Types: Cigarettes    Start date: 1970    Passive exposure: Past   Smokeless Tobacco Never

## 2025-04-30 ENCOUNTER — EXTERNAL CHRONIC CARE MANAGEMENT (OUTPATIENT)
Dept: PRIMARY CARE CLINIC | Facility: CLINIC | Age: 80
End: 2025-04-30
Payer: COMMERCIAL

## 2025-04-30 PROCEDURE — 99490 CHRNC CARE MGMT STAFF 1ST 20: CPT | Mod: S$GLB,,, | Performed by: FAMILY MEDICINE

## 2025-05-31 ENCOUNTER — EXTERNAL CHRONIC CARE MANAGEMENT (OUTPATIENT)
Dept: PRIMARY CARE CLINIC | Facility: CLINIC | Age: 80
End: 2025-05-31
Payer: MEDICARE

## 2025-05-31 PROCEDURE — 99490 CHRNC CARE MGMT STAFF 1ST 20: CPT | Mod: PBBFAC | Performed by: FAMILY MEDICINE

## 2025-05-31 PROCEDURE — 99490 CHRNC CARE MGMT STAFF 1ST 20: CPT | Mod: S$PBB,,, | Performed by: FAMILY MEDICINE

## 2025-06-09 ENCOUNTER — OFFICE VISIT (OUTPATIENT)
Dept: INTERNAL MEDICINE | Facility: CLINIC | Age: 80
End: 2025-06-09
Payer: MEDICARE

## 2025-06-09 VITALS
HEIGHT: 69 IN | OXYGEN SATURATION: 95 % | BODY MASS INDEX: 20.21 KG/M2 | DIASTOLIC BLOOD PRESSURE: 66 MMHG | WEIGHT: 136.44 LBS | HEART RATE: 56 BPM | SYSTOLIC BLOOD PRESSURE: 122 MMHG

## 2025-06-09 DIAGNOSIS — I65.23 BILATERAL CAROTID ARTERY STENOSIS: ICD-10-CM

## 2025-06-09 DIAGNOSIS — F33.42 RECURRENT MAJOR DEPRESSIVE DISORDER, IN FULL REMISSION: ICD-10-CM

## 2025-06-09 DIAGNOSIS — D69.2 SENILE PURPURA: ICD-10-CM

## 2025-06-09 DIAGNOSIS — G40.909 SEIZURE DISORDER: ICD-10-CM

## 2025-06-09 DIAGNOSIS — E78.2 MIXED HYPERLIPIDEMIA: ICD-10-CM

## 2025-06-09 DIAGNOSIS — I35.0 NONRHEUMATIC AORTIC VALVE STENOSIS: ICD-10-CM

## 2025-06-09 DIAGNOSIS — I73.9 PVD (PERIPHERAL VASCULAR DISEASE): ICD-10-CM

## 2025-06-09 DIAGNOSIS — I27.9 PULMONARY HEART DISEASE: ICD-10-CM

## 2025-06-09 DIAGNOSIS — Z74.09 OTHER REDUCED MOBILITY: ICD-10-CM

## 2025-06-09 DIAGNOSIS — Z85.118 HX OF CANCER OF LUNG: ICD-10-CM

## 2025-06-09 DIAGNOSIS — I50.22 CHRONIC SYSTOLIC CONGESTIVE HEART FAILURE: ICD-10-CM

## 2025-06-09 DIAGNOSIS — J44.9 CHRONIC OBSTRUCTIVE PULMONARY DISEASE, UNSPECIFIED COPD TYPE: ICD-10-CM

## 2025-06-09 DIAGNOSIS — I48.3 TYPICAL ATRIAL FLUTTER: ICD-10-CM

## 2025-06-09 DIAGNOSIS — I10 ESSENTIAL HYPERTENSION: ICD-10-CM

## 2025-06-09 DIAGNOSIS — Z72.0 TOBACCO USE: ICD-10-CM

## 2025-06-09 DIAGNOSIS — Z00.00 ENCOUNTER FOR MEDICARE ANNUAL WELLNESS EXAM: Primary | ICD-10-CM

## 2025-06-09 DIAGNOSIS — I25.118 CORONARY ARTERY DISEASE OF NATIVE ARTERY OF NATIVE HEART WITH STABLE ANGINA PECTORIS: ICD-10-CM

## 2025-06-09 PROCEDURE — 99999 PR PBB SHADOW E&M-EST. PATIENT-LVL V: CPT | Mod: PBBFAC,,, | Performed by: NURSE PRACTITIONER

## 2025-06-09 PROCEDURE — 99215 OFFICE O/P EST HI 40 MIN: CPT | Mod: PBBFAC | Performed by: NURSE PRACTITIONER

## 2025-06-09 NOTE — PROGRESS NOTES
"  Humberto Carlson presented for a  Medicare AWV and comprehensive Health Risk Assessment today. The following components were reviewed and updated:    Medical history  Family History  Social history  Allergies and Current Medications  Health Risk Assessment  Health Maintenance  Care Team         ** See Completed Assessments for Annual Wellness Visit within the encounter summary.**         The following assessments were completed:  Living Situation  CAGE  Depression Screening  Timed Get Up and Go  Whisper Test-na  Cognitive Function Screening  Nutrition Screening  ADL Screening  PAQ Screening      Opioid documentation:      Patient does not have a current opioid prescription.        Vitals:    06/09/25 1046   BP: 122/66   Pulse: (!) 56   SpO2: 95%   Weight: 61.9 kg (136 lb 7.4 oz)   Height: 5' 9" (1.753 m)     Body mass index is 20.15 kg/m².  Physical Exam  Vitals and nursing note reviewed.   Constitutional:       Appearance: He is well-developed.   HENT:      Head: Normocephalic.   Cardiovascular:      Rate and Rhythm: Normal rate and regular rhythm.      Heart sounds: Murmur heard.   Pulmonary:      Effort: Pulmonary effort is normal. No respiratory distress.      Breath sounds: Normal breath sounds.   Abdominal:      Palpations: Abdomen is soft. There is no mass.      Tenderness: There is no abdominal tenderness.   Musculoskeletal:         General: Normal range of motion.   Skin:     General: Skin is warm and dry.   Neurological:      Mental Status: He is alert and oriented to person, place, and time.      Motor: No abnormal muscle tone.   Psychiatric:         Speech: Speech normal.         Behavior: Behavior normal.               Diagnoses and health risks identified today and associated recommendations/orders:    1. Encounter for Medicare annual wellness exam  Discussed receiving rsv vaccine at pharmacy.     Declines covid vaccine  Encouraged healthy diet and exercise as tolerated   Reports stress but is not " problematic at this time. Patient knows to follow up with PCP if becomes problematic.    Has urine leakage ever interrupted your daily activites or sleep? No  Do you think you could use some help to better manage urine leakage?No   Reports cardiac conditions are stable.    Reports he is at his respiratory baseline    2. Seizure disorder  Dilantin  Continue current treatment plan as previously prescribed with your  neurologist.     3. Recurrent major depressive disorder, in full remission   PHQ 2-0  Continue current treatment plan as previously prescribed with your  pcp     4. Typical atrial flutter  Stable. Continue current treatment plan as previously prescribed with your  cardiologist.     5. Bilateral carotid artery stenosis  US 11/24  Continue current treatment plan as previously prescribed with your  cardiologist.     6. PVD (peripheral vascular disease)  US 5/24  Continue current treatment plan as previously prescribed with your  cardiologist.     7. Pulmonary heart disease  Echo 4/23  Continue current treatment plan as previously prescribed with your  cardiologist and pulm    8. Chronic obstructive pulmonary disease, unspecified COPD type  Stable. Continue current treatment plan as previously prescribed with your  pulm    9. Chronic systolic congestive heart failure  Stable. Continue current treatment plan as previously prescribed with your  cardiologist.     10. Senile purpura  Chronic. Continue current treatment plan as previously prescribed with your  pcp     11. Coronary artery disease of native artery of native heart with stable angina pectoris  Stable. Continue current treatment plan as previously prescribed with your  cardiologist.     12. Nonrheumatic aortic valve stenosis  Echo 4/23  Continue current treatment plan as previously prescribed with your  cardiologist.     13. Essential hypertension  Stable. Continue current treatment plan as previously prescribed with your  cardiologist and pcp     14.  Mixed hyperlipidemia  Stable. Continue current treatment plan as previously prescribed with your  pcp and cardiologist.     15. Hx of cancer of lung  Continue current treatment plan as previously prescribed with your  providers.     16. Tobacco use  Discussed the importance of smoking cessation and advised to quit smoking. Patient expressed understanding. Declines smoking cessation program.     17. Other reduced mobility  Abnormal timed get up and go test. Denies any falls in the last 12 months.    Fall precautions reviewed with patient. Advised to follow up with PCP for further recommendations. Patient expressed understanding.       Provided Humberto with a 5-10 year written screening schedule and personal prevention plan. Recommendations were developed using the USPSTF age appropriate recommendations. Education, counseling, and referrals were provided as needed. After Visit Summary printed and given to patient which includes a list of additional screenings\tests needed.    Follow up in about 1 year (around 6/9/2026) for awv.    Theresa Garnica NP  I offered to discuss advanced care planning, including how to pick a person who would make decisions for you if you were unable to make them for yourself, called a health care power of , and what kind of decisions you might make such as use of life sustaining treatments such as ventilators and tube feeding when faced with a life limiting illness recorded on a living will that they will need to know. (How you want to be cared for as you near the end of your natural life)     X  Patient has advanced directives on file, which we reviewed, and they do not wish to make changes.

## 2025-06-09 NOTE — PATIENT INSTRUCTIONS
Counseling and Referral of Other Preventative  (Italic type indicates deductible and co-insurance are waived)    Patient Name: Humberto Carlson  Today's Date: 6/9/2025    Health Maintenance       Date Due Completion Date    RSV Vaccine (Age 60+ and Pregnant patients) (1 - 1-dose 75+ series) Never done ---    COVID-19 Vaccine (5 - 2024-25 season) 06/09/2026 (Originally 9/1/2024) 6/13/2022    Influenza Vaccine (Season Ended) 09/01/2025 9/26/2022    Hemoglobin A1c (Prediabetes) 09/27/2025 9/27/2024    Aspirin/Antiplatelet Therapy 06/09/2026 6/9/2025    TETANUS VACCINE 07/22/2029 7/22/2019    Lipid Panel 03/27/2030 3/27/2025        No orders of the defined types were placed in this encounter.      The following information is provided to all patients.  This information is to help you find resources for any of the problems found today that may be affecting your health:                  Living healthy guide: www.[facility].[place].gov      Understanding Diabetes: www.diabetes.org      Eating healthy: www.cdc.gov/healthyweight      CDC home safety checklist: www.cdc.gov/steadi/patient.html      Agency on Aging: www.goea.[place].gov      Alcoholics anonymous (AA): www.aa.org      Physical Activity: www.kate.nih.gov/xi5rvue      Tobacco use: www.quitwithusla.org

## 2025-06-30 ENCOUNTER — LAB VISIT (OUTPATIENT)
Dept: LAB | Facility: HOSPITAL | Age: 80
End: 2025-06-30
Attending: NURSE PRACTITIONER
Payer: MEDICARE

## 2025-06-30 ENCOUNTER — OFFICE VISIT (OUTPATIENT)
Dept: NEUROLOGY | Facility: CLINIC | Age: 80
End: 2025-06-30
Payer: MEDICARE

## 2025-06-30 ENCOUNTER — EXTERNAL CHRONIC CARE MANAGEMENT (OUTPATIENT)
Dept: PRIMARY CARE CLINIC | Facility: CLINIC | Age: 80
End: 2025-06-30
Payer: MEDICARE

## 2025-06-30 VITALS
HEART RATE: 78 BPM | SYSTOLIC BLOOD PRESSURE: 150 MMHG | DIASTOLIC BLOOD PRESSURE: 72 MMHG | WEIGHT: 136.88 LBS | BODY MASS INDEX: 20.27 KG/M2 | RESPIRATION RATE: 16 BRPM | HEIGHT: 69 IN

## 2025-06-30 DIAGNOSIS — G40.409 GRAND MAL EPILEPSY, CONTROLLED: Primary | ICD-10-CM

## 2025-06-30 DIAGNOSIS — F33.41 RECURRENT MAJOR DEPRESSIVE DISORDER, IN PARTIAL REMISSION: ICD-10-CM

## 2025-06-30 DIAGNOSIS — E78.2 MIXED HYPERLIPIDEMIA: ICD-10-CM

## 2025-06-30 DIAGNOSIS — G40.909 SEIZURE DISORDER: ICD-10-CM

## 2025-06-30 DIAGNOSIS — I10 ESSENTIAL HYPERTENSION: ICD-10-CM

## 2025-06-30 DIAGNOSIS — I95.1 ORTHOSTATIC HYPOTENSION: ICD-10-CM

## 2025-06-30 DIAGNOSIS — G40.409 GRAND MAL EPILEPSY, CONTROLLED: ICD-10-CM

## 2025-06-30 DIAGNOSIS — J44.9 CHRONIC OBSTRUCTIVE PULMONARY DISEASE, UNSPECIFIED COPD TYPE: ICD-10-CM

## 2025-06-30 LAB — PHENYTOIN SERPL-MCNC: 14.1 UG/ML (ref 10–20)

## 2025-06-30 PROCEDURE — 99999 PR PBB SHADOW E&M-EST. PATIENT-LVL IV: CPT | Mod: PBBFAC,,, | Performed by: NURSE PRACTITIONER

## 2025-06-30 PROCEDURE — 80185 ASSAY OF PHENYTOIN TOTAL: CPT

## 2025-06-30 PROCEDURE — 99214 OFFICE O/P EST MOD 30 MIN: CPT | Mod: PBBFAC | Performed by: NURSE PRACTITIONER

## 2025-06-30 PROCEDURE — 99490 CHRNC CARE MGMT STAFF 1ST 20: CPT | Mod: PBBFAC | Performed by: FAMILY MEDICINE

## 2025-06-30 PROCEDURE — 99490 CHRNC CARE MGMT STAFF 1ST 20: CPT | Mod: S$PBB,,, | Performed by: FAMILY MEDICINE

## 2025-06-30 PROCEDURE — 36415 COLL VENOUS BLD VENIPUNCTURE: CPT

## 2025-06-30 RX ORDER — PHENYTOIN SODIUM 100 MG/1
300 CAPSULE, EXTENDED RELEASE ORAL DAILY
Qty: 270 CAPSULE | Refills: 3 | Status: SHIPPED | OUTPATIENT
Start: 2025-06-30 | End: 2026-06-30

## 2025-06-30 NOTE — PROGRESS NOTES
Subjective:       Patient ID: Humberto Carlson is a 80 y.o. male.    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 Cardoso 15 years ago (2005) after sustaining a GTC with no aura. He was placed on  mg QD. Has done well on it and was seizure-free till 10 years ago (2010) when he ran of PHT for 1 week and had a Seizure that was not GTC but cause staring and ROSY (Complex Partial). Since then he has not had any seizure. Denies SEs. In 2015, he sustained a traumatic LT SDH S/P evacuation with excellent recovery. We do not have any records and the information is based on the history. 9-: Patient is established with Dr. Morataya, new to me. Has had a total of 2 seizures, last in 2010; well controlled. Tolerating  mg QD without adverse effects. 10- brain MRI unremarkable. 12-9-2020 EEG normal.          INTERVAL HISTORY 06-:  Patient doing well, in [facility]. Patient is unaccompanied. Patient present for seizure management. Reports his last seizure was in 2010. Patient seizures are very well controlled on current therapy of  mg QD no side effects. Refills requested.       Review of Systems   Constitutional:  Negative for appetite change and fatigue.   HENT:  Positive for hearing loss. Negative for tinnitus.    Eyes:  Negative for photophobia and visual disturbance.   Respiratory:  Negative for apnea and shortness of breath.    Cardiovascular:  Negative for chest pain and palpitations.   Gastrointestinal:  Negative for nausea and vomiting.   Endocrine: Negative for cold intolerance and heat intolerance.   Genitourinary:  Negative for difficulty urinating and urgency.   Musculoskeletal:  Positive for arthralgias, back pain and gait problem. Negative for joint swelling, myalgias, neck pain and neck stiffness.   Skin:  Negative for color change, rash and wound.   Allergic/Immunologic: Negative for environmental allergies  and immunocompromised state.   Neurological:  Positive for seizures. Negative for dizziness, tremors, syncope, facial asymmetry, speech difficulty, weakness, light-headedness, numbness and headaches.   Hematological:  Negative for adenopathy. Does not bruise/bleed easily.   Psychiatric/Behavioral:  Negative for agitation, behavioral problems, confusion, decreased concentration, dysphoric mood, hallucinations, self-injury, sleep disturbance and suicidal ideas. The patient is not hyperactive.              Current Outpatient Medications:     albuterol (PROVENTIL/VENTOLIN HFA) 90 mcg/actuation inhaler, Rescue, Disp: 25.5 g, Rfl: 3    aspirin (ECOTRIN) 81 MG EC tablet, Take 1 tablet (81 mg total) by mouth once daily., Disp: , Rfl:     atorvastatin (LIPITOR) 40 MG tablet, Take 1 tablet (40 mg total) by mouth once daily., Disp: 90 tablet, Rfl: 3    brimonidine-timoloL (COMBIGAN) 0.2-0.5 % Drop, Inject 1 drop into the eye Twice daily., Disp: , Rfl:     dorzolamide (TRUSOPT) 2 % ophthalmic solution, Place 1 drop into the left eye 2 (two) times daily., Disp: , Rfl:     ezetimibe (ZETIA) 10 mg tablet, Take 1 tablet (10 mg total) by mouth every evening., Disp: 90 tablet, Rfl: 3    fluticasone propionate (FLONASE) 50 mcg/actuation nasal spray, 1 SPRAY IN EACH NOSTRIL ONCE DAILY., Disp: 32 g, Rfl: 2    latanoprost 0.005 % ophthalmic solution, , Disp: , Rfl:     metoprolol succinate (TOPROL-XL) 25 MG 24 hr tablet, Take 0.5 tablets (12.5 mg total) by mouth once daily., Disp: 45 tablet, Rfl: 3    naproxen sodium (ANAPROX) 220 MG tablet, Take 220 mg by mouth., Disp: , Rfl:     pantoprazole (PROTONIX) 20 MG tablet, Take 1 tablet (20 mg total) by mouth once daily., Disp: 90 tablet, Rfl: 1    sacubitriL-valsartan (ENTRESTO) 24-26 mg per tablet, Take 1 tablet by mouth 2 (two) times daily., Disp: 180 tablet, Rfl: 3    sertraline (ZOLOFT) 50 MG tablet, Take 1 tablet (50 mg total) by mouth once daily., Disp: 90 tablet, Rfl: 3     spironolactone (ALDACTONE) 25 MG tablet, TAKE 1 TABLET BY MOUTH ONCE A DAY, Disp: 30 tablet, Rfl: 9    diclofenac sodium (VOLTAREN) 1 % Gel, Apply 2 g topically 4 (four) times daily as needed., Disp: 200 g, Rfl: 2    EPINEPHrine (EPIPEN) 0.3 mg/0.3 mL AtIn, Inject 0.3 mLs (0.3 mg total) into the muscle once as needed (allergic reaction)., Disp: 2 each, Rfl: 0    phenytoin (DILANTIN) 100 MG ER capsule, Take 3 capsules (300 mg total) by mouth once daily. for 365 doses, Disp: 270 capsule, Rfl: 3  Past Medical History:   Diagnosis Date    Acute bronchitis 12/02/2019    Anticoagulant long-term use     Asthma     Atrial flutter     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    Cancer     Cataract     Chronic obstructive pulmonary disease 05/24/2021    Coronary artery disease of native artery of native heart with stable angina pectoris 10/26/2020    Encounter for blood transfusion     Essential hypertension 07/22/2019    GERD (gastroesophageal reflux disease)     Glaucoma     Hyperlipidemia     Major depression in partial remission 05/02/2022    Seizures      Past Surgical History:   Procedure Laterality Date    ABLATION, ATRIAL FLUTTER, TYPICAL N/A 8/31/2023    Procedure: Ablation, Atrial Flutter, Typical;  Surgeon: Liam Wiggins MD;  Location: [facility] EP LAB;  Service: Cardiology;  Laterality: N/A;  AFL, RFA, BECKY, ANUM (cx if SR), anes, MB, 3 Prep    BRAIN SURGERY      CARPAL TUNNEL RELEASE      EYE SURGERY      LUNG SURGERY      Partial lung removed Left     REPAIR,BROW PTOSIS      toe nail removal      TRANSESOPHAGEAL ECHOCARDIOGRAM WITH POSSIBLE CARDIOVERSION (ANUM W/ POSS CARDIOVERSION) N/A 6/28/2023    Procedure: Transesophageal echo (ANUM) intra-procedure log documentation;  Surgeon: Gallo Carbajal MD;  Location: [facility] CATH LAB;  Service: Cardiology;  Laterality: N/A;     Social History     Socioeconomic History    Marital status:    Tobacco Use    Smoking status: Every Day      Current packs/day: 2.00     Average packs/day: 2.0 packs/day for 55.5 years (111.0 ttl pk-yrs)     Types: Cigarettes     Start date: 1970     Passive exposure: Past    Smokeless tobacco: Never   Substance and Sexual Activity    Alcohol use: No    Drug use: No    Sexual activity: Not Currently     Partners: Female     Social Drivers of Health     Financial Resource Strain: Low Risk  (6/9/2025)    Overall Financial Resource Strain (CARDIA)     Difficulty of Paying Living Expenses: Not hard at all   Food Insecurity: No Food Insecurity (6/9/2025)    Hunger Vital Sign     Worried About Running Out of Food in the Last Year: Never true     Ran Out of Food in the Last Year: Never true   Transportation Needs: No Transportation Needs (6/9/2025)    PRAPARE - Transportation     Lack of Transportation (Medical): No     Lack of Transportation (Non-Medical): No   Physical Activity: Inactive (6/9/2025)    Exercise Vital Sign     Days of Exercise per Week: 0 days     Minutes of Exercise per Session: 0 min   Stress: Stress Concern Present (6/9/2025)    [employer] - Occupational Stress Questionnaire     Feeling of Stress : Very much   Housing Stability: Low Risk  (6/9/2025)    Housing Stability Vital Sign     Unable to Pay for Housing in the Last Year: No     Number of Times Moved in the Last Year: 0     Homeless in the Last Year: No             Past/Current Medical/Surgical History, Past/Current Social History, Past/Current Family History and Past/Current Medications were reviewed in detail.        Objective:           VITAL SIGNS WERE REVIEWED      GENERAL APPEARANCE:     The patient looks comfortable.    BMI 20.22 KG     No signs of respiratory distress.    Normal breathing pattern.    No dysmorphic features    Normal eye contact.     GENERAL MEDICAL EXAM:    HEENT:  Head is atraumatic normocephalic. No tender temporal arteries.     Neck and Axillae: No JVD. No visible lesions.    No carotid bruits.  No thyromegaly. No lymphadenopathy.    Cardiopulmonary: No cyanosis. No tachypnea. Normal respiratory effort.     Gastrointestinal/Urogenital:  No jaundice. No stomas or lesions. No visible hernias. No catheters.     Skin, Hair and Nails: No pathognonomic skin rash. No neurofibromatosis. No visible lesions. No stigmata of autoimmune disease. No clubbing.    Skin is warm and moist. No palpable masses.    Limbs: No varicose veins. No visible swelling. No palpable edema.       Muskoskeletal: No visible deformities.No visible lesions. No spine tenderness. No signs of longstanding neuropathy. No dislocations or fractures.            Neurologic Exam     Mental Status   Oriented to person, place, and time.   Registration: recalls 3 of 3 objects. Recall at 5 minutes: recalls 3 of 3 objects. Follows 3 step commands.   Attention: normal. Concentration: normal.   Speech: speech is normal   Level of consciousness: alert  Knowledge: good and consistent with education. Able to perform simple calculations.   Able to name object. Able to read. Able to repeat. Normal comprehension.     Cranial Nerves     CN II   Visual acuity: decreased  Right visual field deficit: none  Left visual field deficit: upper temporal and lower temporal quadrant(s)    CN III, IV, VI   Pupils are equal, round, and reactive to light.  Extraocular motions are normal.   Right pupil: Size: 4 mm. Shape: regular. Reactivity: brisk. Consensual response: intact. Accommodation: intact.   Left pupil: Size: 3 mm. Shape: irregular. Reactivity: sluggish. Consensual response: intact. Accommodation: intact.   CN III: no CN III palsy  CN VI: no CN VI palsy  Nystagmus: none   Diplopia: none  Ophthalmoparesis: none  Upgaze: normal  Downgaze: normal  Conjugate gaze: absent    CN V   Facial sensation intact.   Right facial sensation deficit: none  Left facial sensation deficit: none    CN VII   Facial expression full, symmetric.   Right facial weakness: none  Left facial  weakness: none    CN VIII   CN VIII normal.   Hearing: impaired    CN IX, X   CN IX normal.   CN X normal.   Palate: symmetric    CN XI   CN XI normal.   Right sternocleidomastoid strength: normal  Left sternocleidomastoid strength: normal  Right trapezius strength: normal  Left trapezius strength: normal    CN XII   CN XII normal.   Tongue: not atrophic  Fasciculations: absent  Tongue deviation: none    Motor Exam   Muscle bulk: normal  Overall muscle tone: normal  Right arm tone: normal  Left arm tone: normal  Right arm pronator drift: absent  Left arm pronator drift: absent  Right leg tone: normal  Left leg tone: normal    Strength   Strength 5/5 throughout.   Right neck flexion: 5/5  Left neck flexion: 5/5  Right neck extension: 5/5  Left neck extension: 5/5  Right deltoid: 5/5  Left deltoid: 5/5  Right biceps: 5/5  Left biceps: 5/5  Right triceps: 5/5  Left triceps: 5/5  Right wrist flexion: 5/5  Left wrist flexion: 5/5  Right wrist extension: 5/5  Left wrist extension: 5/5  Right interossei: 5/5  Left interossei: 5/5  Right iliopsoas: 5/5  Left iliopsoas: 5/5  Right quadriceps: 5/5  Left quadriceps: 5/5  Right hamstrin/5  Left hamstrin/5  Right glutei: 5/5  Left glutei: 5/5  Right anterior tibial: 5/5  Left anterior tibial: 5/5  Right posterior tibial: 5/5  Left posterior tibial: 5/5  Right peroneal: 5/5  Left peroneal: 5/5  Right gastroc: 5/5  Left gastroc: 5/5    Sensory Exam   Light touch normal.   Right arm light touch: normal  Left arm light touch: normal  Right leg light touch: normal  Left leg light touch: normal  Right arm vibration: normal  Left arm vibration: normal  Right leg vibration: decreased from toes  Left leg vibration: decreased from toes  Right arm proprioception: normal  Left arm proprioception: normal  Right leg proprioception: normal  Left leg proprioception: normal  Pinprick normal.   Right arm pinprick: normal  Left arm pinprick: normal  Right leg pinprick: normal  Left leg  pinprick: normal    Gait, Coordination, and Reflexes     Gait  Gait: Wide base unsteady, unassisted     Coordination   Finger to nose coordination: normal  Heel to shin coordination: normal    Tremor   Resting tremor: absent  Intention tremor: absent  Action tremor: absent    Reflexes   Right brachioradialis: 2+  Left brachioradialis: 2+  Right biceps: 2+  Left biceps: 2+  Right triceps: 2+  Left triceps: 2+  Right patellar: 1+  Left patellar: 1+  Right achilles: 0  Left achilles: 0  Right plantar: normal  Left plantar: normal  Right Willingham: absent  Left Willingham: absent  Right ankle clonus: absent  Left ankle clonus: absent  Right pendular knee jerk: absent  Left pendular knee jerk: absent    Lab Results   Component Value Date    WBC 7.98 03/27/2025    HGB 13.1 (L) 03/27/2025    HCT 40.3 03/27/2025    MCV 92 03/27/2025     03/27/2025     Sodium   Date Value Ref Range Status   03/27/2025 139 136 - 145 mmol/L Final   09/27/2024 138 136 - 145 mmol/L Final     Potassium   Date Value Ref Range Status   03/27/2025 4.8 3.5 - 5.1 mmol/L Final   09/27/2024 4.7 3.5 - 5.1 mmol/L Final     Chloride   Date Value Ref Range Status   03/27/2025 102 95 - 110 mmol/L Final   09/27/2024 104 95 - 110 mmol/L Final     CO2   Date Value Ref Range Status   03/27/2025 28 23 - 29 mmol/L Final   09/27/2024 26 23 - 29 mmol/L Final     Glucose   Date Value Ref Range Status   03/27/2025 110 70 - 110 mg/dL Final   09/27/2024 97 70 - 110 mg/dL Final     BUN   Date Value Ref Range Status   03/27/2025 12 8 - 23 mg/dL Final     Creatinine   Date Value Ref Range Status   03/27/2025 0.8 0.5 - 1.4 mg/dL Final     Calcium   Date Value Ref Range Status   03/27/2025 8.9 8.7 - 10.5 mg/dL Final   09/27/2024 9.1 8.7 - 10.5 mg/dL Final     Protein Total   Date Value Ref Range Status   03/27/2025 7.1 6.0 - 8.4 gm/dL Final     Total Protein   Date Value Ref Range Status   09/27/2024 6.7 6.0 - 8.4 g/dL Final     Albumin   Date Value Ref Range Status    03/27/2025 4.1 3.5 - 5.2 g/dL Final   09/27/2024 4.0 3.5 - 5.2 g/dL Final     Total Bilirubin   Date Value Ref Range Status   09/27/2024 0.4 0.1 - 1.0 mg/dL Final     Comment:     For infants and newborns, interpretation of results should be based  on gestational age, weight and in agreement with clinical  observations.    Premature Infant recommended reference ranges:  Up to 24 hours.............<8.0 mg/dL  Up to 48 hours............<12.0 mg/dL  3-5 days..................<15.0 mg/dL  6-29 days.................<15.0 mg/dL       Bilirubin Total   Date Value Ref Range Status   03/27/2025 0.4 0.1 - 1.0 mg/dL Final     Comment:     For infants and newborns, interpretation of results should be based   on gestational age, weight and in agreement with clinical   observations.    Premature Infant recommended reference ranges:   0-24 hours:  <8.0 mg/dL   24-48 hours: <12.0 mg/dL   3-5 days:    <15.0 mg/dL   6-29 days:   <15.0 mg/dL     Alkaline Phosphatase   Date Value Ref Range Status   09/27/2024 50 (L) 55 - 135 U/L Final     ALP   Date Value Ref Range Status   03/27/2025 46 40 - 150 unit/L Final     AST   Date Value Ref Range Status   03/27/2025 22 11 - 45 unit/L Final   09/27/2024 25 10 - 40 U/L Final     ALT   Date Value Ref Range Status   03/27/2025 16 10 - 44 unit/L Final   09/27/2024 16 10 - 44 U/L Final     Anion Gap   Date Value Ref Range Status   03/27/2025 9 8 - 16 mmol/L Final     eGFR if    Date Value Ref Range Status   09/30/2021 >60.0 >60 mL/min/1.73 m^2 Final     eGFR if non    Date Value Ref Range Status   09/30/2021 >60.0 >60 mL/min/1.73 m^2 Final     Comment:     Calculation used to obtain the estimated glomerular filtration  rate (eGFR) is the CKD-EPI equation.        Lab Results   Component Value Date    FAITHIZX86 728 09/27/2024     Lab Results   Component Value Date    TSH 1.963 09/27/2024     AED: Phenytoin  NORMAL LEVEL RANGE: 10-20   DATE  LEVEL    09- 8.2  NL    12- 17.3 NL    06-                      RADIOLOGY EVALUATION:     10-    Brain MRI Unremarkable. No changes. Remote left-sided craniotomy.    NEUROPHYSIOLOGY EVALUATION:    12-9-2020    EEG, w/awake & asleep WNL        Assessment:       1. Grand mal epilepsy, controlled    2. Seizure disorder    3. Mixed hyperlipidemia    4. Essential hypertension    5. Chronic obstructive pulmonary disease, unspecified COPD type    6. Recurrent major depressive disorder, in partial remission    7. Orthostatic hypotension                EPILEPSY CLASSIFICATION    SEMIOLOGY:  DIALEPTIC (FOCAL-ROSY-->GTC     EPILEPTOGENIC ZONE (S): UNKNOWN     ETIOLOGY: UNKNOWN     PRIOR AEDS: NONE     CURRENT AEDS: PHT    LAST SEIZURE DATE: 2010 (NON-COMPLIANCE), 2005 (BEFORE PHT)      COMPREHENSIVE LIST OF AEDs:     Acetazolamide (AZM-Diamox)   Benzos: clonazepam (CZP Klonopin), lorazepam (LZP-Ativan), diazepam (DZP-Valium), clorazepate (CLZ- Tranxene)  Brivaracetam (BRV-Briviact)  Cannabidiol (CBD- Epidiolex)  Carbamazepine (CBZ-Tegretol)  Cenobamate (CNB-Xcopri)  Clobazam (CLB-Onfi)  Eslicarbazepine (ESL-Aptiom)  Ethosuximide (ESX-Zarontin)  Felbamate (FBM-Felbatol)  Gabapentin (GBP-Neurontin)  Lacosamide (LCM-Vimpat)  Lamotrigine (LTG-Lamitcal)  Levetiracetam (LEV- Keppra)  Oxcarbazepine (OXC-Trileptal)  Perampanel (PML-Fycompa)  Phenobarbital (PB)  Phenytoin (PHT-Dilantin)  Pregabalin (PGB-Lyrica)  Primidone (PRM)   Retigabine (RTG- Potiga) Discontinued in 2017  Rufinamide (RFN-Benzil)  Stiripentol (STP-Diacomit)  Tiagabine (TGB-Gabitril)  Topiramate (TPM-Topamax)  Valproate (VPA-Depakote)  Vigabatrin (VGB-Sabril)  Zonisamide (ZNS-Zonegran)    Plan:       GRAND MAL-FOCAL EPILEPSY, IDIOPATHIC, WELL-CONTROLED ON PHT       The patient was encouraged to maintain full traditional seizure precautions which include but not limited to avoid driving, avoid high altitudes, avoid being close to fire or fire source, avoid being  close to a body of water or swimming alone, avoid operating heavy machinery and avoid using sharp objects if possible. The patient was encouraged to shower (without accumulation of water) instead of taking a bath if unsupervised. The patient was made aware that these precautions are especially important during concurrent illness. Adequate sleep and avoidance of alcohol as important measures to assure good seizure control were discussed with the patient. The patient was also advised not to care for children without company. The patient was advised to pad the side rails with pillows and blankets if applicable and sleep as close to the floor as possible. I strongly recommended lowering the bed to the floor level to decrease the risk of falls. I also instructed the patient to avoid safety sensitive duties. In general, any activity that requires full awareness and would result in serious injury to self and others if a seizure occurs should be avoided. The patient verbalized full understanding.    The patient has been seizure-free for >10 years, compliant with regimen with therapeutic AED level. The patient meets the legal criteria to resume driving.I explained to the patient that the risk of breakthrough seizures will ALWAYS be there. I instructed the patient to avoid alcohol, get enough sleep and be complaint with AED regimen. I instructed the patient to avoid driving if AED was skipped, if drank alcohol, sleep-deprived or not feeling well. The patient verbalized full understanding.       Continue  mg QD (#3 of 100 mg capsules). The patient does not want to change his regimen at all. Insurance would not cover  mg capsules!!!    Check PHT level.      Continue AEDs INDEFINITELY, FOR GOOD AND NEVER SKIP A DOSE. The patient verbalized full understanding. Stressed the extreme medical, personal safety, public safety and legal importance of compliance and seizure control. Will give as many refills as possible  with or without face-to-face evaluation to make sure the patient and any patient with epilepsy will never run out of medications. Running out of seizure medications should never happen under our care. I explained to the patient that he should not, under any circumstances, adjust or stop taking his seizure medication without discussing with me. The patient verbalized full understanding.     AVOID any substance that could lower seizure threshold including but not limited to:      ALCOHOL AND WITHDRAWAL      TRAMADOL.     DEMEROL      ALL STIMULANTS-ALL ADHD MEDICATIONS.      CLOZAPINE.      BUPROPION.     CIPROFLOXACIN      RT ARM SKIN TEAR    Follow up with PCP for management.     Decline Dermatology referral              MEDICAL/SURGICAL COMORBIDITIES     All relevant medical comorbidities noted and managed by primary care physician and medical care team.          MISCELLANEOUS MEDICAL PROBLEMS       HEALTHY LIFESTYLE AND PREVENTATIVE CARE    Encouraged the patient to adhere to the age-appropriate health maintenance guidelines including screening tests and vaccinations.     Discussed the overall importance of healthy lifestyle, optimal weight, exercise, healthy diet, good sleep hygiene and avoiding drugs including smoking, alcohol and recreational drugs. The patient verbalized full understanding.       Advised the patient to follow COVID-19 prevention measures.       I spent a total of 30 minutes on the day of the visit.This includes face to face time and non-face to face time preparing to see the patient (eg, review of tests), obtaining and/or reviewing separately obtained history, documenting clinical information in the electronic or other health record, independently interpreting results and communicating results to the patient/family/caregiver, or care coordinator.        RTC annually        Amelie Whitaker, MSN NP      Collaborating Provider: Zain Morataya MD, FAAN Neurologist/Epileptologist

## 2025-07-03 LAB
PHENYTOIN FREE SERPL-MCNC: 1.4 MCG/ML (ref 1–2)
PHENYTOIN SERPL-MCNC: 16.2 MCG/ML (ref 10–20)

## 2025-07-07 ENCOUNTER — RESULTS FOLLOW-UP (OUTPATIENT)
Dept: NEUROLOGY | Facility: CLINIC | Age: 80
End: 2025-07-07

## 2025-07-28 NOTE — PROGRESS NOTES
Chronic Pain Note     Referring Physician: No ref. provider found    PCP: Zeke Lamb MD    Chief Complaint:   Chief Complaint   Patient presents with    Follow-up        SUBJECTIVE:  Interval History (8/4/2025):  Patient Humberto Carlson presents today for follow-up visit.  Patient is being seen for follow up of low back pain. No radiculopathy. Pain today is 0/10. Pain is worse with prolonged activity. He has been using voltaren gel as needed with some relief. He does not want procedures because he states a surgeon told him not to let anyone do surgery on his back.  Patient denies night fever/night sweats, urinary incontinence, bowel incontinence, significant weight loss and significant motor weakness.   Patient denies any other complaints or concerns at this time.      Interval History (4/2/2025):  Patient Humberto Carlson presents today for follow-up visit.  Patient is being seen for follow up of low back pain. Pain is primarily axial and worse with prolonged standing and walking. He also has some right hip pain. He denies radiating symptoms into the legs. He rates his pain 0/10 today but it will go up to 8/10 depending on his activities. 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  He has been taking tumeric with some relief.  Patient denies night fever/night sweats, urinary incontinence, bowel incontinence, significant weight loss and significant motor weakness.   Patient denies any other complaints or concerns at this time.  '    2/5/2025  Humberto Carlson is a 80 y.o. male who presents to the clinic for the evaluation of back and hip pain.  He was referred by Orthopedics for further evaluation and management of this pain.  He has a past medical history of seizure disorder, subdural hematoma history, depression, COPD, hyperlipidemia, hypertension, CAD, atrial flutter, lung cancer history, prediabetes, multiple other medical comorbidities as  listed in his chart.  The pain started 2 years ago and symptoms have been worsening.The pain is located in the lumbosacral area and radiates to the right hip.  The pain is described as sharp, stabbing, aching and is rated as 3/10. The pain is rated with a score of  3/10 on the BEST day and a score of 10/10 on the WORST day.  Symptoms interfere with daily activity. The pain is exacerbated by activity.  The pain is mitigated by NSAIDs.     Patient denies night fever/night sweats, urinary incontinence, bowel incontinence, significant weight loss, significant motor weakness, and loss of sensations.    Pain Disability Index Review:         8/4/2025    10:09 AM 4/2/2025    10:07 AM 2/5/2025     9:04 AM   Last 3 PDI Scores   Pain Disability Index (PDI) 0 1 18       Non-Pharmacologic Treatments:  Physical Therapy/Home Exercise: yes, last year  Ice/Heat:yes  TENS: no  Acupuncture: no  Massage: no  Chiropractic: no    Other: no      Pain Medications:  - Opioids:  Norco  - Adjuvant Medications:  Naproxen, Zoloft  - Anti-Coagulants: Aspirin     report:  Reviewed and consistent with medication use as prescribed.    Pain Procedures:   Denies      Imaging:   X-ray bilateral hips 10/28/2024:  No evidence of acute fracture or dislocation. Mild joint space narrowing sclerosis of the bilateral hips. Osseous demineralization. Marked atherosclerotic disease. Lumbosacral spondylosis.     X-ray lumbar spine 09/24/2020:  There is mild dextroscoliosis of the upper thoracic spine. Vertebral body heights appear within normal limits. There is mild grade 1 anterolisthesis of L4 on L5. There is moderate disc height loss at L2-3 and L4-5. Mild asymmetric disc height loss at L3-4. Facet arthropathy demonstrated at L4-5 and L5-S1. No pars defects visualized. No fractures visualized. The remaining visualized osseous and soft tissue structures demonstrate no appreciable abnormality.     Past Medical History:   Diagnosis Date    Acute bronchitis  12/02/2019    Anticoagulant long-term use     Asthma     Atrial flutter     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    Cancer     Cataract     Chronic obstructive pulmonary disease 05/24/2021    Coronary artery disease of native artery of native heart with stable angina pectoris 10/26/2020    Encounter for blood transfusion     Essential hypertension 07/22/2019    GERD (gastroesophageal reflux disease)     Glaucoma     Hyperlipidemia     Major depression in partial remission 05/02/2022    Seizures      Past Surgical History:   Procedure Laterality Date    ABLATION, ATRIAL FLUTTER, TYPICAL N/A 8/31/2023    Procedure: Ablation, Atrial Flutter, Typical;  Surgeon: Liam Wiggins MD;  Location: [facility] EP LAB;  Service: Cardiology;  Laterality: N/A;  AFL, RFA, BECKY, ANUM (cx if SR), BENJI benz, 3 Prep    BRAIN SURGERY      CARPAL TUNNEL RELEASE      EYE SURGERY      LUNG SURGERY      Partial lung removed Left     REPAIR,BROW PTOSIS      toe nail removal      TRANSESOPHAGEAL ECHOCARDIOGRAM WITH POSSIBLE CARDIOVERSION (ANUM W/ POSS CARDIOVERSION) N/A 6/28/2023    Procedure: Transesophageal echo (ANUM) intra-procedure log documentation;  Surgeon: Gallo Carbajal MD;  Location: [facility] CATH LAB;  Service: Cardiology;  Laterality: N/A;     Social History     Socioeconomic History    Marital status:    Tobacco Use    Smoking status: Every Day     Current packs/day: 2.00     Average packs/day: 2.0 packs/day for 55.6 years (111.2 ttl pk-yrs)     Types: Cigarettes     Start date: 1970     Passive exposure: Past    Smokeless tobacco: Never   Substance and Sexual Activity    Alcohol use: No    Drug use: No    Sexual activity: Not Currently     Partners: Female     Social Drivers of Health     Financial Resource Strain: Low Risk  (6/9/2025)    Overall Financial Resource Strain (CARDIA)     Difficulty of Paying Living Expenses: Not hard at all   Food Insecurity: No Food Insecurity (6/9/2025)     Hunger Vital Sign     Worried About Running Out of Food in the Last Year: Never true     Ran Out of Food in the Last Year: Never true   Transportation Needs: No Transportation Needs (6/9/2025)    PRAPARE - Transportation     Lack of Transportation (Medical): No     Lack of Transportation (Non-Medical): No   Physical Activity: Inactive (6/9/2025)    Exercise Vital Sign     Days of Exercise per Week: 0 days     Minutes of Exercise per Session: 0 min   Stress: Stress Concern Present (6/9/2025)    [employer] - Occupational Stress Questionnaire     Feeling of Stress : Very much   Housing Stability: Low Risk  (6/9/2025)    Housing Stability Vital Sign     Unable to Pay for Housing in the Last Year: No     Number of Times Moved in the Last Year: 0     Homeless in the Last Year: No     Family History   Problem Relation Name Age of Onset    Stroke Mother      Heart attack Father      Coronary artery disease Brother      Valvular heart disease Brother      Stroke Paternal Grandfather         Review of patient's allergies indicates:   Allergen Reactions    Wasp sting  [allergen ext-venom-honey bee]      Other reaction(s): swelling    Yellow Jackets as well       Current Outpatient Medications   Medication Sig    albuterol (PROVENTIL/VENTOLIN HFA) 90 mcg/actuation inhaler Rescue    aspirin (ECOTRIN) 81 MG EC tablet Take 1 tablet (81 mg total) by mouth once daily.    atorvastatin (LIPITOR) 40 MG tablet Take 1 tablet (40 mg total) by mouth once daily.    brimonidine-timoloL (COMBIGAN) 0.2-0.5 % Drop Inject 1 drop into the eye Twice daily.    dorzolamide (TRUSOPT) 2 % ophthalmic solution Place 1 drop into the left eye 2 (two) times daily.    ezetimibe (ZETIA) 10 mg tablet Take 1 tablet (10 mg total) by mouth every evening.    fluticasone propionate (FLONASE) 50 mcg/actuation nasal spray 1 SPRAY IN EACH NOSTRIL ONCE DAILY.    latanoprost 0.005 % ophthalmic solution     metoprolol succinate (TOPROL-XL)  "25 MG 24 hr tablet Take 0.5 tablets (12.5 mg total) by mouth once daily.    naproxen sodium (ANAPROX) 220 MG tablet Take 220 mg by mouth.    pantoprazole (PROTONIX) 20 MG tablet Take 1 tablet (20 mg total) by mouth once daily.    phenytoin (DILANTIN) 100 MG ER capsule Take 3 capsules (300 mg total) by mouth once daily. for 365 doses    sacubitriL-valsartan (ENTRESTO) 24-26 mg per tablet Take 1 tablet by mouth 2 (two) times daily.    sertraline (ZOLOFT) 50 MG tablet Take 1 tablet (50 mg total) by mouth once daily.    spironolactone (ALDACTONE) 25 MG tablet TAKE 1 TABLET BY MOUTH ONCE A DAY    diclofenac sodium (VOLTAREN) 1 % Gel Apply 2 g topically 4 (four) times daily as needed.    EPINEPHrine (EPIPEN) 0.3 mg/0.3 mL AtIn Inject 0.3 mLs (0.3 mg total) into the muscle once as needed (allergic reaction).     No current facility-administered medications for this visit.       Review of Systems     GENERAL:  No weight loss, malaise or fevers.  HEENT:   No recent changes in vision or hearing  NECK:  Negative for lumps, no difficulty with swallowing.  RESPIRATORY:  Negative for cough, wheezing or shortness of breath, patient denies any recent URI.  CARDIOVASCULAR:  Negative for chest pain, leg swelling or palpitations.  GI:  Negative for abdominal discomfort, blood in stools or black stools or change in bowel habits.  MUSCULOSKELETAL:  See HPI.  SKIN:  Negative for lesions, rash, and itching.  PSYCH:  No mood disorder or recent psychosocial stressors.  Patients sleep is not disturbed secondary to pain.  HEMATOLOGY/LYMPHOLOGY:  Negative for prolonged bleeding, bruising easily or swollen nodes.  Patient is currently taking anti-coagulants  NEURO:   No history of headaches, syncope, paralysis, seizures or tremors.  All other reviewed and negative other than HPI.    OBJECTIVE:    BP (!) 109/53   Pulse (!) 56   Resp 17   Ht 5' 9" (1.753 m)   Wt 60.6 kg (133 lb 11.3 oz)   BMI 19.75 kg/m²         Physical Exam    GENERAL: " Well appearing, in no acute distress, alert and oriented x3.  PSYCH:  Mood and affect appropriate.  SKIN: Skin color, texture, turgor normal, no rashes or lesions.  HEAD/FACE:  Normocephalic, atraumatic. Cranial nerves grossly intact.  CV: RRR with palpation of the radial artery.  PULM: No evidence of respiratory difficulty, symmetric chest rise.  GI:  Soft and non-tender.  BACK: Straight leg raising in the sitting and supine positions is negative to radicular pain.  Mild pain to palpation over the facet joints of the lumbar spine or spinous processes.  Limited range of motion without significant pain reproduction. Directional preference:  Flexion. Axial loading positive bilaterally.  EXTREMITIES: No deformities, edema, or skin discoloration. Good capillary refill.  MUSCULOSKELETAL:  Hip and knee provocative maneuvers are unremarkable.  There is minimal pain with palpation over the sacroiliac joints bilaterally.  FABERs test is equivocal.  FADIRs test is negative.   Bilateral upper and lower extremity strength is normal and symmetric.  No atrophy or tone abnormalities are noted.  NEURO: Bilateral upper and lower extremity coordination and muscle stretch reflexes are physiologic and symmetric.  Plantar response are downgoing. No clonus.  No loss of sensation is noted.  GAIT:  Slow, antalgic.      LABS:  Lab Results   Component Value Date    WBC 7.98 03/27/2025    HGB 13.1 (L) 03/27/2025    HCT 40.3 03/27/2025    MCV 92 03/27/2025     03/27/2025       CMP  Sodium   Date Value Ref Range Status   03/27/2025 139 136 - 145 mmol/L Final   09/27/2024 138 136 - 145 mmol/L Final     Potassium   Date Value Ref Range Status   03/27/2025 4.8 3.5 - 5.1 mmol/L Final   09/27/2024 4.7 3.5 - 5.1 mmol/L Final     Chloride   Date Value Ref Range Status   03/27/2025 102 95 - 110 mmol/L Final   09/27/2024 104 95 - 110 mmol/L Final     CO2   Date Value Ref Range Status   03/27/2025 28 23 - 29 mmol/L Final   09/27/2024 26 23 - 29  mmol/L Final     Glucose   Date Value Ref Range Status   03/27/2025 110 70 - 110 mg/dL Final   09/27/2024 97 70 - 110 mg/dL Final     BUN   Date Value Ref Range Status   03/27/2025 12 8 - 23 mg/dL Final     Creatinine   Date Value Ref Range Status   03/27/2025 0.8 0.5 - 1.4 mg/dL Final     Calcium   Date Value Ref Range Status   03/27/2025 8.9 8.7 - 10.5 mg/dL Final   09/27/2024 9.1 8.7 - 10.5 mg/dL Final     Protein Total   Date Value Ref Range Status   03/27/2025 7.1 6.0 - 8.4 gm/dL Final     Total Protein   Date Value Ref Range Status   09/27/2024 6.7 6.0 - 8.4 g/dL Final     Albumin   Date Value Ref Range Status   03/27/2025 4.1 3.5 - 5.2 g/dL Final   09/27/2024 4.0 3.5 - 5.2 g/dL Final     Total Bilirubin   Date Value Ref Range Status   09/27/2024 0.4 0.1 - 1.0 mg/dL Final     Comment:     For infants and newborns, interpretation of results should be based  on gestational age, weight and in agreement with clinical  observations.    Premature Infant recommended reference ranges:  Up to 24 hours.............<8.0 mg/dL  Up to 48 hours............<12.0 mg/dL  3-5 days..................<15.0 mg/dL  6-29 days.................<15.0 mg/dL       Bilirubin Total   Date Value Ref Range Status   03/27/2025 0.4 0.1 - 1.0 mg/dL Final     Comment:     For infants and newborns, interpretation of results should be based   on gestational age, weight and in agreement with clinical   observations.    Premature Infant recommended reference ranges:   0-24 hours:  <8.0 mg/dL   24-48 hours: <12.0 mg/dL   3-5 days:    <15.0 mg/dL   6-29 days:   <15.0 mg/dL     Alkaline Phosphatase   Date Value Ref Range Status   09/27/2024 50 (L) 55 - 135 U/L Final     ALP   Date Value Ref Range Status   03/27/2025 46 40 - 150 unit/L Final     AST   Date Value Ref Range Status   03/27/2025 22 11 - 45 unit/L Final   09/27/2024 25 10 - 40 U/L Final     ALT   Date Value Ref Range Status   03/27/2025 16 10 - 44 unit/L Final   09/27/2024 16 10 - 44 U/L  Final     Anion Gap   Date Value Ref Range Status   03/27/2025 9 8 - 16 mmol/L Final     eGFR if    Date Value Ref Range Status   09/30/2021 >60.0 >60 mL/min/1.73 m^2 Final     eGFR if non    Date Value Ref Range Status   09/30/2021 >60.0 >60 mL/min/1.73 m^2 Final     Comment:     Calculation used to obtain the estimated glomerular filtration  rate (eGFR) is the CKD-EPI equation.          Lab Results   Component Value Date    HGBA1C 5.6 09/27/2024             ASSESSMENT: 80 y.o. year old male with lower back and hip pain, consistent with     1. Lumbar spondylosis        2. Chronic low back pain, unspecified back pain laterality, unspecified whether sciatica present        3. Arthritis of both hips                  PLAN:   - Interventions: Consider L3-5 diagnostic medial branch blocks bilaterally if conservative measures fail..- declines procedures at this time    Pt would like to refrain from any injections at this time    - Anticoagulation use: Patient is taking ASA as 1° prevention      - Medications: I have stressed the importance of physical activity and a home exercise plan to help with pain and improve health. and Patient can continue with medications for now since they are providing benefits, using them appropriately, and without side effects.     Discussed alternative turmeric supplementation options    Rx for voltaren gel given- refill provided              - Therapy:  Providing home exercises for patient to into his weekly routine    - Psychological:  Discussed coping mechanisms to help address chronic pain issues    - Labs:  Reviewed    - Imaging: Reviewed available imaging with patient and answered any questions they had regarding study.    - Consults/Referrals:  None at this time    - Records:  Reviewed/Obtain old records from outside physicians and imaging    - Follow up visit: return to clinic in 6 months- pt request  Visit today included increased complexity associated  with the care of the episodic problem of chronic pain which was addressed and continue to manage the longitudinal care of the patient due to the serious and/or complex managed problem(s) listed above.    - Counseled patient regarding the importance of activity modification and physical therapy    - This condition does not require this patient to take time off of work, and the primary goal of our Pain Management services is to improve the patient's functional capacity.    - Patient Questions: Answered all of the patient's questions regarding diagnosis, therapy, and treatment        The above plan and management options were discussed at length with patient. Patient is in agreement with the above and verbalized understanding.    I discussed the goals of interventional chronic pain management with the patient on today's visit.  I explained the utility of injections for diagnostic and therapeutic purposes.  We discussed a multimodal approach to pain including treating the patient's given worst pain at any given time.  We will use a systematic approach to addressing pain.  We will also adopt a multimodal approach that includes injections, adjuvant medications, physical therapy, at times psychiatry.  There may be a limited role for opioid use intermittently in the treatment of pain, more particularly for acute pain although no one approach can be used as a sole treatment modality.    I emphasized the importance of regular exercise, core strengthening and stretching, diet and weight loss as a cornerstone of long-term pain management.      Visit today included increased complexity associated with the care of the episodic problem of chronic pain which was addressed and continue to manage the longitudinal care of the patient due to the serious and/or complex managed problem(s) listed above.      Nella Alegria NP  Interventional Pain Management  [facility]    Disclaimer:  This note was prepared using voice  recognition system and is likely to have sound alike errors that may have been overlooked even after proof reading.  Please call me with any questions

## 2025-07-31 ENCOUNTER — EXTERNAL CHRONIC CARE MANAGEMENT (OUTPATIENT)
Dept: PRIMARY CARE CLINIC | Facility: CLINIC | Age: 80
End: 2025-07-31
Payer: MEDICARE

## 2025-07-31 PROCEDURE — 99490 CHRNC CARE MGMT STAFF 1ST 20: CPT | Mod: PBBFAC | Performed by: FAMILY MEDICINE

## 2025-07-31 PROCEDURE — 99490 CHRNC CARE MGMT STAFF 1ST 20: CPT | Mod: S$PBB,,, | Performed by: FAMILY MEDICINE

## 2025-08-04 ENCOUNTER — OFFICE VISIT (OUTPATIENT)
Dept: PAIN MEDICINE | Facility: CLINIC | Age: 80
End: 2025-08-04
Payer: MEDICARE

## 2025-08-04 VITALS
SYSTOLIC BLOOD PRESSURE: 109 MMHG | WEIGHT: 133.69 LBS | HEIGHT: 69 IN | BODY MASS INDEX: 19.8 KG/M2 | HEART RATE: 56 BPM | RESPIRATION RATE: 17 BRPM | DIASTOLIC BLOOD PRESSURE: 53 MMHG

## 2025-08-04 DIAGNOSIS — M47.816 LUMBAR SPONDYLOSIS: Primary | ICD-10-CM

## 2025-08-04 DIAGNOSIS — M16.0 ARTHRITIS OF BOTH HIPS: ICD-10-CM

## 2025-08-04 DIAGNOSIS — M54.50 CHRONIC LOW BACK PAIN, UNSPECIFIED BACK PAIN LATERALITY, UNSPECIFIED WHETHER SCIATICA PRESENT: ICD-10-CM

## 2025-08-04 DIAGNOSIS — G89.29 CHRONIC LOW BACK PAIN, UNSPECIFIED BACK PAIN LATERALITY, UNSPECIFIED WHETHER SCIATICA PRESENT: ICD-10-CM

## 2025-08-04 PROCEDURE — G2211 COMPLEX E/M VISIT ADD ON: HCPCS | Mod: ,,, | Performed by: NURSE PRACTITIONER

## 2025-08-04 PROCEDURE — 99213 OFFICE O/P EST LOW 20 MIN: CPT | Mod: S$PBB,,, | Performed by: NURSE PRACTITIONER

## 2025-08-04 PROCEDURE — 99214 OFFICE O/P EST MOD 30 MIN: CPT | Mod: PBBFAC | Performed by: NURSE PRACTITIONER

## 2025-08-04 PROCEDURE — 99999 PR PBB SHADOW E&M-EST. PATIENT-LVL IV: CPT | Mod: PBBFAC,,, | Performed by: NURSE PRACTITIONER

## 2025-08-04 RX ORDER — DICLOFENAC SODIUM 10 MG/G
2 GEL TOPICAL 4 TIMES DAILY PRN
Qty: 200 G | Refills: 2 | Status: SHIPPED | OUTPATIENT
Start: 2025-08-04 | End: 2025-09-03

## 2025-08-22 ENCOUNTER — OFFICE VISIT (OUTPATIENT)
Dept: DERMATOLOGY | Facility: CLINIC | Age: 80
End: 2025-08-22
Payer: MEDICARE

## 2025-08-22 ENCOUNTER — OFFICE VISIT (OUTPATIENT)
Dept: INTERNAL MEDICINE | Facility: CLINIC | Age: 80
End: 2025-08-22
Payer: MEDICARE

## 2025-08-22 ENCOUNTER — LAB VISIT (OUTPATIENT)
Dept: LAB | Facility: HOSPITAL | Age: 80
End: 2025-08-22
Attending: FAMILY MEDICINE
Payer: MEDICARE

## 2025-08-22 ENCOUNTER — TELEPHONE (OUTPATIENT)
Dept: INTERNAL MEDICINE | Facility: CLINIC | Age: 80
End: 2025-08-22

## 2025-08-22 VITALS
RESPIRATION RATE: 18 BRPM | OXYGEN SATURATION: 96 % | BODY MASS INDEX: 20.23 KG/M2 | TEMPERATURE: 98 F | HEART RATE: 71 BPM | WEIGHT: 137 LBS | DIASTOLIC BLOOD PRESSURE: 66 MMHG | SYSTOLIC BLOOD PRESSURE: 120 MMHG

## 2025-08-22 DIAGNOSIS — E78.5 HYPERLIPIDEMIA, UNSPECIFIED HYPERLIPIDEMIA TYPE: ICD-10-CM

## 2025-08-22 DIAGNOSIS — J44.89 ASTHMA-COPD OVERLAP SYNDROME: ICD-10-CM

## 2025-08-22 DIAGNOSIS — G40.909 SEIZURE DISORDER: ICD-10-CM

## 2025-08-22 DIAGNOSIS — D48.5 NEOPLASM OF UNCERTAIN BEHAVIOR OF SKIN: Primary | ICD-10-CM

## 2025-08-22 DIAGNOSIS — I25.118 CORONARY ARTERY DISEASE OF NATIVE ARTERY OF NATIVE HEART WITH STABLE ANGINA PECTORIS: ICD-10-CM

## 2025-08-22 DIAGNOSIS — I10 ESSENTIAL HYPERTENSION: ICD-10-CM

## 2025-08-22 DIAGNOSIS — Z79.899 ENCOUNTER FOR LONG-TERM (CURRENT) USE OF MEDICATIONS: ICD-10-CM

## 2025-08-22 DIAGNOSIS — J44.9 CHRONIC OBSTRUCTIVE PULMONARY DISEASE, UNSPECIFIED COPD TYPE: ICD-10-CM

## 2025-08-22 DIAGNOSIS — D64.9 LOW HEMOGLOBIN: ICD-10-CM

## 2025-08-22 DIAGNOSIS — F32.A DEPRESSION, UNSPECIFIED DEPRESSION TYPE: ICD-10-CM

## 2025-08-22 DIAGNOSIS — M47.816 LUMBAR SPONDYLOSIS: ICD-10-CM

## 2025-08-22 DIAGNOSIS — L98.9 SKIN LESION: Primary | ICD-10-CM

## 2025-08-22 LAB
ABSOLUTE EOSINOPHIL (OHS): 1.16 K/UL
ABSOLUTE MONOCYTE (OHS): 0.53 K/UL (ref 0.3–1)
ABSOLUTE NEUTROPHIL COUNT (OHS): 3.03 K/UL (ref 1.8–7.7)
ALBUMIN SERPL BCP-MCNC: 4 G/DL (ref 3.5–5.2)
ALP SERPL-CCNC: 42 UNIT/L (ref 40–150)
ALT SERPL W/O P-5'-P-CCNC: 19 UNIT/L (ref 0–55)
ANION GAP (OHS): 8 MMOL/L (ref 8–16)
AST SERPL-CCNC: 23 UNIT/L (ref 0–50)
BASOPHILS # BLD AUTO: 0.07 K/UL
BASOPHILS NFR BLD AUTO: 0.9 %
BILIRUB SERPL-MCNC: 0.3 MG/DL (ref 0.1–1)
BUN SERPL-MCNC: 9 MG/DL (ref 8–23)
CALCIUM SERPL-MCNC: 8.8 MG/DL (ref 8.7–10.5)
CHLORIDE SERPL-SCNC: 104 MMOL/L (ref 95–110)
CO2 SERPL-SCNC: 27 MMOL/L (ref 23–29)
CREAT SERPL-MCNC: 0.8 MG/DL (ref 0.5–1.4)
EAG (OHS): 123 MG/DL (ref 68–131)
ERYTHROCYTE [DISTWIDTH] IN BLOOD BY AUTOMATED COUNT: 13.4 % (ref 11.5–14.5)
GFR SERPLBLD CREATININE-BSD FMLA CKD-EPI: >60 ML/MIN/1.73/M2
GLUCOSE SERPL-MCNC: 86 MG/DL (ref 70–110)
HBA1C MFR BLD: 5.9 % (ref 4–5.6)
HCT VFR BLD AUTO: 37.9 % (ref 40–54)
HGB BLD-MCNC: 12.3 GM/DL (ref 14–18)
IMM GRANULOCYTES # BLD AUTO: 0.01 K/UL (ref 0–0.04)
IMM GRANULOCYTES NFR BLD AUTO: 0.1 % (ref 0–0.5)
LYMPHOCYTES # BLD AUTO: 2.77 K/UL (ref 1–4.8)
MCH RBC QN AUTO: 30.1 PG (ref 27–31)
MCHC RBC AUTO-ENTMCNC: 32.5 G/DL (ref 32–36)
MCV RBC AUTO: 93 FL (ref 82–98)
NUCLEATED RBC (/100WBC) (OHS): 0 /100 WBC
PLATELET # BLD AUTO: 290 K/UL (ref 150–450)
PMV BLD AUTO: 10.6 FL (ref 9.2–12.9)
POTASSIUM SERPL-SCNC: 4.5 MMOL/L (ref 3.5–5.1)
PROT SERPL-MCNC: 6.6 GM/DL (ref 6–8.4)
RBC # BLD AUTO: 4.09 M/UL (ref 4.6–6.2)
RELATIVE EOSINOPHIL (OHS): 15.3 %
RELATIVE LYMPHOCYTE (OHS): 36.6 % (ref 18–48)
RELATIVE MONOCYTE (OHS): 7 % (ref 4–15)
RELATIVE NEUTROPHIL (OHS): 40.1 % (ref 38–73)
SODIUM SERPL-SCNC: 139 MMOL/L (ref 136–145)
TSH SERPL-ACNC: 1.97 UIU/ML (ref 0.4–4)
WBC # BLD AUTO: 7.57 K/UL (ref 3.9–12.7)

## 2025-08-22 PROCEDURE — 99213 OFFICE O/P EST LOW 20 MIN: CPT | Mod: PBBFAC,27 | Performed by: STUDENT IN AN ORGANIZED HEALTH CARE EDUCATION/TRAINING PROGRAM

## 2025-08-22 PROCEDURE — 85025 COMPLETE CBC W/AUTO DIFF WBC: CPT

## 2025-08-22 PROCEDURE — 99999 PR PBB SHADOW E&M-EST. PATIENT-LVL IV: CPT | Mod: PBBFAC,,, | Performed by: FAMILY MEDICINE

## 2025-08-22 PROCEDURE — 99214 OFFICE O/P EST MOD 30 MIN: CPT | Mod: PBBFAC | Performed by: FAMILY MEDICINE

## 2025-08-22 PROCEDURE — 83036 HEMOGLOBIN GLYCOSYLATED A1C: CPT

## 2025-08-22 PROCEDURE — 36415 COLL VENOUS BLD VENIPUNCTURE: CPT

## 2025-08-22 PROCEDURE — 80053 COMPREHEN METABOLIC PANEL: CPT

## 2025-08-22 PROCEDURE — 99999 PR PBB SHADOW E&M-EST. PATIENT-LVL III: CPT | Mod: PBBFAC,,, | Performed by: STUDENT IN AN ORGANIZED HEALTH CARE EDUCATION/TRAINING PROGRAM

## 2025-08-22 PROCEDURE — 84443 ASSAY THYROID STIM HORMONE: CPT

## 2025-08-22 RX ORDER — ALBUTEROL SULFATE 90 UG/1
1 INHALANT RESPIRATORY (INHALATION) EVERY 4 HOURS PRN
Qty: 25.5 G | Refills: 3 | Status: SHIPPED | OUTPATIENT
Start: 2025-08-22

## 2025-08-22 RX ORDER — SERTRALINE HYDROCHLORIDE 50 MG/1
50 TABLET, FILM COATED ORAL DAILY
Qty: 90 TABLET | Refills: 3 | Status: SHIPPED | OUTPATIENT
Start: 2025-08-22

## 2025-08-22 RX ORDER — ALBUTEROL SULFATE 90 UG/1
INHALANT RESPIRATORY (INHALATION)
Qty: 25.5 G | Refills: 3 | Status: SHIPPED | OUTPATIENT
Start: 2025-08-22 | End: 2025-08-22

## 2025-08-26 ENCOUNTER — TELEPHONE (OUTPATIENT)
Dept: DERMATOLOGY | Facility: CLINIC | Age: 80
End: 2025-08-26
Payer: MEDICARE

## 2025-08-27 ENCOUNTER — TELEPHONE (OUTPATIENT)
Dept: DERMATOLOGY | Facility: CLINIC | Age: 80
End: 2025-08-27
Payer: MEDICARE

## 2025-08-27 ENCOUNTER — PATIENT MESSAGE (OUTPATIENT)
Dept: DERMATOLOGY | Facility: CLINIC | Age: 80
End: 2025-08-27
Payer: MEDICARE

## (undated) DEVICE — PACK EP DRAPE OMC

## (undated) DEVICE — R CATH BIDIRECTIONL DF CRV 7FR

## (undated) DEVICE — KIT PROBE COVER WITH GEL

## (undated) DEVICE — PAD GROUND UNIV STYLE CORD 9IN

## (undated) DEVICE — INTRO 8.5FR 63CM SRO

## (undated) DEVICE — PAD DEFIB CADENCE ADULT R2

## (undated) DEVICE — PAD RADIOLUCENT STAT ADULT

## (undated) DEVICE — KIT ENSITE ELECTRODE SURFACE

## (undated) DEVICE — INTRODUCER HEMOSTASIS 7.5F

## (undated) DEVICE — Device

## (undated) DEVICE — R CATH TRICSPD HALO XP 7FRX110